# Patient Record
Sex: FEMALE | Race: WHITE | NOT HISPANIC OR LATINO | Employment: OTHER | URBAN - METROPOLITAN AREA
[De-identification: names, ages, dates, MRNs, and addresses within clinical notes are randomized per-mention and may not be internally consistent; named-entity substitution may affect disease eponyms.]

---

## 2018-06-18 ENCOUNTER — OFFICE VISIT (OUTPATIENT)
Dept: SPINE | Facility: CLINIC | Age: 78
End: 2018-06-18
Payer: MEDICARE

## 2018-06-18 VITALS
WEIGHT: 254.31 LBS | HEIGHT: 69 IN | SYSTOLIC BLOOD PRESSURE: 120 MMHG | DIASTOLIC BLOOD PRESSURE: 66 MMHG | HEART RATE: 68 BPM | BODY MASS INDEX: 37.67 KG/M2

## 2018-06-18 DIAGNOSIS — Z98.890 H/O CERVICAL SPINE SURGERY: Primary | ICD-10-CM

## 2018-06-18 DIAGNOSIS — M54.12 CERVICAL RADICULOPATHY: ICD-10-CM

## 2018-06-18 PROCEDURE — 99999 PR PBB SHADOW E&M-NEW PATIENT-LVL IV: CPT | Mod: PBBFAC,,, | Performed by: PHYSICIAN ASSISTANT

## 2018-06-18 PROCEDURE — 99203 OFFICE O/P NEW LOW 30 MIN: CPT | Mod: S$PBB,,, | Performed by: PHYSICIAN ASSISTANT

## 2018-06-18 PROCEDURE — 99204 OFFICE O/P NEW MOD 45 MIN: CPT | Mod: PBBFAC,PN | Performed by: PHYSICIAN ASSISTANT

## 2018-06-18 RX ORDER — TIZANIDINE 4 MG/1
4 TABLET ORAL EVERY 8 HOURS PRN
Qty: 40 TABLET | Refills: 0 | Status: SHIPPED | OUTPATIENT
Start: 2018-06-18 | End: 2018-07-16 | Stop reason: SDUPTHER

## 2018-06-18 RX ORDER — METHYLPREDNISOLONE 4 MG/1
TABLET ORAL
Qty: 1 PACKAGE | Refills: 0 | Status: SHIPPED | OUTPATIENT
Start: 2018-06-18 | End: 2018-07-09

## 2018-06-18 NOTE — LETTER
June 19, 2018      Adriana Gunter MD  [address] [facility]  [facility] [address]           [place] [facility]  [address] [facility] [address]  [facility] [address]  Phone: [phone]  Fax: [phone]          Patient: Selena Moulton   MR Number: [id number]   YOB: 1940   Date of Visit: 6/18/2018       Dear Dr. Adriana Gunter:    Thank you for referring Selena Moulton to me for evaluation. Attached you will find relevant portions of my assessment and plan of care.    If you have questions, please do not hesitate to call me. I look forward to following Selena Moulton along with you.    Sincerely,    Lynnette Chavarria PA-C    Enclosure  CC:  No Recipients    If you would like to receive this communication electronically, please contact [email or web address] or [phone] to request more information on [employer] Link access.    For providers and/or their staff who would like to refer a patient to [facility], please contact us through our one-stop-shop provider referral line, [facility], at [phone].    If you feel you have received this communication in error or would no longer like to receive these types of communications, please e-mail [email or web address]

## 2018-06-19 NOTE — PROGRESS NOTES
Neurosurgery History & Physical    Patient ID: Selena Moulton is a 77 y.o. female.    Chief Complaint   Patient presents with    Neck Pain     Has had 3 surgeries on her neck. Has had pain for one month. Pain radiates down both arms into hands and hands are numb. Pain is worse in the left hand. Nothing makes pain better. Pain comes and goes.       Review of Systems   Constitutional: Negative for activity change, appetite change, chills, fever and unexpected weight change.   HENT: Negative for tinnitus, trouble swallowing and voice change.    Respiratory: Negative for apnea, cough, chest tightness and shortness of breath.    Cardiovascular: Negative for chest pain and palpitations.   Gastrointestinal: Negative for constipation, diarrhea, nausea and vomiting.   Genitourinary: Negative for difficulty urinating, dysuria, frequency and urgency.   Musculoskeletal: Positive for myalgias. Negative for back pain, gait problem, neck pain and neck stiffness.   Skin: Negative for wound.   Neurological: Positive for numbness. Negative for dizziness, tremors, seizures, facial asymmetry, speech difficulty, weakness, light-headedness and headaches.   Psychiatric/Behavioral: Negative for confusion and decreased concentration.       Past Medical History:   Diagnosis Date    Gout     Hypertension      Social History     Social History    Marital status:      Spouse name: N/A    Number of children: N/A    Years of education: N/A     Occupational History    Not on file.     Social History Main Topics    Smoking status: Never Smoker    Smokeless tobacco: Not on file    Alcohol use Not on file    Drug use: Unknown    Sexual activity: Not on file     Other Topics Concern    Not on file     Social History Narrative    No narrative on file     No family history on file.  Review of patient's allergies indicates:   Allergen Reactions    Influenza virus vaccines     Latex, natural rubber     Morpholine analogues   "   Penicillins     Tetanus vaccines and toxoid        Current Outpatient Prescriptions:     allopurinol (ZYLOPRIM) 100 MG tablet, Take 300 mg by mouth once daily. , Disp: , Rfl:     amLODIPine (NORVASC) 5 MG tablet, Take 5 mg by mouth once daily., Disp: , Rfl:     aspirin (ECOTRIN) 81 MG EC tablet, Take 81 mg by mouth once daily., Disp: , Rfl:     atenolol (TENORMIN) 100 MG tablet, Take 100 mg by mouth once daily., Disp: , Rfl:     atorvastatin (LIPITOR) 80 MG tablet, Take 80 mg by mouth once daily., Disp: , Rfl:     fish oil-omega-3 fatty acids 300-1,000 mg capsule, Take by mouth once daily., Disp: , Rfl:     meloxicam (MOBIC) 15 MG tablet, Take 15 mg by mouth once daily., Disp: , Rfl:     methylPREDNISolone (MEDROL, ZAHIRA,) 4 mg tablet, use as directed, Disp: 1 Package, Rfl: 0    multivitamin capsule, Take 1 capsule by mouth once daily., Disp: , Rfl:     omeprazole (PRILOSEC) 20 MG capsule, Take 20 mg by mouth 2 (two) times daily., Disp: , Rfl:     tiZANidine (ZANAFLEX) 4 MG tablet, Take 1 tablet (4 mg total) by mouth every 8 (eight) hours as needed (muscle spasms)., Disp: 40 tablet, Rfl: 0    triamterene-hydrochlorothiazide 37.5-25 mg (DYAZIDE) 37.5-25 mg per capsule, Take 1 capsule by mouth every morning., Disp: , Rfl:     Vitals:    06/18/18 1308   BP: 120/66   Pulse: 68   Weight: 115.4 kg (254 lb 4.8 oz)   Height: 5' 9" (1.753 m)       Physical Exam   Constitutional: She is oriented to person, place, and time. She appears well-developed and well-nourished.   HENT:   Head: Normocephalic and atraumatic.   Eyes: Pupils are equal, round, and reactive to light.   Neck: Normal range of motion. Neck supple.   Cardiovascular: Normal rate.    Pulmonary/Chest: Effort normal.   Musculoskeletal: Normal range of motion. She exhibits no edema.   Neurological: She is alert and oriented to person, place, and time. She has a normal Finger-Nose-Finger Test, a normal Heel to Shin Test, a normal Romberg Test and a " normal Tandem Gait Test. Gait normal.   Reflex Scores:       Tricep reflexes are 2+ on the right side and 2+ on the left side.       Bicep reflexes are 2+ on the right side and 2+ on the left side.       Brachioradialis reflexes are 2+ on the right side and 2+ on the left side.       Patellar reflexes are 2+ on the right side and 2+ on the left side.       Achilles reflexes are 2+ on the right side and 2+ on the left side.  Skin: Skin is warm, dry and intact.   Psychiatric: She has a normal mood and affect. Her speech is normal and behavior is normal. Judgment and thought content normal.   Nursing note and vitals reviewed.      Neurologic Exam     Mental Status   Oriented to person, place, and time.   Oriented to person.   Oriented to place.   Oriented to time.   Follows 3 step commands.   Attention: normal. Concentration: normal.   Speech: speech is normal   Level of consciousness: alert  Knowledge: consistent with education.   Able to name object. Able to read. Able to repeat. Able to write. Normal comprehension.     Cranial Nerves     CN II   Visual acuity: normal  Right visual field deficit: none  Left visual field deficit: none     CN III, IV, VI   Pupils are equal, round, and reactive to light.  Right pupil: Size: 3 mm. Shape: regular. Reactivity: brisk. Consensual response: intact.   Left pupil: Size: 3 mm. Shape: regular. Reactivity: brisk. Consensual response: intact.   CN III: no CN III palsy  CN VI: no CN VI palsy  Nystagmus: none   Diplopia: none  Ophthalmoparesis: none  Conjugate gaze: present    CN V   Right facial sensation deficit: none  Left facial sensation deficit: none    CN VII   Right facial weakness: none  Left facial weakness: none    CN VIII   Hearing: intact    CN IX, X   CN IX normal.   CN X normal.     CN XI   Right sternocleidomastoid strength: normal  Left sternocleidomastoid strength: normal  Right trapezius strength: normal  Left trapezius strength: normal    CN XII   Fasciculations:  absent  Tongue deviation: none    Motor Exam   Muscle bulk: normal  Overall muscle tone: normal  Right arm pronator drift: absent  Left arm pronator drift: absent    Strength   Right neck flexion: 5/5  Left neck flexion: 5/5  Right neck extension: 5/5  Left neck extension: 5/5  Right deltoid: 5/5  Left deltoid: 5/5  Right biceps: 5/5  Left biceps: 5/5  Right triceps: 5/5  Left triceps: 5/5  Right wrist flexion: 5/5  Left wrist flexion: 5/5  Right wrist extension: 5/5  Left wrist extension: 5/5  Right interossei: 5/5  Left interossei: 5/5  Right abdominals: 5/5  Left abdominals: 5/  Right iliopsoas:   Left iliopsoas:   Right quadriceps:   Left quadriceps:   Right hamstrin/5  Left hamstrin/5  Right glutei:   Left glutei:   Right anterior tibial:   Left anterior tibial:   Right posterior tibial:   Left posterior tibial:   Right peroneal: 5  Left peroneal:   Right gastroc: 5/  Left gastroc:     Sensory Exam   Right arm light touch: normal  Left arm light touch: normal  Right leg light touch: normal  Left leg light touch: normal  Right arm vibration: normal  Left arm vibration: normal  Right arm pinprick: normal  Left arm pinprick: normal    Gait, Coordination, and Reflexes     Gait  Gait: normal    Coordination   Romberg: negative  Finger to nose coordination: normal  Heel to shin coordination: normal  Tandem walking coordination: normal    Tremor   Resting tremor: absent  Intention tremor: absent  Action tremor: absent    Reflexes   Right brachioradialis: 2+  Left brachioradialis: 2+  Right biceps: 2+  Left biceps: 2+  Right triceps: 2+  Left triceps: 2+  Right patellar: 2+  Left patellar: 2+  Right achilles: 2+  Left achilles: 2+  Right Cordon: absent  Left Cordon: absent  Right ankle clonus: absent  Left ankle clonus: absent      Provider dictation:  77 year old female with history of 2 prior cervical spine surgeries is referred through the [facility] ER for left arm/ hand  pain.  She recalls surgery in 1983 and 1984 of C5/7 anterior fusion and C5/6 posterior wiring fusion.  She has done well since surgery without significant complications.  Her current symptoms started 1 month ago without trauma.  She describes pain in the from the left shoulder to the left hand and numbness in the left hand digits 1,2,3 greater than 4,5.  It was initally intermittent, but is now constant.  She also has intermittent numbness in the right hand.  She has tried mobic and flexeril with some benefit.  She has not had PT or CLYDE.  Oswestry score: 40%.  PHQ:  4.    She is neurologically intact on exam without focal deficts noted.  Phalens and tinels are negative at the wrist and elbow bilaterally.    I have reviewed xrays of the cervical spine from 6-7-18 demonstrating post operative changes of anterior fusion forom C5-C7 and posterior wiring at C5/6.  Degenerative changes are seen throughout.  There is no acute abnormality.    Ms. Moulton has history of C5-C7 fusion surgeries with acute onset left arm pain in a C6, C7 pattern greater than the right side.  Post op changes and cervical spondylosis are seen on xrays.  At this time, I recommend further imaging with MRI cervical spine to determine if there is any significant neural compression.  To help with pain, I will prescribe methylprednisolone pack and zanaflex.  Follow up after imaging is complete.    Visit Diagnosis:  H/O cervical spine surgery  -     MRI Cervical Spine Without Contrast; Future; Expected date: 06/18/2018  -     tiZANidine (ZANAFLEX) 4 MG tablet; Take 1 tablet (4 mg total) by mouth every 8 (eight) hours as needed (muscle spasms).  Dispense: 40 tablet; Refill: 0  -     methylPREDNISolone (MEDROL, ZAHIRA,) 4 mg tablet; use as directed  Dispense: 1 Package; Refill: 0    Cervical radiculopathy  -     MRI Cervical Spine Without Contrast; Future; Expected date: 06/18/2018  -     tiZANidine (ZANAFLEX) 4 MG tablet; Take 1 tablet (4 mg total) by mouth  every 8 (eight) hours as needed (muscle spasms).  Dispense: 40 tablet; Refill: 0  -     methylPREDNISolone (MEDROL, ZAHIRA,) 4 mg tablet; use as directed  Dispense: 1 Package; Refill: 0        Total time spent counseling greater than fifty percent of total visit time.  Counseling included discussion regarding imaging findings, diagnosis possibilities, treatment options, risks and benefits.   The patient had many questions regarding the options and long-term effects.

## 2018-06-21 ENCOUNTER — HOSPITAL ENCOUNTER (OUTPATIENT)
Dept: RADIOLOGY | Facility: HOSPITAL | Age: 78
Discharge: HOME | End: 2018-06-21
Attending: PHYSICIAN ASSISTANT
Payer: MEDICARE

## 2018-06-21 DIAGNOSIS — Z98.890 H/O CERVICAL SPINE SURGERY: ICD-10-CM

## 2018-06-21 DIAGNOSIS — M54.12 CERVICAL RADICULOPATHY: ICD-10-CM

## 2018-06-21 PROCEDURE — 72141 MRI NECK SPINE W/O DYE: CPT | Mod: TC,PO

## 2018-06-21 PROCEDURE — 72141 MRI NECK SPINE W/O DYE: CPT | Mod: 26,,, | Performed by: RADIOLOGY

## 2018-06-28 ENCOUNTER — OFFICE VISIT (OUTPATIENT)
Dept: SPINE | Facility: CLINIC | Age: 78
End: 2018-06-28
Payer: MEDICARE

## 2018-06-28 VITALS
BODY MASS INDEX: 37.68 KG/M2 | SYSTOLIC BLOOD PRESSURE: 99 MMHG | WEIGHT: 254.44 LBS | DIASTOLIC BLOOD PRESSURE: 62 MMHG | HEART RATE: 60 BPM | HEIGHT: 69 IN

## 2018-06-28 DIAGNOSIS — M47.812 SPONDYLOSIS OF CERVICAL REGION WITHOUT MYELOPATHY OR RADICULOPATHY: ICD-10-CM

## 2018-06-28 DIAGNOSIS — Z98.890 H/O CERVICAL SPINE SURGERY: Primary | ICD-10-CM

## 2018-06-28 PROCEDURE — 99999 PR PBB SHADOW E&M-EST. PATIENT-LVL III: CPT | Mod: PBBFAC,,, | Performed by: PHYSICIAN ASSISTANT

## 2018-06-28 PROCEDURE — 99214 OFFICE O/P EST MOD 30 MIN: CPT | Mod: S$PBB,,, | Performed by: PHYSICIAN ASSISTANT

## 2018-06-28 PROCEDURE — 99213 OFFICE O/P EST LOW 20 MIN: CPT | Mod: PBBFAC,PN | Performed by: PHYSICIAN ASSISTANT

## 2018-06-28 NOTE — PROGRESS NOTES
Neurosurgery History & Physical    Patient ID: Selena Moulton is a 77 y.o. female.    Chief Complaint   Patient presents with    Follow-up     MRI       Review of Systems   Constitutional: Negative for activity change, appetite change, chills, fever and unexpected weight change.   HENT: Negative for tinnitus, trouble swallowing and voice change.    Respiratory: Negative for apnea, cough, chest tightness and shortness of breath.    Cardiovascular: Negative for chest pain and palpitations.   Gastrointestinal: Negative for constipation, diarrhea, nausea and vomiting.   Genitourinary: Negative for difficulty urinating, dysuria, frequency and urgency.   Musculoskeletal: Positive for myalgias. Negative for back pain, gait problem, neck pain and neck stiffness.   Skin: Negative for wound.   Neurological: Positive for numbness. Negative for dizziness, tremors, seizures, facial asymmetry, speech difficulty, weakness, light-headedness and headaches.   Psychiatric/Behavioral: Negative for confusion and decreased concentration.       Past Medical History:   Diagnosis Date    Gout     Hypertension      Social History     Social History    Marital status:      Spouse name: N/A    Number of children: N/A    Years of education: N/A     Occupational History    Not on file.     Social History Main Topics    Smoking status: Never Smoker    Smokeless tobacco: Not on file    Alcohol use Not on file    Drug use: Unknown    Sexual activity: Not on file     Other Topics Concern    Not on file     Social History Narrative    No narrative on file     No family history on file.  Review of patient's allergies indicates:   Allergen Reactions    Influenza virus vaccines     Latex, natural rubber     Morpholine analogues     Penicillins     Tetanus vaccines and toxoid        Current Outpatient Prescriptions:     allopurinol (ZYLOPRIM) 100 MG tablet, Take 300 mg by mouth once daily. , Disp: , Rfl:     amLODIPine  "(NORVASC) 5 MG tablet, Take 5 mg by mouth once daily., Disp: , Rfl:     aspirin (ECOTRIN) 81 MG EC tablet, Take 81 mg by mouth once daily., Disp: , Rfl:     atenolol (TENORMIN) 100 MG tablet, Take 100 mg by mouth once daily., Disp: , Rfl:     atorvastatin (LIPITOR) 80 MG tablet, Take 80 mg by mouth once daily., Disp: , Rfl:     fish oil-omega-3 fatty acids 300-1,000 mg capsule, Take by mouth once daily., Disp: , Rfl:     meloxicam (MOBIC) 15 MG tablet, Take 15 mg by mouth once daily., Disp: , Rfl:     methylPREDNISolone (MEDROL, ZAHIRA,) 4 mg tablet, use as directed, Disp: 1 Package, Rfl: 0    multivitamin capsule, Take 1 capsule by mouth once daily., Disp: , Rfl:     omeprazole (PRILOSEC) 20 MG capsule, Take 20 mg by mouth 2 (two) times daily., Disp: , Rfl:     tiZANidine (ZANAFLEX) 4 MG tablet, Take 1 tablet (4 mg total) by mouth every 8 (eight) hours as needed (muscle spasms)., Disp: 40 tablet, Rfl: 0    triamterene-hydrochlorothiazide 37.5-25 mg (DYAZIDE) 37.5-25 mg per capsule, Take 1 capsule by mouth every morning., Disp: , Rfl:     Vitals:    06/28/18 1018   BP: 99/62   BP Location: Right arm   Patient Position: Sitting   BP Method: Large (Automatic)   Pulse: 60   Weight: 115.4 kg (254 lb 6.6 oz)   Height: 5' 9" (1.753 m)       Physical Exam   Constitutional: She is oriented to person, place, and time. She appears well-developed and well-nourished.   HENT:   Head: Normocephalic and atraumatic.   Eyes: Pupils are equal, round, and reactive to light.   Neck: Normal range of motion. Neck supple.   Cardiovascular: Normal rate.    Pulmonary/Chest: Effort normal.   Musculoskeletal: Normal range of motion. She exhibits no edema.   Neurological: She is alert and oriented to person, place, and time. She has a normal Finger-Nose-Finger Test, a normal Heel to Shin Test, a normal Romberg Test and a normal Tandem Gait Test. Gait normal.   Reflex Scores:       Tricep reflexes are 2+ on the right side and 2+ on the " left side.       Bicep reflexes are 2+ on the right side and 2+ on the left side.       Brachioradialis reflexes are 2+ on the right side and 2+ on the left side.       Patellar reflexes are 2+ on the right side and 2+ on the left side.       Achilles reflexes are 2+ on the right side and 2+ on the left side.  Skin: Skin is warm, dry and intact.   Psychiatric: She has a normal mood and affect. Her speech is normal and behavior is normal. Judgment and thought content normal.   Nursing note and vitals reviewed.      Neurologic Exam     Mental Status   Oriented to person, place, and time.   Oriented to person.   Oriented to place.   Oriented to time.   Follows 3 step commands.   Attention: normal. Concentration: normal.   Speech: speech is normal   Level of consciousness: alert  Knowledge: consistent with education.   Able to name object. Able to read. Able to repeat. Able to write. Normal comprehension.     Cranial Nerves     CN II   Visual acuity: normal  Right visual field deficit: none  Left visual field deficit: none     CN III, IV, VI   Pupils are equal, round, and reactive to light.  Right pupil: Size: 3 mm. Shape: regular. Reactivity: brisk. Consensual response: intact.   Left pupil: Size: 3 mm. Shape: regular. Reactivity: brisk. Consensual response: intact.   CN III: no CN III palsy  CN VI: no CN VI palsy  Nystagmus: none   Diplopia: none  Ophthalmoparesis: none  Conjugate gaze: present    CN V   Right facial sensation deficit: none  Left facial sensation deficit: none    CN VII   Right facial weakness: none  Left facial weakness: none    CN VIII   Hearing: intact    CN IX, X   CN IX normal.   CN X normal.     CN XI   Right sternocleidomastoid strength: normal  Left sternocleidomastoid strength: normal  Right trapezius strength: normal  Left trapezius strength: normal    CN XII   Fasciculations: absent  Tongue deviation: none    Motor Exam   Muscle bulk: normal  Overall muscle tone: normal  Right arm pronator  drift: absent  Left arm pronator drift: absent    Strength   Right neck flexion: 5/5  Left neck flexion: 5/5  Right neck extension: 5/5  Left neck extension: 5/5  Right deltoid: 5/5  Left deltoid: 5/5  Right biceps: 5/5  Left biceps: 5/5  Right triceps: 5/5  Left triceps: 5/5  Right wrist flexion: 5/5  Left wrist flexion: 5/5  Right wrist extension: 5/5  Left wrist extension: 5/5  Right interossei: 5/5  Left interossei: 5/5  Right abdominals: 5/5  Left abdominals: 5/5  Right iliopsoas: 5/5  Left iliopsoas: 5/5  Right quadriceps: 5/5  Left quadriceps: 5/5  Right hamstrin/5  Left hamstrin/5  Right glutei: 5/5  Left glutei: 5/5  Right anterior tibial: 5/5  Left anterior tibial: 5/5  Right posterior tibial: 5/5  Left posterior tibial: 5/5  Right peroneal: 5/5  Left peroneal: 5/5  Right gastroc: 5/5  Left gastroc: 5/5    Sensory Exam   Right arm light touch: normal  Left arm light touch: normal  Right leg light touch: normal  Left leg light touch: normal  Right arm vibration: normal  Left arm vibration: normal  Right arm pinprick: normal  Left arm pinprick: normal    Gait, Coordination, and Reflexes     Gait  Gait: normal    Coordination   Romberg: negative  Finger to nose coordination: normal  Heel to shin coordination: normal  Tandem walking coordination: normal    Tremor   Resting tremor: absent  Intention tremor: absent  Action tremor: absent    Reflexes   Right brachioradialis: 2+  Left brachioradialis: 2+  Right biceps: 2+  Left biceps: 2+  Right triceps: 2+  Left triceps: 2+  Right patellar: 2+  Left patellar: 2+  Right achilles: 2+  Left achilles: 2+  Right Cordon: absent  Left Cordon: absent  Right ankle clonus: absent  Left ankle clonus: absent      Provider dictation:  77 year old female with history of 2 prior cervical spine surgeries presents for follow up of acute onset left arm pain/ numbness after undergoing an MRI of the cervical spine to discuss results.  She recalls surgery in  and   of C5/7 anterior fusion and C5/6 posterior cerclage wire fusion.  She has done well since surgery without significant complications.  At the time of her last visit, she had 1 month onset pain from the left shoulder to the left hand and numbness in the left hand digits 1,2,3 greater than 4,5.  She has completed a steroid taper and using muscle relaxants intermittently.  Pain has resolved with no significant pain today.  Right hand numbness has resolved.  She does continue to have left hand intermittent numbness that is manageable.  She has tried mobic and flexeril with some benefit.  She has not had PT or CLYDE.  Oswestry score: 40%.  PHQ:  4.    She is neurologically intact on exam without focal deficts noted.  Phalens and tinels are negative at the wrist and elbow bilaterally.    I have reviewed xrays of the cervical spine from 6-7-18 demonstrating post operative changes of anterior fusion forom C5-C7 and posterior wiring at C5/6.  Degenerative changes are seen throughout.  There is no acute abnormality.     I have reviewed an MRI of the cervical spine from 6-21-18 demonstrating post op changes at C5/6, C6/7 anterior fusion with cerclage posterior wires at C5/6.  At C2/3 there is a right disk hernia and facet hypertrophy with right foraminal narrowing.  AT C3/4 there is a left disk hernia with left foraminal narrowing.  At C5/6 there is a right osteophyte with with right anterior thecal sac impingement.  No cord signal change is seen.    Ms. Moulton has history of C5-C7 fusion surgeries with acute onset left arm pain in a C6, C7 pattern greater than the right side.  Pain has resolved with oral steroids and muscle relaxants.  Numbness has improved and nyla felt int eh elft hand at this time. She has multilevel cervical spondylosis.  The greatest abnormality to the left is C3/4 spondylosis/ disk hernia with left foraminal narrowing.  She may have had left C4 radiculopathy associated with this, but it would only explain  left neck and upper arm pain; it would not explain pain past the elbow or numbness in the hands.  Symptoms have significantly improved with medications and no further treatment is needed at this time.      Visit Diagnosis:  H/O cervical spine surgery    Spondylosis of cervical region without myelopathy or radiculopathy        Total time spent counseling greater than fifty percent of total visit time.  Counseling included discussion regarding imaging findings, diagnosis possibilities, treatment options, risks and benefits.   The patient had many questions regarding the options and long-term effects.

## 2018-07-12 ENCOUNTER — TELEPHONE (OUTPATIENT)
Dept: ADMINISTRATIVE | Facility: OTHER | Age: 78
End: 2018-07-12

## 2018-07-12 DIAGNOSIS — M54.12 CERVICAL RADICULOPATHY: ICD-10-CM

## 2018-07-12 DIAGNOSIS — Z98.890 H/O CERVICAL SPINE SURGERY: ICD-10-CM

## 2018-07-12 NOTE — TELEPHONE ENCOUNTER
----- Message from Sherrell James sent at 7/12/2018  4:08 PM CDT -----  Contact: self  Patient need to schedule appointment but want to speak with nurse first     Please call to advice  [phone] (home)      CM sent orders for pt HH to [facility]. Previous CM updated system and pt was accepted. CM updated AVS to reflect [facility] provider. There were no additional discharge needs. Care Management Interventions  PCP Verified by CM: Yes (Dr. Eligio Kimball)  Mode of Transport at Discharge:  Other (see comment) (wife or daughter can transport by car)  Transition of Care Consult (CM Consult): Home Health ([facility] )  [facility] - INPATIENT: No  Reason Outside [place]: Patient already serviced by other home care/hospice agency ([facility] )  Discharge [address]: No (has a walker and walking stick )  Health Maintenance Reviewed: Yes  Physical Therapy Consult: Yes  Occupational Therapy Consult: Yes  Speech Therapy Consult: Yes  Current Support Network: Lives with Spouse, Own Home (lives with his wife in a 1 story home with a few steps to the entrance)  Confirm Follow Up Transport: Family  Discharge Location  Discharge Placement: Home with home health     MICHAELLE Ackerman, [address] [place] [address]   [phone]

## 2018-07-13 NOTE — TELEPHONE ENCOUNTER
Spoke with pt and she asked for a refill on her Zanaflex and was also requesting pain medication. I informed her Lynnette was off until Monday and I wasn't sure she would be able to check messages while out, but I would send her one just in case. The pt asked to be scheduled on Monday to speak with Ms. Chavarria and discuss her situation. I scheduled her for Monday at 9:45.

## 2018-07-13 NOTE — TELEPHONE ENCOUNTER
Spoke with pt and informed there were some appts scheduled incorrectly on Lynnette's schedule for Monday so I put her in the next available which was 1 week later. I also informed the pt I did put a msg in to Lynnette regarding the zanaflex refill and pain medication of some sort.

## 2018-07-16 ENCOUNTER — TELEPHONE (OUTPATIENT)
Dept: SPINE | Facility: CLINIC | Age: 78
End: 2018-07-16

## 2018-07-16 RX ORDER — TIZANIDINE 4 MG/1
4 TABLET ORAL EVERY 8 HOURS PRN
Qty: 40 TABLET | Refills: 0 | Status: SHIPPED | OUTPATIENT
Start: 2018-07-16 | End: 2018-09-13

## 2018-07-16 NOTE — TELEPHONE ENCOUNTER
Spoke with patient and let her know that Lynnette has refilled her Zanaflex and gave her the other recommendations as per Lynnette, she indicated understanding.

## 2018-07-16 NOTE — TELEPHONE ENCOUNTER
I have sent in a refill of zanaflex to her pharmacy per her request.    Any other pain medications or additional refills of zanaflex need to come from her pcp or a pain management physician.    She was doing well at the time of her last visit and if she is having continued lpain,, shee needs to keep her appt on 7/23.

## 2018-07-23 ENCOUNTER — OFFICE VISIT (OUTPATIENT)
Dept: SPINE | Facility: CLINIC | Age: 78
End: 2018-07-23
Payer: MEDICARE

## 2018-07-23 ENCOUNTER — TELEPHONE (OUTPATIENT)
Dept: PAIN MEDICINE | Facility: CLINIC | Age: 78
End: 2018-07-23

## 2018-07-23 VITALS
HEART RATE: 60 BPM | SYSTOLIC BLOOD PRESSURE: 98 MMHG | HEIGHT: 69 IN | DIASTOLIC BLOOD PRESSURE: 49 MMHG | WEIGHT: 254.44 LBS | BODY MASS INDEX: 37.68 KG/M2

## 2018-07-23 DIAGNOSIS — M54.12 CERVICAL RADICULOPATHY: ICD-10-CM

## 2018-07-23 DIAGNOSIS — Z98.890 H/O CERVICAL SPINE SURGERY: ICD-10-CM

## 2018-07-23 DIAGNOSIS — M54.12 CERVICAL RADICULOPATHY: Primary | ICD-10-CM

## 2018-07-23 DIAGNOSIS — M47.812 SPONDYLOSIS OF CERVICAL REGION WITHOUT MYELOPATHY OR RADICULOPATHY: Primary | ICD-10-CM

## 2018-07-23 PROCEDURE — 99214 OFFICE O/P EST MOD 30 MIN: CPT | Mod: PBBFAC,PN | Performed by: PHYSICIAN ASSISTANT

## 2018-07-23 PROCEDURE — 99999 PR PBB SHADOW E&M-EST. PATIENT-LVL IV: CPT | Mod: PBBFAC,,, | Performed by: PHYSICIAN ASSISTANT

## 2018-07-23 PROCEDURE — 99213 OFFICE O/P EST LOW 20 MIN: CPT | Mod: S$PBB,,, | Performed by: PHYSICIAN ASSISTANT

## 2018-07-23 NOTE — PROGRESS NOTES
Neurosurgery History & Physical    Patient ID: Selena Moulton is a 77 y.o. female.    Chief Complaint   Patient presents with    Arm Pain     Has severe pain in left hand radiating up arm into left shoulder. The steroid pack helped and then the pain came back worse. She can no longer tolerate the pain.       Review of Systems   Constitutional: Negative for activity change, appetite change, chills, fever and unexpected weight change.   HENT: Negative for tinnitus, trouble swallowing and voice change.    Respiratory: Negative for apnea, cough, chest tightness and shortness of breath.    Cardiovascular: Negative for chest pain and palpitations.   Gastrointestinal: Negative for constipation, diarrhea, nausea and vomiting.   Genitourinary: Negative for difficulty urinating, dysuria, frequency and urgency.   Musculoskeletal: Positive for myalgias. Negative for back pain, gait problem, neck pain and neck stiffness.   Skin: Negative for wound.   Neurological: Positive for numbness. Negative for dizziness, tremors, seizures, facial asymmetry, speech difficulty, weakness, light-headedness and headaches.   Psychiatric/Behavioral: Negative for confusion and decreased concentration.       Past Medical History:   Diagnosis Date    Gout     Hypertension      Social History     Social History    Marital status:      Spouse name: N/A    Number of children: N/A    Years of education: N/A     Occupational History    Not on file.     Social History Main Topics    Smoking status: Never Smoker    Smokeless tobacco: Not on file    Alcohol use Not on file    Drug use: Unknown    Sexual activity: Not on file     Other Topics Concern    Not on file     Social History Narrative    No narrative on file     No family history on file.  Review of patient's allergies indicates:   Allergen Reactions    Influenza virus vaccines     Latex, natural rubber     Morpholine analogues     Penicillins     Tetanus vaccines and  "toxoid        Current Outpatient Prescriptions:     allopurinol (ZYLOPRIM) 100 MG tablet, Take 300 mg by mouth once daily. , Disp: , Rfl:     amLODIPine (NORVASC) 5 MG tablet, Take 5 mg by mouth once daily., Disp: , Rfl:     aspirin (ECOTRIN) 81 MG EC tablet, Take 81 mg by mouth once daily., Disp: , Rfl:     atenolol (TENORMIN) 100 MG tablet, Take 100 mg by mouth once daily., Disp: , Rfl:     atorvastatin (LIPITOR) 80 MG tablet, Take 80 mg by mouth once daily., Disp: , Rfl:     fish oil-omega-3 fatty acids 300-1,000 mg capsule, Take by mouth once daily., Disp: , Rfl:     meloxicam (MOBIC) 15 MG tablet, Take 15 mg by mouth once daily., Disp: , Rfl:     multivitamin capsule, Take 1 capsule by mouth once daily., Disp: , Rfl:     omeprazole (PRILOSEC) 20 MG capsule, Take 20 mg by mouth 2 (two) times daily., Disp: , Rfl:     tiZANidine (ZANAFLEX) 4 MG tablet, Take 1 tablet (4 mg total) by mouth every 8 (eight) hours as needed (muscle spasms)., Disp: 40 tablet, Rfl: 0    triamterene-hydrochlorothiazide 37.5-25 mg (DYAZIDE) 37.5-25 mg per capsule, Take 1 capsule by mouth every morning., Disp: , Rfl:     Vitals:    07/23/18 1030   BP: (!) 98/49   BP Location: Right arm   Patient Position: Sitting   BP Method: Large (Automatic)   Pulse: 60   Weight: 115.4 kg (254 lb 6.6 oz)   Height: 5' 9" (1.753 m)       Physical Exam   Constitutional: She is oriented to person, place, and time. She appears well-developed and well-nourished.   HENT:   Head: Normocephalic and atraumatic.   Eyes: Pupils are equal, round, and reactive to light.   Neck: Normal range of motion. Neck supple.   Cardiovascular: Normal rate.    Pulmonary/Chest: Effort normal.   Musculoskeletal: Normal range of motion. She exhibits no edema.   Neurological: She is alert and oriented to person, place, and time. She has a normal Finger-Nose-Finger Test, a normal Heel to Shin Test, a normal Romberg Test and a normal Tandem Gait Test. Gait normal.   Reflex " Scores:       Tricep reflexes are 2+ on the right side and 2+ on the left side.       Bicep reflexes are 2+ on the right side and 2+ on the left side.       Brachioradialis reflexes are 2+ on the right side and 2+ on the left side.       Patellar reflexes are 2+ on the right side and 2+ on the left side.       Achilles reflexes are 2+ on the right side and 2+ on the left side.  Skin: Skin is warm, dry and intact.   Psychiatric: She has a normal mood and affect. Her speech is normal and behavior is normal. Judgment and thought content normal.   Nursing note and vitals reviewed.      Neurologic Exam     Mental Status   Oriented to person, place, and time.   Oriented to person.   Oriented to place.   Oriented to time.   Follows 3 step commands.   Attention: normal. Concentration: normal.   Speech: speech is normal   Level of consciousness: alert  Knowledge: consistent with education.   Able to name object. Able to read. Able to repeat. Able to write. Normal comprehension.     Cranial Nerves     CN II   Visual acuity: normal  Right visual field deficit: none  Left visual field deficit: none     CN III, IV, VI   Pupils are equal, round, and reactive to light.  Right pupil: Size: 3 mm. Shape: regular. Reactivity: brisk. Consensual response: intact.   Left pupil: Size: 3 mm. Shape: regular. Reactivity: brisk. Consensual response: intact.   CN III: no CN III palsy  CN VI: no CN VI palsy  Nystagmus: none   Diplopia: none  Ophthalmoparesis: none  Conjugate gaze: present    CN V   Right facial sensation deficit: none  Left facial sensation deficit: none    CN VII   Right facial weakness: none  Left facial weakness: none    CN VIII   Hearing: intact    CN IX, X   CN IX normal.   CN X normal.     CN XI   Right sternocleidomastoid strength: normal  Left sternocleidomastoid strength: normal  Right trapezius strength: normal  Left trapezius strength: normal    CN XII   Fasciculations: absent  Tongue deviation: none    Motor Exam    Muscle bulk: normal  Overall muscle tone: normal  Right arm pronator drift: absent  Left arm pronator drift: absent    Strength   Right neck flexion: 5/5  Left neck flexion: 5/5  Right neck extension: 5/5  Left neck extension: 5/5  Right deltoid: 5/5  Left deltoid: 5/5  Right biceps: 5/5  Left biceps: 5/5  Right triceps: 5/5  Left triceps: 5/5  Right wrist flexion: 5/5  Left wrist flexion: 5/5  Right wrist extension: 5/5  Left wrist extension: 5/5  Right interossei: 5/5  Left interossei: 5/5  Right abdominals: 5/5  Left abdominals: 5/5  Right iliopsoas: 5/5  Left iliopsoas: 5/5  Right quadriceps: 5/5  Left quadriceps: 5/5  Right hamstrin/5  Left hamstrin/5  Right glutei: 5/5  Left glutei: 5/  Right anterior tibial: 5/5  Left anterior tibial: 5/5  Right posterior tibial: 5/5  Left posterior tibial: 5/5  Right peroneal: 5/5  Left peroneal: 5/5  Right gastroc: 5/5  Left gastroc: 5/    Sensory Exam   Right arm light touch: normal  Left arm light touch: normal  Right leg light touch: normal  Left leg light touch: normal  Right arm vibration: normal  Left arm vibration: normal  Right arm pinprick: normal  Left arm pinprick: normal    Gait, Coordination, and Reflexes     Gait  Gait: normal    Coordination   Romberg: negative  Finger to nose coordination: normal  Heel to shin coordination: normal  Tandem walking coordination: normal    Tremor   Resting tremor: absent  Intention tremor: absent  Action tremor: absent    Reflexes   Right brachioradialis: 2+  Left brachioradialis: 2+  Right biceps: 2+  Left biceps: 2+  Right triceps: 2+  Left triceps: 2+  Right patellar: 2+  Left patellar: 2+  Right achilles: 2+  Left achilles: 2+  Right Cordon: absent  Left Cordon: absent  Right ankle clonus: absent  Left ankle clonus: absent      Provider dictation:  77 year old female presents for follow up of left arm radiculopathy.  She has a history of 2 prior cervical spine surgeries in  and  of C5/7 anterior fusion  and C5/6 posterior cerclage wire fusion.  She did well after surgery without significant complications.  She has had recent onset pain from the left shoulder to the left hand and numbness in the left hand digits 1,2,3 greater than 4,5.  She has completed a steroid taper and used muscle relaxants with complete resolution of pain at the time of her last visit.  But since then pain has recurred in the same area and is worse.  It is also associated with numbness in the left hand.  She has not had PT or CLYDE.  Oswestry score: 40%.  PHQ:  4.    She is neurologically intact on exam without focal deficts noted.  Phalens and tinels are negative at the wrist and elbow bilaterally.    I have reviewed xrays of the cervical spine from 6-7-18 demonstrating post operative changes of anterior fusion forom C5-C7 and posterior wiring at C5/6.  Degenerative changes are seen throughout.  There is no acute abnormality.     I have re-reviewed an MRI of the cervical spine from 6-21-18 demonstrating post op changes at C5/6, C6/7 anterior fusion with cerclage posterior wires at C5/6.  At C2/3 there is a right disk hernia and facet hypertrophy with right foraminal narrowing.  AT C3/4 there is a left disk hernia with left foraminal narrowing.  At C5/6 there is a right osteophyte with with right anterior thecal sac impingement.  No cord signal change is seen.  There is no significant left foraminal narrowing at C5/6 or C6/7.    Ms. Moulton has history of C5-C7 fusion surgeries with acute onset left arm pain in a C6, C7 pattern greater than the right side.  She has normal post op changes and no focal left C5/6 or C6/7 foraminal narrowing to explain her left upper extremitiy symptoms.  I recommend EMG/ NCV at this time to further evaluate for the source of pain as MRI does not explain it. I am also referring her to pain management for consideration of CLYDE since oral steroids helped her significantly.  Follow up with me after nerve  testing.    Visit Diagnosis:  Spondylosis of cervical region without myelopathy or radiculopathy  -     EMG W/ ULTRASOUND AND NERVE CONDUCTION TEST 1 Extremity; Future  -     Ambulatory referral to Pain Clinic    Cervical radiculopathy  -     EMG W/ ULTRASOUND AND NERVE CONDUCTION TEST 1 Extremity; Future  -     Ambulatory referral to Pain Clinic    H/O cervical spine surgery  -     EMG W/ ULTRASOUND AND NERVE CONDUCTION TEST 1 Extremity; Future  -     Ambulatory referral to Pain Clinic        Total time spent counseling greater than fifty percent of total visit time.  Counseling included discussion regarding imaging findings, diagnosis possibilities, treatment options, risks and benefits.   The patient had many questions regarding the options and long-term effects.

## 2018-07-26 ENCOUNTER — OFFICE VISIT (OUTPATIENT)
Dept: SPINE | Facility: CLINIC | Age: 78
End: 2018-07-26
Payer: MEDICARE

## 2018-07-26 VITALS
SYSTOLIC BLOOD PRESSURE: 91 MMHG | WEIGHT: 254.44 LBS | BODY MASS INDEX: 37.68 KG/M2 | HEART RATE: 61 BPM | HEIGHT: 69 IN | DIASTOLIC BLOOD PRESSURE: 46 MMHG

## 2018-07-26 DIAGNOSIS — Z98.890 H/O CERVICAL SPINE SURGERY: ICD-10-CM

## 2018-07-26 DIAGNOSIS — M47.812 SPONDYLOSIS OF CERVICAL REGION WITHOUT MYELOPATHY OR RADICULOPATHY: ICD-10-CM

## 2018-07-26 DIAGNOSIS — G56.02 CARPAL TUNNEL SYNDROME OF LEFT WRIST: Primary | ICD-10-CM

## 2018-07-26 PROCEDURE — 99213 OFFICE O/P EST LOW 20 MIN: CPT | Mod: S$PBB,,, | Performed by: PHYSICIAN ASSISTANT

## 2018-07-26 PROCEDURE — 99214 OFFICE O/P EST MOD 30 MIN: CPT | Mod: PBBFAC,PN | Performed by: PHYSICIAN ASSISTANT

## 2018-07-26 PROCEDURE — 99999 PR PBB SHADOW E&M-EST. PATIENT-LVL IV: CPT | Mod: PBBFAC,,, | Performed by: PHYSICIAN ASSISTANT

## 2018-07-31 NOTE — PROGRESS NOTES
Neurosurgery History & Physical    Patient ID: Selena Moulton is a 77 y.o. female.    Chief Complaint   Patient presents with    Follow-up     EMG results       Review of Systems   Constitutional: Negative for activity change, appetite change, chills, fever and unexpected weight change.   HENT: Negative for tinnitus, trouble swallowing and voice change.    Respiratory: Negative for apnea, cough, chest tightness and shortness of breath.    Cardiovascular: Negative for chest pain and palpitations.   Gastrointestinal: Negative for constipation, diarrhea, nausea and vomiting.   Genitourinary: Negative for difficulty urinating, dysuria, frequency and urgency.   Musculoskeletal: Positive for myalgias. Negative for back pain, gait problem, neck pain and neck stiffness.   Skin: Negative for wound.   Neurological: Positive for numbness. Negative for dizziness, tremors, seizures, facial asymmetry, speech difficulty, weakness, light-headedness and headaches.   Psychiatric/Behavioral: Negative for confusion and decreased concentration.       Past Medical History:   Diagnosis Date    Gout     Hypertension      Social History     Social History    Marital status:      Spouse name: N/A    Number of children: N/A    Years of education: N/A     Occupational History    Not on file.     Social History Main Topics    Smoking status: Never Smoker    Smokeless tobacco: Not on file    Alcohol use Not on file    Drug use: Unknown    Sexual activity: Not on file     Other Topics Concern    Not on file     Social History Narrative    No narrative on file     No family history on file.  Review of patient's allergies indicates:   Allergen Reactions    Influenza virus vaccines     Latex, natural rubber     Morpholine analogues     Penicillins     Tetanus vaccines and toxoid        Current Outpatient Prescriptions:     allopurinol (ZYLOPRIM) 100 MG tablet, Take 300 mg by mouth once daily. , Disp: , Rfl:      "amLODIPine (NORVASC) 5 MG tablet, Take 5 mg by mouth once daily., Disp: , Rfl:     aspirin (ECOTRIN) 81 MG EC tablet, Take 81 mg by mouth once daily., Disp: , Rfl:     atenolol (TENORMIN) 100 MG tablet, Take 100 mg by mouth once daily., Disp: , Rfl:     atorvastatin (LIPITOR) 80 MG tablet, Take 80 mg by mouth once daily., Disp: , Rfl:     fish oil-omega-3 fatty acids 300-1,000 mg capsule, Take by mouth once daily., Disp: , Rfl:     meloxicam (MOBIC) 15 MG tablet, Take 15 mg by mouth once daily., Disp: , Rfl:     multivitamin capsule, Take 1 capsule by mouth once daily., Disp: , Rfl:     omeprazole (PRILOSEC) 20 MG capsule, Take 20 mg by mouth 2 (two) times daily., Disp: , Rfl:     tiZANidine (ZANAFLEX) 4 MG tablet, Take 1 tablet (4 mg total) by mouth every 8 (eight) hours as needed (muscle spasms)., Disp: 40 tablet, Rfl: 0    triamterene-hydrochlorothiazide 37.5-25 mg (DYAZIDE) 37.5-25 mg per capsule, Take 1 capsule by mouth every morning., Disp: , Rfl:     Vitals:    07/26/18 1057   BP: (!) 91/46   BP Location: Right arm   Patient Position: Sitting   BP Method: Medium (Automatic)   Pulse: 61   Weight: 115.4 kg (254 lb 6.6 oz)   Height: 5' 9" (1.753 m)       Physical Exam   Constitutional: She is oriented to person, place, and time. She appears well-developed and well-nourished.   HENT:   Head: Normocephalic and atraumatic.   Eyes: Pupils are equal, round, and reactive to light.   Neck: Normal range of motion. Neck supple.   Cardiovascular: Normal rate.    Pulmonary/Chest: Effort normal.   Musculoskeletal: Normal range of motion. She exhibits no edema.   Neurological: She is alert and oriented to person, place, and time. She has a normal Finger-Nose-Finger Test, a normal Heel to Shin Test, a normal Romberg Test and a normal Tandem Gait Test. Gait normal.   Reflex Scores:       Tricep reflexes are 2+ on the right side and 2+ on the left side.       Bicep reflexes are 2+ on the right side and 2+ on the left " side.       Brachioradialis reflexes are 2+ on the right side and 2+ on the left side.       Patellar reflexes are 2+ on the right side and 2+ on the left side.       Achilles reflexes are 2+ on the right side and 2+ on the left side.  Skin: Skin is warm, dry and intact.   Psychiatric: She has a normal mood and affect. Her speech is normal and behavior is normal. Judgment and thought content normal.   Nursing note and vitals reviewed.      Neurologic Exam     Mental Status   Oriented to person, place, and time.   Oriented to person.   Oriented to place.   Oriented to time.   Follows 3 step commands.   Attention: normal. Concentration: normal.   Speech: speech is normal   Level of consciousness: alert  Knowledge: consistent with education.   Able to name object. Able to read. Able to repeat. Able to write. Normal comprehension.     Cranial Nerves     CN II   Visual acuity: normal  Right visual field deficit: none  Left visual field deficit: none     CN III, IV, VI   Pupils are equal, round, and reactive to light.  Right pupil: Size: 3 mm. Shape: regular. Reactivity: brisk. Consensual response: intact.   Left pupil: Size: 3 mm. Shape: regular. Reactivity: brisk. Consensual response: intact.   CN III: no CN III palsy  CN VI: no CN VI palsy  Nystagmus: none   Diplopia: none  Ophthalmoparesis: none  Conjugate gaze: present    CN V   Right facial sensation deficit: none  Left facial sensation deficit: none    CN VII   Right facial weakness: none  Left facial weakness: none    CN VIII   Hearing: intact    CN IX, X   CN IX normal.   CN X normal.     CN XI   Right sternocleidomastoid strength: normal  Left sternocleidomastoid strength: normal  Right trapezius strength: normal  Left trapezius strength: normal    CN XII   Fasciculations: absent  Tongue deviation: none    Motor Exam   Muscle bulk: normal  Overall muscle tone: normal  Right arm pronator drift: absent  Left arm pronator drift: absent    Strength   Right neck  flexion: 5/5  Left neck flexion: 5/5  Right neck extension: 5/5  Left neck extension: 5/5  Right deltoid: 5/5  Left deltoid: 5/5  Right biceps: 5/5  Left biceps: 5/5  Right triceps: 5/5  Left triceps: 5/5  Right wrist flexion: 5/5  Left wrist flexion: 5/5  Right wrist extension: 5/5  Left wrist extension: 5/5  Right interossei: 5/5  Left interossei: 5/5  Right abdominals: 5/5  Left abdominals: 5/5  Right iliopsoas: 5/5  Left iliopsoas: 5/5  Right quadriceps: 5/5  Left quadriceps: 5/5  Right hamstrin/5  Left hamstrin/  Right glutei: 5/5  Left glutei: 5  Right anterior tibial: 5/5  Left anterior tibial: 55  Right posterior tibial: 5  Left posterior tibial: 5  Right peroneal: 55  Left peroneal: 5/5  Right gastroc: 5/5  Left gastroc: 55    Sensory Exam   Right arm light touch: normal  Left arm light touch: normal  Right leg light touch: normal  Left leg light touch: normal  Right arm vibration: normal  Left arm vibration: normal  Right arm pinprick: normal  Left arm pinprick: normal    Gait, Coordination, and Reflexes     Gait  Gait: normal    Coordination   Romberg: negative  Finger to nose coordination: normal  Heel to shin coordination: normal  Tandem walking coordination: normal    Tremor   Resting tremor: absent  Intention tremor: absent  Action tremor: absent    Reflexes   Right brachioradialis: 2+  Left brachioradialis: 2+  Right biceps: 2+  Left biceps: 2+  Right triceps: 2+  Left triceps: 2+  Right patellar: 2+  Left patellar: 2+  Right achilles: 2+  Left achilles: 2+  Right Cordon: absent  Left Cordon: absent  Right ankle clonus: absent  Left ankle clonus: absent      Provider dictation:  77 year old female presents for follow up of left arm radiculopathy vs carpal tunnel syndrome.  She has a history of 2 prior cervical spine surgeries in  and  of C5/7 anterior fusion and C5/6 posterior cerclage wire fusion.  She did well after surgery without significant complications.  She has had  recent onset pain from the left shoulder to the left hand and numbness in the left hand digits 1,2,3 greater than 4,5.  She has completed a steroid taper and used muscle relaxants which initially helped, but then symptoms recurred.  She has not had PT or CLYDE.  Oswestry score: 40%.  PHQ:  4.    She is neurologically intact on exam without focal deficts noted.  Phalens and tinels are negative at the wrist and elbow bilaterally.    I have reviewed xrays of the cervical spine from 6-7-18 demonstrating post operative changes of anterior fusion forom C5-C7 and posterior wiring at C5/6.  Degenerative changes are seen throughout.  There is no acute abnormality.     I have re-reviewed an MRI of the cervical spine from 6-21-18 demonstrating post op changes at C5/6, C6/7 anterior fusion with cerclage posterior wires at C5/6.  At C2/3 there is a right disk hernia and facet hypertrophy with right foraminal narrowing.  AT C3/4 there is a left disk hernia with left foraminal narrowing.  At C5/6 there is a right osteophyte with with right anterior thecal sac impingement.  No cord signal change is seen.  There is no significant left foraminal narrowing at C5/6 or C6/7.    EMG/ NCV from 7-25-18 demonstrates severe left carpal tunnel syndrome and no evidence of cervical radiculopathy.    Ms. Moulton has history of C5-C7 fusion surgeries with acute onset left arm pain in a C6, C7 pattern greater than the right side.  She has normal post op changes, no focal left C5/6 or C6/7 foraminal narrowing and no evidence of cervical radiculopathy on nerve testing to explain her left upper extremitiy symptoms.  At this time, numbness in the hand is due to severe left carpal tunnel.  I have advised her to wear a left wrist splint.  We discussed injections vs left carpal tunnel release and she would like to proceed with surgical consideration.  We will arrange for her to see one of our surgeons.     Visit Diagnosis:  Carpal tunnel syndrome of left  wrist  -     HME - OTHER    H/O cervical spine surgery    Spondylosis of cervical region without myelopathy or radiculopathy        Total time spent counseling greater than fifty percent of total visit time.  Counseling included discussion regarding imaging findings, diagnosis possibilities, treatment options, risks and benefits.   The patient had many questions regarding the options and long-term effects.

## 2018-08-02 ENCOUNTER — HOSPITAL ENCOUNTER (OUTPATIENT)
Facility: AMBULARY SURGERY CENTER | Age: 78
Discharge: HOME | End: 2018-08-02
Attending: ANESTHESIOLOGY | Admitting: ANESTHESIOLOGY
Payer: MEDICARE

## 2018-08-02 ENCOUNTER — SURGERY (OUTPATIENT)
Age: 78
End: 2018-08-02

## 2018-08-02 DIAGNOSIS — M54.12 CERVICAL RADICULITIS: Primary | ICD-10-CM

## 2018-08-02 PROCEDURE — 64480 NJX AA&/STRD TFRM EPI C/T EA: CPT | Performed by: ANESTHESIOLOGY

## 2018-08-02 PROCEDURE — 64479 NJX AA&/STRD TFRM EPI C/T 1: CPT | Mod: LT,,, | Performed by: ANESTHESIOLOGY

## 2018-08-02 PROCEDURE — 99152 MOD SED SAME PHYS/QHP 5/>YRS: CPT | Mod: ,,, | Performed by: ANESTHESIOLOGY

## 2018-08-02 PROCEDURE — 64479 NJX AA&/STRD TFRM EPI C/T 1: CPT | Performed by: ANESTHESIOLOGY

## 2018-08-02 PROCEDURE — 64480 NJX AA&/STRD TFRM EPI C/T EA: CPT | Mod: LT,,, | Performed by: ANESTHESIOLOGY

## 2018-08-02 PROCEDURE — G8907 PT DOC NO EVENTS ON DISCHARG: HCPCS | Performed by: ANESTHESIOLOGY

## 2018-08-02 RX ORDER — DEXAMETHASONE SODIUM PHOSPHATE 10 MG/ML
INJECTION INTRAMUSCULAR; INTRAVENOUS
Status: DISCONTINUED
Start: 2018-08-02 | End: 2018-08-02 | Stop reason: HOSPADM

## 2018-08-02 RX ORDER — FENTANYL CITRATE 50 UG/ML
INJECTION, SOLUTION INTRAMUSCULAR; INTRAVENOUS
Status: DISCONTINUED
Start: 2018-08-02 | End: 2018-08-02 | Stop reason: HOSPADM

## 2018-08-02 RX ORDER — MIDAZOLAM HYDROCHLORIDE 1 MG/ML
INJECTION INTRAMUSCULAR; INTRAVENOUS
Status: DISCONTINUED
Start: 2018-08-02 | End: 2018-08-02 | Stop reason: HOSPADM

## 2018-08-02 RX ORDER — LIDOCAINE HYDROCHLORIDE AND EPINEPHRINE 10; 10 MG/ML; UG/ML
INJECTION, SOLUTION INFILTRATION; PERINEURAL
Status: DISCONTINUED
Start: 2018-08-02 | End: 2018-08-02 | Stop reason: HOSPADM

## 2018-08-02 RX ORDER — DEXAMETHASONE SODIUM PHOSPHATE 10 MG/ML
INJECTION INTRAMUSCULAR; INTRAVENOUS
Status: DISCONTINUED | OUTPATIENT
Start: 2018-08-02 | End: 2018-08-02 | Stop reason: HOSPADM

## 2018-08-02 RX ORDER — MIDAZOLAM HYDROCHLORIDE 1 MG/ML
INJECTION INTRAMUSCULAR; INTRAVENOUS
Status: DISCONTINUED | OUTPATIENT
Start: 2018-08-02 | End: 2018-08-02 | Stop reason: HOSPADM

## 2018-08-02 RX ORDER — FENTANYL CITRATE 50 UG/ML
INJECTION, SOLUTION INTRAMUSCULAR; INTRAVENOUS
Status: DISCONTINUED | OUTPATIENT
Start: 2018-08-02 | End: 2018-08-02 | Stop reason: HOSPADM

## 2018-08-02 RX ORDER — LIDOCAINE HYDROCHLORIDE 10 MG/ML
INJECTION, SOLUTION EPIDURAL; INFILTRATION; INTRACAUDAL; PERINEURAL
Status: DISCONTINUED | OUTPATIENT
Start: 2018-08-02 | End: 2018-08-02 | Stop reason: HOSPADM

## 2018-08-02 RX ORDER — LIDOCAINE HYDROCHLORIDE AND EPINEPHRINE 10; 10 MG/ML; UG/ML
INJECTION, SOLUTION INFILTRATION; PERINEURAL
Status: DISCONTINUED | OUTPATIENT
Start: 2018-08-02 | End: 2018-08-02 | Stop reason: HOSPADM

## 2018-08-02 RX ORDER — SODIUM CHLORIDE, SODIUM LACTATE, POTASSIUM CHLORIDE, CALCIUM CHLORIDE 600; 310; 30; 20 MG/100ML; MG/100ML; MG/100ML; MG/100ML
INJECTION, SOLUTION INTRAVENOUS CONTINUOUS
Status: DISCONTINUED | OUTPATIENT
Start: 2018-08-02 | End: 2018-08-02 | Stop reason: HOSPADM

## 2018-08-02 RX ADMIN — DEXAMETHASONE SODIUM PHOSPHATE 10 MG: 10 INJECTION INTRAMUSCULAR; INTRAVENOUS at 12:08

## 2018-08-02 RX ADMIN — FENTANYL CITRATE 50 MCG: 50 INJECTION, SOLUTION INTRAMUSCULAR; INTRAVENOUS at 12:08

## 2018-08-02 RX ADMIN — LIDOCAINE HYDROCHLORIDE AND EPINEPHRINE 3 ML: 10; 10 INJECTION, SOLUTION INFILTRATION; PERINEURAL at 12:08

## 2018-08-02 RX ADMIN — SODIUM CHLORIDE, SODIUM LACTATE, POTASSIUM CHLORIDE, CALCIUM CHLORIDE: 600; 310; 30; 20 INJECTION, SOLUTION INTRAVENOUS at 12:08

## 2018-08-02 RX ADMIN — MIDAZOLAM HYDROCHLORIDE 2 MG: 1 INJECTION INTRAMUSCULAR; INTRAVENOUS at 12:08

## 2018-08-02 RX ADMIN — LIDOCAINE HYDROCHLORIDE 1 ML: 10 INJECTION, SOLUTION EPIDURAL; INFILTRATION; INTRACAUDAL; PERINEURAL at 12:08

## 2018-08-02 RX ADMIN — LIDOCAINE HYDROCHLORIDE 10 ML: 10 INJECTION, SOLUTION EPIDURAL; INFILTRATION; INTRACAUDAL; PERINEURAL at 12:08

## 2018-08-02 NOTE — DISCHARGE SUMMARY
[facility]  Discharge Note  Short Stay    Admit Date: 8/2/2018    Discharge Date and Time: 8/2/2018    Attending Physician: Chaim Hernandez MD     Discharge Provider: Chaim Hernandez    Diagnoses:  Active Hospital Problems    Diagnosis  POA    *Cervical radiculitis [M54.12]  Yes      Resolved Hospital Problems    Diagnosis Date Resolved POA   No resolved problems to display.       Hospital Course: Cervical CLYDE  Discharged Condition: Good    Final Diagnoses:   Active Hospital Problems    Diagnosis  POA    *Cervical radiculitis [M54.12]  Yes      Resolved Hospital Problems    Diagnosis Date Resolved POA   No resolved problems to display.       Disposition: Home or Self Care    Follow up/Patient Instructions:    Medications:  Reconciled Home Medications:      Medication List      CONTINUE taking these medications    allopurinol 100 MG tablet  Commonly known as:  ZYLOPRIM  Take 300 mg by mouth once daily.     amLODIPine 5 MG tablet  Commonly known as:  NORVASC  Take 5 mg by mouth once daily.     aspirin 81 MG EC tablet  Commonly known as:  ECOTRIN  Take 81 mg by mouth once daily.     atenolol 100 MG tablet  Commonly known as:  TENORMIN  Take 100 mg by mouth once daily.     atorvastatin 80 MG tablet  Commonly known as:  LIPITOR  Take 80 mg by mouth once daily.     fish oil-omega-3 fatty acids 300-1,000 mg capsule  Take by mouth once daily.     meloxicam 15 MG tablet  Commonly known as:  MOBIC  Take 15 mg by mouth once daily.     multivitamin capsule  Take 1 capsule by mouth once daily.     omeprazole 20 MG capsule  Commonly known as:  PRILOSEC  Take 20 mg by mouth 2 (two) times daily.     tiZANidine 4 MG tablet  Commonly known as:  ZANAFLEX  Take 1 tablet (4 mg total) by mouth every 8 (eight) hours as needed (muscle spasms).     triamterene-hydrochlorothiazide 37.5-25 mg 37.5-25 mg per capsule  Commonly known as:  DYAZIDE  Take 1 capsule by mouth every morning.            Discharge Procedure Orders  Call MD for:   temperature >100.4     Call MD for:  persistent nausea and vomiting or diarrhea     Call MD for:  severe uncontrolled pain     Call MD for:  redness, tenderness, or signs of infection (pain, swelling, redness, odor or green/yellow discharge around incision site)     Call MD for:  difficulty breathing or increased cough     Call MD for:  severe persistent headache          Follow up with MD in 2-3 weeks    Discharge Procedure Orders (must include Diet, Follow-up, Activity):    Discharge Procedure Orders (must include Diet, Follow-up, Activity)  Call MD for:  temperature >100.4     Call MD for:  persistent nausea and vomiting or diarrhea     Call MD for:  severe uncontrolled pain     Call MD for:  redness, tenderness, or signs of infection (pain, swelling, redness, odor or green/yellow discharge around incision site)     Call MD for:  difficulty breathing or increased cough     Call MD for:  severe persistent headache

## 2018-08-02 NOTE — PLAN OF CARE
Pt states ready to go home , stable, emmanuelle po fluids, ambulatory, denies pain, Leg raises performed in bed without diff and instructed on fall risk. PT  verbalized understanding

## 2018-08-02 NOTE — OP NOTE
PROCEDURE DATE: 8/2/2018    PROCEDURE: Left C5-6 and C6-7 transforaminal epidural steroid injection under fluoroscopy     DIAGNOSIS: cervical disc displacement, cervical radiculopathy     Post op diagnosis: Same     PHYSICIAN: Chaim Hernandez MD     MEDICATIONS INJECTED: Decadron 5mg and 0.5ml 1% lidocaine at each nerve root.     LOCAL ANESTHETIC INJECTED: Lidocaine 1%. 1 ml per site.     SEDATION MEDICATIONS: RN IV sedation    ESTIMATED BLOOD LOSS: none     COMPLICATIONS: none     TECHNIQUE: A time-out was taken to identify patient and procedure side prior to starting the procedure. The patient was placed in a prone position, prepped and draped in the usual sterile fashion using ChloraPrep and sterile towels. The area to be injected was determined under fluoroscopic guidance in AP and oblique view. Local anesthetic was given by raising a wheal and going down to the hub of a 25-gauge 1.5 inch needle. In oblique view, a 2inch 25-gauge bent-tip spinal needle was introduced towards posterior inferior portion of the left C5-6 and C6-7 foramen using the oblique view. C-arm was rotated about 45 degrees off vertical and 10-20 degrees cephalad to caudal. The needle was advanced in AP until tip of needle was past the lateral margins. 0.5ml contrast dye was injected to confirm appropriate placement and that there was no vascular uptake.  Test dose of 1ml  1% lidocaine with epinephrine was given.  There was no changes in vital signs.  After negative aspiration for blood or CSF, the medication was then injected. This was performed at the left C5-6 and C6-7level(s). The patient tolerated the procedure well.     The patient was monitored after the procedure. Patient was given post procedure and discharge instructions to follow at home. The patient was discharged in a stable condition.

## 2018-08-02 NOTE — DISCHARGE INSTRUCTIONS
Recovery After Procedural Sedation (Adult)  You have been given medicine by vein to make you sleep during your surgery. This may have included both a pain medicine and sleeping medicine. Most of the effects have worn off. But you may still have some drowsiness for the next 6 to 8 hours.  Home care  Follow these guidelines when you get home:  · For the next 8 hours, you should be watched by a responsible adult. This person should make sure your condition is not getting worse.  · Don't drink any alcohol for the next 24 hours.  · Don't drive, operate dangerous machinery, or make important business or personal decisions during the next 24 hours.  Note: Your healthcare provider may tell you not to take any medicine by mouth for pain or sleep in the next 4 hours. These medicines may react with the medicines you were given in the hospital. This could cause a much stronger response than usual.  Follow-up care  Follow up with your healthcare provider if you are not alert and back to your usual level of activity within 12 hours.  When to seek medical advice  Call your healthcare provider right away if any of these occur:  · Drowsiness gets worse  · Weakness or dizziness gets worse  · Repeated vomiting  · You can't be awakened   Date Last Reviewed: 10/18/2016  © 2613-2396 [employer]. [address] [place] [address], [place] [address]. All rights reserved. This information is not intended as a substitute for professional medical care. Always follow your healthcare professional's instructions.      Pain injection instructions:     Steroids take about a 2 weeks to relieve pain.  Initially you may get pain relief from the local anesthetic but this may wear off before the steroid works.    No driving for 24 hrs.   Activity as tolerated- gradually increase activities.  Dont lift over 10 lbs for 24 hrs   No heat at injection sites x 2 days. No heating pads, hot tubs, saunas, or swimming in any body of water or pool for 2  days.  Use ice pack for mild swelling and for comfort , apply for 20 minutes, remove for 20 minute intervals. No direct contact of ice itself  to skin.  May shower today. No tub baths for two days.      Resume Aspirin, Plavix, or Coumadin the day after the procedure unless otherwise instructed.   If diabetic,monitor your glucose carefully as steroids can increase your glucose level    Seek immediate medical help for:   Severe increase in your usual pain or appearance of new pain.  Prolonged (mor than 8 hours) or increasing weakness or numbness in the legs or arms.    - Numbing medicine was injected that affects nerves that carry information from       muscles to the  brain and the brain to the muscles.  This numbness can last 6-8 hrs so be very careful and get assistance when standing or walking.    Fever above 101 ,Drainage,redness,active bleeding, or increased swelling at the injection site.  Headache, shortness of breath, chest pain, or breathing problems.

## 2018-08-06 ENCOUNTER — OFFICE VISIT (OUTPATIENT)
Dept: NEUROSURGERY | Facility: CLINIC | Age: 78
End: 2018-08-06
Payer: MEDICARE

## 2018-08-06 VITALS
HEIGHT: 69 IN | OXYGEN SATURATION: 98 % | WEIGHT: 254 LBS | TEMPERATURE: 98 F | BODY MASS INDEX: 37.62 KG/M2 | DIASTOLIC BLOOD PRESSURE: 74 MMHG | SYSTOLIC BLOOD PRESSURE: 147 MMHG | RESPIRATION RATE: 18 BRPM | HEART RATE: 71 BPM

## 2018-08-06 VITALS — HEIGHT: 69 IN | BODY MASS INDEX: 38.37 KG/M2 | WEIGHT: 259.06 LBS

## 2018-08-06 DIAGNOSIS — G56.00 CARPAL TUNNEL SYNDROME, UNSPECIFIED LATERALITY: Primary | ICD-10-CM

## 2018-08-06 DIAGNOSIS — G56.02 CARPAL TUNNEL SYNDROME OF LEFT WRIST: ICD-10-CM

## 2018-08-06 PROCEDURE — 99999 PR PBB SHADOW E&M-EST. PATIENT-LVL III: CPT | Mod: PBBFAC,,, | Performed by: NEUROLOGICAL SURGERY

## 2018-08-06 PROCEDURE — 99213 OFFICE O/P EST LOW 20 MIN: CPT | Mod: PBBFAC,PN | Performed by: NEUROLOGICAL SURGERY

## 2018-08-06 PROCEDURE — 99214 OFFICE O/P EST MOD 30 MIN: CPT | Mod: S$PBB,,, | Performed by: NEUROLOGICAL SURGERY

## 2018-08-06 RX ORDER — GABAPENTIN 300 MG/1
300 CAPSULE ORAL 3 TIMES DAILY
Qty: 90 CAPSULE | Refills: 2 | Status: SHIPPED | OUTPATIENT
Start: 2018-08-06 | End: 2018-10-29 | Stop reason: SDUPTHER

## 2018-08-06 RX ORDER — METHYLPREDNISOLONE 4 MG/1
TABLET ORAL
Qty: 1 PACKAGE | Refills: 0 | Status: SHIPPED | OUTPATIENT
Start: 2018-08-06 | End: 2018-09-04 | Stop reason: SDUPTHER

## 2018-08-06 NOTE — LETTER
August 6, 2018      Lynnette Chavarria PA-C  [address] [facility] [address]  [address]  [facility] [address]           [facility]  [address] [facility] [address] [place] [address]  Phone: [phone]  Fax: [phone]          Patient: Selena Moulton   MR Number: [id number]   YOB: 1940   Date of Visit: 8/6/2018       Dear Lynnette Chavarria:    Thank you for referring Selena Moulton to me for evaluation. Attached you will find relevant portions of my assessment and plan of care.    If you have questions, please do not hesitate to call me. I look forward to following Selena Moulton along with you.    Sincerely,    Meche Cárdenas MD    Enclosure  CC:  No Recipients    If you would like to receive this communication electronically, please contact [email or web address] or [phone] to request more information on [employer] access.    For providers and/or their staff who would like to refer a patient to [facility], please contact us through our one-stop-shop provider referral line, [facility], at [phone].    If you feel you have received this communication in error or would no longer like to receive these types of communications, please e-mail [email or web address]

## 2018-08-06 NOTE — PROGRESS NOTES
Neurosurgery History and Physical    Patient ID: Selena Moulton is a 77 y.o. female.    Chief Complaint   Patient presents with    Left carpal tunnel     pt states past couple of months pain has gotten worse, was seen in ED thinking possible heart attack; pain in tips of fingers when touching and numb when not; wearing brace has helped; shooting pain up arm; right hand has started to hurt also       Review of Systems   Constitutional: Positive for activity change.   HENT: Negative.    Eyes: Negative.    Respiratory: Negative.    Cardiovascular: Negative.    Gastrointestinal: Negative.    Endocrine: Negative.    Genitourinary: Negative.    Musculoskeletal: Positive for arthralgias, gait problem and neck pain.   Allergic/Immunologic: Negative.    Neurological: Positive for weakness and numbness.   Hematological: Negative.    Psychiatric/Behavioral: Negative.        Past Medical History:   Diagnosis Date    Gout     Hypertension      Social History     Social History    Marital status:      Spouse name: N/A    Number of children: N/A    Years of education: N/A     Occupational History    Not on file.     Social History Main Topics    Smoking status: Never Smoker    Smokeless tobacco: Not on file    Alcohol use Not on file    Drug use: Unknown    Sexual activity: Not on file     Other Topics Concern    Not on file     Social History Narrative    No narrative on file     History reviewed. No pertinent family history.  Review of patient's allergies indicates:   Allergen Reactions    Influenza virus vaccines     Latex, natural rubber     Morpholine analogues     Penicillins     Tetanus vaccines and toxoid        Current Outpatient Prescriptions:     allopurinol (ZYLOPRIM) 100 MG tablet, Take 300 mg by mouth once daily. , Disp: , Rfl:     amLODIPine (NORVASC) 5 MG tablet, Take 5 mg by mouth once daily., Disp: , Rfl:     aspirin (ECOTRIN) 81 MG EC tablet, Take 81 mg by mouth once daily.,  "Disp: , Rfl:     atenolol (TENORMIN) 100 MG tablet, Take 100 mg by mouth once daily., Disp: , Rfl:     atorvastatin (LIPITOR) 80 MG tablet, Take 80 mg by mouth once daily., Disp: , Rfl:     fish oil-omega-3 fatty acids 300-1,000 mg capsule, Take by mouth once daily., Disp: , Rfl:     meloxicam (MOBIC) 15 MG tablet, Take 15 mg by mouth once daily., Disp: , Rfl:     multivitamin capsule, Take 1 capsule by mouth once daily., Disp: , Rfl:     omeprazole (PRILOSEC) 20 MG capsule, Take 20 mg by mouth 2 (two) times daily., Disp: , Rfl:     triamterene-hydrochlorothiazide 37.5-25 mg (DYAZIDE) 37.5-25 mg per capsule, Take 1 capsule by mouth every morning., Disp: , Rfl:     gabapentin (NEURONTIN) 300 MG capsule, Take 1 capsule (300 mg total) by mouth 3 (three) times daily. Start 1 cap qHS x 2d then 1 cap BID x2 d then 1 cap TID, Disp: 90 capsule, Rfl: 2    methylPREDNISolone (MEDROL DOSEPACK) 4 mg tablet, use as directed, Disp: 1 Package, Rfl: 0    tiZANidine (ZANAFLEX) 4 MG tablet, Take 1 tablet (4 mg total) by mouth every 8 (eight) hours as needed (muscle spasms)., Disp: 40 tablet, Rfl: 0  Height 5' 9" (1.753 m), weight 117.5 kg (259 lb 0.7 oz).      Neurologic Exam     Mental Status   Oriented to person, place, and time.   Attention: normal. Concentration: normal.   Speech: speech is normal   Level of consciousness: alert  Knowledge: good.     Cranial Nerves     CN II   Visual acuity: normal    CN III, IV, VI   Pupils are equal, round, and reactive to light.  Extraocular motions are normal.     CN V   Facial sensation intact.     CN VII   Facial expression full, symmetric.     CN VIII   Hearing: intact    CN IX, X   Palate: symmetric    CN XI   CN XI normal.     CN XII   CN XII normal.     Motor Exam   Muscle bulk: normal  Overall muscle tone: normal  Right arm pronator drift: absent  Left arm pronator drift: absent    Strength   Right deltoid: 5/5  Left deltoid: 5/5  Right biceps: 5/5  Left biceps: 5/5  Right " triceps: 5/5  Left triceps: 5/5  Right wrist flexion: 5/5  Left wrist flexion: 5/5  Right wrist extension: 5/5  Left wrist extension: 5/5  Right interossei: 5/5  Left interossei: 5/5  Right iliopsoas: 5/5  Left iliopsoas: 5/5  Right quadriceps: 5/5  Left quadriceps: 5/5  Right hamstrin/5  Left hamstrin/5  Right anterior tibial: 5/5  Left anterior tibial: 5/5  Right posterior tibial: 5/5  Left posterior tibial: 5/5  Right peroneal: 5/5  Left peroneal: 5/5  Right gastroc: 5/5  Left gastroc: 5/5    Sensory Exam   Right arm light touch: normal  Right leg light touch: normal  Left leg light touch: normal  Sensory deficit distribution on left: median    Gait, Coordination, and Reflexes     Gait  Gait: (uses walker 2/2 knees)    Coordination   Romberg: negative  Finger to nose coordination: normal    Tremor   Resting tremor: absent    Reflexes   Right brachioradialis: 0  Left brachioradialis: 0  Right biceps: 1+  Left biceps: 1+  Right triceps: 1+  Left triceps: 0  Right patellar: 2+  Left patellar: 2+  Right achilles: 0  Left achilles: 0  Right plantar: normal  Left plantar: normal  Right Cordon: absent  Left Cordon: absent  Right ankle clonus: absent  Left ankle clonus: absent      Physical Exam   Constitutional: She is oriented to person, place, and time. She appears well-developed and well-nourished.   HENT:   Head: Normocephalic and atraumatic.   Eyes: EOM are normal. Pupils are equal, round, and reactive to light.   Neck: Neck supple.   Cardiovascular: Normal rate and regular rhythm.    Pulmonary/Chest: Effort normal.   Abdominal: Soft.   Musculoskeletal: Normal range of motion.   Neurological: She is alert and oriented to person, place, and time. She has a normal Finger-Nose-Finger Test and a normal Romberg Test.   Reflex Scores:       Tricep reflexes are 1+ on the right side and 0 on the left side.       Bicep reflexes are 1+ on the right side and 1+ on the left side.       Brachioradialis reflexes are 0  "on the right side and 0 on the left side.       Patellar reflexes are 2+ on the right side and 2+ on the left side.       Achilles reflexes are 0 on the right side and 0 on the left side.  Skin: Skin is warm and dry.   Psychiatric: She has a normal mood and affect. Her speech is normal and behavior is normal. Judgment and thought content normal.   Nursing note and vitals reviewed.      Vital Signs  Pain Score:   3  Pain Loc: Hand  Height and Weight  Height: 5' 9" (175.3 cm)  Weight: 117.5 kg (259 lb 0.7 oz)  BSA (Calculated - sq m): 2.39 sq meters  BMI (Calculated): 38.3  Weight in (lb) to have BMI = 25: 168.9]    Provider dictation:  I reviewed the imaging. She has a C spine MRI showing surgical changes with no current surgical indications. She had a recent NCT showing severe left carpal tunnel syndrome.     She describes starting experiencing numbness in the left first 3 digits about  2 months ago and soon thereafter, she began experiencing pain in those finger tips. She was initially though to have cervical radiculopathy and referred for a cervical CLYDE but this did no improve her hand symptoms. EMG/NCT then confirmed that her symptoms are secondary to carpal tunnel syndrome. She denies any recent trauma or inciting event. She started wearing a wrist brace about a week ago and since then she has noticed mild improvement in her finger tip pain.     On exam, she has numbness in a left distribution consistent with the median nerve although I was not able to elicit splitting. She has no thenar atrophy. There was perhaps a little weakness in her opponens pollicis.    She has not yet had maximal conservative treatment. I will order a Medrol Dose pack and Neurontin and give a few more weeks of wrist splinting. F/U in 4-5 weeks. If she fails, I will offer her surgical decompression at that time.     Visit Diagnosis:  Carpal tunnel syndrome, unspecified laterality  -     methylPREDNISolone (MEDROL DOSEPACK) 4 mg tablet; " use as directed  Dispense: 1 Package; Refill: 0  -     gabapentin (NEURONTIN) 300 MG capsule; Take 1 capsule (300 mg total) by mouth 3 (three) times daily. Start 1 cap qHS x 2d then 1 cap BID x2 d then 1 cap TID  Dispense: 90 capsule; Refill: 2    Carpal tunnel syndrome of left wrist

## 2018-09-04 ENCOUNTER — OFFICE VISIT (OUTPATIENT)
Dept: NEUROSURGERY | Facility: CLINIC | Age: 78
End: 2018-09-04
Payer: MEDICARE

## 2018-09-04 VITALS — WEIGHT: 259.06 LBS | RESPIRATION RATE: 20 BRPM | HEIGHT: 69 IN | BODY MASS INDEX: 38.37 KG/M2

## 2018-09-04 DIAGNOSIS — G56.00 CARPAL TUNNEL SYNDROME, UNSPECIFIED LATERALITY: Primary | ICD-10-CM

## 2018-09-04 PROCEDURE — 99999 PR PBB SHADOW E&M-EST. PATIENT-LVL III: CPT | Mod: PBBFAC,,, | Performed by: NEUROLOGICAL SURGERY

## 2018-09-04 PROCEDURE — 99214 OFFICE O/P EST MOD 30 MIN: CPT | Mod: S$PBB,,, | Performed by: NEUROLOGICAL SURGERY

## 2018-09-04 PROCEDURE — 99213 OFFICE O/P EST LOW 20 MIN: CPT | Mod: PBBFAC,PN | Performed by: NEUROLOGICAL SURGERY

## 2018-09-04 RX ORDER — METHYLPREDNISOLONE 4 MG/1
TABLET ORAL
Qty: 1 PACKAGE | Refills: 0 | Status: SHIPPED | OUTPATIENT
Start: 2018-09-04 | End: 2018-09-12

## 2018-09-04 NOTE — PROGRESS NOTES
Neurosurgery History and Physical    Patient ID: Selena Moulton is a 78 y.o. female.    Chief Complaint   Patient presents with    Follow-up     left carpal tunnel syndrome; pt states since CLYDE pain has gotten worse; severe pain comes more often; is still wearing brace       Review of Systems   Constitutional: Positive for activity change.   HENT: Negative.    Eyes: Negative.    Respiratory: Negative.    Cardiovascular: Negative.    Gastrointestinal: Negative.    Endocrine: Negative.    Genitourinary: Negative.    Musculoskeletal: Positive for arthralgias, gait problem and neck pain.   Allergic/Immunologic: Negative.    Neurological: Positive for weakness and numbness.   Hematological: Negative.    Psychiatric/Behavioral: Negative.        Past Medical History:   Diagnosis Date    Gout     Hypertension      Social History     Socioeconomic History    Marital status:      Spouse name: Not on file    Number of children: Not on file    Years of education: Not on file    Highest education level: Not on file   Social Needs    Financial resource strain: Not on file    Food insecurity - worry: Not on file    Food insecurity - inability: Not on file    Transportation needs - medical: Not on file    Transportation needs - non-medical: Not on file   Occupational History    Not on file   Tobacco Use    Smoking status: Never Smoker   Substance and Sexual Activity    Alcohol use: Not on file    Drug use: Not on file    Sexual activity: Not on file   Other Topics Concern    Not on file   Social History Narrative    Not on file     History reviewed. No pertinent family history.  Review of patient's allergies indicates:   Allergen Reactions    Influenza virus vaccines     Latex, natural rubber     Morpholine analogues     Penicillins     Tetanus vaccines and toxoid        Current Outpatient Medications:     allopurinol (ZYLOPRIM) 100 MG tablet, Take 300 mg by mouth once daily. , Disp: , Rfl:     " amLODIPine (NORVASC) 5 MG tablet, Take 5 mg by mouth once daily., Disp: , Rfl:     aspirin (ECOTRIN) 81 MG EC tablet, Take 81 mg by mouth once daily., Disp: , Rfl:     atenolol (TENORMIN) 100 MG tablet, Take 100 mg by mouth once daily., Disp: , Rfl:     atorvastatin (LIPITOR) 80 MG tablet, Take 80 mg by mouth once daily., Disp: , Rfl:     fish oil-omega-3 fatty acids 300-1,000 mg capsule, Take by mouth once daily., Disp: , Rfl:     gabapentin (NEURONTIN) 300 MG capsule, Take 1 capsule (300 mg total) by mouth 3 (three) times daily. Start 1 cap qHS x 2d then 1 cap BID x2 d then 1 cap TID, Disp: 90 capsule, Rfl: 2    meloxicam (MOBIC) 15 MG tablet, Take 15 mg by mouth once daily., Disp: , Rfl:     methylPREDNISolone (MEDROL DOSEPACK) 4 mg tablet, use as directed, Disp: 1 Package, Rfl: 0    multivitamin capsule, Take 1 capsule by mouth once daily., Disp: , Rfl:     omeprazole (PRILOSEC) 20 MG capsule, Take 20 mg by mouth 2 (two) times daily., Disp: , Rfl:     tiZANidine (ZANAFLEX) 4 MG tablet, Take 1 tablet (4 mg total) by mouth every 8 (eight) hours as needed (muscle spasms)., Disp: 40 tablet, Rfl: 0    triamterene-hydrochlorothiazide 37.5-25 mg (DYAZIDE) 37.5-25 mg per capsule, Take 1 capsule by mouth every morning., Disp: , Rfl:   Resp. rate 20, height 5' 9" (1.753 m), weight 117.5 kg (259 lb 0.7 oz).      Neurologic Exam     Mental Status   Oriented to person, place, and time.   Attention: normal. Concentration: normal.   Speech: speech is normal   Level of consciousness: alert  Knowledge: good.     Cranial Nerves     CN II   Visual acuity: normal    CN III, IV, VI   Pupils are equal, round, and reactive to light.  Extraocular motions are normal.     CN V   Facial sensation intact.     CN VII   Facial expression full, symmetric.     CN VIII   Hearing: intact    CN IX, X   Palate: symmetric    CN XI   CN XI normal.     CN XII   CN XII normal.     Motor Exam   Muscle bulk: normal  Overall muscle tone: " normal  Right arm pronator drift: absent  Left arm pronator drift: absent    Strength   Right deltoid: 5/5  Left deltoid: 5/5  Right biceps: 5/5  Left biceps: 5/5  Right triceps: 5/5  Left triceps: 5/5  Right wrist flexion: 5/5  Left wrist flexion: 5/5  Right wrist extension: 5/5  Left wrist extension: 5/5  Right interossei: 5/5  Left interossei: 5/5  Right iliopsoas: 5/5  Left iliopsoas: 5/5  Right quadriceps: 5/5  Left quadriceps: 5/5  Right hamstrin/5  Left hamstrin/5  Right anterior tibial: 5/5  Left anterior tibial: 5/5  Right posterior tibial: 5/5  Left posterior tibial: 5/5  Right peroneal: 5/5  Left peroneal: 5/5  Right gastroc: 5/5  Left gastroc: 5/5    Sensory Exam   Right arm light touch: normal  Right leg light touch: normal  Left leg light touch: normal  Sensory deficit distribution on left: median    Gait, Coordination, and Reflexes     Gait  Gait: (uses walker 2/2 knees)    Coordination   Romberg: negative  Finger to nose coordination: normal    Tremor   Resting tremor: absent    Reflexes   Right brachioradialis: 0  Left brachioradialis: 0  Right biceps: 1+  Left biceps: 1+  Right triceps: 1+  Left triceps: 0  Right patellar: 2+  Left patellar: 2+  Right achilles: 0  Left achilles: 0  Right plantar: normal  Left plantar: normal  Right Cordon: absent  Left Cordon: absent  Right ankle clonus: absent  Left ankle clonus: absent      Physical Exam   Constitutional: She is oriented to person, place, and time. She appears well-developed and well-nourished.   HENT:   Head: Normocephalic and atraumatic.   Eyes: EOM are normal. Pupils are equal, round, and reactive to light.   Neck: Neck supple.   Cardiovascular: Normal rate and regular rhythm.   Pulmonary/Chest: Effort normal.   Abdominal: Soft.   Musculoskeletal: Normal range of motion.   Neurological: She is alert and oriented to person, place, and time. She has a normal Finger-Nose-Finger Test and a normal Romberg Test.   Reflex Scores:        "Tricep reflexes are 1+ on the right side and 0 on the left side.       Bicep reflexes are 1+ on the right side and 1+ on the left side.       Brachioradialis reflexes are 0 on the right side and 0 on the left side.       Patellar reflexes are 2+ on the right side and 2+ on the left side.       Achilles reflexes are 0 on the right side and 0 on the left side.  Skin: Skin is warm and dry.   Psychiatric: She has a normal mood and affect. Her speech is normal and behavior is normal. Judgment and thought content normal.   Nursing note and vitals reviewed.      Vital Signs  Resp: 20  Pain Score:   8  Pain Loc: Hand  Height and Weight  Height: 5' 9" (175.3 cm)  Weight: 117.5 kg (259 lb 0.7 oz)  BSA (Calculated - sq m): 2.39 sq meters  BMI (Calculated): 38.3  Weight in (lb) to have BMI = 25: 168.9]    Provider dictation:  Since her last visit, her right hand pain has worsened. She has temporary relief from from the Medrol dose pack but the pain returned as soon as she finished it.     She has now developed splitting of the ring finger on exam.    She has failed multimodality conservative treatment and at this point, surgical release is indicated. I have offered her a left carpal tunnel release. I have discussed the risks, benefits and alternatives to the surgery in detail. Risks include bleeding, hematoma, nerve injury, no improvement, recurrence and need for more surgery. She wishes to proceed.     I will order another Medrol pack to temporize her symptoms before surgery. She will need PCP clearance for surgery and for a rash that she recently developed on her left foot.       Visit Diagnosis:  Carpal tunnel syndrome, unspecified laterality  -     methylPREDNISolone (MEDROL DOSEPACK) 4 mg tablet; use as directed  Dispense: 1 Package; Refill: 0      "

## 2018-09-05 ENCOUNTER — TELEPHONE (OUTPATIENT)
Dept: NEUROSURGERY | Facility: CLINIC | Age: 78
End: 2018-09-05

## 2018-09-05 DIAGNOSIS — R79.1 ABNORMAL COAGULATION PROFILE: ICD-10-CM

## 2018-09-05 DIAGNOSIS — G56.00 CARPAL TUNNEL SYNDROME, UNSPECIFIED LATERALITY: Primary | ICD-10-CM

## 2018-09-05 RX ORDER — SODIUM CHLORIDE, SODIUM LACTATE, POTASSIUM CHLORIDE, CALCIUM CHLORIDE 600; 310; 30; 20 MG/100ML; MG/100ML; MG/100ML; MG/100ML
INJECTION, SOLUTION INTRAVENOUS CONTINUOUS
Status: CANCELLED | OUTPATIENT
Start: 2018-09-05

## 2018-09-05 RX ORDER — LIDOCAINE HYDROCHLORIDE 10 MG/ML
1 INJECTION, SOLUTION EPIDURAL; INFILTRATION; INTRACAUDAL; PERINEURAL ONCE
Status: CANCELLED | OUTPATIENT
Start: 2018-09-05 | End: 2018-09-05

## 2018-09-05 NOTE — TELEPHONE ENCOUNTER
Attempted to call pt. Unable to leave a message, called emergency number and left a message. Attempting to review information regarding surgery.

## 2018-09-12 ENCOUNTER — HOSPITAL ENCOUNTER (OUTPATIENT)
Dept: RADIOLOGY | Facility: HOSPITAL | Age: 78
Discharge: HOME | End: 2018-09-12
Attending: PHYSICIAN ASSISTANT
Payer: MEDICARE

## 2018-09-12 ENCOUNTER — OFFICE VISIT (OUTPATIENT)
Dept: FAMILY MEDICINE | Facility: CLINIC | Age: 78
End: 2018-09-12
Payer: MEDICARE

## 2018-09-12 VITALS
BODY MASS INDEX: 38.95 KG/M2 | DIASTOLIC BLOOD PRESSURE: 82 MMHG | HEIGHT: 69 IN | HEART RATE: 60 BPM | SYSTOLIC BLOOD PRESSURE: 110 MMHG | WEIGHT: 263 LBS

## 2018-09-12 DIAGNOSIS — R79.1 ABNORMAL COAGULATION PROFILE: ICD-10-CM

## 2018-09-12 DIAGNOSIS — Z01.818 PRE-OPERATIVE CLEARANCE: ICD-10-CM

## 2018-09-12 DIAGNOSIS — Z01.818 PRE-OPERATIVE CLEARANCE: Primary | ICD-10-CM

## 2018-09-12 DIAGNOSIS — R01.1 HEART MURMUR: ICD-10-CM

## 2018-09-12 PROCEDURE — 71046 X-RAY EXAM CHEST 2 VIEWS: CPT | Mod: 26,,, | Performed by: RADIOLOGY

## 2018-09-12 PROCEDURE — 71046 X-RAY EXAM CHEST 2 VIEWS: CPT | Mod: TC,FY,PO

## 2018-09-12 PROCEDURE — 99214 OFFICE O/P EST MOD 30 MIN: CPT | Mod: S$PBB,,, | Performed by: PHYSICIAN ASSISTANT

## 2018-09-12 PROCEDURE — 99999 PR PBB SHADOW E&M-EST. PATIENT-LVL IV: CPT | Mod: PBBFAC,,, | Performed by: PHYSICIAN ASSISTANT

## 2018-09-12 PROCEDURE — 99214 OFFICE O/P EST MOD 30 MIN: CPT | Mod: PBBFAC,25,PO | Performed by: PHYSICIAN ASSISTANT

## 2018-09-12 NOTE — PROGRESS NOTES
The patient presents today for pre op for her left carpal tunnel release on 9/26/18 with Dr Cárdenas.  She has a long history of a heart murmur and Her cardiologist is with [facility] in [facility].       Past Medical History:  Past Medical History:   Diagnosis Date    Gout     Hypertension      Past Surgical History:   Procedure Laterality Date    BACK SURGERY      CERVICAL FUSION      HYSTERECTOMY      Injection,steroid,epidural,transforaminal approach Left 8/2/2018    Performed by [facility]aim Hernandez [facility] at [facility] OR    TONSILLECTOMY       Review of patient's allergies indicates:   Allergen Reactions    Influenza virus vaccines     Latex, natural rubber     Morpholine analogues     Penicillins     Tetanus vaccines and toxoid      Current Outpatient Medications on File Prior to Visit   Medication Sig Dispense Refill    allopurinol (ZYLOPRIM) 100 MG tablet Take 300 mg by mouth once daily.       amLODIPine (NORVASC) 5 MG tablet Take 5 mg by mouth once daily.      aspirin (ECOTRIN) 81 MG EC tablet Take 81 mg by mouth once daily.      atenolol (TENORMIN) 100 MG tablet Take 100 mg by mouth once daily.      atorvastatin (LIPITOR) 80 MG tablet Take 80 mg by mouth once daily.      fish oil-omega-3 fatty acids 300-1,000 mg capsule Take by mouth once daily.      gabapentin (NEURONTIN) 300 MG capsule Take 1 capsule (300 mg total) by mouth 3 (three) times daily. Start 1 cap qHS x 2d then 1 cap BID x2 d then 1 cap TID 90 capsule 2    meloxicam (MOBIC) 15 MG tablet Take 15 mg by mouth once daily.      multivitamin capsule Take 1 capsule by mouth once daily.      omeprazole (PRILOSEC) 20 MG capsule Take 20 mg by mouth 2 (two) times daily.      tiZANidine (ZANAFLEX) 4 MG tablet Take 1 tablet (4 mg total) by mouth every 8 (eight) hours as needed (muscle spasms). 40 tablet 0    [DISCONTINUED] methylPREDNISolone (MEDROL DOSEPACK) 4 mg tablet use as directed 1 Package 0    [DISCONTINUED]  triamterene-hydrochlorothiazide 37.5-25 mg (DYAZIDE) 37.5-25 mg per capsule Take 1 capsule by mouth every morning.       No current facility-administered medications on file prior to visit.      Social History     Socioeconomic History    Marital status:      Spouse name: Not on file    Number of children: Not on file    Years of education: Not on file    Highest education level: Not on file   Social Needs    Financial resource strain: Not on file    Food insecurity - worry: Not on file    Food insecurity - inability: Not on file    Transportation needs - medical: Not on file    Transportation needs - non-medical: Not on file   Occupational History    Not on file   Tobacco Use    Smoking status: Never Smoker   Substance and Sexual Activity    Alcohol use: Not on file    Drug use: Not on file    Sexual activity: Not on file   Other Topics Concern    Not on file   Social History Narrative    Not on file     History reviewed. No pertinent family history.      ROS:GENERAL: No fever, chills, fatigability or weight loss.  SKIN: No rashes, itching or changes in color or texture of skin.  HEAD: No headaches or recent head trauma.EYES: Visual acuity fine. No photophobia, ocular pain or diplopia.EARS: Denies ear pain, discharge or vertigo.NOSE: No loss of smell, no epistaxis or postnasal drip.MOUTH & THROAT: No hoarseness or change in voice. No excessive gum bleeding.NODES: Denies swollen glands.  CHEST: Denies SCHMIDT, cyanosis, wheezing, cough and sputum production.  CARDIOVASCULAR: Denies chest pain, PND, orthopnea or reduced exercise tolerance.  ABDOMEN: Appetite fine. No weight loss. Denies diarrhea, abdominal pain, hematemesis or blood in stool.  URINARY: No flank pain, dysuria or hematuria.  PERIPHERAL VASCULAR: No claudication or cyanosis.  MUSCULOSKELETAL: See above.  NEUROLOGIC: No history of seizures, paralysis, alteration of gait or coordination.    PE: obese female  HEAD: Normocephalic,  atraumatic.EYES: PERRL. EOMI.   EARS: TM's intact. Light reflex normal. No retraction or perforation.   NOSE: Mucosa pink. Airway clear.MOUTH & THROAT: No tonsillar enlargement. No pharyngeal erythema or exudate. No stridor.  NODES: No cervical, axillary or inguinal lymph node enlargement.  CHEST: Lungs clear to auscultation.  CARDIOVASCULAR: Loud systolic murmur heard.  ABDOMEN: Bowel sounds normal. Not distended. Soft. No tenderness or masses.  MUSCULOSKELETAL: No palpable abnormality  NEUROLOGIC: Cranial Nerves: II-XII grossly intact.  Motor: 5/5 strength major flexors/extensors.  DTR's: Knees, Ankles 2+ and equal bilaterally; downgoing toes.  Sensory: Intact to light touch distally.  Gait & Posture: Normal gait and fine motion. No cerebellar signs.     Impression:pre- op evaluation  Plan:Lab eval, labs placed by Dr Cárdenas are on hold, placed again by myself today, referred for cardiology for evaluation of murmur.      9/14/2018: I have reviewed her recent labs and chest X-ray.  Labs show herpercalcemia and we plan to recheck BMP in 1 week.  She has been cleared by cardiology for surgery.      9/24/18: Hypercalcemia has resolved, she is cleared for surgery from primary care perspective.

## 2018-09-13 ENCOUNTER — TELEPHONE (OUTPATIENT)
Dept: CARDIOLOGY | Facility: CLINIC | Age: 78
End: 2018-09-13

## 2018-09-13 ENCOUNTER — OFFICE VISIT (OUTPATIENT)
Dept: CARDIOLOGY | Facility: CLINIC | Age: 78
End: 2018-09-13
Payer: MEDICARE

## 2018-09-13 VITALS
WEIGHT: 265.44 LBS | BODY MASS INDEX: 39.31 KG/M2 | DIASTOLIC BLOOD PRESSURE: 72 MMHG | HEIGHT: 69 IN | HEART RATE: 62 BPM | SYSTOLIC BLOOD PRESSURE: 138 MMHG

## 2018-09-13 DIAGNOSIS — I10 ESSENTIAL HYPERTENSION: ICD-10-CM

## 2018-09-13 DIAGNOSIS — Z01.810 ENCOUNTER FOR PRE-OPERATIVE CARDIOVASCULAR CLEARANCE: ICD-10-CM

## 2018-09-13 DIAGNOSIS — E78.00 HYPERCHOLESTEROLEMIA: ICD-10-CM

## 2018-09-13 DIAGNOSIS — R01.1 UNDIAGNOSED CARDIAC MURMURS: Primary | ICD-10-CM

## 2018-09-13 PROCEDURE — 99213 OFFICE O/P EST LOW 20 MIN: CPT | Mod: PBBFAC,PO | Performed by: INTERNAL MEDICINE

## 2018-09-13 PROCEDURE — 99202 OFFICE O/P NEW SF 15 MIN: CPT | Mod: S$PBB,,, | Performed by: INTERNAL MEDICINE

## 2018-09-13 PROCEDURE — 99999 PR PBB SHADOW E&M-EST. PATIENT-LVL III: CPT | Mod: PBBFAC,,, | Performed by: INTERNAL MEDICINE

## 2018-09-13 NOTE — PROGRESS NOTES
Subjective:    Patient ID:  Selena Moulton is a 78 y.o. female who presents for Hypertension (Clearance fot [facility]) and Hyperlipidemia        HPI    CLEARANCE FOR L CARPAL TUNNEL SX, H/O MURMUR, HAD ECHOS IN THE PAST,NO CP, NO SOB,  USUALLY ACTIVE, SEE ROS  Past Medical History:   Diagnosis Date    Arthritis     Gout     Hypertension      Past Surgical History:   Procedure Laterality Date    BACK SURGERY      bone graft neck    CERVICAL FUSION  1984    x2     HERNIA REPAIR      umbilical    HYSTERECTOMY      Injection,steroid,epidural,transforaminal approach Left 8/2/2018    Performed by [facility]aim Hernandez MD at [facility] OR    TONSILLECTOMY       Family History   Problem Relation Age of Onset    Cancer Mother     Stroke Mother     Hypertension Mother     No Known Problems Father      Social History     Socioeconomic History    Marital status:      Spouse name: Not on file    Number of children: Not on file    Years of education: Not on file    Highest education level: Not on file   Social Needs    Financial resource strain: Not on file    Food insecurity - worry: Not on file    Food insecurity - inability: Not on file    Transportation needs - medical: Not on file    Transportation needs - non-medical: Not on file   Occupational History    Not on file   Tobacco Use    Smoking status: Never Smoker    Smokeless tobacco: Never Used   Substance and Sexual Activity    Alcohol use: No     Frequency: Never    Drug use: No    Sexual activity: Not on file   Other Topics Concern    Not on file   Social History Narrative    Not on file       Review of patient's allergies indicates:   Allergen Reactions    Influenza virus vaccines Nausea And Vomiting    Morpholine analogues Nausea And Vomiting    Penicillins Other (See Comments)     High fever as a child, also was allergy tested 1980    Latex, natural rubber Rash    Tetanus vaccines and toxoid Rash       Current Outpatient Medications:      allopurinol (ZYLOPRIM) 300 MG tablet, Take 300 mg by mouth once daily. , Disp: , Rfl:     amLODIPine (NORVASC) 5 MG tablet, Take 5 mg by mouth once daily., Disp: , Rfl:     aspirin (ECOTRIN) 81 MG EC tablet, Take 81 mg by mouth once daily., Disp: , Rfl:     atenolol (TENORMIN) 100 MG tablet, Take 50 mg by mouth once daily. , Disp: , Rfl:     atorvastatin (LIPITOR) 80 MG tablet, Take 80 mg by mouth once daily., Disp: , Rfl:     fish oil-omega-3 fatty acids 300-1,000 mg capsule, Take by mouth once daily., Disp: , Rfl:     gabapentin (NEURONTIN) 300 MG capsule, Take 1 capsule (300 mg total) by mouth 3 (three) times daily. Start 1 cap qHS x 2d then 1 cap BID x2 d then 1 cap TID, Disp: 90 capsule, Rfl: 2    meloxicam (MOBIC) 15 MG tablet, Take 15 mg by mouth once daily., Disp: , Rfl:     multivitamin capsule, Take 1 capsule by mouth once daily., Disp: , Rfl:     omeprazole (PRILOSEC) 20 MG capsule, Take 20 mg by mouth 2 (two) times daily., Disp: , Rfl:     triamterene-hydrochlorothiazide 37.5-25 mg (DYAZIDE) 37.5-25 mg per capsule, Take 1 capsule by mouth every morning., Disp: , Rfl:     Review of Systems   Constitution: Negative for chills, diaphoresis, weakness, malaise/fatigue and night sweats.   HENT: Negative for congestion, hearing loss and nosebleeds.    Eyes: Negative for blurred vision, discharge, double vision and visual disturbance.   Cardiovascular: Negative for chest pain, claudication, cyanosis, dyspnea on exertion, irregular heartbeat, leg swelling, near-syncope, orthopnea, palpitations, paroxysmal nocturnal dyspnea and syncope.   Respiratory: Negative for cough, hemoptysis, shortness of breath and wheezing.    Endocrine: Negative for cold intolerance, heat intolerance and polyuria.   Hematologic/Lymphatic: Negative for adenopathy and bleeding problem. Does not bruise/bleed easily.   Skin: Negative for color change, itching, nail changes and rash.   Musculoskeletal: Positive for joint pain.  "Negative for back pain and falls.   Gastrointestinal: Negative for bloating, abdominal pain, change in bowel habit, dysphagia, heartburn, hematemesis, jaundice, melena and vomiting.   Genitourinary: Negative for dysuria, flank pain and frequency.   Neurological: Negative for brief paralysis, dizziness, focal weakness, light-headedness, loss of balance, numbness (L HAND), paresthesias and tremors.   Psychiatric/Behavioral: Negative for altered mental status, depression, memory loss and substance abuse. The patient is not nervous/anxious.    Allergic/Immunologic: Negative for hives and persistent infections.        Objective:      Vitals:    09/13/18 1057   BP: 138/72   Pulse: 62   Weight: 120.4 kg (265 lb 6.9 oz)   Height: 5' 9" (1.753 m)   PainSc:   4     Body mass index is 39.2 kg/m².    Physical Exam   Constitutional: She is oriented to person, place, and time. She appears well-developed and well-nourished. She is active.   OVERWEIGHT   HENT:   Head: Normocephalic and atraumatic.   Mouth/Throat: Oropharynx is clear and moist and mucous membranes are normal.   Eyes: Conjunctivae and EOM are normal. Pupils are equal, round, and reactive to light.   Neck: Trachea normal and normal range of motion. Neck supple. Normal carotid pulses, no hepatojugular reflux and no JVD present. Carotid bruit is not present. No tracheal deviation, no edema and no erythema present. No thyromegaly present.   Cardiovascular: Normal rate and regular rhythm.  No extrasystoles are present. PMI is not displaced. Exam reveals no gallop and no friction rub.   Murmur heard.   Harsh midsystolic murmur is present with a grade of 2/6 at the upper right sternal border radiating to the neck.  Pulses:       Carotid pulses are 2+ on the right side, and 2+ on the left side.       Radial pulses are 2+ on the right side, and 2+ on the left side.        Dorsalis pedis pulses are 2+ on the right side, and 2+ on the left side.        Posterior tibial pulses " are 2+ on the right side, and 2+ on the left side.   Pulmonary/Chest: Effort normal and breath sounds normal. No tachypnea and no bradypnea. She has no wheezes. She has no rales. She exhibits no tenderness.   Abdominal: Soft. Bowel sounds are normal. She exhibits no distension and no mass. There is no hepatosplenomegaly. There is no tenderness. There is no guarding and no CVA tenderness.   Musculoskeletal: Normal range of motion. She exhibits no edema or tenderness.   USES WALKER, L HAND BRACE   Lymphadenopathy:     She has no cervical adenopathy.   Neurological: She is alert and oriented to person, place, and time. She has normal strength. She displays no tremor. No cranial nerve deficit. She exhibits normal muscle tone. Coordination normal.   Skin: Skin is warm and dry. No rash noted. No cyanosis or erythema. No pallor.   Psychiatric: She has a normal mood and affect. Her speech is normal and behavior is normal. Judgment and thought content normal.               ..    Chemistry        Component Value Date/Time     09/12/2018 1147    K 5.1 09/12/2018 1147     09/12/2018 1147    CO2 31 (H) 09/12/2018 1147    BUN 30 (H) 09/12/2018 1147    CREATININE 1.1 09/12/2018 1147    GLU 90 09/12/2018 1147        Component Value Date/Time    CALCIUM 11.2 (H) 09/12/2018 1147    ALKPHOS 132 06/07/2018 2225    AST 28 06/07/2018 2225    ALT 37 06/07/2018 2225    BILITOT 0.3 06/07/2018 2225    ESTGFRAFRICA 54.8 (A) 09/12/2018 1147    EGFRNONAA 47.5 (A) 09/12/2018 1147            ..No results found for: CHOL  No results found for: HDL  No results found for: LDLCALC  No results found for: TRIG  No results found for: CHOLHDL  ..  Lab Results   Component Value Date    WBC 9.22 09/12/2018    HGB 13.6 09/12/2018    HCT 45.1 09/12/2018    MCV 93 09/12/2018     09/12/2018       Test(s) Reviewed  I have reviewed the following in detail:  [] Stress test   [] Angiography   [] Echocardiogram   [] Labs   [x] Other:        Assessment:         ICD-10-CM ICD-9-CM   1. Undiagnosed cardiac murmurs R01.1 785.2   2. Encounter for pre-operative cardiovascular clearance Z01.810 V72.81   3. Essential hypertension I10 401.9   4. Hypercholesterolemia E78.00 272.0     Problem List Items Addressed This Visit        Cardiac/Vascular    Undiagnosed cardiac murmurs - Primary    Encounter for pre-operative cardiovascular clearance    Essential hypertension    Hypercholesterolemia           Plan:     EKG SR, WNL, CHECK ECHO, ACCEPTABLE RISK FOR SURGERY, DIET, WEIGHT LOSS,ALL CV CLINICALLY STABLE, NO ANGINA, NO HF, NO TIA, NO CLINICAL ARRHYTHMIA,CONTINUE CURRENT MEDS, EDUCATION, DIET, EXERCISE      Undiagnosed cardiac murmurs    Encounter for pre-operative cardiovascular clearance    Essential hypertension    Hypercholesterolemia    RTC Low level/low impact aerobic exercise 5x's/wk. Heart healthy diet and risk factor modification.    See labs and med orders.    Aerobic exercise 5x's/wk. Heart healthy diet and risk factor modification.    See labs and med orders.

## 2018-09-13 NOTE — LETTER
September 14, 2018      Maame Montesinos PA-C  [address] [facility] [address]  [facility] [address]           [place] [facility]  [address] [facility] [address]  [facility] [address]  Phone: [phone]          Patient: Selena Moulton   MR Number: [id number]   YOB: 1940   Date of Visit: 9/13/2018       Dear Maame Montesinos:    Thank you for referring Selena Moulton to me for evaluation. Attached you will find relevant portions of my assessment and plan of care.    If you have questions, please do not hesitate to call me. I look forward to following Selena Moulton along with you.    Sincerely,    Terrie Alberto MD    Enclosure  CC:  No Recipients    If you would like to receive this communication electronically, please contact [email or web address] or [phone] to request more information on [employer] Link access.    For providers and/or their staff who would like to refer a patient to [facility], please contact us through our one-stop-shop provider referral line, [facility], at [phone].    If you feel you have received this communication in error or would no longer like to receive these types of communications, please e-mail [email or web address]

## 2018-09-14 DIAGNOSIS — N39.0 URINARY TRACT INFECTION WITHOUT HEMATURIA, SITE UNSPECIFIED: ICD-10-CM

## 2018-09-14 DIAGNOSIS — E83.52 HYPERCALCEMIA: Primary | ICD-10-CM

## 2018-09-18 DIAGNOSIS — R79.1 ABNORMAL COAGULATION PROFILE: ICD-10-CM

## 2018-09-18 DIAGNOSIS — R82.90 ABNORMAL FINDING IN URINE: ICD-10-CM

## 2018-09-18 DIAGNOSIS — Z01.818 PRE-OP EVALUATION: Primary | ICD-10-CM

## 2018-09-21 ENCOUNTER — LAB VISIT (OUTPATIENT)
Dept: LAB | Facility: HOSPITAL | Age: 78
End: 2018-09-21
Attending: NEUROLOGICAL SURGERY
Payer: MEDICARE

## 2018-09-21 DIAGNOSIS — E83.52 HYPERCALCEMIA: ICD-10-CM

## 2018-09-21 LAB
ANION GAP SERPL CALC-SCNC: 11 MMOL/L
BUN SERPL-MCNC: 38 MG/DL
CALCIUM SERPL-MCNC: 10 MG/DL
CHLORIDE SERPL-SCNC: 109 MMOL/L
CO2 SERPL-SCNC: 24 MMOL/L
CREAT SERPL-MCNC: 1.3 MG/DL
EST. GFR  (AFRICAN AMERICAN): 44.8 ML/MIN/1.73 M^2
EST. GFR  (NON AFRICAN AMERICAN): 38.8 ML/MIN/1.73 M^2
GLUCOSE SERPL-MCNC: 108 MG/DL
POTASSIUM SERPL-SCNC: 4.1 MMOL/L
SODIUM SERPL-SCNC: 144 MMOL/L

## 2018-09-21 PROCEDURE — 36415 COLL VENOUS BLD VENIPUNCTURE: CPT | Mod: PO

## 2018-09-21 PROCEDURE — 80048 BASIC METABOLIC PNL TOTAL CA: CPT

## 2018-09-24 ENCOUNTER — TELEPHONE (OUTPATIENT)
Dept: NEUROSURGERY | Facility: CLINIC | Age: 78
End: 2018-09-24

## 2018-09-24 ENCOUNTER — CLINICAL SUPPORT (OUTPATIENT)
Dept: CARDIOLOGY | Facility: CLINIC | Age: 78
End: 2018-09-24
Attending: INTERNAL MEDICINE
Payer: MEDICARE

## 2018-09-24 VITALS
SYSTOLIC BLOOD PRESSURE: 120 MMHG | BODY MASS INDEX: 39.31 KG/M2 | DIASTOLIC BLOOD PRESSURE: 70 MMHG | WEIGHT: 265.44 LBS | HEIGHT: 69 IN | HEART RATE: 64 BPM

## 2018-09-24 DIAGNOSIS — R01.1 UNDIAGNOSED CARDIAC MURMURS: ICD-10-CM

## 2018-09-24 DIAGNOSIS — N39.0 URINARY TRACT INFECTION WITHOUT HEMATURIA, SITE UNSPECIFIED: Primary | ICD-10-CM

## 2018-09-24 LAB
ASCENDING AORTA: 0.93 CM
AV MEAN GRADIENT: 34.25 MMHG
AV PEAK GRADIENT: 51.09 MMHG
AV VALVE AREA: 0.92 CM2
BSA FOR ECHO PROCEDURE: 2.42 M2
CV ECHO LV RWT: 0.51 CM
DOP CALC AO PEAK VEL: 3.57 M/S
DOP CALC AO VTI: 100.96 CM
DOP CALC LVOT AREA: 3.43 CM2
DOP CALC LVOT DIAMETER: 2.09 CM
DOP CALC LVOT STROKE VOLUME: 93.03 CM3
DOP CALCLVOT PEAK VEL VTI: 27.13 CM
E WAVE DECELERATION TIME: 188.96 MSEC
E/A RATIO: 1.95
E/E' RATIO: 12.74
ECHO LV POSTERIOR WALL: 1.16 CM (ref 0.6–1.1)
FRACTIONAL SHORTENING: 35 % (ref 28–44)
INTERVENTRICULAR SEPTUM: 1.27 CM (ref 0.6–1.1)
IVRT: 0.06 MSEC
LA MAJOR: 5.79 CM
LA MINOR: 5.07 CM
LA WIDTH: 4.13 CM
LEFT ATRIUM SIZE: 4.03 CM
LEFT ATRIUM VOLUME INDEX: 31.6 ML/M2
LEFT ATRIUM VOLUME: 76.48 CM3
LEFT INTERNAL DIMENSION IN SYSTOLE: 3.09 CM (ref 2.1–4)
LEFT VENTRICLE MASS INDEX: 91.6 G/M2
LEFT VENTRICULAR INTERNAL DIMENSION IN DIASTOLE: 4.78 CM (ref 3.5–6)
LEFT VENTRICULAR MASS: 221.57 G
LV LATERAL E/E' RATIO: 11
LV SEPTAL E/E' RATIO: 15.13
MV PEAK A VEL: 0.62 M/S
MV PEAK E VEL: 1.21 M/S
MV STENOSIS PRESSURE HALF TIME: 54.8 MS
MV VALVE AREA P 1/2 METHOD: 4.01 CM2
PISA TR MAX VEL: 2.98 M/S
PULM VEIN S/D RATIO: 0.81
PV PEAK D VEL: 0.62 M/S
PV PEAK S VEL: 0.5 M/S
RA MAJOR: 6.4 CM
RA PRESSURE: 3 MMHG
RA WIDTH: 3.81 CM
RIGHT VENTRICULAR END-DIASTOLIC DIMENSION: 3.62 CM
SINUS: 2.98 CM
TDI LATERAL: 0.11
TDI SEPTAL: 0.08
TDI: 0.1
TR MAX PG: 35.52 MMHG
TRICUSPID ANNULAR PLANE SYSTOLIC EXCURSION: 0.03 CM
TV REST PULMONARY ARTERY PRESSURE: 38.52 MMHG

## 2018-09-24 PROCEDURE — 99999 PR PBB SHADOW E&M-EST. PATIENT-LVL II: CPT | Mod: PBBFAC,,,

## 2018-09-24 PROCEDURE — 99212 OFFICE O/P EST SF 10 MIN: CPT | Mod: PBBFAC,PO

## 2018-09-24 PROCEDURE — 93306 TTE W/DOPPLER COMPLETE: CPT | Mod: PBBFAC,PO | Performed by: INTERNAL MEDICINE

## 2018-09-24 RX ORDER — NITROFURANTOIN 25; 75 MG/1; MG/1
100 CAPSULE ORAL 2 TIMES DAILY
Qty: 14 CAPSULE | Refills: 0 | Status: SHIPPED | OUTPATIENT
Start: 2018-09-24 | End: 2018-10-01

## 2018-09-24 NOTE — TELEPHONE ENCOUNTER
Spoke with pt and informed that ABT was called into pharmacy, surgery would be postponed until the following week. Pt is to repeat UA on Monday. Surgery to be rescheduled to 10/3/18 at [facility].

## 2018-09-24 NOTE — TELEPHONE ENCOUNTER
Macrobid sent to patient's pharmacy for UTI. She should start taking antibiotic today. Repeat UA and culture on Monday, 10/1. Reschedule surgery to next week.

## 2018-09-27 ENCOUNTER — TELEPHONE (OUTPATIENT)
Dept: CARDIOLOGY | Facility: CLINIC | Age: 78
End: 2018-09-27

## 2018-10-01 ENCOUNTER — LAB VISIT (OUTPATIENT)
Dept: LAB | Facility: HOSPITAL | Age: 78
End: 2018-10-01
Attending: FAMILY MEDICINE
Payer: MEDICARE

## 2018-10-01 DIAGNOSIS — N39.0 URINARY TRACT INFECTION WITHOUT HEMATURIA, SITE UNSPECIFIED: ICD-10-CM

## 2018-10-01 LAB
BILIRUB UR QL STRIP: NEGATIVE
CLARITY UR: CLEAR
COLOR UR: YELLOW
GLUCOSE UR QL STRIP: NEGATIVE
HGB UR QL STRIP: NEGATIVE
KETONES UR QL STRIP: NEGATIVE
LEUKOCYTE ESTERASE UR QL STRIP: NEGATIVE
NITRITE UR QL STRIP: NEGATIVE
PH UR STRIP: 7 [PH] (ref 5–8)
PROT UR QL STRIP: NEGATIVE
SP GR UR STRIP: 1.01 (ref 1–1.03)
URN SPEC COLLECT METH UR: NORMAL

## 2018-10-01 PROCEDURE — 81003 URINALYSIS AUTO W/O SCOPE: CPT | Mod: PO

## 2018-10-03 ENCOUNTER — TELEPHONE (OUTPATIENT)
Dept: NEUROSURGERY | Facility: CLINIC | Age: 78
End: 2018-10-03

## 2018-10-03 PROBLEM — G56.00 CARPAL TUNNEL SYNDROME: Status: ACTIVE | Noted: 2018-10-03

## 2018-10-03 NOTE — TELEPHONE ENCOUNTER
----- Message from Sarah Gar PA-C sent at 10/3/2018  9:18 AM CDT -----  Please reschedule patient's post-op appointment to appropriate time frame.    Thanks!    Tiff

## 2018-10-15 ENCOUNTER — CLINICAL SUPPORT (OUTPATIENT)
Dept: NEUROSURGERY | Facility: CLINIC | Age: 78
End: 2018-10-15
Payer: MEDICARE

## 2018-10-15 VITALS — SYSTOLIC BLOOD PRESSURE: 128 MMHG | TEMPERATURE: 98 F | DIASTOLIC BLOOD PRESSURE: 69 MMHG | HEART RATE: 67 BPM

## 2018-10-15 PROCEDURE — 99212 OFFICE O/P EST SF 10 MIN: CPT | Mod: PBBFAC,PN

## 2018-10-15 PROCEDURE — 99999 PR PBB SHADOW E&M-EST. PATIENT-LVL II: CPT | Mod: PBBFAC,,,

## 2018-10-29 DIAGNOSIS — G56.00 CARPAL TUNNEL SYNDROME, UNSPECIFIED LATERALITY: ICD-10-CM

## 2018-10-29 RX ORDER — GABAPENTIN 300 MG/1
300 CAPSULE ORAL 3 TIMES DAILY
Qty: 90 CAPSULE | Refills: 2 | Status: SHIPPED | OUTPATIENT
Start: 2018-10-29 | End: 2019-07-08 | Stop reason: ALTCHOICE

## 2018-10-30 ENCOUNTER — OFFICE VISIT (OUTPATIENT)
Dept: NEUROSURGERY | Facility: CLINIC | Age: 78
End: 2018-10-30
Payer: MEDICARE

## 2018-10-30 VITALS
SYSTOLIC BLOOD PRESSURE: 119 MMHG | WEIGHT: 262.44 LBS | RESPIRATION RATE: 16 BRPM | HEART RATE: 63 BPM | BODY MASS INDEX: 38.87 KG/M2 | TEMPERATURE: 98 F | DIASTOLIC BLOOD PRESSURE: 72 MMHG | HEIGHT: 69 IN

## 2018-10-30 DIAGNOSIS — G56.02 CARPAL TUNNEL SYNDROME OF LEFT WRIST: Primary | ICD-10-CM

## 2018-10-30 DIAGNOSIS — G56.01 CARPAL TUNNEL SYNDROME ON RIGHT: ICD-10-CM

## 2018-10-30 PROCEDURE — 99024 POSTOP FOLLOW-UP VISIT: CPT | Mod: POP,,, | Performed by: PHYSICIAN ASSISTANT

## 2018-10-30 PROCEDURE — 99999 PR PBB SHADOW E&M-EST. PATIENT-LVL III: CPT | Mod: PBBFAC,,, | Performed by: PHYSICIAN ASSISTANT

## 2018-10-30 PROCEDURE — 99213 OFFICE O/P EST LOW 20 MIN: CPT | Mod: PBBFAC,PN | Performed by: PHYSICIAN ASSISTANT

## 2018-10-30 RX ORDER — METHYLPREDNISOLONE 4 MG/1
TABLET ORAL
Qty: 1 PACKAGE | Refills: 0 | Status: SHIPPED | OUTPATIENT
Start: 2018-10-30 | End: 2018-11-06

## 2018-10-30 NOTE — PROGRESS NOTES
Neurosurgery History & Physical    Patient ID: Selena Moulton is a 78 y.o. female.    Chief Complaint   Patient presents with    Post-op Evaluation     CTR, bilateral numbess in thumb to index finger. No pain       Review of Systems   Constitutional: Negative for chills, diaphoresis, fatigue and fever.   HENT: Negative for congestion, ear pain, rhinorrhea, sneezing, sore throat and tinnitus.    Eyes: Negative for photophobia, pain, redness and visual disturbance.   Respiratory: Negative for cough, chest tightness, shortness of breath and wheezing.    Cardiovascular: Negative for chest pain, palpitations and leg swelling.   Gastrointestinal: Negative for abdominal distention, abdominal pain, constipation, diarrhea, nausea and vomiting.   Genitourinary: Negative for difficulty urinating, dysuria, frequency and urgency.   Musculoskeletal: Negative for back pain, gait problem, myalgias and neck pain.   Skin: Negative for pallor and rash.   Neurological: Positive for weakness and numbness. Negative for dizziness, seizures, speech difficulty and headaches.   Psychiatric/Behavioral: Negative for confusion and hallucinations.       Past Medical History:   Diagnosis Date    Arthritis     Gout     Hypertension      Social History     Socioeconomic History    Marital status:      Spouse name: Not on file    Number of children: Not on file    Years of education: Not on file    Highest education level: Not on file   Social Needs    Financial resource strain: Not on file    Food insecurity - worry: Not on file    Food insecurity - inability: Not on file    Transportation needs - medical: Not on file    Transportation needs - non-medical: Not on file   Occupational History    Not on file   Tobacco Use    Smoking status: Never Smoker    Smokeless tobacco: Never Used   Substance and Sexual Activity    Alcohol use: No     Frequency: Never    Drug use: No    Sexual activity: Not on file   Other Topics  "Concern    Not on file   Social History Narrative    Not on file     Family History   Problem Relation Age of Onset    Cancer Mother     Stroke Mother     Hypertension Mother     No Known Problems Father      Review of patient's allergies indicates:   Allergen Reactions    Influenza virus vaccines Nausea And Vomiting    Morpholine analogues Nausea And Vomiting    Penicillins Other (See Comments)     High fever as a child, also was allergy tested 1980    Latex, natural rubber Rash    Tetanus vaccines and toxoid Rash       Current Outpatient Medications:     allopurinol (ZYLOPRIM) 300 MG tablet, Take 300 mg by mouth once daily. , Disp: , Rfl:     amLODIPine (NORVASC) 5 MG tablet, Take 5 mg by mouth once daily., Disp: , Rfl:     aspirin (ECOTRIN) 81 MG EC tablet, Take 81 mg by mouth once daily., Disp: , Rfl:     atenolol (TENORMIN) 100 MG tablet, Take 50 mg by mouth once daily. , Disp: , Rfl:     atorvastatin (LIPITOR) 80 MG tablet, Take 80 mg by mouth once daily., Disp: , Rfl:     fish oil-omega-3 fatty acids 300-1,000 mg capsule, Take by mouth once daily., Disp: , Rfl:     gabapentin (NEURONTIN) 300 MG capsule, Take 1 capsule (300 mg total) by mouth 3 (three) times daily., Disp: 90 capsule, Rfl: 2    multivitamin capsule, Take 1 capsule by mouth once daily., Disp: , Rfl:     omeprazole (PRILOSEC) 20 MG capsule, Take 20 mg by mouth 2 (two) times daily., Disp: , Rfl:     oxyCODONE-acetaminophen (PERCOCET) 7.5-325 mg per tablet, Take 1 tablet by mouth every 6 (six) hours as needed., Disp: 28 tablet, Rfl: 0    triamterene-hydrochlorothiazide 37.5-25 mg (DYAZIDE) 37.5-25 mg per capsule, Take 1 capsule by mouth every morning., Disp: , Rfl:     methylPREDNISolone (MEDROL DOSEPACK) 4 mg tablet, use as directed, Disp: 1 Package, Rfl: 0  Blood pressure 119/72, pulse 63, temperature 98.2 °F (36.8 °C), resp. rate 16, height 5' 9" (1.753 m), weight 119 kg (262 lb 7.3 oz).      Neurologic Exam  Mental Status "   Oriented to person, place, and time.   Attention: normal. Concentration: normal.   Speech: speech is normal   Level of consciousness: alert  Knowledge: good.      Cranial Nerves      CN II   Visual acuity: normal     CN III, IV, VI   Pupils are equal, round, and reactive to light.  Extraocular motions are normal.      CN V   Facial sensation intact.      CN VII   Facial expression full, symmetric.      CN VIII   Hearing: intact     CN IX, X   Palate: symmetric     CN XI   CN XI normal.      CN XII   CN XII normal.      Motor Exam   Muscle bulk: normal  Overall muscle tone: normal  Right arm pronator drift: absent  Left arm pronator drift: absent     Strength   Right deltoid: 5/5  Left deltoid: 5/5  Right biceps: 5/5  Left biceps: 5/5  Right triceps: 5/5  Left triceps: 5/5  Right wrist flexion: 5/5  Left wrist flexion: 5/5  Right wrist extension: 5/5  Left wrist extension: 5/5  Right interossei: 5/5  Left interossei: 5/5  Right iliopsoas: 5/5  Left iliopsoas: 5/5  Right quadriceps: 5/5  Left quadriceps: 5/5  Right hamstrin/5  Left hamstrin/5  Right anterior tibial: 5/5  Left anterior tibial: 5/5  Right posterior tibial: 5/5  Left posterior tibial: 5/5  Right peroneal: 5/5  Left peroneal: 5/5  Right gastroc: 5/5  Left gastroc: 5/5     Sensory Exam   Right arm light touch: decreased in median nerve distribution  Right leg light touch: normal  Left arm light touch: decreased in median nerve distribution but improved  Left leg light touch: normal     Gait, Coordination, and Reflexes      Gait  Gait: (uses walker 2/2 knees)     Coordination   Romberg: negative  Finger to nose coordination: normal     Tremor   Resting tremor: absent     Reflexes   Right brachioradialis: 0  Left brachioradialis: 0  Right biceps: 1+  Left biceps: 1+  Right triceps: 1+  Left triceps: 0  Right patellar: 2+  Left patellar: 2+  Right achilles: 0  Left achilles: 0  Right plantar: normal  Left plantar: normal  Right Cordon: absent  Left  Cordon: absent  Right ankle clonus: absent  Left ankle clonus: absent     Physical Exam  Constitutional: She is oriented to person, place, and time. She appears well-developed and well-nourished.   HENT:   Head: Normocephalic and atraumatic.   Eyes: EOM are normal. Pupils are equal, round, and reactive to light.   Neck: Neck supple.   Cardiovascular: Normal rate and regular rhythm.   Pulmonary/Chest: Effort normal.   Abdominal: Soft.   Musculoskeletal: Normal range of motion.   Neurological: She is alert and oriented to person, place, and time. She has a normal Finger-Nose-Finger Test and a normal Romberg Test.   Reflex Scores:       Tricep reflexes are 1+ on the right side and 0 on the left side.       Bicep reflexes are 1+ on the right side and 1+ on the left side.       Brachioradialis reflexes are 0 on the right side and 0 on the left side.       Patellar reflexes are 2+ on the right side and 2+ on the left side.       Achilles reflexes are 0 on the right side and 0 on the left side.  Skin: Skin is warm and dry.   Psychiatric: She has a normal mood and affect. Her speech is normal and behavior is normal. Judgment and thought content normal.   Nursing note and vitals reviewed.      Provider dictation:    Ms. Selena Moulton is an 78 year old female who presents for 4 week post-op appointment s/p left carpal tunnel release. She reports resolution of pain in the median nerve distribution on the left, but she continues with numbness. The numbness has improved since surgery. She now complains of severe pain and numbness in the median nerve distribution on the right side that started about 2 weeks ago. The pain is worse at night and she is having difficulty sleeping due to the pain. She denies any weakness in the right hand. She continues to take neurontin 300 mg TID with mild relief.     On exam patient has a + tinel's and phalen's test on the right side. Decreased sensation right > left in the median nerve  distribution. Left carpal tunnel release incision well healed.     Ms. Moulton now has signs of right sided carpal tunnel syndrome. She has recovered well from the left sided carpal tunnel release and is pleased with outcome following surgery. I am ordering an EMG/NCS of the right upper extremity and sending a medrol dose pack to her pharmacy. She should continue to take the neurontin 300 mg TID. I will also order a right carpal tunnel wrist brace. She will follow-up once the above is complete with Dr. Cárdenas for surgical evaluation. Patient was informed to call the clinic with any further questions or concerns.     1. Carpal tunnel syndrome of left wrist     2. Carpal tunnel syndrome on right

## 2018-11-06 ENCOUNTER — OFFICE VISIT (OUTPATIENT)
Dept: FAMILY MEDICINE | Facility: CLINIC | Age: 78
End: 2018-11-06
Payer: MEDICARE

## 2018-11-06 VITALS
HEART RATE: 72 BPM | WEIGHT: 256.38 LBS | BODY MASS INDEX: 37.97 KG/M2 | DIASTOLIC BLOOD PRESSURE: 84 MMHG | SYSTOLIC BLOOD PRESSURE: 122 MMHG | HEIGHT: 69 IN

## 2018-11-06 DIAGNOSIS — R20.0 NUMBNESS: ICD-10-CM

## 2018-11-06 DIAGNOSIS — R60.0 LOCALIZED EDEMA: ICD-10-CM

## 2018-11-06 DIAGNOSIS — E78.49 OTHER HYPERLIPIDEMIA: ICD-10-CM

## 2018-11-06 DIAGNOSIS — E66.01 SEVERE OBESITY (BMI 35.0-39.9) WITH COMORBIDITY: ICD-10-CM

## 2018-11-06 DIAGNOSIS — L30.4 INTERTRIGO: ICD-10-CM

## 2018-11-06 DIAGNOSIS — I10 ESSENTIAL HYPERTENSION: Primary | ICD-10-CM

## 2018-11-06 DIAGNOSIS — R06.02 SHORTNESS OF BREATH: ICD-10-CM

## 2018-11-06 DIAGNOSIS — K21.00 GASTROESOPHAGEAL REFLUX DISEASE WITH ESOPHAGITIS: ICD-10-CM

## 2018-11-06 DIAGNOSIS — M1A.0720 IDIOPATHIC CHRONIC GOUT OF LEFT FOOT WITHOUT TOPHUS: ICD-10-CM

## 2018-11-06 DIAGNOSIS — M15.3 OTHER SECONDARY OSTEOARTHRITIS OF MULTIPLE SITES: ICD-10-CM

## 2018-11-06 PROCEDURE — 99214 OFFICE O/P EST MOD 30 MIN: CPT | Mod: S$PBB,,, | Performed by: FAMILY MEDICINE

## 2018-11-06 PROCEDURE — 99999 PR PBB SHADOW E&M-EST. PATIENT-LVL III: CPT | Mod: PBBFAC,,, | Performed by: FAMILY MEDICINE

## 2018-11-06 PROCEDURE — 99213 OFFICE O/P EST LOW 20 MIN: CPT | Mod: PBBFAC,PO | Performed by: FAMILY MEDICINE

## 2018-11-06 RX ORDER — COLCHICINE 0.6 MG/1
0.6 TABLET ORAL DAILY
COMMUNITY
End: 2018-11-06 | Stop reason: DRUGHIGH

## 2018-11-06 RX ORDER — NYSTATIN 100000 U/G
CREAM TOPICAL 2 TIMES DAILY
Qty: 30 G | Refills: 1 | Status: SHIPPED | OUTPATIENT
Start: 2018-11-06 | End: 2019-02-06 | Stop reason: SDUPTHER

## 2018-11-06 RX ORDER — COLCHICINE 0.6 MG/1
0.6 TABLET ORAL DAILY PRN
COMMUNITY
End: 2019-08-06

## 2018-11-06 RX ORDER — MELOXICAM 7.5 MG/1
7.5 TABLET ORAL DAILY
Qty: 90 TABLET | Refills: 1 | Status: SHIPPED | OUTPATIENT
Start: 2018-11-06 | End: 2019-08-06 | Stop reason: ALTCHOICE

## 2018-11-06 RX ORDER — OMEPRAZOLE 20 MG/1
20 CAPSULE, DELAYED RELEASE ORAL DAILY PRN
COMMUNITY
End: 2019-05-21 | Stop reason: SDUPTHER

## 2018-11-06 RX ORDER — ATORVASTATIN CALCIUM 40 MG/1
40 TABLET, FILM COATED ORAL NIGHTLY
COMMUNITY
End: 2019-08-20 | Stop reason: SDUPTHER

## 2018-11-06 RX ORDER — ALLOPURINOL 300 MG/1
300 TABLET ORAL DAILY
Qty: 90 TABLET | Refills: 3 | Status: SHIPPED | OUTPATIENT
Start: 2018-11-06 | End: 2019-02-06 | Stop reason: SDUPTHER

## 2018-11-06 RX ORDER — MELOXICAM 15 MG/1
15 TABLET ORAL DAILY
COMMUNITY
End: 2018-11-06 | Stop reason: DRUGHIGH

## 2018-11-06 NOTE — PATIENT INSTRUCTIONS
Treating Gout Attacks     Raising the joint above the level of your heart can help reduce gout symptoms.     Gout is a disease that affects the joints. It is caused by excess uric acid in your blood stream that may lead to crystals forming in your joints. Left untreated, it can lead to painful foot and joint deformities and even kidney problems. But, by treating gout early, you can relieve pain and help prevent future problems. Gout can usually be treated with medication and proper diet. In severe cases, surgery may be needed.  Gout attacks are painful and often happen more than once. Taking medications may reduce pain and prevent attacks in the future. There are also some things you can do at home to relieve symptoms.  Medications for gout  Your healthcare provider may prescribe a daily medication to reduce levels of uric acid. Reducing your uric acid levels may help prevent gout attacks. Allopurinol is one commonly used medication taken daily to reduce uric acid levels. Other medications can help relieve pain and swelling during an acute attack. Medicines such as NSAIDs (nonsteroidal anti-inflammatory medicines), steroids, and colchicine may be prescribed for intermittent use to relieve an acute gout attack. Be sure to take your medication as directed.  What you can do  Below are some things you can do at home to relieve gout symptoms. Your healthcare provider may have other tips.  · Rest the painful joint as much as you can.  · Raise the painful joint so it is at a level higher than your heart.  · Use ice for 10 minutes every 1-2 hours as possible.  How can I prevent gout?  With a little effort, you may be able to prevent gout attacks in the future. Here are some things you can do:  · Avoid foods high in purines  ¨ Certain meats (red meat, processed meat, turkey)  ¨ Organ meats (kidney, liver, sweetbread)  ¨ Shellfish (lobster, crab, shrimp, scallop, mussel)  ¨ Certain fish (anchovy, sardine, herring,  mackerel)  · Take any medications prescribed by your healthcare provider.  · Lose weight if you need to.  · Reduce high fructose corn syrup in meals and drinks.  · Reduce or eliminate consumption of alcohol, particularly beer, but also red wine and spirits.  · Control blood pressure, diabetes, and cholesterol.  · Drink plenty of water to help flush uric acid from your body.  Date Last Reviewed: 2/1/2016  © 7383-4879 [employer]. [address] [place] [address], [place] [address]. All rights reserved. This information is not intended as a substitute for professional medical care. Always follow your healthcare professional's instructions.        Eating Heart-Healthy Food: Using the DASH Plan    Eating for your heart doesnt have to be hard or boring. You just need to know how to make healthier choices. The DASH eating plan has been developed to help you do just that. DASH stands for Dietary Approaches to Stop Hypertension. It is a plan that has been proven to be healthier for your heart and to lower your risk for high blood pressure. It can also help lower your risk for cancer, heart disease, osteoporosis, and diabetes.  Choosing from each food group  Choose foods from each of the food groups below each day. Try to get the recommended number of servings for each food group. The serving numbers are based on a diet of 2,000 calories a day. Talk to your doctor if youre unsure about your calorie needs. Along with getting the correct servings, the DASH plan also recommends a sodium intake less than 2,300 mg per day.        Grains  Servings: 6 to 8 a day  A serving is:  · 1 slice bread  · 1 ounce dry cereal  · Half a cup cooked rice, pasta or cereal  Best choices: Whole grains and any grains high in fiber. Vegetables  Servings: 4 to 5 a day  A serving is:  · 1 cup raw leafy vegetable  · Half a cup cut-up raw or cooked vegetable  · Half a cup vegetable juice  Best choices: Fresh or frozen vegetables prepared without  added salt or fat.   Fruits  Servings: 4 to 5 a day  A serving is:  · 1 medium fruit  · One-quarter cup dried fruit  · Half a cup fresh, frozen, or canned fruit  · Half a cup of 100% fruit juices  Best choices: A variety of fresh fruits of different colors. Whole fruits are a better choice than fruit juices. Low-fat or fat-free dairy  Servings: 2 to 3 a day  A serving is:  · 1 cup milk  · 1 cup yogurt  · One and a half ounces cheese  Best choices: Skim or 1% milk, low-fat or fat-free yogurt or buttermilk, and low-fat cheeses.         Lean meats, poultry, fish  Servings: 6 or fewer a day  A serving is:  · 1 ounce cooked meats, poultry, or fish  · 1 egg  Best choices: Lean poultry and fish. Trim away visible fat. Broil, grill, roast, or boil instead of frying. Remove skin from poultry before eating. Limit how much red meat you eat.  Nuts, seeds, beans  Servings: 4 to 5 a week  A serving is:  · One-third cup nuts (one and a half ounces)  · 2 tablespoons nut butter or seeds  · Half a cup cooked dry beans or legumes  Best choices: Dry roasted nuts with no salt added, lentils, kidney beans, garbanzo beans, and whole denson beans.   Fats and oils  Servings: 2 to 3 a day  A serving is:  · 1 teaspoon vegetable oil  · 1 teaspoon soft margarine  · 1 tablespoon mayonnaise  · 2 tablespoons salad dressing  Best choices: Nut and vegetable oils (nontropical vegetable oils), such as olive and canola oil. Sweets  Servings: 5 a week or fewer  A serving is:  · 1 tablespoon sugar, maple syrup, or honey  · 1 tablespoon jam or jelly  · 1 half-ounce jelly beans (about 15)  · 1 cup lemonade  Best choices: Dried fruit can be a satisfying sweet. Choose low-fat sweets. And watch your serving sizes!      For more on the DASH eating plan, visit:  www.nhlbi.nih.gov/health/health-topics/topics/dash   Date Last Reviewed: 6/1/2016  © 4321-0674 [employer]. [address] [place] [address], [place] [address]. All rights reserved. This information  is not intended as a substitute for professional medical care. Always follow your healthcare professional's instructions.

## 2018-11-06 NOTE — PROGRESS NOTES
Subjective:       Patient ID: Selena Moulton is a 78 y.o. female.    Chief Complaint: Establish Care    The patient also is coming here today for a follow-up on gout, usually the left great toe is affected, she is taking allopurinol daily and colchicine as needed.  She also has history of osteoarthritis in different places which she takes meloxicam for daily.      Shortness of Breath   This is a chronic problem. The current episode started more than 1 year ago. The problem occurs intermittently. The problem has been waxing and waning. Associated symptoms include leg swelling. Pertinent negatives include no abdominal pain, chest pain, coryza, ear pain, neck pain, orthopnea, rash, sore throat, sputum production, swollen glands or syncope. The symptoms are aggravated by exercise. She has tried nothing for the symptoms. The treatment provided no relief. There is no history of allergies, aspirin allergies, CAD, chronic lung disease, a heart failure or PE.   Hypertension   This is a chronic problem. The current episode started more than 1 year ago. The problem is unchanged. The problem is controlled. Associated symptoms include peripheral edema and shortness of breath. Pertinent negatives include no anxiety, chest pain, malaise/fatigue, neck pain, orthopnea or palpitations. There are no associated agents to hypertension. Risk factors for coronary artery disease include dyslipidemia, obesity, sedentary lifestyle and post-menopausal state. Past treatments include beta blockers and calcium channel blockers. The current treatment provides moderate improvement. Compliance problems include diet and exercise.  Hypertensive end-organ damage includes kidney disease. There is no history of angina, CAD/MI, CVA or heart failure. Identifiable causes of hypertension include chronic renal disease. There is no history of a hypertension causing med.   Rash   This is a chronic problem. The current episode started more than 1 month  ago. The problem has been gradually worsening since onset. Location: Underneath the breast area. The rash is characterized by burning, pain and itchiness. She was exposed to nothing. Associated symptoms include shortness of breath. Pertinent negatives include no congestion, cough, diarrhea, fatigue or sore throat. Past treatments include antibiotic cream. The treatment provided mild relief. There is no history of allergies.   Hyperlipidemia   This is a chronic problem. Exacerbating diseases include chronic renal disease and obesity. She has no history of hypothyroidism. Factors aggravating her hyperlipidemia include fatty foods and beta blockers. Associated symptoms include shortness of breath. Pertinent negatives include no chest pain. Current antihyperlipidemic treatment includes statins. The current treatment provides significant improvement of lipids. Compliance problems include adherence to diet and adherence to exercise.  Risk factors for coronary artery disease include hypertension, dyslipidemia, obesity, a sedentary lifestyle and post-menopausal.            Past medical history, past social history was reviewed and discussed with the patient.    Review of Systems   Constitutional: Negative for activity change, appetite change, fatigue and malaise/fatigue.   HENT: Negative for congestion, ear discharge, ear pain and sore throat.    Eyes: Negative for discharge and itching.   Respiratory: Positive for shortness of breath. Negative for cough, sputum production, choking and chest tightness.    Cardiovascular: Positive for leg swelling. Negative for chest pain, palpitations, orthopnea and syncope.   Gastrointestinal: Negative for abdominal distention, abdominal pain and diarrhea.   Endocrine: Negative for cold intolerance and heat intolerance.   Genitourinary: Negative for dysuria and flank pain.   Musculoskeletal: Positive for arthralgias. Negative for back pain and neck pain.   Skin: Negative for pallor and  rash.   Allergic/Immunologic: Negative for environmental allergies and food allergies.   Neurological: Positive for numbness (Bilateral hands). Negative for dizziness and facial asymmetry.   Hematological: Negative for adenopathy. Does not bruise/bleed easily.   Psychiatric/Behavioral: Negative for agitation and sleep disturbance.       Objective:      Physical Exam   Constitutional: She appears well-developed and well-nourished. No distress.   HENT:   Head: Normocephalic and atraumatic.   Right Ear: External ear normal.   Left Ear: External ear normal.   Nose: Nose normal.   Mouth/Throat: Oropharynx is clear and moist. No oropharyngeal exudate.   Eyes: Conjunctivae are normal. Pupils are equal, round, and reactive to light. Right eye exhibits no discharge. Left eye exhibits no discharge.   Neck: Neck supple. No thyromegaly present.   Cardiovascular: Normal rate, regular rhythm, normal heart sounds and intact distal pulses.   Pulmonary/Chest: Effort normal and breath sounds normal. No respiratory distress. She has no wheezes.   Abdominal: She exhibits no distension. There is no tenderness.   Musculoskeletal: She exhibits edema (Trace of bilateral lower extremities and presence of varicosity veins bilaterally). She exhibits no tenderness or deformity.   Neurological: No cranial nerve deficit. Coordination normal.   Skin: She is not diaphoretic. No erythema. No pallor.   Psychiatric: She has a normal mood and affect. Her behavior is normal. Judgment and thought content normal.   Nursing note and vitals reviewed.      Assessment:       1. Essential hypertension    2. Other hyperlipidemia    3. Severe obesity (BMI 35.0-39.9) with comorbidity    4. Idiopathic chronic gout of left foot without tophus    5. Numbness    6. Shortness of breath    7. Localized edema    8. Intertrigo        Plan:       Essential hypertension:  Stable  -     Lipid panel; Future; Expected date: 11/06/2018  -     Microalbumin/creatinine urine  ratio; Future; Expected date: 11/06/2018    Other hyperlipidemia:  Uncontrolled  -     Lipid panel; Future; Expected date: 11/06/2018    Severe obesity (BMI 35.0-39.9) with comorbidity:  Improved    Idiopathic chronic gout of left foot without tophus:  Worsening  -     allopurinol (ZYLOPRIM) 300 MG tablet; Take 1 tablet (300 mg total) by mouth once daily.  Dispense: 90 tablet; Refill: 3  -     Uric acid; Future; Expected date: 11/06/2018    Numbness:  Improved  -     Vitamin B12; Future; Expected date: 11/06/2018  -     Folate; Future; Expected date: 11/06/2018    Shortness of breath:  New problem, workup needed  -     CBC auto differential; Future; Expected date: 11/06/2018  -     Comprehensive metabolic panel; Future; Expected date: 11/06/2018    Localized edema:  Stable    Intertrigo:  Worsening  -     nystatin (MYCOSTATIN) cream; Apply topically 2 (two) times daily.  Dispense: 30 g; Refill: 1    Gastroesophageal reflux disease with esophagitis:  Stable    Other secondary osteoarthritis of multiple sites:  Worsening  -     meloxicam (MOBIC) 7.5 MG tablet; Take 1 tablet (7.5 mg total) by mouth once daily. With meals  Dispense: 90 tablet; Refill: 1    Will decrease the dose to meloxicam to 7.5 mg because of the abnormal kidney function, will start the patient on nystatin cream to apply twice daily for 4 weeks, will call the patient after we have the results of the test, will decrease the dose of the cholesterol medication to 40 mg q.h.s.The patient's BMI has been recorded in the chart. The patient has been provided educational materials regarding the benefits of attaining and maintaining a normal weight. We will continue to address and follow this issue during follow up visits.Patient agreed with assessment and plan. Patient verbalized understanding.

## 2018-11-07 ENCOUNTER — LAB VISIT (OUTPATIENT)
Dept: LAB | Facility: HOSPITAL | Age: 78
End: 2018-11-07
Attending: FAMILY MEDICINE
Payer: MEDICARE

## 2018-11-07 DIAGNOSIS — I10 ESSENTIAL HYPERTENSION: ICD-10-CM

## 2018-11-07 LAB
ALBUMIN/CREAT UR: 7.5 UG/MG
CREAT UR-MCNC: 120 MG/DL
MICROALBUMIN UR DL<=1MG/L-MCNC: 9 UG/ML

## 2018-11-07 PROCEDURE — 82043 UR ALBUMIN QUANTITATIVE: CPT

## 2018-11-08 ENCOUNTER — TELEPHONE (OUTPATIENT)
Dept: NEUROSURGERY | Facility: CLINIC | Age: 78
End: 2018-11-08

## 2018-11-08 DIAGNOSIS — N28.9 ABNORMAL KIDNEY FUNCTION: Primary | ICD-10-CM

## 2018-11-08 DIAGNOSIS — G56.01 CARPAL TUNNEL SYNDROME OF RIGHT WRIST: Primary | ICD-10-CM

## 2018-11-08 NOTE — TELEPHONE ENCOUNTER
This pt has an order for a Right carpal tunnel wrist brace. Will you please call the pt to schedule a time ti be fitted? Thank you.

## 2018-11-08 NOTE — TELEPHONE ENCOUNTER
Spoke with the pt, scheduled EMG and follow up with Dr Cárdenas. Directions given to pt, reports she has completed her Medrol Dose tj as ordered and the pain to her RUE has improved.

## 2018-11-09 ENCOUNTER — HOSPITAL ENCOUNTER (OUTPATIENT)
Dept: RADIOLOGY | Facility: HOSPITAL | Age: 78
Discharge: HOME | End: 2018-11-09
Attending: FAMILY MEDICINE
Payer: MEDICARE

## 2018-11-09 DIAGNOSIS — N28.9 ABNORMAL KIDNEY FUNCTION: ICD-10-CM

## 2018-11-09 PROCEDURE — 76770 US EXAM ABDO BACK WALL COMP: CPT | Mod: TC,PO

## 2018-11-09 PROCEDURE — 76770 US EXAM ABDO BACK WALL COMP: CPT | Mod: 26,,, | Performed by: RADIOLOGY

## 2018-12-03 RX ORDER — ATENOLOL 100 MG/1
50 TABLET ORAL DAILY
Qty: 45 TABLET | Refills: 3 | Status: SHIPPED | OUTPATIENT
Start: 2018-12-03 | End: 2019-02-06 | Stop reason: SDUPTHER

## 2018-12-03 NOTE — TELEPHONE ENCOUNTER
----- Message from Jermain Wei sent at 12/3/2018  2:53 PM CST -----  Contact: same  Type:  RX Refill Request    Who Called:  patient  Refill or New Rx:  New Rx  RX Name and Strength:  atenolol (TENORMIN) 100 MG tablet  How is the patient currently taking it? (ex. 1XDay):  Take 50 mg by mouth once daily  Is this a 30 day or 90 day RX:  Historic med last filled by [facility]  Preferred Pharmacy with phone number:    [facility] - [place], [place] - [address]  [address] [address]  Phone: [phone] Fax: [phone]  Ordering Provider:  New Rx for Dr. Angella Rico Call Back Number:  [phone]  Additional Information:  Patient stated she gets the 100 mg & cuts it in half.  Patient stated she always gets a 90 day supply with 3 refills from mail order

## 2019-01-03 ENCOUNTER — OFFICE VISIT (OUTPATIENT)
Dept: PHYSICAL MEDICINE AND REHAB | Facility: CLINIC | Age: 79
End: 2019-01-03
Payer: MEDICARE

## 2019-01-03 DIAGNOSIS — G56.01 CARPAL TUNNEL SYNDROME OF RIGHT WRIST: ICD-10-CM

## 2019-01-03 PROCEDURE — 95886 MUSC TEST DONE W/N TEST COMP: CPT | Mod: 26,S$GLB,, | Performed by: PHYSICAL MEDICINE & REHABILITATION

## 2019-01-03 PROCEDURE — 99999 PR PBB SHADOW E&M-EST. PATIENT-LVL II: ICD-10-PCS | Mod: PBBFAC,,, | Performed by: PHYSICAL MEDICINE & REHABILITATION

## 2019-01-03 PROCEDURE — 99499 NO LOS: ICD-10-PCS | Mod: S$GLB,,, | Performed by: PHYSICAL MEDICINE & REHABILITATION

## 2019-01-03 PROCEDURE — 95910 PR NERVE CONDUCTION STUDY; 7-8 STUDIES: ICD-10-PCS | Mod: S$GLB,,, | Performed by: PHYSICAL MEDICINE & REHABILITATION

## 2019-01-03 PROCEDURE — 95886 PR EMG COMPLETE, W/ NERVE CONDUCTION STUDIES, 5+ MUSCLES: ICD-10-PCS | Mod: 26,S$GLB,, | Performed by: PHYSICAL MEDICINE & REHABILITATION

## 2019-01-03 PROCEDURE — 99999 PR PBB SHADOW E&M-EST. PATIENT-LVL II: CPT | Mod: PBBFAC,,, | Performed by: PHYSICAL MEDICINE & REHABILITATION

## 2019-01-03 PROCEDURE — 99212 OFFICE O/P EST SF 10 MIN: CPT | Mod: PBBFAC,PN,25 | Performed by: PHYSICAL MEDICINE & REHABILITATION

## 2019-01-03 PROCEDURE — 95886 MUSC TEST DONE W/N TEST COMP: CPT | Mod: PBBFAC,PN | Performed by: PHYSICAL MEDICINE & REHABILITATION

## 2019-01-03 PROCEDURE — 99499 UNLISTED E&M SERVICE: CPT | Mod: S$GLB,,, | Performed by: PHYSICAL MEDICINE & REHABILITATION

## 2019-01-03 PROCEDURE — 95910 NRV CNDJ TEST 7-8 STUDIES: CPT | Mod: S$GLB,,, | Performed by: PHYSICAL MEDICINE & REHABILITATION

## 2019-01-03 PROCEDURE — 95910 NRV CNDJ TEST 7-8 STUDIES: CPT | Mod: PBBFAC,PN | Performed by: PHYSICAL MEDICINE & REHABILITATION

## 2019-01-03 NOTE — LETTER
January 3, 2019      Sarah Gar PA-C  [address] [facility] [address] [place] [address]           [facility]  [address] [facility] [place] [address]  Phone: [phone]  Fax: [phone]          Patient: Selena Moulton   MR Number: [id number]   YOB: 1940   Date of Visit: 1/3/2019       Dear Sarah Gar:    Thank you for referring Selena Moulton to me for evaluation. Attached you will find relevant portions of my assessment and plan of care.    If you have questions, please do not hesitate to call me. I look forward to following Selena Moulton along with you.    Sincerely,    Yann Hall MD    Enclosure  CC:  No Recipients    If you would like to receive this communication electronically, please contact [email or web address] or [phone] to request more information on [employer] access.    For providers and/or their staff who would like to refer a patient to [facility], please contact us through our one-stop-shop provider referral line, [facility], at [phone].    If you feel you have received this communication in error or would no longer like to receive these types of communications, please e-mail [email or web address]

## 2019-01-03 NOTE — PROGRESS NOTES
[facility]  [address]  [place] [address]             Full Name: VAISHALI MATIAS Gender: Female  Patient ID: [id number] YOB: 1940  History & Physical Exam: Numbness in digits 1-3 in the right hand for 3 months. +  weakness. No neck pain. Had left CTR in 10/2018, No DM or ETOH abuse.       Visit Date: 1/3/2019 11:24  Age: 78 Years 4 Months Old  Examining Physician: ALFA  Referring Physician: LINDA      Sensory NCS      Nerve / Sites Rec. Site Onset Lat Peak Lat NP Amp PP Amp Segments Distance Velocity     ms ms µV µV  cm m/s   L Median - Digit III (Antidromic)      Wrist Dig III NR NR NR NR Wrist - Dig III 14 NR      Mid palm Dig III NR NR          NR NR Mid palm - Dig III 7 NR   R Median - Digit III (Antidromic)      Wrist Dig III NR NR NR NR Wrist - Dig III 14 NR      Mid palm Dig III 3.65 4.43 4.5 4.6 Mid palm - Dig III 7 19   L Ulnar - Digit V (Antidromic)      Wrist Dig V 2.60 3.28 18.4 36.6 Wrist - Dig V 14 54   R Ulnar - Digit V (Antidromic)      Wrist Dig V 2.66 3.33 14.7 65.6 Wrist - Dig V 14 53       Motor NCS      Nerve / Sites Muscle Latency Amplitude Amp % Duration Segments Distance Lat Diff Velocity     ms mV % ms  cm ms m/s   R Median - APB      Wrist APB 7.71 1.8 100 6.77 Wrist - APB 8        Elbow APB 13.59 1.5 82  Elbow - Wrist 29 5.89 49   L Median - APB      Wrist APB 6.04 1.6 100 11.20 Wrist - APB 8        Elbow APB 11.72 1.9 115  Elbow - Wrist 26 5.68 46   R Ulnar - ADM      Wrist ADM 3.13 9.4 100 5.68 Wrist - ADM 8        B.Elbow ADM 6.98 8.2 87.7 5.89 B.Elbow - Wrist 21 3.85 54      A.Elbow ADM 8.70 8.6 91.3 6.25 A.Elbow - B.Elbow 10 1.72 58       EMG         EMG Summary Table     Spontaneous MUAP Recruitment   Muscle IA Fib PSW Fasc H.F. Amp Dur. PPP Pattern   L. Abductor pollicis brevis 2+ None 2+ None None N 1+ N N   R. Deltoid N None None None None N N N N   R. Biceps brachii N None None None None N N N N   R. Triceps brachii N None None None None N  N N N   R. Pronator teres N None None None None N N N N   R. First dorsal interosseous N None None None None N N N N   R. Abductor pollicis brevis 3+ 1+ 3+ None None N N N Reduced       Summary    The motor conduction test was performed on 3 nerve(s). The results were normal in 1 nerve(s): R Ulnar - ADM. Results outside the specified normal range were found in 2 nerve(s), as follows:   In the R Median - APB study  o the take off latency result was increased for Wrist stimulation  o the peak amplitude result was reduced for Wrist stimulation   In the L Median - APB study  o the take off latency result was increased for Wrist stimulation  o the peak amplitude result was reduced for Wrist stimulation  o the take off velocity result was reduced for Elbow - Wrist segment    The sensory conduction test was performed on 4 nerve(s). There were results within the specified normal range in 2 nerves: R Ulnar - Digit V (Antidromic), L Ulnar - Digit V (Antidromic). Findings were non-responsive in 2 nerve(s): L Median - Digit III (Antidromic), R Median - Digit III (Antidromic).     The needle EMG examination was performed in 7 muscles. It was normal in 5 muscle(s): R. Deltoid, R. Biceps brachii, R. Triceps brachii, R. Pronator teres, R. First dorsal interosseous. The study was abnormal in 2 muscle(s), with the following distribution:   Abnormal spontaneous/insertional activity was found in L. Abductor pollicis brevis, R. Abductor pollicis brevis.   The MUP waveform abnormality was found in L. Abductor pollicis brevis.   Abnormal interference pattern was found in R. Abductor pollicis brevis.      Electrodiagnostic Impression:  1. 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  Needle EMG exam of the median nerve innervated abductor pollicis brevis muscle showed signs of significant active axonal denervation with reduced motor unit recruitment.  2. 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  It is noted that the patient recently underwent left carpal tunnel surgical release on 10/03/2018.  Persistent nerve conduction study abnormalities can be expected despite successful carpal tunnel release, however needle EMG exam the median nerve innervated abductor pollicis brevis muscle did show findings of active axonal denervation.  3. There is insufficient electrodiagnostic evidence for diagnoses of peripheral polyneuropathy, myopathy, or cervical radiculopathy/brachial plexopathy of the right upper extremity.    Summary:  1. Severe right carpal tunnel syndrome with active axonal denervation.  2. History of left carpal tunnel release, with persistent axonal denervation.    Plan:  Today's test results will be sent to her referring Neurosurgery team for further review.  As noted above, today's test results did suggest active axonal denervation of the median nerve innervated abductor pollicis brevis muscle on the left, despite recent carpal tunnel release.  Such findings can persist at the 3 month [place] status post release, however if clinical concern remains for active left median nerve compression at the wrist over the next several weeks, repeat electrodiagnostic studies can be considered.     Thank you very much for the referral. Please call if you have any questions regarding this study or the report.       -------------------------------  Yann Hall M.D.

## 2019-01-07 ENCOUNTER — OFFICE VISIT (OUTPATIENT)
Dept: NEUROSURGERY | Facility: CLINIC | Age: 79
End: 2019-01-07
Payer: MEDICARE

## 2019-01-07 VITALS
HEIGHT: 69 IN | HEART RATE: 60 BPM | WEIGHT: 259.5 LBS | BODY MASS INDEX: 38.44 KG/M2 | DIASTOLIC BLOOD PRESSURE: 78 MMHG | SYSTOLIC BLOOD PRESSURE: 142 MMHG

## 2019-01-07 DIAGNOSIS — G56.01 CARPAL TUNNEL SYNDROME OF RIGHT WRIST: Primary | ICD-10-CM

## 2019-01-07 DIAGNOSIS — R79.1 ABNORMAL COAGULATION PROFILE: ICD-10-CM

## 2019-01-07 PROCEDURE — 99213 OFFICE O/P EST LOW 20 MIN: CPT | Mod: PBBFAC,PN | Performed by: NEUROLOGICAL SURGERY

## 2019-01-07 PROCEDURE — 99214 PR OFFICE/OUTPT VISIT, EST, LEVL IV, 30-39 MIN: ICD-10-PCS | Mod: S$GLB,,, | Performed by: NEUROLOGICAL SURGERY

## 2019-01-07 PROCEDURE — 99999 PR PBB SHADOW E&M-EST. PATIENT-LVL III: ICD-10-PCS | Mod: PBBFAC,,, | Performed by: NEUROLOGICAL SURGERY

## 2019-01-07 PROCEDURE — 99214 OFFICE O/P EST MOD 30 MIN: CPT | Mod: S$GLB,,, | Performed by: NEUROLOGICAL SURGERY

## 2019-01-07 PROCEDURE — 99999 PR PBB SHADOW E&M-EST. PATIENT-LVL III: CPT | Mod: PBBFAC,,, | Performed by: NEUROLOGICAL SURGERY

## 2019-01-07 NOTE — PROGRESS NOTES
Neurosurgery History and Physical    Patient ID: Selena Moulton is a 78 y.o. female.    Chief Complaint   Patient presents with    Follow-up     f/u from EMG; right hand/wrist is still bothering pt; pain will wake her up at night       Review of Systems   Constitutional: Positive for activity change.   HENT: Negative.    Eyes: Negative.    Respiratory: Negative.    Cardiovascular: Negative.    Gastrointestinal: Negative.    Endocrine: Negative.    Genitourinary: Negative.    Musculoskeletal: Positive for arthralgias, gait problem and neck pain.   Allergic/Immunologic: Negative.    Neurological: Positive for weakness and numbness.   Hematological: Negative.    Psychiatric/Behavioral: Negative.        Past Medical History:   Diagnosis Date    Arthritis     Gout     Hypertension      Social History     Socioeconomic History    Marital status:      Spouse name: Not on file    Number of children: Not on file    Years of education: Not on file    Highest education level: Not on file   Social Needs    Financial resource strain: Not on file    Food insecurity - worry: Not on file    Food insecurity - inability: Not on file    Transportation needs - medical: Not on file    Transportation needs - non-medical: Not on file   Occupational History    Not on file   Tobacco Use    Smoking status: Never Smoker    Smokeless tobacco: Never Used   Substance and Sexual Activity    Alcohol use: No     Frequency: Never    Drug use: No    Sexual activity: Not on file   Other Topics Concern    Not on file   Social History Narrative    Not on file     Family History   Problem Relation Age of Onset    Cancer Mother     Stroke Mother     Hypertension Mother     No Known Problems Father      Review of patient's allergies indicates:   Allergen Reactions    Influenza virus vaccines     Latex, natural rubber     Morpholine analogues     Penicillins     Tetanus vaccines and toxoid        Current Outpatient  "Medications:     allopurinol (ZYLOPRIM) 300 MG tablet, Take 1 tablet (300 mg total) by mouth once daily., Disp: 90 tablet, Rfl: 3    amLODIPine (NORVASC) 5 MG tablet, Take 5 mg by mouth once daily., Disp: , Rfl:     aspirin (ECOTRIN) 81 MG EC tablet, Take 81 mg by mouth once daily., Disp: , Rfl:     atenolol (TENORMIN) 100 MG tablet, Take 0.5 tablets (50 mg total) by mouth once daily., Disp: 45 tablet, Rfl: 3    atorvastatin (LIPITOR) 40 MG tablet, Take 40 mg by mouth every evening., Disp: , Rfl:     colchicine (COLCRYS) 0.6 mg tablet, Take 0.6 mg by mouth daily as needed., Disp: , Rfl:     fish oil-omega-3 fatty acids 300-1,000 mg capsule, Take by mouth once daily., Disp: , Rfl:     gabapentin (NEURONTIN) 300 MG capsule, Take 1 capsule (300 mg total) by mouth 3 (three) times daily., Disp: 90 capsule, Rfl: 2    meloxicam (MOBIC) 7.5 MG tablet, Take 1 tablet (7.5 mg total) by mouth once daily. With meals, Disp: 90 tablet, Rfl: 1    multivitamin capsule, Take 1 capsule by mouth once daily., Disp: , Rfl:     nystatin (MYCOSTATIN) cream, Apply topically 2 (two) times daily., Disp: 30 g, Rfl: 1    omeprazole (PRILOSEC) 20 MG capsule, Take 20 mg by mouth once daily., Disp: , Rfl:     triamterene-hydrochlorothiazide 37.5-25 mg (DYAZIDE) 37.5-25 mg per capsule, Take 1 capsule by mouth every morning., Disp: , Rfl:   Blood pressure (!) 142/78, pulse 60, height 5' 9" (1.753 m), weight 117.7 kg (259 lb 7.7 oz).      Neurologic Exam     Mental Status   Oriented to person, place, and time.   Attention: normal. Concentration: normal.   Speech: speech is normal   Level of consciousness: alert  Knowledge: good.     Cranial Nerves     CN II   Visual acuity: normal    CN III, IV, VI   Pupils are equal, round, and reactive to light.  Extraocular motions are normal.     CN V   Facial sensation intact.     CN VII   Facial expression full, symmetric.     CN VIII   Hearing: intact    CN IX, X   Palate: symmetric    CN XI   CN " XI normal.     CN XII   CN XII normal.     Motor Exam   Muscle bulk: normal  Overall muscle tone: normal  Right arm pronator drift: absent  Left arm pronator drift: absent    Strength   Right deltoid: 5/5  Left deltoid: 5/5  Right biceps: 5/5  Left biceps: 5/5  Right triceps: 5/5  Left triceps: 5/5  Right wrist flexion: 5/5  Left wrist flexion: 5/5  Right wrist extension: 5/5  Left wrist extension: 5/5  Right interossei: 5/5  Left interossei: 5/5  Right iliopsoas: 5/5  Left iliopsoas: 5/5  Right quadriceps: 5/5  Left quadriceps: 5/5  Right hamstrin/5  Left hamstrin/5  Right anterior tibial: 5/5  Left anterior tibial: 5/5  Right posterior tibial: 5/5  Left posterior tibial: 5/5  Right peroneal: 5/5  Left peroneal: 5/5  Right gastroc: 5/5  Left gastroc: 5/5    Sensory Exam   Right arm light touch: normal  Sensory deficit distribution on right: median  Sensory deficit distribution on left: median    Gait, Coordination, and Reflexes     Gait  Gait: (uses walker 2/2 knees)    Coordination   Romberg: negative  Finger to nose coordination: normal    Tremor   Resting tremor: absent    Reflexes   Right brachioradialis: 0  Left brachioradialis: 0  Right biceps: 1+  Left biceps: 1+  Right triceps: 1+  Left triceps: 0  Right patellar: 2+  Left patellar: 2+  Right achilles: 0  Left achilles: 0  Right plantar: normal  Left plantar: normal  Right Cordon: absent  Left Cordon: absent  Right ankle clonus: absent  Left ankle clonus: absent      Physical Exam   Constitutional: She is oriented to person, place, and time. She appears well-developed and well-nourished.   HENT:   Head: Normocephalic and atraumatic.   Eyes: EOM are normal. Pupils are equal, round, and reactive to light.   Neck: Neck supple.   Cardiovascular: Normal rate and regular rhythm.   Pulmonary/Chest: Effort normal.   Abdominal: Soft.   Musculoskeletal: Normal range of motion.   Neurological: She is alert and oriented to person, place, and time. She has a  "normal Finger-Nose-Finger Test and a normal Romberg Test.   Reflex Scores:       Tricep reflexes are 1+ on the right side and 0 on the left side.       Bicep reflexes are 1+ on the right side and 1+ on the left side.       Brachioradialis reflexes are 0 on the right side and 0 on the left side.       Patellar reflexes are 2+ on the right side and 2+ on the left side.       Achilles reflexes are 0 on the right side and 0 on the left side.  Skin: Skin is warm and dry.   Psychiatric: She has a normal mood and affect. Her speech is normal and behavior is normal. Judgment and thought content normal.   Nursing note and vitals reviewed.      Vital Signs  Pulse: 60  BP: (!) 142/78  Pain Score:   8  Pain Loc: Hand  Height and Weight  Height: 5' 9" (175.3 cm)  Weight: 117.7 kg (259 lb 7.7 oz)  BSA (Calculated - sq m): 2.39 sq meters  BMI (Calculated): 38.4  Weight in (lb) to have BMI = 25: 168.9]    Provider dictation:  Doing very well S/P left carpal tunnel release with resolution of her hand pain. She has ongoing numbness in the first three digits. Her right carpal tunnel syndrome has now worsened and she has severe right hand pain and numbness in the first three digits. On exam, she has weakness in the right thenar muscles. Her left hand strength is normal. She had an updated NCT, showing severe right carpal tunnel syndrome with axonal denervation.     In light of her progressive symptoms, right median-innervated muscle weakness and electrodiagnostic findings, I have offered her a right-sided carpal tunnel release. Risks, benefits, alternatives explained. She wishes to proceed. She will need PCP and Cardiology clearance.     Visit Diagnosis:  Carpal tunnel syndrome of right wrist      "

## 2019-01-08 ENCOUNTER — TELEPHONE (OUTPATIENT)
Dept: NEUROSURGERY | Facility: CLINIC | Age: 79
End: 2019-01-08

## 2019-01-08 RX ORDER — LIDOCAINE HYDROCHLORIDE 10 MG/ML
1 INJECTION, SOLUTION EPIDURAL; INFILTRATION; INTRACAUDAL; PERINEURAL ONCE
Status: CANCELLED | OUTPATIENT
Start: 2019-01-08 | End: 2019-01-08

## 2019-01-08 RX ORDER — SODIUM CHLORIDE, SODIUM LACTATE, POTASSIUM CHLORIDE, CALCIUM CHLORIDE 600; 310; 30; 20 MG/100ML; MG/100ML; MG/100ML; MG/100ML
INJECTION, SOLUTION INTRAVENOUS CONTINUOUS
Status: CANCELLED | OUTPATIENT
Start: 2019-01-08

## 2019-01-08 NOTE — TELEPHONE ENCOUNTER
To Whom It May Concern,    Selena Moulton will be having surgery on 1/17/19 with Dr. Cárdenas, Neurosurgeon, at [facility]. We are reaching out to obtain a surgical clearance from Dr. Perales to ensure the safety of our patient. The surgery is a right carpal tunnel release and will be under MAC anesthesia. This procedure typically lasts approximately 0.5 hours. We request that the patient hold all anticoagulant/antiinflammatory medications for 5-7 days prior to surgery. Please let us know if our office can be of further assistance.     Thank you,     AGGIE Perez LPN  [phone]  F: [phone]

## 2019-01-08 NOTE — TELEPHONE ENCOUNTER
To Whom It May Concern,    Selena Moulton will be having surgery on 1/17/19 with Dr. Cárdenas, Neurosurgeon, at [facility]. We are reaching out to obtain a surgical clearance from Dr. Alberto to ensure the safety of our patient. The surgery is a right carpal tunnel release and will be under MAC anesthesia. This procedure typically lasts approximately 0.5 hours. We request that the patient hold all anticoagulant/antiinflammatory medications for 5-7 days prior to surgery. Please let us know if our office can be of further assistance.     Thank you,     AGGIE Perez LPN  [phone]  F: [phone]

## 2019-01-14 ENCOUNTER — OFFICE VISIT (OUTPATIENT)
Dept: CARDIOLOGY | Facility: CLINIC | Age: 79
End: 2019-01-14
Payer: MEDICARE

## 2019-01-14 VITALS
WEIGHT: 261.69 LBS | HEART RATE: 65 BPM | BODY MASS INDEX: 38.76 KG/M2 | HEIGHT: 69 IN | SYSTOLIC BLOOD PRESSURE: 128 MMHG | DIASTOLIC BLOOD PRESSURE: 70 MMHG

## 2019-01-14 DIAGNOSIS — I35.0 NONRHEUMATIC AORTIC VALVE STENOSIS: ICD-10-CM

## 2019-01-14 DIAGNOSIS — E78.00 HYPERCHOLESTEROLEMIA: ICD-10-CM

## 2019-01-14 DIAGNOSIS — E66.01 SEVERE OBESITY (BMI 35.0-39.9) WITH COMORBIDITY: ICD-10-CM

## 2019-01-14 DIAGNOSIS — I10 ESSENTIAL HYPERTENSION: Primary | ICD-10-CM

## 2019-01-14 DIAGNOSIS — Z01.810 ENCOUNTER FOR PRE-OPERATIVE CARDIOVASCULAR CLEARANCE: ICD-10-CM

## 2019-01-14 PROCEDURE — 3078F DIAST BP <80 MM HG: CPT | Mod: CPTII,S$GLB,, | Performed by: INTERNAL MEDICINE

## 2019-01-14 PROCEDURE — 99213 PR OFFICE/OUTPT VISIT, EST, LEVL III, 20-29 MIN: ICD-10-PCS | Mod: S$GLB,,, | Performed by: INTERNAL MEDICINE

## 2019-01-14 PROCEDURE — 99999 PR PBB SHADOW E&M-EST. PATIENT-LVL III: ICD-10-PCS | Mod: PBBFAC,,, | Performed by: INTERNAL MEDICINE

## 2019-01-14 PROCEDURE — 3074F PR MOST RECENT SYSTOLIC BLOOD PRESSURE < 130 MM HG: ICD-10-PCS | Mod: CPTII,S$GLB,, | Performed by: INTERNAL MEDICINE

## 2019-01-14 PROCEDURE — 3078F PR MOST RECENT DIASTOLIC BLOOD PRESSURE < 80 MM HG: ICD-10-PCS | Mod: CPTII,S$GLB,, | Performed by: INTERNAL MEDICINE

## 2019-01-14 PROCEDURE — 99499 RISK ADDL DX/OHS AUDIT: ICD-10-PCS | Mod: S$GLB,,, | Performed by: INTERNAL MEDICINE

## 2019-01-14 PROCEDURE — 99499 UNLISTED E&M SERVICE: CPT | Mod: S$GLB,,, | Performed by: INTERNAL MEDICINE

## 2019-01-14 PROCEDURE — 99999 PR PBB SHADOW E&M-EST. PATIENT-LVL III: CPT | Mod: PBBFAC,,, | Performed by: INTERNAL MEDICINE

## 2019-01-14 PROCEDURE — 3074F SYST BP LT 130 MM HG: CPT | Mod: CPTII,S$GLB,, | Performed by: INTERNAL MEDICINE

## 2019-01-14 PROCEDURE — 1101F PR PT FALLS ASSESS DOC 0-1 FALLS W/OUT INJ PAST YR: ICD-10-PCS | Mod: CPTII,S$GLB,, | Performed by: INTERNAL MEDICINE

## 2019-01-14 PROCEDURE — 1101F PT FALLS ASSESS-DOCD LE1/YR: CPT | Mod: CPTII,S$GLB,, | Performed by: INTERNAL MEDICINE

## 2019-01-14 PROCEDURE — 99213 OFFICE O/P EST LOW 20 MIN: CPT | Mod: S$GLB,,, | Performed by: INTERNAL MEDICINE

## 2019-01-14 NOTE — PROGRESS NOTES
Subjective:    Patient ID:  Selena Moulton is a 78 y.o. female who presents for Hyperlipidemia (Clearance for CTS Right wrist) and Hypertension        HPI  LAST ECHO, MUKUND 0.92, MILD SCHMIDT, DENIES SIGNIFICANT SOB, NO CP, HAD L CARPAL TUNNEL, NOW NEED R DONE, LAST LDL IN November 76, CMP OK LAST WEEK, SEE ROS    Past Medical History:   Diagnosis Date    Arthritis     Gout     Hypertension      Past Surgical History:   Procedure Laterality Date    BACK SURGERY      bone graft neck    CERVICAL FUSION  1984    x2     HERNIA REPAIR      umbilical    HYSTERECTOMY      Injection,steroid,epidural,transforaminal approach Left 8/2/2018    Performed by Chaim Hernandez MD at [facility]    RELEASE, CARPAL TUNNEL  LEFT Left 10/3/2018    Performed by Meche C[facility]denas MD at [facility]    TONSILLECTOMY       Family History   Problem Relation Age of Onset    Cancer Mother     Stroke Mother     Hypertension Mother     No Known Problems Father      Social History     Socioeconomic History    Marital status:      Spouse name: Not on file    Number of children: Not on file    Years of education: Not on file    Highest education level: Not on file   Social Needs    Financial resource strain: Not on file    Food insecurity - worry: Not on file    Food insecurity - inability: Not on file    Transportation needs - medical: Not on file    Transportation needs - non-medical: Not on file   Occupational History    Not on file   Tobacco Use    Smoking status: Never Smoker    Smokeless tobacco: Never Used   Substance and Sexual Activity    Alcohol use: No     Frequency: Never    Drug use: No    Sexual activity: Not on file   Other Topics Concern    Not on file   Social History Narrative    Not on file       Review of patient's allergies indicates:   Allergen Reactions    Influenza virus vaccines Nausea And Vomiting    Morpholine analogues Nausea And Vomiting    Penicillins Other (See Comments)     High fever  as a child, also was allergy tested 1980    Latex, natural rubber Rash    Tetanus vaccines and toxoid Rash       Current Outpatient Medications:     allopurinol (ZYLOPRIM) 300 MG tablet, Take 1 tablet (300 mg total) by mouth once daily. (Patient taking differently: Take 300 mg by mouth once daily. Use AM of surgery with a sip of water.), Disp: 90 tablet, Rfl: 3    amLODIPine (NORVASC) 5 MG tablet, Take 5 mg by mouth once daily. Use AM of surgery with a sip of water, Disp: , Rfl:     aspirin (ECOTRIN) 81 MG EC tablet, Take 81 mg by mouth once daily. Hold for 7 days before surgery, Disp: , Rfl:     atenolol (TENORMIN) 100 MG tablet, Take 0.5 tablets (50 mg total) by mouth once daily. (Patient taking differently: Take 50 mg by mouth once daily. Use AM of surgery with a sip of water), Disp: 45 tablet, Rfl: 3    atorvastatin (LIPITOR) 40 MG tablet, Take 40 mg by mouth every evening. Use PM before surgery and a dose AM of surgery with a sip of water, Disp: , Rfl:     colchicine (COLCRYS) 0.6 mg tablet, Take 0.6 mg by mouth daily as needed. Use AM of surgery with a sip of water if needed, Disp: , Rfl:     fish oil-omega-3 fatty acids 300-1,000 mg capsule, Take 1 capsule by mouth once daily. Hold for 7 days before surgery, Disp: , Rfl:     gabapentin (NEURONTIN) 300 MG capsule, Take 1 capsule (300 mg total) by mouth 3 (three) times daily. (Patient taking differently: Take 300 mg by mouth 3 (three) times daily. Use with a sip of water AM of srugery), Disp: 90 capsule, Rfl: 2    meloxicam (MOBIC) 7.5 MG tablet, Take 1 tablet (7.5 mg total) by mouth once daily. With meals (Patient taking differently: Take 7.5 mg by mouth once daily. With meals. Hold for 7 days before surgery.), Disp: 90 tablet, Rfl: 1    multivitamin capsule, Take 1 capsule by mouth once daily. Hold AM of surgery, Disp: , Rfl:     nystatin (MYCOSTATIN) cream, Apply topically 2 (two) times daily. (Patient taking differently: Apply topically 2 (two)  "times daily as needed. Hold AM of surgery), Disp: 30 g, Rfl: 1    omeprazole (PRILOSEC) 20 MG capsule, Take 20 mg by mouth daily as needed. Use AM of surgery with a sip of water, Disp: , Rfl:     triamterene-hydrochlorothiazide 37.5-25 mg (DYAZIDE) 37.5-25 mg per capsule, Take 1 capsule by mouth every morning. Hold AM of surgery., Disp: , Rfl:     Review of Systems   Constitution: Negative for chills, diaphoresis, weakness, malaise/fatigue and night sweats.   HENT: Negative for congestion, hearing loss and nosebleeds.    Eyes: Negative for blurred vision, discharge, double vision and visual disturbance.   Cardiovascular: Negative for chest pain, claudication, cyanosis, dyspnea on exertion, irregular heartbeat, leg swelling, near-syncope, orthopnea, palpitations, paroxysmal nocturnal dyspnea and syncope.   Respiratory: Negative for cough, hemoptysis, shortness of breath and wheezing.    Endocrine: Negative for cold intolerance, heat intolerance and polyuria.   Hematologic/Lymphatic: Negative for adenopathy and bleeding problem. Does not bruise/bleed easily.   Skin: Negative for color change, itching, nail changes and rash.   Musculoskeletal: Positive for joint pain (R HAND). Negative for back pain and falls.   Gastrointestinal: Negative for bloating, abdominal pain, change in bowel habit, dysphagia, heartburn, hematemesis, jaundice, melena and vomiting.   Genitourinary: Negative for dysuria, flank pain and frequency.   Neurological: Negative for brief paralysis, dizziness, focal weakness, light-headedness, loss of balance, numbness (L HAND) and tremors.   Psychiatric/Behavioral: Negative for altered mental status, depression, memory loss and substance abuse. The patient is not nervous/anxious.    Allergic/Immunologic: Negative for hives and persistent infections.        Objective:      Vitals:    01/14/19 1433   BP: 128/70   Pulse: 65   Weight: 118.7 kg (261 lb 11 oz)   Height: 5' 9" (1.753 m)   PainSc:   7 "   PainLoc: Arm     Body mass index is 38.64 kg/m².    Physical Exam   Constitutional: She is oriented to person, place, and time. She appears well-developed and well-nourished. She is active.   OVERWEIGHT   HENT:   Head: Normocephalic and atraumatic.   Mouth/Throat: Oropharynx is clear and moist and mucous membranes are normal.   Eyes: Conjunctivae and EOM are normal. Pupils are equal, round, and reactive to light.   Neck: Trachea normal and normal range of motion. Neck supple. Normal carotid pulses, no hepatojugular reflux and no JVD present. Carotid bruit is not present. No tracheal deviation, no edema and no erythema present. No thyromegaly present.   Cardiovascular: Normal rate and regular rhythm.  No extrasystoles are present. PMI is not displaced. Exam reveals no gallop and no friction rub.   Murmur heard.   Harsh midsystolic murmur is present with a grade of 2/6 at the upper right sternal border radiating to the neck.  Pulses:       Carotid pulses are 2+ on the right side, and 2+ on the left side.       Radial pulses are 2+ on the right side, and 2+ on the left side.        Dorsalis pedis pulses are 2+ on the right side, and 2+ on the left side.        Posterior tibial pulses are 2+ on the right side, and 2+ on the left side.   Pulmonary/Chest: Effort normal and breath sounds normal. No tachypnea and no bradypnea. She has no wheezes. She has no rales. She exhibits no tenderness.   Abdominal: Soft. Bowel sounds are normal. She exhibits no distension and no mass. There is no hepatosplenomegaly. There is no tenderness. There is no guarding and no CVA tenderness.   Musculoskeletal: Normal range of motion. She exhibits no edema or tenderness.   USES WALKER, L HAND BRACE   Lymphadenopathy:     She has no cervical adenopathy.   Neurological: She is alert and oriented to person, place, and time. She has normal strength. She displays no tremor. No cranial nerve deficit. She exhibits normal muscle tone. Coordination  normal.   Skin: Skin is warm and dry. No rash noted. No cyanosis or erythema. No pallor.   Psychiatric: She has a normal mood and affect. Her speech is normal and behavior is normal. Judgment and thought content normal.               ..    Chemistry        Component Value Date/Time     01/09/2019 1229    K 4.3 01/09/2019 1229     01/09/2019 1229    CO2 31 01/09/2019 1229    BUN 23 (H) 01/09/2019 1229    CREATININE 0.90 01/09/2019 1229    GLU 98 01/09/2019 1229        Component Value Date/Time    CALCIUM 9.9 01/09/2019 1229    ALKPHOS 103 01/09/2019 1229    AST 22 01/09/2019 1229    ALT 27 01/09/2019 1229    BILITOT 0.5 01/09/2019 1229    ESTGFRAFRICA >60 01/09/2019 1229    EGFRNONAA >60 01/09/2019 1229            ..  Lab Results   Component Value Date    CHOL 153 11/07/2018     Lab Results   Component Value Date    HDL 46 11/07/2018     Lab Results   Component Value Date    LDLCALC 76.6 11/07/2018     Lab Results   Component Value Date    TRIG 152 (H) 11/07/2018     Lab Results   Component Value Date    CHOLHDL 30.1 11/07/2018     ..  Lab Results   Component Value Date    WBC 7.32 01/09/2019    HGB 12.9 01/09/2019    HCT 42.2 01/09/2019    MCV 90 01/09/2019     01/09/2019       Test(s) Reviewed  I have reviewed the following in detail:  [] Stress test   [] Angiography   [x] Echocardiogram   [x] Labs   [] Other:       Assessment:         ICD-10-CM ICD-9-CM   1. Essential hypertension I10 401.9   2. Nonrheumatic aortic valve stenosis I35.0 424.1   3. Encounter for pre-operative cardiovascular clearance Z01.810 V72.81   4. Severe obesity (BMI 35.0-39.9) with comorbidity E66.01 278.01   5. Hypercholesterolemia E78.00 272.0     Problem List Items Addressed This Visit        Cardiac/Vascular    Encounter for pre-operative cardiovascular clearance    Essential hypertension - Primary    Hypercholesterolemia    Nonrheumatic aortic valve stenosis       Endocrine    Severe obesity (BMI 35.0-39.9) with  comorbidity           Plan:     ALL CV CLINICALLY STABLE, NO ANGINA, NO HF, NO TIA, NO CLINICAL ARRHYTHMIA,CONTINUE CURRENT MEDS, EDUCATION, DIET, EXERCISE,WEIGHT LOSS,  ACCEPTABLE RISK FOR CARPAL TUNNEL SURGERY, AVOID HYPOTENSION, RTC AS SCHEDULED      Essential hypertension    Nonrheumatic aortic valve stenosis    Encounter for pre-operative cardiovascular clearance    Severe obesity (BMI 35.0-39.9) with comorbidity    Hypercholesterolemia    RTC Low level/low impact aerobic exercise 5x's/wk. Heart healthy diet and risk factor modification.    See labs and med orders.    Aerobic exercise 5x's/wk. Heart healthy diet and risk factor modification.    See labs and med orders.

## 2019-01-15 ENCOUNTER — TELEPHONE (OUTPATIENT)
Dept: NEUROSURGERY | Facility: CLINIC | Age: 79
End: 2019-01-15

## 2019-01-15 NOTE — TELEPHONE ENCOUNTER
Pt has been cleared by cardiology. We are still awaiting clearance from Dr. Perales for surgery on 1.17.19. Please advise.

## 2019-01-28 ENCOUNTER — CLINICAL SUPPORT (OUTPATIENT)
Dept: NEUROSURGERY | Facility: CLINIC | Age: 79
End: 2019-01-28
Payer: MEDICARE

## 2019-01-28 PROCEDURE — 99999 PR PBB SHADOW E&M-EST. PATIENT-LVL I: CPT | Mod: PBBFAC,,,

## 2019-01-28 PROCEDURE — 99999 PR PBB SHADOW E&M-EST. PATIENT-LVL I: ICD-10-PCS | Mod: PBBFAC,,,

## 2019-01-28 RX ORDER — CLINDAMYCIN HYDROCHLORIDE 300 MG/1
300 CAPSULE ORAL 3 TIMES DAILY
Qty: 21 CAPSULE | Refills: 0 | Status: SHIPPED | OUTPATIENT
Start: 2019-01-28 | End: 2019-02-04

## 2019-01-28 NOTE — PROGRESS NOTES
Pt is 12 days s/p CTR right with Dr. Cárdenas. Pt had redness, tenderness and some discolored area around suture, TARIQ Pedraza assessed wound and ordered ABT. Dr. Cárdenas was consulted and agreed to place pt on ABT therapy and follow up in 1 week for re-check. Incision cleaned with chloraprep and 8 sutures removed with no issue. Incision is warm, dry, intact. Pt reports post-operative pain level < pre-operative state. Pt is not requesting medication refill today. Pt re-educated on narcotics policy. Educated patient on weight lifting status, bending/lifting/twisting, and to call with any changes or questions. Pt aware of imaging and f/u appt with provider. No further questions.

## 2019-02-04 ENCOUNTER — CLINICAL SUPPORT (OUTPATIENT)
Dept: NEUROSURGERY | Facility: CLINIC | Age: 79
End: 2019-02-04
Payer: MEDICARE

## 2019-02-04 PROCEDURE — 99999 PR PBB SHADOW E&M-EST. PATIENT-LVL I: ICD-10-PCS | Mod: PBBFAC,,,

## 2019-02-04 PROCEDURE — 99999 PR PBB SHADOW E&M-EST. PATIENT-LVL I: CPT | Mod: PBBFAC,,,

## 2019-02-04 NOTE — PROGRESS NOTES
Pt is 19 days s/p CTR with Dr. Cárdenas. No s/s of infection. Incision cleaned with chloraprep. Incision is warm, dry, intact. Pt reports post-operative pain level < pre-operative state. Pt is not requesting medication refill today. Pt re-educated on narcotics policy. Educated patient on weight lifting status, bending/lifting/twisting, and to call with any changes or questions. Pt aware of imaging and f/u appt with provider. No further questions.

## 2019-02-06 ENCOUNTER — OFFICE VISIT (OUTPATIENT)
Dept: FAMILY MEDICINE | Facility: CLINIC | Age: 79
End: 2019-02-06
Payer: MEDICARE

## 2019-02-06 VITALS
DIASTOLIC BLOOD PRESSURE: 68 MMHG | OXYGEN SATURATION: 95 % | BODY MASS INDEX: 39.65 KG/M2 | WEIGHT: 252.63 LBS | HEIGHT: 67 IN | SYSTOLIC BLOOD PRESSURE: 128 MMHG | HEART RATE: 73 BPM

## 2019-02-06 DIAGNOSIS — M1A.0720 IDIOPATHIC CHRONIC GOUT OF LEFT FOOT WITHOUT TOPHUS: ICD-10-CM

## 2019-02-06 DIAGNOSIS — E78.49 OTHER HYPERLIPIDEMIA: ICD-10-CM

## 2019-02-06 DIAGNOSIS — L30.4 INTERTRIGO: ICD-10-CM

## 2019-02-06 DIAGNOSIS — I35.0 NONRHEUMATIC AORTIC VALVE STENOSIS: ICD-10-CM

## 2019-02-06 DIAGNOSIS — J98.4 SCARRING OF LUNG: ICD-10-CM

## 2019-02-06 DIAGNOSIS — I77.1 TORTUOUS AORTA: ICD-10-CM

## 2019-02-06 DIAGNOSIS — Z78.0 POST-MENOPAUSAL: ICD-10-CM

## 2019-02-06 DIAGNOSIS — E66.01 CLASS 2 SEVERE OBESITY DUE TO EXCESS CALORIES WITH SERIOUS COMORBIDITY AND BODY MASS INDEX (BMI) OF 39.0 TO 39.9 IN ADULT: ICD-10-CM

## 2019-02-06 DIAGNOSIS — I10 ESSENTIAL HYPERTENSION: Primary | ICD-10-CM

## 2019-02-06 PROBLEM — J84.10 FIBROSIS LUNG: Status: ACTIVE | Noted: 2019-02-06

## 2019-02-06 PROCEDURE — 99214 PR OFFICE/OUTPT VISIT, EST, LEVL IV, 30-39 MIN: ICD-10-PCS | Mod: S$GLB,,, | Performed by: FAMILY MEDICINE

## 2019-02-06 PROCEDURE — 3078F PR MOST RECENT DIASTOLIC BLOOD PRESSURE < 80 MM HG: ICD-10-PCS | Mod: CPTII,S$GLB,, | Performed by: FAMILY MEDICINE

## 2019-02-06 PROCEDURE — 1101F PR PT FALLS ASSESS DOC 0-1 FALLS W/OUT INJ PAST YR: ICD-10-PCS | Mod: CPTII,S$GLB,, | Performed by: FAMILY MEDICINE

## 2019-02-06 PROCEDURE — 99499 RISK ADDL DX/OHS AUDIT: ICD-10-PCS | Mod: S$GLB,,, | Performed by: FAMILY MEDICINE

## 2019-02-06 PROCEDURE — 3074F SYST BP LT 130 MM HG: CPT | Mod: CPTII,S$GLB,, | Performed by: FAMILY MEDICINE

## 2019-02-06 PROCEDURE — 3078F DIAST BP <80 MM HG: CPT | Mod: CPTII,S$GLB,, | Performed by: FAMILY MEDICINE

## 2019-02-06 PROCEDURE — 99214 OFFICE O/P EST MOD 30 MIN: CPT | Mod: S$GLB,,, | Performed by: FAMILY MEDICINE

## 2019-02-06 PROCEDURE — 99999 PR PBB SHADOW E&M-EST. PATIENT-LVL IV: CPT | Mod: PBBFAC,,, | Performed by: FAMILY MEDICINE

## 2019-02-06 PROCEDURE — 99999 PR PBB SHADOW E&M-EST. PATIENT-LVL IV: ICD-10-PCS | Mod: PBBFAC,,, | Performed by: FAMILY MEDICINE

## 2019-02-06 PROCEDURE — 99499 UNLISTED E&M SERVICE: CPT | Mod: S$GLB,,, | Performed by: FAMILY MEDICINE

## 2019-02-06 PROCEDURE — 3074F PR MOST RECENT SYSTOLIC BLOOD PRESSURE < 130 MM HG: ICD-10-PCS | Mod: CPTII,S$GLB,, | Performed by: FAMILY MEDICINE

## 2019-02-06 PROCEDURE — 1101F PT FALLS ASSESS-DOCD LE1/YR: CPT | Mod: CPTII,S$GLB,, | Performed by: FAMILY MEDICINE

## 2019-02-06 RX ORDER — ATENOLOL 100 MG/1
50 TABLET ORAL DAILY
Qty: 45 TABLET | Refills: 3 | Status: SHIPPED | OUTPATIENT
Start: 2019-02-06 | End: 2019-02-26 | Stop reason: SDUPTHER

## 2019-02-06 RX ORDER — ALLOPURINOL 300 MG/1
300 TABLET ORAL DAILY
Qty: 90 TABLET | Refills: 3 | Status: SHIPPED | OUTPATIENT
Start: 2019-02-06 | End: 2019-02-22 | Stop reason: SDUPTHER

## 2019-02-06 RX ORDER — NYSTATIN 100000 U/G
CREAM TOPICAL 2 TIMES DAILY
Qty: 30 G | Refills: 1 | Status: ON HOLD | OUTPATIENT
Start: 2019-02-06 | End: 2019-10-08

## 2019-02-06 NOTE — MEDICAL/APP STUDENT
"Subjective:       Patient ID: Selena Moulton is a 78 y.o. female with HTN, aortic stenosis, mitral regurgitation, gout, and arthritis.    Chief Complaint: Follow-up with HTN    HPI   Patient is compliant with her medications. She reports her insurance changed from [employer] to [employer] and wants the prescriptions sent there. She is on Lipitor and needs the 40 mg dose.     She had a carpal tunnel surgery on 1/17 and is recovering well.     She does not take the flu shot. She is due for her DEXA scan.      Review of Systems    Constitutional: No fevers. No appetite change. No weight change.  Head: No headache. No blurry vision.  Resp: SOB on exertion. No cough.  Cardio: No chest pain. No palpitations.  GI: No nausea, vomiting, or diarrhea.   : No changes in urination.  Skin: No rash.  Extremities: No swelling.    Objective:       Vitals:    02/06/19 1307   BP: 128/68   Pulse: 73   SpO2: 95%   Weight: 114.6 kg (252 lb 10.4 oz)   Height: 5' 7" (1.702 m)     Physical Exam    Constitutional: Patient is not in distress.  Resp: Effort normal. Reduced breath sounds in lower left lung. Fine crackles in lower left lung. No wheeze.   Cardio: Regular rate and rhythm. S1, S2 normal. Systolic murmur present. Carotid bruits present.  Abdomen: Soft, non-tender.  Skin: No rash.  Extremities: No swelling.     Assessment:       79 yo F with HTN, aortic stenosis, mitral regurgitation, gout, and arthritis.  Plan:       1. Post-Menopause:    -Bone DEXA scan  2. HTN: Well- controlled   -Continue Atenolol, Amlodipine  3. Gout: Well-controlled   -Continue Allopurinol    "

## 2019-02-07 ENCOUNTER — HOSPITAL ENCOUNTER (OUTPATIENT)
Dept: RADIOLOGY | Facility: HOSPITAL | Age: 79
Discharge: HOME | End: 2019-02-07
Attending: FAMILY MEDICINE
Payer: MEDICARE

## 2019-02-07 DIAGNOSIS — Z78.0 POST-MENOPAUSAL: ICD-10-CM

## 2019-02-07 PROCEDURE — 77080 DXA BONE DENSITY AXIAL: CPT | Mod: 26,,, | Performed by: RADIOLOGY

## 2019-02-07 PROCEDURE — 77080 DEXA BONE DENSITY SPINE HIP: ICD-10-PCS | Mod: 26,,, | Performed by: RADIOLOGY

## 2019-02-07 PROCEDURE — 77080 DXA BONE DENSITY AXIAL: CPT | Mod: TC,PO

## 2019-02-08 PROBLEM — M1A.0720 IDIOPATHIC CHRONIC GOUT OF LEFT FOOT WITHOUT TOPHUS: Status: ACTIVE | Noted: 2019-02-08

## 2019-02-08 PROBLEM — Z78.0 POST-MENOPAUSAL: Status: ACTIVE | Noted: 2019-02-08

## 2019-02-08 PROBLEM — L30.4 INTERTRIGO: Status: ACTIVE | Noted: 2019-02-08

## 2019-02-08 PROBLEM — E66.812 CLASS 2 SEVERE OBESITY DUE TO EXCESS CALORIES WITH SERIOUS COMORBIDITY AND BODY MASS INDEX (BMI) OF 39.0 TO 39.9 IN ADULT: Status: ACTIVE | Noted: 2018-09-13

## 2019-02-08 NOTE — PROGRESS NOTES
Subjective:       Patient ID: Selena Moulton is a 78 y.o. female.    Chief Complaint: Follow-up    HPI     The patient is coming here today for a follow-up visit on hypertension, she is currently stable current medications, denies any side effects of the medication, she needs a new prescription.  She has been not bring a log of blood pressure home, she does not check her blood pressure home.  She denies any symptoms of chest pain, shortness of breath, nausea, vomiting, abdominal pain, diarrhea and constipation.    She is also here today for a follow-up on hyperlipidemia, she is currently stable on current medication, she take atorvastatin 40 mg a needs a new prescription.    Patient is compliant with her medications. She reports her insurance changed from [employer] to [employer] and wants the prescriptions sent there.    She had a carpal tunnel surgery on 1/17 and is recovering well.      She does not take the flu shot. She is due for her DEXA scan.     The patient stated that the rash underneath the breast area is improved considerably and needs a new refill sent to the pharmacy.  Upon review of the medical records, x-rays showed presence of tortuous aorta.  The patient currently on aspirin and Lipitor.  She has fine crackles on the left lung, x-ray showed presence of scarring on the left.      Past medical history, past social history was reviewed and discussed with the patient.    Review of Systems   Constitutional: Negative for activity change and appetite change.   HENT: Negative for congestion and ear discharge.    Eyes: Negative for discharge and itching.   Respiratory: Negative for choking and chest tightness.    Cardiovascular: Negative for chest pain and leg swelling.   Gastrointestinal: Negative for abdominal distention and abdominal pain.   Endocrine: Negative for cold intolerance and heat intolerance.   Genitourinary: Negative for dysuria and flank pain.   Musculoskeletal: Negative for  arthralgias and back pain.   Skin: Negative for pallor and rash.   Allergic/Immunologic: Negative for environmental allergies and food allergies.   Neurological: Negative for dizziness and facial asymmetry.   Hematological: Negative for adenopathy. Does not bruise/bleed easily.   Psychiatric/Behavioral: Negative for agitation, confusion and sleep disturbance.       Objective:      Physical Exam   Constitutional: She appears well-developed and well-nourished. No distress.   HENT:   Head: Normocephalic and atraumatic.   Right Ear: External ear normal.   Left Ear: External ear normal.   Nose: Nose normal.   Mouth/Throat: Oropharynx is clear and moist. No oropharyngeal exudate.   Eyes: Conjunctivae are normal. Pupils are equal, round, and reactive to light. Right eye exhibits no discharge. Left eye exhibits no discharge.   Neck: Neck supple. No thyromegaly present.   Cardiovascular: Normal rate and regular rhythm.   Murmur (Systolic) heard.  Pulmonary/Chest: Effort normal and breath sounds normal.   Reduced breath sounds in lower left lung. Fine crackles in lower left    Musculoskeletal: She exhibits no tenderness or deformity.   Neurological: No cranial nerve deficit. Coordination normal.   Skin: No rash noted. She is not diaphoretic. No erythema. No pallor.   Psychiatric: She has a normal mood and affect. Her behavior is normal. Judgment and thought content normal.   Nursing note and vitals reviewed.      Assessment:       1. Post-menopausal    2. Other hyperlipidemia    3. Nonrheumatic aortic valve stenosis    4. Class 2 severe obesity due to excess calories with serious comorbidity and body mass index (BMI) of 39.0 to 39.9 in adult    5. Essential hypertension    6. Idiopathic chronic gout of left foot without tophus    7. Intertrigo    8. Tortuous aorta    9. Scarring of lung        Plan:       Essential hypertension:  Stable  -     atenolol (TENORMIN) 100 MG tablet; Take 0.5 tablets (50 mg total) by mouth once  daily.  Dispense: 45 tablet; Refill: 3    Nonrheumatic aortic valve stenosis:  Stable.  The patient is currently seen the cardiologist    Post-menopausal  -     DXA Bone Density Spine And Hip; Future; Expected date: 02/06/2019    Other hyperlipidemia:  Uncontrolled.    Class 2 severe obesity due to excess calories with serious comorbidity and body mass index (BMI) of 39.0 to 39.9 in adult:  Worsening    Idiopathic chronic gout of left foot without tophus:  Stable  -     allopurinol (ZYLOPRIM) 300 MG tablet; Take 1 tablet (300 mg total) by mouth once daily.  Dispense: 90 tablet; Refill: 3    Intertrigo:  Stable  -     nystatin (MYCOSTATIN) cream; Apply topically 2 (two) times daily.  Dispense: 30 g; Refill: 1    Tortuous aorta:  Stable    Scarring of lung:  Stable    Call the patient after we have the results of the bone density test, continue with current medications, healthy eating habits, avoid fried foods, red meat and processed starches, 3 meals a day, 3 snacks and small portions, decrease carbohydrate intake, drink plenty water. The patient's BMI has been recorded in the chart. The patient has been provided educational materials regarding the benefits of attaining and maintaining a normal weight. We will continue to address and follow this issue during follow up visits.Patient agreed with assessment and plan. Will continue to monitor the patient, if the patient develops any symptoms of cough, will order another x-rays or chest CT.Patient verbalized understanding.  Follow up with the cardiologist as directed.

## 2019-02-11 ENCOUNTER — OFFICE VISIT (OUTPATIENT)
Dept: NEUROSURGERY | Facility: CLINIC | Age: 79
End: 2019-02-11
Payer: MEDICARE

## 2019-02-11 VITALS
DIASTOLIC BLOOD PRESSURE: 68 MMHG | HEART RATE: 64 BPM | TEMPERATURE: 98 F | BODY MASS INDEX: 39.78 KG/M2 | WEIGHT: 253.44 LBS | SYSTOLIC BLOOD PRESSURE: 113 MMHG | RESPIRATION RATE: 16 BRPM | HEIGHT: 67 IN

## 2019-02-11 DIAGNOSIS — G56.01 CARPAL TUNNEL SYNDROME OF RIGHT WRIST: Primary | ICD-10-CM

## 2019-02-11 PROCEDURE — 99024 PR POST-OP FOLLOW-UP VISIT: ICD-10-PCS | Mod: S$GLB,,, | Performed by: PHYSICIAN ASSISTANT

## 2019-02-11 PROCEDURE — 99024 POSTOP FOLLOW-UP VISIT: CPT | Mod: S$GLB,,, | Performed by: PHYSICIAN ASSISTANT

## 2019-02-11 PROCEDURE — 99999 PR PBB SHADOW E&M-EST. PATIENT-LVL IV: CPT | Mod: PBBFAC,,, | Performed by: PHYSICIAN ASSISTANT

## 2019-02-11 PROCEDURE — 99999 PR PBB SHADOW E&M-EST. PATIENT-LVL IV: ICD-10-PCS | Mod: PBBFAC,,, | Performed by: PHYSICIAN ASSISTANT

## 2019-02-11 NOTE — PROGRESS NOTES
Neurosurgery History & Physical    Patient ID: Selena Moulton is a 78 y.o. female.    Chief Complaint   Patient presents with    Follow-up     CTR       Review of Systems   Constitutional: Negative for chills, diaphoresis, fatigue and fever.   HENT: Negative for congestion, ear pain, rhinorrhea, sneezing, sore throat and tinnitus.    Eyes: Negative for photophobia, pain, redness and visual disturbance.   Respiratory: Negative for cough, chest tightness, shortness of breath and wheezing.    Cardiovascular: Negative for chest pain, palpitations and leg swelling.   Gastrointestinal: Negative for abdominal distention, abdominal pain, constipation, diarrhea, nausea and vomiting.   Genitourinary: Negative for difficulty urinating, dysuria, frequency and urgency.   Musculoskeletal: Negative for back pain, gait problem, myalgias and neck pain.   Skin: Negative for pallor and rash.   Neurological: Positive for weakness and numbness. Negative for dizziness, seizures, speech difficulty and headaches.   Psychiatric/Behavioral: Negative for confusion and hallucinations.       Past Medical History:   Diagnosis Date    Arthritis     Gout     Hypertension      Social History     Socioeconomic History    Marital status:      Spouse name: Not on file    Number of children: Not on file    Years of education: Not on file    Highest education level: Not on file   Social Needs    Financial resource strain: Not on file    Food insecurity - worry: Not on file    Food insecurity - inability: Not on file    Transportation needs - medical: Not on file    Transportation needs - non-medical: Not on file   Occupational History    Not on file   Tobacco Use    Smoking status: Never Smoker    Smokeless tobacco: Never Used   Substance and Sexual Activity    Alcohol use: No     Frequency: Never    Drug use: No    Sexual activity: Not on file   Other Topics Concern    Not on file   Social History Narrative    Not on  file     Family History   Problem Relation Age of Onset    Cancer Mother     Stroke Mother     Hypertension Mother     No Known Problems Father      Review of patient's allergies indicates:   Allergen Reactions    Influenza virus vaccines Nausea And Vomiting    Morpholine analogues Nausea And Vomiting    Penicillins Other (See Comments)     High fever as a child, also was allergy tested 1980    Latex, natural rubber Rash    Tetanus vaccines and toxoid Rash       Current Outpatient Medications:     allopurinol (ZYLOPRIM) 300 MG tablet, Take 1 tablet (300 mg total) by mouth once daily., Disp: 90 tablet, Rfl: 3    amLODIPine (NORVASC) 5 MG tablet, Take 5 mg by mouth once daily. Use AM of surgery with a sip of water, Disp: , Rfl:     aspirin (ECOTRIN) 81 MG EC tablet, Take 81 mg by mouth once daily. Hold for 7 days before surgery, Disp: , Rfl:     atenolol (TENORMIN) 100 MG tablet, Take 0.5 tablets (50 mg total) by mouth once daily., Disp: 45 tablet, Rfl: 3    atorvastatin (LIPITOR) 40 MG tablet, Take 40 mg by mouth every evening. Use PM before surgery and a dose AM of surgery with a sip of water, Disp: , Rfl:     colchicine (COLCRYS) 0.6 mg tablet, Take 0.6 mg by mouth daily as needed. Use AM of surgery with a sip of water if needed, Disp: , Rfl:     fish oil-omega-3 fatty acids 300-1,000 mg capsule, Take 1 capsule by mouth once daily. Hold for 7 days before surgery, Disp: , Rfl:     gabapentin (NEURONTIN) 300 MG capsule, Take 1 capsule (300 mg total) by mouth 3 (three) times daily. (Patient taking differently: Take 300 mg by mouth 3 (three) times daily. Use with a sip of water AM of srugery), Disp: 90 capsule, Rfl: 2    meloxicam (MOBIC) 7.5 MG tablet, Take 1 tablet (7.5 mg total) by mouth once daily. With meals (Patient taking differently: Take 7.5 mg by mouth once daily. With meals. Hold for 7 days before surgery.), Disp: 90 tablet, Rfl: 1    multivitamin capsule, Take 1 capsule by mouth once  "daily. Hold AM of surgery, Disp: , Rfl:     nystatin (MYCOSTATIN) cream, Apply topically 2 (two) times daily., Disp: 30 g, Rfl: 1    omeprazole (PRILOSEC) 20 MG capsule, Take 20 mg by mouth daily as needed. Use AM of surgery with a sip of water, Disp: , Rfl:     oxyCODONE-acetaminophen (PERCOCET) 7.5-325 mg per tablet, Take 1 tablet by mouth every 6 (six) hours as needed., Disp: 28 tablet, Rfl: 0    triamterene-hydrochlorothiazide 37.5-25 mg (DYAZIDE) 37.5-25 mg per capsule, Take 1 capsule by mouth every morning. Hold AM of surgery., Disp: , Rfl:   Blood pressure 113/68, pulse 64, temperature 97.9 °F (36.6 °C), resp. rate 16, height 5' 7" (1.702 m), weight 114.9 kg (253 lb 6.7 oz).      Neurologic Exam  Mental Status   Oriented to person, place, and time.   Attention: normal. Concentration: normal.   Speech: speech is normal   Level of consciousness: alert  Knowledge: good.      Cranial Nerves      CN II   Visual acuity: normal     CN III, IV, VI   Pupils are equal, round, and reactive to light.  Extraocular motions are normal.      CN V   Facial sensation intact.      CN VII   Facial expression full, symmetric.      CN VIII   Hearing: intact     CN IX, X   Palate: symmetric     CN XI   CN XI normal.      CN XII   CN XII normal.      Motor Exam   Muscle bulk: normal  Overall muscle tone: normal  Right arm pronator drift: absent  Left arm pronator drift: absent     Strength   Right deltoid: 5/5  Left deltoid: 5/5  Right biceps: 5/5  Left biceps: 5/5  Right triceps: 5/5  Left triceps: 5/5  Right wrist flexion: 5/5  Left wrist flexion: 5/5  Right wrist extension: 5/5  Left wrist extension: 5/5  Right interossei: 5/5  Left interossei: 5/5  Right iliopsoas: 5/5  Left iliopsoas: 5/5  Right quadriceps: 5/5  Left quadriceps: 5/5  Right hamstrin/5  Left hamstrin/5  Right anterior tibial: 5/5  Left anterior tibial: 5/5  Right posterior tibial: 5/5  Left posterior tibial: 5/5  Right peroneal: 5/5  Left peroneal: " 5/5  Right gastroc: 5/5  Left gastroc: 5/5     Sensory Exam   Sensory deficit distribution on right: median  Sensory deficit distribution on left: none     Gait, Coordination, and Reflexes      Gait  Gait: normal     Coordination   Romberg: negative  Finger to nose coordination: normal     Tremor   Resting tremor: absent     Reflexes   Right brachioradialis: 0  Left brachioradialis: 0  Right biceps: 1+  Left biceps: 1+  Right triceps: 1+  Left triceps: 0  Right patellar: 2+  Left patellar: 2+  Right achilles: 0  Left achilles: 0  Right plantar: normal  Left plantar: normal  Right Cordon: absent  Left Cordon: absent  Right ankle clonus: absent  Left ankle clonus: absent    Physical Exam  Constitutional: She is oriented to person, place, and time. She appears well-developed and well-nourished.   HENT:   Head: Normocephalic and atraumatic.   Eyes: EOM are normal. Pupils are equal, round, and reactive to light.   Neck: Neck supple.   Cardiovascular: Normal rate and regular rhythm.   Pulmonary/Chest: Effort normal.   Abdominal: Soft.   Musculoskeletal: Normal range of motion.   Neurological: She is alert and oriented to person, place, and time. She has a normal Finger-Nose-Finger Test and a normal Romberg Test.   Reflex Scores:       Tricep reflexes are 1+ on the right side and 0 on the left side.       Bicep reflexes are 1+ on the right side and 1+ on the left side.       Brachioradialis reflexes are 0 on the right side and 0 on the left side.       Patellar reflexes are 2+ on the right side and 2+ on the left side.       Achilles reflexes are 0 on the right side and 0 on the left side.  Skin: Skin is warm and dry.   Psychiatric: She has a normal mood and affect. Her speech is normal and behavior is normal. Judgment and thought content normal.   Nursing note and vitals reviewed.       Provider dictation:  Ms. Selena Moulton presents to clinic today for 4 week post-op appointment s/p right carpal tunnel release. She  reports resolution of right hand pain following surgery. She reports continued numbness in the right median nerve distribution and weakness.     On exam, she has weakness in the right thenar muscles. Her left hand strength is normal. Decreased sensation in the right median nerve distribution. Incision is well healed.     Overall, Ms. Moulton is doing very well with resolution of hand pain following carpal tunnel release. She was informed to allow more time and hopefully her right hand weakness and numbness will improve. She reports significant improvement in the left hand weakness and numbness since prior left carpal tunnel release. She will follow-up as scheduled for 3 month post-op appointment with Dr. Cárdenas. If she continues to do well at that time we will release her from our care. She was informed to call the clinic with any further questions or concerns.     1. Carpal tunnel syndrome of right wrist

## 2019-02-22 DIAGNOSIS — M1A.0720 IDIOPATHIC CHRONIC GOUT OF LEFT FOOT WITHOUT TOPHUS: ICD-10-CM

## 2019-02-22 RX ORDER — ALLOPURINOL 300 MG/1
300 TABLET ORAL DAILY
Qty: 90 TABLET | Refills: 3 | Status: SHIPPED | OUTPATIENT
Start: 2019-02-22 | End: 2020-03-02

## 2019-02-22 NOTE — TELEPHONE ENCOUNTER
----- Message from Ana Esposito sent at 2/22/2019  8:40 AM CST -----  Contact: self  Type:  RX Refill Request    Who Called:  self  Refill or New Rx:  refill  RX Name and Strength:  allopurinol (ZYLOPRIM) 300 MG tablet; atenolol (TENORMIN) 100 MG tablet  How is the patient currently taking it? (ex. 1XDay):    Is this a 30 day or 90 day RX:  90  Preferred Pharmacy with phone number:  [facility]  Local or Mail Order:  local  Ordering Provider:  Dr Angella Rico Call Back Number:  [phone]  Additional Information:  Patient called the [facility] and they told her they do not mail prescriptions. It is strictly a  pharmacy in [facility].  She needs the prescriptions sent to [facility]. Please call patient when sent.Thanks!   [facility] [address] [place] [address] [id number] [place] [address] AT [place] [address] [address] [place] [address]  [id number] [place] [id number]  Phone: [phone] Fax: [phone]

## 2019-02-26 DIAGNOSIS — I10 ESSENTIAL HYPERTENSION: ICD-10-CM

## 2019-02-26 RX ORDER — ATENOLOL 100 MG/1
50 TABLET ORAL DAILY
Qty: 45 TABLET | Refills: 3 | Status: SHIPPED | OUTPATIENT
Start: 2019-02-26 | End: 2019-04-15 | Stop reason: SDUPTHER

## 2019-02-26 NOTE — TELEPHONE ENCOUNTER
----- Message from Jasmina Blackburn sent at 2/26/2019 10:34 AM CST -----  Contact: Patient  Type:  RX Refill Request    Who Called: Patient  Refill or New Rx:  Refill  RX Name and Strength:  atenolol (TENORMIN) 100 MG tablet  How is the patient currently taking it? (ex. 1XDay): Take 0.5 tablets (50 mg total) by mouth once daily. - Oral   Is this a 30 day or 90 day RX:  45 tablet  Preferred Pharmacy with phone number:    [facility] [facility] [id number] [place] [address] [address] [address]  Phone: [phone] Fax: [phone]  Local or Mail Order:  Local  Ordering Provider:  Dr Aby Rico Call Back Number:  [phone]  Additional Information:  Calling to request a refill. She will be out of medication today. Please advise.

## 2019-04-15 ENCOUNTER — OFFICE VISIT (OUTPATIENT)
Dept: NEUROSURGERY | Facility: CLINIC | Age: 79
End: 2019-04-15
Payer: MEDICARE

## 2019-04-15 ENCOUNTER — OFFICE VISIT (OUTPATIENT)
Dept: CARDIOLOGY | Facility: CLINIC | Age: 79
End: 2019-04-15
Payer: MEDICARE

## 2019-04-15 VITALS
HEART RATE: 63 BPM | HEIGHT: 67 IN | SYSTOLIC BLOOD PRESSURE: 118 MMHG | BODY MASS INDEX: 40.89 KG/M2 | DIASTOLIC BLOOD PRESSURE: 68 MMHG | WEIGHT: 260.5 LBS

## 2019-04-15 VITALS
DIASTOLIC BLOOD PRESSURE: 61 MMHG | SYSTOLIC BLOOD PRESSURE: 109 MMHG | BODY MASS INDEX: 41.03 KG/M2 | HEIGHT: 67 IN | HEART RATE: 67 BPM | WEIGHT: 261.44 LBS

## 2019-04-15 DIAGNOSIS — I10 ESSENTIAL HYPERTENSION: ICD-10-CM

## 2019-04-15 DIAGNOSIS — I35.0 NONRHEUMATIC AORTIC VALVE STENOSIS: Primary | ICD-10-CM

## 2019-04-15 DIAGNOSIS — G56.01 CARPAL TUNNEL SYNDROME OF RIGHT WRIST: Primary | ICD-10-CM

## 2019-04-15 DIAGNOSIS — E66.01 CLASS 2 SEVERE OBESITY DUE TO EXCESS CALORIES WITH SERIOUS COMORBIDITY AND BODY MASS INDEX (BMI) OF 39.0 TO 39.9 IN ADULT: ICD-10-CM

## 2019-04-15 DIAGNOSIS — R06.02 SOB (SHORTNESS OF BREATH): ICD-10-CM

## 2019-04-15 PROBLEM — R01.1 UNDIAGNOSED CARDIAC MURMURS: Status: RESOLVED | Noted: 2018-09-13 | Resolved: 2019-04-15

## 2019-04-15 PROCEDURE — 99999 PR PBB SHADOW E&M-EST. PATIENT-LVL III: ICD-10-PCS | Mod: PBBFAC,,, | Performed by: NEUROLOGICAL SURGERY

## 2019-04-15 PROCEDURE — 3074F SYST BP LT 130 MM HG: CPT | Mod: CPTII,S$GLB,, | Performed by: INTERNAL MEDICINE

## 2019-04-15 PROCEDURE — 99499 RISK ADDL DX/OHS AUDIT: ICD-10-PCS | Mod: S$GLB,,, | Performed by: INTERNAL MEDICINE

## 2019-04-15 PROCEDURE — 1101F PT FALLS ASSESS-DOCD LE1/YR: CPT | Mod: CPTII,S$GLB,, | Performed by: INTERNAL MEDICINE

## 2019-04-15 PROCEDURE — 99214 OFFICE O/P EST MOD 30 MIN: CPT | Mod: S$GLB,,, | Performed by: INTERNAL MEDICINE

## 2019-04-15 PROCEDURE — 99999 PR PBB SHADOW E&M-EST. PATIENT-LVL III: CPT | Mod: PBBFAC,,, | Performed by: NEUROLOGICAL SURGERY

## 2019-04-15 PROCEDURE — 99999 PR PBB SHADOW E&M-EST. PATIENT-LVL III: ICD-10-PCS | Mod: PBBFAC,,, | Performed by: INTERNAL MEDICINE

## 2019-04-15 PROCEDURE — 99214 PR OFFICE/OUTPT VISIT, EST, LEVL IV, 30-39 MIN: ICD-10-PCS | Mod: S$GLB,,, | Performed by: INTERNAL MEDICINE

## 2019-04-15 PROCEDURE — 99999 PR PBB SHADOW E&M-EST. PATIENT-LVL III: CPT | Mod: PBBFAC,,, | Performed by: INTERNAL MEDICINE

## 2019-04-15 PROCEDURE — 3078F DIAST BP <80 MM HG: CPT | Mod: CPTII,S$GLB,, | Performed by: INTERNAL MEDICINE

## 2019-04-15 PROCEDURE — 99024 PR POST-OP FOLLOW-UP VISIT: ICD-10-PCS | Mod: S$GLB,,, | Performed by: NEUROLOGICAL SURGERY

## 2019-04-15 PROCEDURE — 99024 POSTOP FOLLOW-UP VISIT: CPT | Mod: S$GLB,,, | Performed by: NEUROLOGICAL SURGERY

## 2019-04-15 PROCEDURE — 3078F PR MOST RECENT DIASTOLIC BLOOD PRESSURE < 80 MM HG: ICD-10-PCS | Mod: CPTII,S$GLB,, | Performed by: INTERNAL MEDICINE

## 2019-04-15 PROCEDURE — 99499 UNLISTED E&M SERVICE: CPT | Mod: S$GLB,,, | Performed by: INTERNAL MEDICINE

## 2019-04-15 PROCEDURE — 1101F PR PT FALLS ASSESS DOC 0-1 FALLS W/OUT INJ PAST YR: ICD-10-PCS | Mod: CPTII,S$GLB,, | Performed by: INTERNAL MEDICINE

## 2019-04-15 PROCEDURE — 3074F PR MOST RECENT SYSTOLIC BLOOD PRESSURE < 130 MM HG: ICD-10-PCS | Mod: CPTII,S$GLB,, | Performed by: INTERNAL MEDICINE

## 2019-04-15 RX ORDER — ATENOLOL 100 MG/1
50 TABLET ORAL DAILY
Qty: 45 TABLET | Refills: 1 | Status: SHIPPED | OUTPATIENT
Start: 2019-04-15 | End: 2020-02-17 | Stop reason: SDUPTHER

## 2019-04-15 NOTE — PROGRESS NOTES
Neurosurgery History and Physical    Patient ID: Selena Moulton is a 78 y.o. female.    Chief Complaint   Patient presents with    Follow-up     patient reports left hand numbness has resolved; right hand is improving but still has numbness; pain resolved in bilateral hands; 0/10       Review of Systems   Constitutional: Negative.    HENT: Negative.    Eyes: Negative.    Respiratory: Negative.    Cardiovascular: Negative.    Gastrointestinal: Negative.    Endocrine: Negative.    Genitourinary: Negative.    Allergic/Immunologic: Negative.    Neurological: Positive for weakness and numbness.   Hematological: Negative.    Psychiatric/Behavioral: Negative.        Past Medical History:   Diagnosis Date    Arthritis     Gout     Hypertension      Social History     Socioeconomic History    Marital status:      Spouse name: Not on file    Number of children: Not on file    Years of education: Not on file    Highest education level: Not on file   Occupational History    Not on file   Social Needs    Financial resource strain: Not on file    Food insecurity:     Worry: Not on file     Inability: Not on file    Transportation needs:     Medical: Not on file     Non-medical: Not on file   Tobacco Use    Smoking status: Never Smoker    Smokeless tobacco: Never Used   Substance and Sexual Activity    Alcohol use: No     Frequency: Never    Drug use: No    Sexual activity: Not on file   Lifestyle    Physical activity:     Days per week: Not on file     Minutes per session: Not on file    Stress: Not on file   Relationships    Social connections:     Talks on phone: Not on file     Gets together: Not on file     Attends Jewish service: Not on file     Active member of club or organization: Not on file     Attends meetings of clubs or organizations: Not on file     Relationship status: Not on file   Other Topics Concern    Not on file   Social History Narrative    Not on file     Family History    Problem Relation Age of Onset    Cancer Mother     Stroke Mother     Hypertension Mother     No Known Problems Father      Review of patient's allergies indicates:   Allergen Reactions    Influenza virus vaccines     Latex, natural rubber     Morpholine analogues     Penicillins     Tetanus vaccines and toxoid        Current Outpatient Medications:     allopurinol (ZYLOPRIM) 300 MG tablet, Take 1 tablet (300 mg total) by mouth once daily., Disp: 90 tablet, Rfl: 3    amLODIPine (NORVASC) 5 MG tablet, Take 5 mg by mouth once daily. Use AM of surgery with a sip of water, Disp: , Rfl:     aspirin (ECOTRIN) 81 MG EC tablet, Take 81 mg by mouth once daily. Hold for 7 days before surgery, Disp: , Rfl:     atenolol (TENORMIN) 100 MG tablet, Take 0.5 tablets (50 mg total) by mouth once daily., Disp: 45 tablet, Rfl: 3    atorvastatin (LIPITOR) 40 MG tablet, Take 40 mg by mouth every evening. Use PM before surgery and a dose AM of surgery with a sip of water, Disp: , Rfl:     colchicine (COLCRYS) 0.6 mg tablet, Take 0.6 mg by mouth daily as needed. Use AM of surgery with a sip of water if needed, Disp: , Rfl:     fish oil-omega-3 fatty acids 300-1,000 mg capsule, Take 1 capsule by mouth once daily. Hold for 7 days before surgery, Disp: , Rfl:     gabapentin (NEURONTIN) 300 MG capsule, Take 1 capsule (300 mg total) by mouth 3 (three) times daily. (Patient taking differently: Take 300 mg by mouth 3 (three) times daily. Use with a sip of water AM of ugery), Disp: 90 capsule, Rfl: 2    meloxicam (MOBIC) 7.5 MG tablet, Take 1 tablet (7.5 mg total) by mouth once daily. With meals (Patient taking differently: Take 7.5 mg by mouth once daily. With meals. Hold for 7 days before surgery.), Disp: 90 tablet, Rfl: 1    multivitamin capsule, Take 1 capsule by mouth once daily. Hold AM of surgery, Disp: , Rfl:     nystatin (MYCOSTATIN) cream, Apply topically 2 (two) times daily., Disp: 30 g, Rfl: 1    omeprazole  "(PRILOSEC) 20 MG capsule, Take 20 mg by mouth daily as needed. Use AM of surgery with a sip of water, Disp: , Rfl:     oxyCODONE-acetaminophen (PERCOCET) 7.5-325 mg per tablet, Take 1 tablet by mouth every 6 (six) hours as needed., Disp: 28 tablet, Rfl: 0    triamterene-hydrochlorothiazide 37.5-25 mg (DYAZIDE) 37.5-25 mg per capsule, Take 1 capsule by mouth every morning. Hold AM of surgery., Disp: , Rfl:   Blood pressure 118/68, pulse 63, height 5' 7" (1.702 m), weight 118.2 kg (260 lb 7.6 oz).      Neurologic Exam     Mental Status   Oriented to person, place, and time.   Attention: normal. Concentration: normal.   Speech: speech is normal   Level of consciousness: alert  Knowledge: good.     Cranial Nerves     CN II   Visual acuity: normal    CN III, IV, VI   Pupils are equal, round, and reactive to light.  Extraocular motions are normal.     CN V   Facial sensation intact.     CN VII   Facial expression full, symmetric.     CN VIII   Hearing: intact    CN IX, X   Palate: symmetric    CN XI   CN XI normal.     CN XII   CN XII normal.     Motor Exam   Muscle bulk: normal  Overall muscle tone: normal  Right arm pronator drift: absent  Left arm pronator drift: absent    Strength   Right deltoid: 5/5  Left deltoid: 5/5  Right biceps: 5/5  Left biceps: 5/5  Right triceps: 5/5  Left triceps: 5/5  Right wrist flexion: 5/5  Left wrist flexion: 5/5  Right wrist extension: 5/5  Left wrist extension: 5/5  Right interossei: 5/5  Left interossei: 5/5  Right iliopsoas: 5/5  Left iliopsoas: 5/5  Right quadriceps: 5/5  Left quadriceps: 5/5  Right hamstrin/5  Left hamstrin/5  Right anterior tibial: 5/5  Left anterior tibial: 5/5  Right posterior tibial: 5/5  Left posterior tibial: 5/5  Right peroneal: 5/5  Left peroneal: 5/5  Right gastroc: 5/5  Left gastroc: 5/5    Sensory Exam   Right arm light touch: normal  Left arm light touch: normal  Sensory deficit distribution on right: median    Gait, Coordination, and " "Reflexes     Gait  Gait: (uses walker 2/2 knees)    Coordination   Romberg: negative  Finger to nose coordination: normal    Tremor   Resting tremor: absent    Reflexes   Right brachioradialis: 0  Left brachioradialis: 0  Right biceps: 1+  Left biceps: 1+  Right triceps: 1+  Left triceps: 0  Right patellar: 2+  Left patellar: 2+  Right achilles: 0  Left achilles: 0  Right plantar: normal  Left plantar: normal  Right Cordon: absent  Left Cordon: absent  Right ankle clonus: absent  Left ankle clonus: absent      Physical Exam   Constitutional: She is oriented to person, place, and time. She appears well-developed and well-nourished.   HENT:   Head: Normocephalic and atraumatic.   Eyes: Pupils are equal, round, and reactive to light. EOM are normal.   Neck: Neck supple.   Cardiovascular: Normal rate and regular rhythm.   Pulmonary/Chest: Effort normal.   Abdominal: Soft.   Musculoskeletal: Normal range of motion.   Neurological: She is alert and oriented to person, place, and time. She has a normal Finger-Nose-Finger Test and a normal Romberg Test.   Reflex Scores:       Tricep reflexes are 1+ on the right side and 0 on the left side.       Bicep reflexes are 1+ on the right side and 1+ on the left side.       Brachioradialis reflexes are 0 on the right side and 0 on the left side.       Patellar reflexes are 2+ on the right side and 2+ on the left side.       Achilles reflexes are 0 on the right side and 0 on the left side.  Skin: Skin is warm and dry.   Psychiatric: She has a normal mood and affect. Her speech is normal and behavior is normal. Judgment and thought content normal.   Nursing note and vitals reviewed.      Vital Signs  Pulse: 63  BP: 118/68  Pain Score: 0-No pain  Height and Weight  Height: 5' 7" (170.2 cm)  Weight: 118.2 kg (260 lb 7.6 oz)  BSA (Calculated - sq m): 2.36 sq meters  BMI (Calculated): 40.9  Weight in (lb) to have BMI = 25: 159.3]    Provider dictation:  Doing very well S/P left carpal " tunnel release and subsequent right carpal tunnel release. Resolution of her bilateral hand pain. Left numbness has now fully resolved. The right hand is stronger and sensation is improved although she still has a little numbness.     On exam, the left median muscles are full strength The right median muscles are still slightly weak but stronger than per-op. Incisions are well-healed.     Very pleased with outcome of both operations.     At this point, she may F/U PRN.    Visit Diagnosis:  Carpal tunnel syndrome of right wrist

## 2019-04-15 NOTE — PROGRESS NOTES
Subjective:    Patient ID:  Selena Moulton is a 78 y.o. female who presents for Valvular Heart Disease (Murmur); Hypertension; and Shortness of Breath        HPI    RECENT CMP OK, HAD CARPAL TUNNEL SURGERY, DENIES CP, SOME  SOB / MILD, SEE ROS  Past Medical History:   Diagnosis Date    Arthritis     Gout     Hypertension      Past Surgical History:   Procedure Laterality Date    BACK SURGERY      bone graft neck    CERVICAL FUSION  1984    x2     HERNIA REPAIR      umbilical    HYSTERECTOMY      Injection,steroid,epidural,transforaminal approach Left 8/2/2018    Performed by [facility]aim Hernandez [facility] at [facility]    RELEASE, CARPAL TUNNEL  LEFT Left 10/3/2018    Performed by Meche C[facility]denas [facility] at [facility]    RELEASE, CARPAL TUNNEL RIGHT Right 1/17/2019    Performed by [facility]as [facility] at [facility]    TONSILLECTOMY       Family History   Problem Relation Age of Onset    Cancer Mother     Stroke Mother     Hypertension Mother     No Known Problems Father      Social History     Socioeconomic History    Marital status:      Spouse name: Not on file    Number of children: Not on file    Years of education: Not on file    Highest education level: Not on file   Occupational History    Not on file   Social Needs    Financial resource strain: Not on file    Food insecurity:     Worry: Not on file     Inability: Not on file    Transportation needs:     Medical: Not on file     Non-medical: Not on file   Tobacco Use    Smoking status: Never Smoker    Smokeless tobacco: Never Used   Substance and Sexual Activity    Alcohol use: No     Frequency: Never    Drug use: No    Sexual activity: Not on file   Lifestyle    Physical activity:     Days per week: Not on file     Minutes per session: Not on file    Stress: Not on file   Relationships    Social connections:     Talks on phone: Not on file     Gets together: Not on file     Attends Mandaen service: Not on file     Active member of  club or organization: Not on file     Attends meetings of clubs or organizations: Not on file     Relationship status: Not on file   Other Topics Concern    Not on file   Social History Narrative    Not on file       Review of patient's allergies indicates:   Allergen Reactions    Influenza virus vaccines Nausea And Vomiting    Morpholine analogues Nausea And Vomiting    Penicillins Other (See Comments)     High fever as a child, also was allergy tested 1980    Latex, natural rubber Rash    Tetanus vaccines and toxoid Rash       Current Outpatient Medications:     allopurinol (ZYLOPRIM) 300 MG tablet, Take 1 tablet (300 mg total) by mouth once daily., Disp: 90 tablet, Rfl: 3    amLODIPine (NORVASC) 5 MG tablet, Take 5 mg by mouth once daily. Use AM of surgery with a sip of water, Disp: , Rfl:     aspirin (ECOTRIN) 81 MG EC tablet, Take 81 mg by mouth once daily. Hold for 7 days before surgery, Disp: , Rfl:     atenolol (TENORMIN) 100 MG tablet, Take 0.5 tablets (50 mg total) by mouth once daily., Disp: 45 tablet, Rfl: 1    atorvastatin (LIPITOR) 40 MG tablet, Take 40 mg by mouth every evening. Use PM before surgery and a dose AM of surgery with a sip of water, Disp: , Rfl:     fish oil-omega-3 fatty acids 300-1,000 mg capsule, Take 1 capsule by mouth once daily. Hold for 7 days before surgery, Disp: , Rfl:     meloxicam (MOBIC) 7.5 MG tablet, Take 1 tablet (7.5 mg total) by mouth once daily. With meals (Patient taking differently: Take 7.5 mg by mouth once daily. With meals. Hold for 7 days before surgery.), Disp: 90 tablet, Rfl: 1    multivitamin capsule, Take 1 capsule by mouth once daily. Hold AM of surgery, Disp: , Rfl:     omeprazole (PRILOSEC) 20 MG capsule, Take 20 mg by mouth daily as needed. Use AM of surgery with a sip of water, Disp: , Rfl:     triamterene-hydrochlorothiazide 37.5-25 mg (DYAZIDE) 37.5-25 mg per capsule, Take 1 capsule by mouth every morning. Hold AM of surgery., Disp: ,  Rfl:     colchicine (COLCRYS) 0.6 mg tablet, Take 0.6 mg by mouth daily as needed. Use AM of surgery with a sip of water if needed, Disp: , Rfl:     gabapentin (NEURONTIN) 300 MG capsule, Take 1 capsule (300 mg total) by mouth 3 (three) times daily. (Patient taking differently: Take 300 mg by mouth 3 (three) times daily. Use with a sip of water AM of srugery), Disp: 90 capsule, Rfl: 2    nystatin (MYCOSTATIN) cream, Apply topically 2 (two) times daily., Disp: 30 g, Rfl: 1    Review of Systems   Constitution: Negative for chills, diaphoresis, malaise/fatigue and night sweats.   HENT: Negative for congestion, hearing loss and nosebleeds.    Eyes: Negative for blurred vision, discharge, double vision and visual disturbance.   Cardiovascular: Positive for dyspnea on exertion. Negative for chest pain, claudication, cyanosis, irregular heartbeat, leg swelling, near-syncope, orthopnea, palpitations, paroxysmal nocturnal dyspnea and syncope.   Respiratory: Negative for cough, hemoptysis, shortness of breath (SOME) and wheezing.    Endocrine: Negative for cold intolerance, heat intolerance and polyuria.   Hematologic/Lymphatic: Negative for adenopathy and bleeding problem. Does not bruise/bleed easily.   Skin: Negative for color change, itching, nail changes and rash.   Musculoskeletal: Negative for back pain, falls and joint pain (R HAND).   Gastrointestinal: Negative for bloating, abdominal pain, change in bowel habit, dysphagia, heartburn, hematemesis, jaundice, melena and vomiting.   Genitourinary: Negative for dysuria, flank pain and frequency.   Neurological: Negative for brief paralysis, dizziness, focal weakness, light-headedness, loss of balance, numbness (L HAND), tremors and weakness.   Psychiatric/Behavioral: Negative for altered mental status, depression, memory loss and substance abuse. The patient is not nervous/anxious.    Allergic/Immunologic: Negative for hives and persistent infections.       "  Objective:      Vitals:    04/15/19 1304   BP: 109/61   Pulse: 67   Weight: 118.6 kg (261 lb 7.5 oz)   Height: 5' 7" (1.702 m)   PainSc: 0-No pain     Body mass index is 40.95 kg/m².    Physical Exam   Constitutional: She is oriented to person, place, and time. She appears well-developed and well-nourished. She is active.   OVERWEIGHT   HENT:   Head: Normocephalic and atraumatic.   Mouth/Throat: Oropharynx is clear and moist and mucous membranes are normal.   Eyes: Pupils are equal, round, and reactive to light. Conjunctivae and EOM are normal.   Neck: Trachea normal and normal range of motion. Neck supple. Normal carotid pulses, no hepatojugular reflux and no JVD present. Carotid bruit is not present. No edema and no erythema present. No thyromegaly present.   Cardiovascular: Normal rate and regular rhythm.  No extrasystoles are present. PMI is not displaced. Exam reveals no gallop and no friction rub.   Murmur heard.   Harsh midsystolic murmur is present with a grade of 2/6 at the upper right sternal border radiating to the neck.  Pulses:       Carotid pulses are 2+ on the right side, and 2+ on the left side.       Radial pulses are 2+ on the right side, and 2+ on the left side.        Dorsalis pedis pulses are 2+ on the right side, and 2+ on the left side.        Posterior tibial pulses are 2+ on the right side, and 2+ on the left side.   Pulmonary/Chest: Effort normal and breath sounds normal. No tachypnea and no bradypnea. She has no wheezes. She has no rales. She exhibits no tenderness.   Abdominal: Soft. Bowel sounds are normal. She exhibits no distension and no mass. There is no hepatosplenomegaly. There is no guarding and no CVA tenderness.   Musculoskeletal: Normal range of motion. She exhibits no edema or tenderness.   SLOW GAIT   Lymphadenopathy:     She has no cervical adenopathy.   Neurological: She is alert and oriented to person, place, and time. She has normal strength. She displays no tremor. No " cranial nerve deficit. Coordination normal.   Skin: Skin is warm and dry. No rash noted. No cyanosis or erythema. No pallor.   Psychiatric: She has a normal mood and affect. Her speech is normal and behavior is normal.               ..    Chemistry        Component Value Date/Time     01/09/2019 1229    K 4.3 01/09/2019 1229     01/09/2019 1229    CO2 31 01/09/2019 1229    BUN 23 (H) 01/09/2019 1229    CREATININE 0.90 01/09/2019 1229    GLU 98 01/09/2019 1229        Component Value Date/Time    CALCIUM 9.9 01/09/2019 1229    ALKPHOS 103 01/09/2019 1229    AST 22 01/09/2019 1229    ALT 27 01/09/2019 1229    BILITOT 0.5 01/09/2019 1229    ESTGFRAFRICA >60 01/09/2019 1229    EGFRNONAA >60 01/09/2019 1229            ..  Lab Results   Component Value Date    CHOL 153 11/07/2018     Lab Results   Component Value Date    HDL 46 11/07/2018     Lab Results   Component Value Date    LDLCALC 76.6 11/07/2018     Lab Results   Component Value Date    TRIG 152 (H) 11/07/2018     Lab Results   Component Value Date    CHOLHDL 30.1 11/07/2018     ..  Lab Results   Component Value Date    WBC 7.32 01/09/2019    HGB 12.9 01/09/2019    HCT 42.2 01/09/2019    MCV 90 01/09/2019     01/09/2019       Test(s) Reviewed  I have reviewed the following in detail:  [] Stress test   [] Angiography   [] Echocardiogram   [x] Labs   [x] Other:       Assessment:         ICD-10-CM ICD-9-CM   1. Nonrheumatic aortic valve stenosis I35.0 424.1   2. SOB (shortness of breath) R06.02 786.05   3. Essential hypertension I10 401.9   4. Class 2 severe obesity due to excess calories with serious comorbidity and body mass index (BMI) of 39.0 to 39.9 in adult E66.01 278.01    Z68.39 V85.39     Problem List Items Addressed This Visit        Cardiac/Vascular    Essential hypertension    Relevant Medications    atenolol (TENORMIN) 100 MG tablet    Nonrheumatic aortic valve stenosis - Primary    Relevant Orders    Transthoracic echo (TTE) 2D with  Color Flow       Other    Class 2 severe obesity due to excess calories with serious comorbidity and body mass index (BMI) of 39.0 to 39.9 in adult    SOB (shortness of breath)    Relevant Orders    Transthoracic echo (TTE) 2D with Color Flow    Stress test with myocardial perfusion (Completed)           Plan:         WILL CHECK NUCLEAR STRESS TEST TO ASSESS FOR ISCHEMIA THEN DECIDE REGARDING VALVE I AORTIC STENOSIS PATIENT'S SYMPTOMS ARE BORDERLINE.ALL OTHER CV CLINICALLY STABLE,  NO HF, NO TIA, NO CLINICAL ARRHYTHMIA,CONTINUE CURRENT MEDS, EDUCATION, DIET, EXERCISE, WEIGHT LOSS, WILL CALL WITH RESULTS OF STRESS TEST, WATCH BLOOD PRESSURE, 10 CLINIC IN FEW MONTHS WITH REASSESSMENT OF AORTIC VALVE IF NUCLEAR STRESS TEST IS NEGATIVE  Nonrheumatic aortic valve stenosis  -     Transthoracic echo (TTE) 2D with Color Flow; Future    SOB (shortness of breath)  -     Transthoracic echo (TTE) 2D with Color Flow; Future  -     Stress test with myocardial perfusion; Future    Essential hypertension  -     atenolol (TENORMIN) 100 MG tablet; Take 0.5 tablets (50 mg total) by mouth once daily.  Dispense: 45 tablet; Refill: 1    Class 2 severe obesity due to excess calories with serious comorbidity and body mass index (BMI) of 39.0 to 39.9 in adult    RTC Low level/low impact aerobic exercise 5x's/wk. Heart healthy diet and risk factor modification.    See labs and med orders.    Aerobic exercise 5x's/wk. Heart healthy diet and risk factor modification.    See labs and med orders.

## 2019-04-17 ENCOUNTER — HOSPITAL ENCOUNTER (OUTPATIENT)
Dept: RADIOLOGY | Facility: HOSPITAL | Age: 79
Discharge: HOME | End: 2019-04-17
Attending: INTERNAL MEDICINE
Payer: MEDICARE

## 2019-04-17 ENCOUNTER — CLINICAL SUPPORT (OUTPATIENT)
Dept: CARDIOLOGY | Facility: CLINIC | Age: 79
End: 2019-04-17
Attending: INTERNAL MEDICINE
Payer: MEDICARE

## 2019-04-17 VITALS — WEIGHT: 261 LBS | HEIGHT: 67 IN | BODY MASS INDEX: 40.97 KG/M2

## 2019-04-17 DIAGNOSIS — R06.02 SOB (SHORTNESS OF BREATH): ICD-10-CM

## 2019-04-17 LAB
CV STRESS BASE HR: 60 BPM
DIASTOLIC BLOOD PRESSURE: 67 MMHG
NUC REST EJECTION FRACTION: 71
OHS CV CPX 1 MINUTE RECOVERY HEART RATE: 72 BPM
OHS CV CPX 85 PERCENT MAX PREDICTED HEART RATE MALE: 115
OHS CV CPX MAX PREDICTED HEART RATE: 136
OHS CV CPX PATIENT IS FEMALE: 1
OHS CV CPX PATIENT IS MALE: 0
OHS CV CPX PEAK DIASTOLIC BLOOD PRESSURE: 67 MMHG
OHS CV CPX PEAK HEAR RATE: 74 BPM
OHS CV CPX PEAK RATE PRESSURE PRODUCT: NORMAL
OHS CV CPX PEAK SYSTOLIC BLOOD PRESSURE: 145 MMHG
OHS CV CPX PERCENT MAX PREDICTED HEART RATE ACHIEVED: 55
OHS CV CPX RATE PRESSURE PRODUCT PRESENTING: 8700
SYSTOLIC BLOOD PRESSURE: 145 MMHG

## 2019-04-17 PROCEDURE — 78452 STRESS TEST WITH MYOCARDIAL PERFUSION (CUPID ONLY): ICD-10-PCS | Mod: 26,,, | Performed by: INTERNAL MEDICINE

## 2019-04-17 PROCEDURE — 99999 PR PBB SHADOW E&M-EST. PATIENT-LVL I: CPT | Mod: PBBFAC,,,

## 2019-04-17 PROCEDURE — 93018 PR CARDIAC STRESS TST,INTERP/REPT ONLY: ICD-10-PCS | Mod: ,,, | Performed by: INTERNAL MEDICINE

## 2019-04-17 PROCEDURE — 93016 CV STRESS TEST SUPVJ ONLY: CPT | Mod: ,,, | Performed by: INTERNAL MEDICINE

## 2019-04-17 PROCEDURE — 99999 PR PBB SHADOW E&M-EST. PATIENT-LVL I: ICD-10-PCS | Mod: PBBFAC,,,

## 2019-04-17 PROCEDURE — 93016 STRESS TEST WITH MYOCARDIAL PERFUSION (CUPID ONLY): ICD-10-PCS | Mod: ,,, | Performed by: INTERNAL MEDICINE

## 2019-04-17 PROCEDURE — 93018 CV STRESS TEST I&R ONLY: CPT | Mod: ,,, | Performed by: INTERNAL MEDICINE

## 2019-04-17 PROCEDURE — 78452 HT MUSCLE IMAGE SPECT MULT: CPT | Mod: 26,,, | Performed by: INTERNAL MEDICINE

## 2019-04-18 ENCOUNTER — TELEPHONE (OUTPATIENT)
Dept: CARDIOLOGY | Facility: CLINIC | Age: 79
End: 2019-04-18

## 2019-05-21 DIAGNOSIS — K21.00 GASTROESOPHAGEAL REFLUX DISEASE WITH ESOPHAGITIS: ICD-10-CM

## 2019-05-21 RX ORDER — OMEPRAZOLE 20 MG/1
20 CAPSULE, DELAYED RELEASE ORAL DAILY PRN
Qty: 90 CAPSULE | Refills: 1 | Status: SHIPPED | OUTPATIENT
Start: 2019-05-21 | End: 2019-11-25

## 2019-05-21 RX ORDER — AMLODIPINE BESYLATE 5 MG/1
5 TABLET ORAL DAILY
Qty: 90 TABLET | Refills: 1 | Status: SHIPPED | OUTPATIENT
Start: 2019-05-21 | End: 2019-07-08 | Stop reason: DRUGHIGH

## 2019-05-21 RX ORDER — TRIAMTERENE AND HYDROCHLOROTHIAZIDE 37.5; 25 MG/1; MG/1
1 CAPSULE ORAL EVERY MORNING
Qty: 90 CAPSULE | Refills: 1 | Status: SHIPPED | OUTPATIENT
Start: 2019-05-21 | End: 2019-08-07 | Stop reason: ALTCHOICE

## 2019-07-01 ENCOUNTER — CLINICAL SUPPORT (OUTPATIENT)
Dept: CARDIOLOGY | Facility: CLINIC | Age: 79
End: 2019-07-01
Attending: INTERNAL MEDICINE
Payer: MEDICARE

## 2019-07-01 VITALS
DIASTOLIC BLOOD PRESSURE: 86 MMHG | BODY MASS INDEX: 40.97 KG/M2 | SYSTOLIC BLOOD PRESSURE: 138 MMHG | WEIGHT: 261 LBS | HEART RATE: 58 BPM | HEIGHT: 67 IN

## 2019-07-01 DIAGNOSIS — I35.0 NONRHEUMATIC AORTIC VALVE STENOSIS: ICD-10-CM

## 2019-07-01 DIAGNOSIS — R06.02 SOB (SHORTNESS OF BREATH): ICD-10-CM

## 2019-07-01 LAB
ASCENDING AORTA: 2.22 CM
AV INDEX (PROSTH): 0.25
AV MEAN GRADIENT: 42 MMHG
AV PEAK GRADIENT: 68 MMHG
AV VALVE AREA: 0.77 CM2
AV VELOCITY RATIO: 0.25
BSA FOR ECHO PROCEDURE: 2.37 M2
CV ECHO LV RWT: 0.5 CM
DOP CALC AO PEAK VEL: 4.12 M/S
DOP CALC AO VTI: 109.09 CM
DOP CALC LVOT AREA: 3 CM2
DOP CALC LVOT DIAMETER: 1.97 CM
DOP CALC LVOT PEAK VEL: 1.04 M/S
DOP CALC LVOT STROKE VOLUME: 84.02 CM3
DOP CALCLVOT PEAK VEL VTI: 27.58 CM
E WAVE DECELERATION TIME: 259.23 MSEC
E/A RATIO: 1.07
E/E' RATIO: 11.13 M/S
ECHO LV POSTERIOR WALL: 1.02 CM (ref 0.6–1.1)
FRACTIONAL SHORTENING: 38 % (ref 28–44)
INTERVENTRICULAR SEPTUM: 1.11 CM (ref 0.6–1.1)
IVRT: 0.13 MSEC
LA MAJOR: 5.23 CM
LA MINOR: 5.59 CM
LA WIDTH: 3.35 CM
LEFT ATRIUM SIZE: 3.16 CM
LEFT ATRIUM VOLUME INDEX: 21.5 ML/M2
LEFT ATRIUM VOLUME: 48.63 CM3
LEFT INTERNAL DIMENSION IN SYSTOLE: 2.52 CM (ref 2.1–4)
LEFT VENTRICLE DIASTOLIC VOLUME INDEX: 32.63 ML/M2
LEFT VENTRICLE DIASTOLIC VOLUME: 73.89 ML
LEFT VENTRICLE MASS INDEX: 64 G/M2
LEFT VENTRICLE SYSTOLIC VOLUME INDEX: 10.1 ML/M2
LEFT VENTRICLE SYSTOLIC VOLUME: 22.85 ML
LEFT VENTRICULAR INTERNAL DIMENSION IN DIASTOLE: 4.09 CM (ref 3.5–6)
LEFT VENTRICULAR MASS: 143.89 G
LV LATERAL E/E' RATIO: 9.89 M/S
LV SEPTAL E/E' RATIO: 12.71 M/S
MV PEAK A VEL: 0.83 M/S
MV PEAK E VEL: 0.89 M/S
PISA TR MAX VEL: 2.89 M/S
PULM VEIN S/D RATIO: 1.1
PV PEAK D VEL: 0.41 M/S
PV PEAK S VEL: 0.45 M/S
RA MAJOR: 5.42 CM
RA PRESSURE: 3 MMHG
RA WIDTH: 3.19 CM
RIGHT VENTRICULAR END-DIASTOLIC DIMENSION: 4.15 CM
RV TISSUE DOPPLER FREE WALL SYSTOLIC VELOCITY 1 (APICAL 4 CHAMBER VIEW): 10.17 CM/S
SINUS: 2.79 CM
STJ: 1.89 CM
TDI LATERAL: 0.09 M/S
TDI SEPTAL: 0.07 M/S
TDI: 0.08 M/S
TR MAX PG: 33 MMHG
TRICUSPID ANNULAR PLANE SYSTOLIC EXCURSION: 2.84 CM
TV REST PULMONARY ARTERY PRESSURE: 36 MMHG

## 2019-07-01 PROCEDURE — 93306 TTE W/DOPPLER COMPLETE: CPT | Mod: S$GLB,,, | Performed by: INTERNAL MEDICINE

## 2019-07-01 PROCEDURE — 99999 PR PBB SHADOW E&M-EST. PATIENT-LVL II: CPT | Mod: PBBFAC,,,

## 2019-07-01 PROCEDURE — 99999 PR PBB SHADOW E&M-EST. PATIENT-LVL II: ICD-10-PCS | Mod: PBBFAC,,,

## 2019-07-01 PROCEDURE — 93306 TRANSTHORACIC ECHO (TTE) COMPLETE (CUPID ONLY): ICD-10-PCS | Mod: S$GLB,,, | Performed by: INTERNAL MEDICINE

## 2019-07-08 ENCOUNTER — OFFICE VISIT (OUTPATIENT)
Dept: CARDIOLOGY | Facility: CLINIC | Age: 79
End: 2019-07-08
Payer: MEDICARE

## 2019-07-08 VITALS
HEART RATE: 66 BPM | BODY MASS INDEX: 39.72 KG/M2 | DIASTOLIC BLOOD PRESSURE: 68 MMHG | SYSTOLIC BLOOD PRESSURE: 99 MMHG | WEIGHT: 253.06 LBS | HEIGHT: 67 IN

## 2019-07-08 DIAGNOSIS — R06.02 SOB (SHORTNESS OF BREATH): ICD-10-CM

## 2019-07-08 DIAGNOSIS — E78.49 OTHER HYPERLIPIDEMIA: ICD-10-CM

## 2019-07-08 DIAGNOSIS — R42 DIZZINESS: ICD-10-CM

## 2019-07-08 DIAGNOSIS — E66.01 CLASS 2 SEVERE OBESITY DUE TO EXCESS CALORIES WITH SERIOUS COMORBIDITY AND BODY MASS INDEX (BMI) OF 39.0 TO 39.9 IN ADULT: ICD-10-CM

## 2019-07-08 DIAGNOSIS — I35.0 SEVERE AORTIC STENOSIS: Primary | ICD-10-CM

## 2019-07-08 DIAGNOSIS — I10 ESSENTIAL HYPERTENSION: ICD-10-CM

## 2019-07-08 PROCEDURE — 99999 PR PBB SHADOW E&M-EST. PATIENT-LVL IV: ICD-10-PCS | Mod: PBBFAC,,, | Performed by: INTERNAL MEDICINE

## 2019-07-08 PROCEDURE — 99499 UNLISTED E&M SERVICE: CPT | Mod: S$GLB,,, | Performed by: INTERNAL MEDICINE

## 2019-07-08 PROCEDURE — 99214 PR OFFICE/OUTPT VISIT, EST, LEVL IV, 30-39 MIN: ICD-10-PCS | Mod: S$GLB,,, | Performed by: INTERNAL MEDICINE

## 2019-07-08 PROCEDURE — 3078F PR MOST RECENT DIASTOLIC BLOOD PRESSURE < 80 MM HG: ICD-10-PCS | Mod: CPTII,S$GLB,, | Performed by: INTERNAL MEDICINE

## 2019-07-08 PROCEDURE — 3074F PR MOST RECENT SYSTOLIC BLOOD PRESSURE < 130 MM HG: ICD-10-PCS | Mod: CPTII,S$GLB,, | Performed by: INTERNAL MEDICINE

## 2019-07-08 PROCEDURE — 1101F PT FALLS ASSESS-DOCD LE1/YR: CPT | Mod: CPTII,S$GLB,, | Performed by: INTERNAL MEDICINE

## 2019-07-08 PROCEDURE — 99999 PR PBB SHADOW E&M-EST. PATIENT-LVL IV: CPT | Mod: PBBFAC,,, | Performed by: INTERNAL MEDICINE

## 2019-07-08 PROCEDURE — 99214 OFFICE O/P EST MOD 30 MIN: CPT | Mod: S$GLB,,, | Performed by: INTERNAL MEDICINE

## 2019-07-08 PROCEDURE — 3078F DIAST BP <80 MM HG: CPT | Mod: CPTII,S$GLB,, | Performed by: INTERNAL MEDICINE

## 2019-07-08 PROCEDURE — 3074F SYST BP LT 130 MM HG: CPT | Mod: CPTII,S$GLB,, | Performed by: INTERNAL MEDICINE

## 2019-07-08 PROCEDURE — 99499 RISK ADDL DX/OHS AUDIT: ICD-10-PCS | Mod: S$GLB,,, | Performed by: INTERNAL MEDICINE

## 2019-07-08 PROCEDURE — 1101F PR PT FALLS ASSESS DOC 0-1 FALLS W/OUT INJ PAST YR: ICD-10-PCS | Mod: CPTII,S$GLB,, | Performed by: INTERNAL MEDICINE

## 2019-07-08 RX ORDER — SODIUM CHLORIDE 0.9 % (FLUSH) 0.9 %
10 SYRINGE (ML) INJECTION
Status: DISCONTINUED | OUTPATIENT
Start: 2019-07-08 | End: 2019-07-23 | Stop reason: CLARIF

## 2019-07-08 RX ORDER — SODIUM CHLORIDE 9 MG/ML
INJECTION, SOLUTION INTRAVENOUS ONCE
Status: CANCELLED | OUTPATIENT
Start: 2019-07-08 | End: 2019-07-08

## 2019-07-08 RX ORDER — AMLODIPINE BESYLATE 2.5 MG/1
2.5 TABLET ORAL DAILY
Qty: 30 TABLET | Refills: 3 | Status: SHIPPED | OUTPATIENT
Start: 2019-07-08 | End: 2019-10-25 | Stop reason: SDUPTHER

## 2019-07-08 NOTE — PATIENT INSTRUCTIONS
Angiogram with Dr. Alberto    Arrive for procedure at: [facility] on Tuesday July 30th 2019. Your procedure will start at 8 a.m.     You will receive a phone call from [facility] Pre-Op Department with arrival time and further instructions prior to your scheduled procedure.    Notify the nurse if you are ALLERGIC TO IODINE.    FASTING: You MAY NOT have anything to eat or drink AFTER MIDNIGHT the day before your procedure.     MEDICATIONS: You may take your regular morning medications with water. If there are any medications that you should not take, you will be instructed to hold them for that morning.    ? CARDIOLOGY PRE-PROCEDURE MEDICATION ORDERS:  ** Please hold any medications that are checked below:    HOLD   # OF DAYS TO HOLD  ? Coumadin   Consult with Coumadin Clinic   ? Xarelto    _DAY BEFORE & DAY OF_  ? Pradaxa  _ DAY BEFORE & DAY OF _  ? Eliquis   _ DAY BEFORE & DAY OF _  ? Metformin    Day before procedure & morning of procedure  ? Short acting insulin   Morning of procedure    CONTINUE the Following Medications   ? Plavix      ? Effient     ? Aspirin    WHAT TO EXPECT:    How long will the procedure take?  The procedure will take an average of 1 - 2 hours to perform.  After the procedure, you will need to lay flat for around 4 - 6 hours to minimize bleeding from the puncture site. If the wrist is accessed you will need to keep your arm still as instructed by the nurse.    When can I go home?  You may be able to be discharged home that same afternoon if there were no complications.  If you have one of the following: balloon; stent; pacemaker or defibrillator procedures, you may spend one night for observation.  Your doctor will determine your discharge based upon your progress.  The results of your procedure will be discussed with you before you are discharged.  Any further testing or procedures will be scheduled for you either before you leave or you will be instructed to call for a  future appointment.      TRANSPORTATION:  PLEASE ARRANGE TO HAVE SOMEONE DRIVE YOU HOME FOLLOWING YOUR PROCEDURE, YOU WILL NOT BE ALLOWED TO DRIVE.

## 2019-07-08 NOTE — PROGRESS NOTES
Subjective:    Patient ID:  Selena Moulton is a 78 y.o. female who presents for Hypertension (Echo) and Valvular Heart Disease        HPI  DISCUSSED ECHO, SEVERE AS, MUKUND 0.8CM2,MILD AI,  HAS BEEN MORE SOB, SOME LIGHT-HEADEDNESS ESPECIALLY UPON STANDING, NO CHEST PAIN PER SE NO DEFINITE ORTHOPNEA, NO PALPITATION,, SEE ROS    Past Medical History:   Diagnosis Date    Arthritis     Gout     Hypertension      Past Surgical History:   Procedure Laterality Date    BACK SURGERY      bone graft neck    CERVICAL FUSION  1984    x2     HERNIA REPAIR      umbilical    HYSTERECTOMY      Injection,steroid,epidural,transforaminal approach Left 8/2/2018    Performed by Chaim Hernandez MD at [facility] OR    RELEASE, CARPAL TUNNEL  LEFT Left 10/3/2018    Performed by [facility]he C[facility]denas MD at [facility]    RELEASE, CARPAL TUNNEL RIGHT Right 1/17/2019    Performed by [facility] C[facility]as [facility] at [facility]    TONSILLECTOMY       Family History   Problem Relation Age of Onset    Cancer Mother     Stroke Mother     Hypertension Mother     No Known Problems Father      Social History     Socioeconomic History    Marital status:      Spouse name: Not on file    Number of children: Not on file    Years of education: Not on file    Highest education level: Not on file   Occupational History    Not on file   Social Needs    Financial resource strain: Not on file    Food insecurity:     Worry: Not on file     Inability: Not on file    Transportation needs:     Medical: Not on file     Non-medical: Not on file   Tobacco Use    Smoking status: Never Smoker    Smokeless tobacco: Never Used   Substance and Sexual Activity    Alcohol use: No     Frequency: Never    Drug use: No    Sexual activity: Not on file   Lifestyle    Physical activity:     Days per week: Not on file     Minutes per session: Not on file    Stress: Not on file   Relationships    Social connections:     Talks on phone: Not on file     Gets  together: Not on file     Attends Confucianist service: Not on file     Active member of club or organization: Not on file     Attends meetings of clubs or organizations: Not on file     Relationship status: Not on file   Other Topics Concern    Not on file   Social History Narrative    Not on file       Review of patient's allergies indicates:   Allergen Reactions    Influenza virus vaccines Nausea And Vomiting    Morpholine analogues Nausea And Vomiting    Penicillins Other (See Comments)     High fever as a child, also was allergy tested 1980    Latex, natural rubber Rash    Tetanus vaccines and toxoid Rash       Current Outpatient Medications:     allopurinol (ZYLOPRIM) 300 MG tablet, Take 1 tablet (300 mg total) by mouth once daily., Disp: 90 tablet, Rfl: 3    aspirin (ECOTRIN) 81 MG EC tablet, Take 81 mg by mouth once daily. Hold for 7 days before surgery, Disp: , Rfl:     atenolol (TENORMIN) 100 MG tablet, Take 0.5 tablets (50 mg total) by mouth once daily., Disp: 45 tablet, Rfl: 1    atorvastatin (LIPITOR) 40 MG tablet, Take 40 mg by mouth every evening. Use PM before surgery and a dose AM of surgery with a sip of water, Disp: , Rfl:     colchicine (COLCRYS) 0.6 mg tablet, Take 0.6 mg by mouth daily as needed. Use AM of surgery with a sip of water if needed, Disp: , Rfl:     fish oil-omega-3 fatty acids 300-1,000 mg capsule, Take 1 capsule by mouth once daily. Hold for 7 days before surgery, Disp: , Rfl:     meloxicam (MOBIC) 7.5 MG tablet, Take 1 tablet (7.5 mg total) by mouth once daily. With meals (Patient taking differently: Take 7.5 mg by mouth once daily. With meals. Hold for 7 days before surgery.), Disp: 90 tablet, Rfl: 1    multivitamin capsule, Take 1 capsule by mouth once daily. Hold AM of surgery, Disp: , Rfl:     nystatin (MYCOSTATIN) cream, Apply topically 2 (two) times daily., Disp: 30 g, Rfl: 1    omeprazole (PRILOSEC) 20 MG capsule, Take 1 capsule (20 mg total) by mouth daily  as needed. Use AM of surgery with a sip of water, Disp: 90 capsule, Rfl: 1    triamterene-hydrochlorothiazide 37.5-25 mg (DYAZIDE) 37.5-25 mg per capsule, Take 1 capsule by mouth every morning. Hold AM of surgery., Disp: 90 capsule, Rfl: 1    amLODIPine (NORVASC) 2.5 MG tablet, Take 1 tablet (2.5 mg total) by mouth once daily., Disp: 30 tablet, Rfl: 3    Current Facility-Administered Medications:     sodium chloride 0.9% flush 10 mL, 10 mL, Intravenous, PRN, Terrie Alberto MD    Review of Systems   Constitution: Negative for chills, diaphoresis, malaise/fatigue and night sweats.   HENT: Negative for congestion, hearing loss and nosebleeds.    Eyes: Negative for blurred vision, discharge, double vision and visual disturbance.   Cardiovascular: Positive for dyspnea on exertion. Negative for chest pain, claudication, cyanosis, irregular heartbeat, leg swelling, near-syncope, orthopnea, palpitations, paroxysmal nocturnal dyspnea and syncope.   Respiratory: Positive for shortness of breath (SOME). Negative for cough, hemoptysis and wheezing.    Endocrine: Negative for cold intolerance, heat intolerance and polyuria.   Hematologic/Lymphatic: Negative for adenopathy and bleeding problem. Does not bruise/bleed easily.   Skin: Negative for color change, itching, nail changes and rash.   Musculoskeletal: Negative for back pain, falls and joint pain (R HAND).   Gastrointestinal: Negative for bloating, abdominal pain, change in bowel habit, dysphagia, heartburn, hematemesis, jaundice, melena and vomiting.   Genitourinary: Negative for dysuria, flank pain and frequency.   Neurological: Positive for dizziness. Negative for brief paralysis, focal weakness, light-headedness, loss of balance, numbness (L HAND), tremors and weakness.   Psychiatric/Behavioral: Negative for altered mental status, depression, memory loss and substance abuse. The patient is not nervous/anxious.    Allergic/Immunologic: Negative for hives and  "persistent infections.        Objective:      Vitals:    07/08/19 1415   BP: 99/68   Pulse: 66   Weight: 114.8 kg (253 lb 1.4 oz)   Height: 5' 7" (1.702 m)   PainSc: 0-No pain     Body mass index is 39.64 kg/m².    Physical Exam   Constitutional: She is oriented to person, place, and time. She appears well-developed and well-nourished. She is active.   OVERWEIGHT   HENT:   Head: Normocephalic and atraumatic.   Mouth/Throat: Oropharynx is clear and moist and mucous membranes are normal.   Eyes: Pupils are equal, round, and reactive to light. Conjunctivae and EOM are normal.   Neck: Trachea normal and normal range of motion. Neck supple. Normal carotid pulses, no hepatojugular reflux and no JVD present. Carotid bruit is not present. No edema and no erythema present. No thyromegaly present.   Cardiovascular: Normal rate and regular rhythm.  No extrasystoles are present. PMI is not displaced. Exam reveals no gallop and no friction rub.   Murmur heard.   Harsh midsystolic murmur is present with a grade of 2/6 at the upper right sternal border radiating to the neck.  Pulses:       Carotid pulses are 2+ on the right side, and 2+ on the left side.       Radial pulses are 2+ on the right side, and 2+ on the left side.        Dorsalis pedis pulses are 2+ on the right side, and 2+ on the left side.        Posterior tibial pulses are 2+ on the right side, and 2+ on the left side.   Pulmonary/Chest: Effort normal and breath sounds normal. No tachypnea and no bradypnea. She has no wheezes. She has no rales. She exhibits no tenderness.   Abdominal: Soft. Bowel sounds are normal. She exhibits no distension and no mass. There is no hepatosplenomegaly. There is no CVA tenderness.   Musculoskeletal: Normal range of motion. She exhibits no edema or tenderness.   SLOW GAIT   Lymphadenopathy:     She has no cervical adenopathy.   Neurological: She is alert and oriented to person, place, and time. She has normal strength. She displays no " tremor. No cranial nerve deficit.   Skin: Skin is warm and dry. No cyanosis or erythema. No pallor.   Psychiatric: She has a normal mood and affect. Her speech is normal and behavior is normal.               ..    Chemistry        Component Value Date/Time     01/09/2019 1229    K 4.3 01/09/2019 1229     01/09/2019 1229    CO2 31 01/09/2019 1229    BUN 23 (H) 01/09/2019 1229    CREATININE 0.90 01/09/2019 1229    GLU 98 01/09/2019 1229        Component Value Date/Time    CALCIUM 9.9 01/09/2019 1229    ALKPHOS 103 01/09/2019 1229    AST 22 01/09/2019 1229    ALT 27 01/09/2019 1229    BILITOT 0.5 01/09/2019 1229    ESTGFRAFRICA >60 01/09/2019 1229    EGFRNONAA >60 01/09/2019 1229            ..  Lab Results   Component Value Date    CHOL 153 11/07/2018     Lab Results   Component Value Date    HDL 46 11/07/2018     Lab Results   Component Value Date    LDLCALC 76.6 11/07/2018     Lab Results   Component Value Date    TRIG 152 (H) 11/07/2018     Lab Results   Component Value Date    CHOLHDL 30.1 11/07/2018     ..  Lab Results   Component Value Date    WBC 7.32 01/09/2019    HGB 12.9 01/09/2019    HCT 42.2 01/09/2019    MCV 90 01/09/2019     01/09/2019       Test(s) Reviewed  I have reviewed the following in detail:  [] Stress test   [] Angiography   [x] Echocardiogram   [x] Labs   [] Other:       Assessment:         ICD-10-CM ICD-9-CM   1. Severe aortic stenosis I35.0 424.1   2. Essential hypertension I10 401.9   3. Dizziness R42 780.4   4. Other hyperlipidemia E78.49 272.4   5. Class 2 severe obesity due to excess calories with serious comorbidity and body mass index (BMI) of 39.0 to 39.9 in adult E66.01 278.01    Z68.39 V85.39   6. SOB (shortness of breath) R06.02 786.05     Problem List Items Addressed This Visit        Cardiac/Vascular    Essential hypertension    Other hyperlipidemia    Severe aortic stenosis - Primary    Relevant Orders    Case Request-Cath Lab: CATHETERIZATION, HEART, BOTH LEFT  AND RIGHT (Completed)    Full code       Other    Class 2 severe obesity due to excess calories with serious comorbidity and body mass index (BMI) of 39.0 to 39.9 in adult    SOB (shortness of breath)    Relevant Orders    Full code    Dizziness    Relevant Orders    Full code           Plan:         DECREASE AMLODIPINE TO 2.5 MG IN VIEW OF MILD ORTHOSTASIS, WILL NEED, R/LHC IN FEW OF SEVERE AORTIC STENOSIS AND PROGRESSIVE SYMPTOMS, LIKELY BECAUSE A CANDIDATE FOR TAVR,ALL OTHER CV CLINICALLY STABLE, CLASS 1 ANGINA, NO OVERT HF, NO TIA, NO CLINICAL ARRHYTHMIA,CONTINUE CURRENT MEDS, EDUCATION, DIET, EXERCISE, WEIGHT LOSS, CONSENT  Severe aortic stenosis  -     Case Request-Cath Lab: CATHETERIZATION, HEART, BOTH LEFT AND RIGHT; Standing  -     Full code; Standing    Essential hypertension    Dizziness  Comments:  MILD ORTHOSTASIS  Orders:  -     Full code; Standing    Other hyperlipidemia    Class 2 severe obesity due to excess calories with serious comorbidity and body mass index (BMI) of 39.0 to 39.9 in adult    SOB (shortness of breath)  -     Full code; Standing    Other orders  -     amLODIPine (NORVASC) 2.5 MG tablet; Take 1 tablet (2.5 mg total) by mouth once daily.  Dispense: 30 tablet; Refill: 3  -     sodium chloride 0.9% flush 10 mL    RTC Low level/low impact aerobic exercise 5x's/wk. Heart healthy diet and risk factor modification.    See labs and med orders.    Aerobic exercise 5x's/wk. Heart healthy diet and risk factor modification.    See labs and med orders.

## 2019-07-23 ENCOUNTER — PATIENT OUTREACH (OUTPATIENT)
Dept: ADMINISTRATIVE | Facility: HOSPITAL | Age: 79
End: 2019-07-23

## 2019-07-31 DIAGNOSIS — I35.0 NODULAR CALCIFIC AORTIC VALVE STENOSIS: Primary | ICD-10-CM

## 2019-07-31 DIAGNOSIS — N18.9 CHRONIC KIDNEY DISEASE, UNSPECIFIED CKD STAGE: ICD-10-CM

## 2019-08-02 ENCOUNTER — TELEPHONE (OUTPATIENT)
Dept: CARDIOLOGY | Facility: CLINIC | Age: 79
End: 2019-08-02

## 2019-08-06 ENCOUNTER — LAB VISIT (OUTPATIENT)
Dept: LAB | Facility: HOSPITAL | Age: 79
End: 2019-08-06
Attending: FAMILY MEDICINE
Payer: MEDICARE

## 2019-08-06 ENCOUNTER — OFFICE VISIT (OUTPATIENT)
Dept: FAMILY MEDICINE | Facility: CLINIC | Age: 79
End: 2019-08-06
Payer: MEDICARE

## 2019-08-06 ENCOUNTER — IMMUNIZATION (OUTPATIENT)
Dept: PHARMACY | Facility: CLINIC | Age: 79
End: 2019-08-06
Payer: MEDICARE

## 2019-08-06 VITALS
DIASTOLIC BLOOD PRESSURE: 68 MMHG | WEIGHT: 248.25 LBS | TEMPERATURE: 98 F | BODY MASS INDEX: 38.96 KG/M2 | OXYGEN SATURATION: 95 % | HEART RATE: 61 BPM | SYSTOLIC BLOOD PRESSURE: 110 MMHG | HEIGHT: 67 IN

## 2019-08-06 DIAGNOSIS — E66.01 CLASS 2 SEVERE OBESITY DUE TO EXCESS CALORIES WITH SERIOUS COMORBIDITY AND BODY MASS INDEX (BMI) OF 38.0 TO 38.9 IN ADULT: ICD-10-CM

## 2019-08-06 DIAGNOSIS — R53.83 OTHER FATIGUE: ICD-10-CM

## 2019-08-06 DIAGNOSIS — M54.2 NECK PAIN: Primary | ICD-10-CM

## 2019-08-06 DIAGNOSIS — N28.9 ABNORMAL KIDNEY FUNCTION: ICD-10-CM

## 2019-08-06 DIAGNOSIS — I10 ESSENTIAL HYPERTENSION: ICD-10-CM

## 2019-08-06 PROBLEM — E66.812 CLASS 2 SEVERE OBESITY DUE TO EXCESS CALORIES WITH SERIOUS COMORBIDITY AND BODY MASS INDEX (BMI) OF 39.0 TO 39.9 IN ADULT: Status: RESOLVED | Noted: 2018-09-13 | Resolved: 2019-08-06

## 2019-08-06 LAB
ANION GAP SERPL CALC-SCNC: 10 MMOL/L (ref 8–16)
BUN SERPL-MCNC: 35 MG/DL (ref 8–23)
CALCIUM SERPL-MCNC: 10.6 MG/DL (ref 8.7–10.5)
CHLORIDE SERPL-SCNC: 105 MMOL/L (ref 95–110)
CO2 SERPL-SCNC: 27 MMOL/L (ref 23–29)
CREAT SERPL-MCNC: 1.5 MG/DL (ref 0.5–1.4)
EST. GFR  (AFRICAN AMERICAN): 37.7 ML/MIN/1.73 M^2
EST. GFR  (NON AFRICAN AMERICAN): 32.7 ML/MIN/1.73 M^2
GLUCOSE SERPL-MCNC: 105 MG/DL (ref 70–110)
POTASSIUM SERPL-SCNC: 4.9 MMOL/L (ref 3.5–5.1)
SODIUM SERPL-SCNC: 142 MMOL/L (ref 136–145)

## 2019-08-06 PROCEDURE — 99214 OFFICE O/P EST MOD 30 MIN: CPT | Mod: S$GLB,,, | Performed by: FAMILY MEDICINE

## 2019-08-06 PROCEDURE — 1101F PT FALLS ASSESS-DOCD LE1/YR: CPT | Mod: CPTII,S$GLB,, | Performed by: FAMILY MEDICINE

## 2019-08-06 PROCEDURE — 3078F PR MOST RECENT DIASTOLIC BLOOD PRESSURE < 80 MM HG: ICD-10-PCS | Mod: CPTII,S$GLB,, | Performed by: FAMILY MEDICINE

## 2019-08-06 PROCEDURE — 3074F PR MOST RECENT SYSTOLIC BLOOD PRESSURE < 130 MM HG: ICD-10-PCS | Mod: CPTII,S$GLB,, | Performed by: FAMILY MEDICINE

## 2019-08-06 PROCEDURE — 99999 PR PBB SHADOW E&M-EST. PATIENT-LVL III: ICD-10-PCS | Mod: PBBFAC,,, | Performed by: FAMILY MEDICINE

## 2019-08-06 PROCEDURE — 80048 BASIC METABOLIC PNL TOTAL CA: CPT

## 2019-08-06 PROCEDURE — 99214 PR OFFICE/OUTPT VISIT, EST, LEVL IV, 30-39 MIN: ICD-10-PCS | Mod: S$GLB,,, | Performed by: FAMILY MEDICINE

## 2019-08-06 PROCEDURE — 36415 COLL VENOUS BLD VENIPUNCTURE: CPT | Mod: PO

## 2019-08-06 PROCEDURE — 3074F SYST BP LT 130 MM HG: CPT | Mod: CPTII,S$GLB,, | Performed by: FAMILY MEDICINE

## 2019-08-06 PROCEDURE — 1101F PR PT FALLS ASSESS DOC 0-1 FALLS W/OUT INJ PAST YR: ICD-10-PCS | Mod: CPTII,S$GLB,, | Performed by: FAMILY MEDICINE

## 2019-08-06 PROCEDURE — 99999 PR PBB SHADOW E&M-EST. PATIENT-LVL III: CPT | Mod: PBBFAC,,, | Performed by: FAMILY MEDICINE

## 2019-08-06 PROCEDURE — 3078F DIAST BP <80 MM HG: CPT | Mod: CPTII,S$GLB,, | Performed by: FAMILY MEDICINE

## 2019-08-06 RX ORDER — HYDROCODONE BITARTRATE AND ACETAMINOPHEN 5; 325 MG/1; MG/1
1 TABLET ORAL EVERY 4 HOURS PRN
COMMUNITY
Start: 2017-11-27 | End: 2019-08-06 | Stop reason: SDUPTHER

## 2019-08-06 RX ORDER — ACETAMINOPHEN 500 MG
500 TABLET ORAL EVERY 8 HOURS PRN
COMMUNITY

## 2019-08-06 RX ORDER — HYDROCODONE BITARTRATE AND ACETAMINOPHEN 5; 325 MG/1; MG/1
1 TABLET ORAL EVERY 6 HOURS PRN
Qty: 28 TABLET | Refills: 0 | Status: SHIPPED | OUTPATIENT
Start: 2019-08-06 | End: 2019-08-13

## 2019-08-06 NOTE — PROGRESS NOTES
Subjective:       Patient ID: Selena Moulton is a 78 y.o. female.    Chief Complaint: Follow-up (6mth)    The patient is here today for a follow-up visit.  Upon review of the last blood work, the patient kidney function is abnormal.  The patient is taking meloxicam daily for arthritis pain. Also takes omeprazole for acid reflux daily.    Neck Pain    This is a chronic problem. The current episode started more than 1 month ago. The problem occurs constantly. The problem has been gradually worsening. The pain is associated with nothing. The pain is present in the midline. The quality of the pain is described as aching. The pain is severe. The symptoms are aggravated by position. Stiffness is present in the morning, all day and at night. Associated symptoms include weakness. Pertinent negatives include no chest pain, fever, headaches, leg pain, numbness, paresis, photophobia, syncope, tingling or visual change. She has tried acetaminophen, oral narcotics and NSAIDs for the symptoms. The treatment provided moderate (The patient states that he has been taking meloxicam 7.5, Tylenol sometimes and still the pain persists, she was prescribed the past oxycodone and sometimes hydrocodone with good relief of the symptoms when the pain gets severe,) relief.   Hypertension   This is a chronic problem. The current episode started more than 1 year ago. The problem is unchanged. The problem is controlled. Associated symptoms include malaise/fatigue, neck pain and shortness of breath. Pertinent negatives include no anxiety, chest pain, headaches, orthopnea, palpitations or peripheral edema. (The patient stated that was told that the cardiologist that she needed surgery, she still waiting for the recommendations in [place] for possible surgery) There are no associated agents to hypertension. Risk factors for coronary artery disease include obesity, post-menopausal state, sedentary lifestyle, family history and dyslipidemia.  Past treatments include calcium channel blockers and diuretics. The current treatment provides significant improvement. Compliance problems include diet and exercise.  Hypertensive end-organ damage includes kidney disease. There is no history of angina or retinopathy. The last blood work showed presence of abnormal kidney function,. Identifiable causes of hypertension include chronic renal disease.   Fatigue   This is a chronic problem. The current episode started more than 1 month ago. The problem occurs constantly. The problem has been gradually worsening. Associated symptoms include arthralgias, fatigue, neck pain and weakness. Pertinent negatives include no abdominal pain, chest pain, chills, congestion, coughing, fever, headaches, joint swelling, myalgias, numbness, rash, swollen glands, urinary symptoms or visual change. The symptoms are aggravated by exertion. She has tried nothing for the symptoms. The treatment provided no relief.        Past medical history, past social history was reviewed and discussed with the patient.    Review of Systems   Constitutional: Positive for fatigue and malaise/fatigue. Negative for activity change, appetite change, chills and fever.   HENT: Negative for congestion and ear discharge.    Eyes: Negative for photophobia, discharge and itching.   Respiratory: Positive for shortness of breath. Negative for cough, choking and chest tightness.    Cardiovascular: Negative for chest pain, palpitations, orthopnea, leg swelling and syncope.   Gastrointestinal: Negative for abdominal distention and abdominal pain.   Endocrine: Negative for cold intolerance and heat intolerance.   Genitourinary: Negative for dysuria and flank pain.   Musculoskeletal: Positive for arthralgias and neck pain. Negative for back pain, joint swelling and myalgias.   Skin: Negative for pallor and rash.   Allergic/Immunologic: Negative for environmental allergies and food allergies.   Neurological: Positive for  weakness. Negative for dizziness, tingling, facial asymmetry, numbness and headaches.   Hematological: Negative for adenopathy. Does not bruise/bleed easily.   Psychiatric/Behavioral: Negative for agitation and confusion.       Objective:      Physical Exam   Constitutional: She appears well-developed and well-nourished. No distress.   Limited ambulation, the patient is using a walker for mobilization   HENT:   Head: Normocephalic and atraumatic.   Right Ear: External ear normal.   Left Ear: External ear normal.   Nose: Nose normal.   Mouth/Throat: Oropharynx is clear and moist. No oropharyngeal exudate.   Eyes: Pupils are equal, round, and reactive to light. Conjunctivae are normal. Right eye exhibits no discharge. Left eye exhibits no discharge.   Neck: Neck supple. No tracheal deviation present. No thyromegaly present.   Cardiovascular: Normal rate, regular rhythm and normal heart sounds.   No murmur heard.  Pulmonary/Chest: Effort normal and breath sounds normal. No respiratory distress. She has no wheezes.   Abdominal: She exhibits no distension. There is no tenderness.   Musculoskeletal: She exhibits tenderness (Cervical spine with decreased range of motion). She exhibits no deformity.   Neurological: No cranial nerve deficit. Coordination normal.   Skin: No rash noted. She is not diaphoretic. No erythema. No pallor.   Psychiatric: She has a normal mood and affect. Her behavior is normal. Judgment and thought content normal.   Nursing note and vitals reviewed.      Assessment:       1. Neck pain    2. Class 2 severe obesity due to excess calories with serious comorbidity and body mass index (BMI) of 38.0 to 38.9 in adult    3. Essential hypertension    4. Other fatigue    5. Abnormal kidney function        Plan:       Neck pain:  Worsening  -     HYDROcodone-acetaminophen (NORCO) 5-325 mg per tablet; Take 1 tablet by mouth every 6 (six) hours as needed.  Dispense: 28 tablet; Refill: 0    Class 2 severe obesity  due to excess calories with serious comorbidity and body mass index (BMI) of 38.0 to 38.9 in adult:  Worsening    Essential hypertension:  Well controlled    Other fatigue:  Worsening  -     Basic metabolic panel; Future; Expected date: 08/06/2019    Abnormal kidney function:  New problem, workup needed  -     Basic metabolic panel; Future; Expected date: 08/06/2019    Will call the patient after we have the results of the test, [place] prescription monitoring program was checked and okay how, hydrocodone was prescribed to take as needed for 7 days.  Healthy habits, decrease salt intake, weight loss, increase physical activity as tolerated.  Monitor blood pressure home.  Follow with the cardiologist as directed.  The patient was advised to discontinue meloxicam due to side effects.The patient's BMI has been recorded in the chart. The patient has been provided educational materials regarding the benefits of attaining and maintaining a normal weight. We will continue to address and follow this issue during follow up visits.Patient agreed with assessment and plan. Patient verbalized understanding.

## 2019-08-07 DIAGNOSIS — N18.30 CHRONIC KIDNEY DISEASE, STAGE 3: Primary | ICD-10-CM

## 2019-08-07 RX ORDER — OLMESARTAN MEDOXOMIL AND HYDROCHLOROTHIAZIDE 20/12.5 20; 12.5 MG/1; MG/1
1 TABLET ORAL DAILY
Qty: 90 TABLET | Refills: 3 | Status: SHIPPED | OUTPATIENT
Start: 2019-08-07 | End: 2019-09-04 | Stop reason: ALTCHOICE

## 2019-08-09 ENCOUNTER — TELEPHONE (OUTPATIENT)
Dept: CARDIOLOGY | Facility: CLINIC | Age: 79
End: 2019-08-09

## 2019-08-09 NOTE — TELEPHONE ENCOUNTER
----- Message from Tamara Loomis sent at 8/9/2019 12:42 PM CDT -----  Contact: patient   Patient need to speak with a nurse, she states Dr Alberto told her to call office if she didn't hear anything for the [place] office to schedule her procedure. Please call back at [phone] (home)

## 2019-08-12 DIAGNOSIS — I35.0 NODULAR CALCIFIC AORTIC VALVE STENOSIS: Primary | ICD-10-CM

## 2019-08-20 ENCOUNTER — CLINICAL SUPPORT (OUTPATIENT)
Dept: FAMILY MEDICINE | Facility: CLINIC | Age: 79
End: 2019-08-20
Payer: MEDICARE

## 2019-08-20 ENCOUNTER — LAB VISIT (OUTPATIENT)
Dept: LAB | Facility: HOSPITAL | Age: 79
End: 2019-08-20
Payer: MEDICARE

## 2019-08-20 VITALS — DIASTOLIC BLOOD PRESSURE: 60 MMHG | SYSTOLIC BLOOD PRESSURE: 114 MMHG | OXYGEN SATURATION: 96 % | HEART RATE: 60 BPM

## 2019-08-20 DIAGNOSIS — E78.49 OTHER HYPERLIPIDEMIA: ICD-10-CM

## 2019-08-20 DIAGNOSIS — N18.30 CHRONIC KIDNEY DISEASE, STAGE 3: ICD-10-CM

## 2019-08-20 DIAGNOSIS — I10 ESSENTIAL HYPERTENSION: Primary | ICD-10-CM

## 2019-08-20 LAB
ANION GAP SERPL CALC-SCNC: 11 MMOL/L (ref 8–16)
BUN SERPL-MCNC: 23 MG/DL (ref 8–23)
CALCIUM SERPL-MCNC: 10.2 MG/DL (ref 8.7–10.5)
CHLORIDE SERPL-SCNC: 104 MMOL/L (ref 95–110)
CO2 SERPL-SCNC: 26 MMOL/L (ref 23–29)
CREAT SERPL-MCNC: 1.1 MG/DL (ref 0.5–1.4)
EST. GFR  (AFRICAN AMERICAN): 54.4 ML/MIN/1.73 M^2
EST. GFR  (NON AFRICAN AMERICAN): 47.2 ML/MIN/1.73 M^2
GLUCOSE SERPL-MCNC: 109 MG/DL (ref 70–110)
POTASSIUM SERPL-SCNC: 4.5 MMOL/L (ref 3.5–5.1)
SODIUM SERPL-SCNC: 141 MMOL/L (ref 136–145)

## 2019-08-20 PROCEDURE — 99999 PR PBB SHADOW E&M-EST. PATIENT-LVL III: CPT | Mod: PBBFAC,,,

## 2019-08-20 PROCEDURE — 99999 PR PBB SHADOW E&M-EST. PATIENT-LVL III: ICD-10-PCS | Mod: PBBFAC,,,

## 2019-08-20 PROCEDURE — 36415 COLL VENOUS BLD VENIPUNCTURE: CPT | Mod: PO

## 2019-08-20 PROCEDURE — 80048 BASIC METABOLIC PNL TOTAL CA: CPT

## 2019-08-20 RX ORDER — ATORVASTATIN CALCIUM 40 MG/1
40 TABLET, FILM COATED ORAL NIGHTLY
Qty: 90 TABLET | Refills: 3 | Status: SHIPPED | OUTPATIENT
Start: 2019-08-20 | End: 2020-07-21 | Stop reason: SDUPTHER

## 2019-08-20 NOTE — PROGRESS NOTES
Selena Moulton 79 y.o. female is here today for Blood Pressure check.   History of HTN yes.    Review of patient's allergies indicates:   Allergen Reactions    Influenza virus vaccines Nausea And Vomiting    Morpholine analogues Nausea And Vomiting    Penicillins Other (See Comments)     High fever as a child, also was allergy tested 1980    Latex, natural rubber Rash    Tetanus vaccines and toxoid Rash     Creatinine   Date Value Ref Range Status   08/06/2019 1.5 (H) 0.5 - 1.4 mg/dL Final     Sodium   Date Value Ref Range Status   08/06/2019 142 136 - 145 mmol/L Final     Potassium   Date Value Ref Range Status   08/06/2019 4.9 3.5 - 5.1 mmol/L Final   ]  Patient denies taking blood pressure medications on a regular basis at the same time of the day.     Current Outpatient Medications:     acetaminophen (TYLENOL) 500 MG tablet, Take 500 mg by mouth every 8 (eight) hours as needed., Disp: , Rfl:     allopurinol (ZYLOPRIM) 300 MG tablet, Take 1 tablet (300 mg total) by mouth once daily., Disp: 90 tablet, Rfl: 3    amLODIPine (NORVASC) 2.5 MG tablet, Take 1 tablet (2.5 mg total) by mouth once daily., Disp: 30 tablet, Rfl: 3    aspirin (ECOTRIN) 81 MG EC tablet, Take 81 mg by mouth once daily. Hold for 7 days before surgery, Disp: , Rfl:     atenolol (TENORMIN) 100 MG tablet, Take 0.5 tablets (50 mg total) by mouth once daily., Disp: 45 tablet, Rfl: 1    atorvastatin (LIPITOR) 40 MG tablet, Take 40 mg by mouth every evening. , Disp: , Rfl:     fish oil-omega-3 fatty acids 300-1,000 mg capsule, Take 1 capsule by mouth once daily. Hold for 7 days before surgery, Disp: , Rfl:     multivitamin capsule, Take 1 capsule by mouth once daily. Hold AM of surgery, Disp: , Rfl:     nystatin (MYCOSTATIN) cream, Apply topically 2 (two) times daily., Disp: 30 g, Rfl: 1    olmesartan-hydrochlorothiazide (BENICAR HCT) 20-12.5 mg per tablet, Take 1 tablet by mouth once daily., Disp: 90 tablet, Rfl: 3    omeprazole  (PRILOSEC) 20 MG capsule, Take 1 capsule (20 mg total) by mouth daily as needed. Use AM of surgery with a sip of water, Disp: 90 capsule, Rfl: 1  Does patient have record of home blood pressure readings no. Readings have been averaging n/a.   Last dose of blood pressure medication was taken at 7:00am.  Patient is asymptomatic.   Complains of N/A.    BP: 114/60 , Pulse: 60 .    Blood pressure reading after 15 minutes was 114/60, Pulse60.  Dr. Perales notified.

## 2019-08-20 NOTE — PATIENT INSTRUCTIONS
Pt advised to cont bp medication  Regiment, maintain compliant diet, report any s/s of high BP, keep fu visits

## 2019-08-30 ENCOUNTER — HOSPITAL ENCOUNTER (OUTPATIENT)
Dept: PULMONOLOGY | Facility: CLINIC | Age: 79
Discharge: HOME | End: 2019-08-30
Payer: MEDICARE

## 2019-08-30 ENCOUNTER — HOSPITAL ENCOUNTER (OUTPATIENT)
Dept: RADIOLOGY | Facility: HOSPITAL | Age: 79
Discharge: HOME | End: 2019-08-30
Attending: INTERNAL MEDICINE
Payer: MEDICARE

## 2019-08-30 ENCOUNTER — INITIAL CONSULT (OUTPATIENT)
Dept: CARDIOLOGY | Facility: CLINIC | Age: 79
End: 2019-08-30
Payer: MEDICARE

## 2019-08-30 VITALS
HEART RATE: 67 BPM | OXYGEN SATURATION: 95 % | BODY MASS INDEX: 40.31 KG/M2 | HEIGHT: 67 IN | SYSTOLIC BLOOD PRESSURE: 142 MMHG | WEIGHT: 256.81 LBS | DIASTOLIC BLOOD PRESSURE: 74 MMHG

## 2019-08-30 VITALS — WEIGHT: 257.06 LBS | HEIGHT: 68 IN | BODY MASS INDEX: 38.96 KG/M2

## 2019-08-30 DIAGNOSIS — I77.1 TORTUOUS AORTA: ICD-10-CM

## 2019-08-30 DIAGNOSIS — E78.49 OTHER HYPERLIPIDEMIA: ICD-10-CM

## 2019-08-30 DIAGNOSIS — B37.2 SKIN CANDIDIASIS: ICD-10-CM

## 2019-08-30 DIAGNOSIS — I35.0 NODULAR CALCIFIC AORTIC VALVE STENOSIS: ICD-10-CM

## 2019-08-30 DIAGNOSIS — I10 ESSENTIAL HYPERTENSION: Primary | ICD-10-CM

## 2019-08-30 DIAGNOSIS — R06.02 SOB (SHORTNESS OF BREATH): ICD-10-CM

## 2019-08-30 DIAGNOSIS — E66.01 MORBID OBESITY: ICD-10-CM

## 2019-08-30 DIAGNOSIS — I35.0 SEVERE AORTIC STENOSIS: ICD-10-CM

## 2019-08-30 LAB
ALLENS TEST: ABNORMAL
DELSYS: ABNORMAL
DLCO ADJ PRE: 22.17 ML/(MIN*MMHG) (ref 16.49–27.96)
DLCO SINGLE BREATH LLN: 16.49
DLCO SINGLE BREATH PRE REF: 98.5 %
DLCO SINGLE BREATH REF: 22.22
DLCOC SBVA LLN: 2.71
DLCOC SBVA PRE REF: 125.8 %
DLCOC SBVA REF: 3.95
DLCOC SINGLE BREATH LLN: 16.49
DLCOC SINGLE BREATH PRE REF: 99.7 %
DLCOC SINGLE BREATH REF: 22.22
DLCOCSBVAULN: 5.18
DLCOCSINGLEBREATHULN: 27.96
DLCOSINGLEBREATHULN: 27.96
DLCOVA LLN: 2.71
DLCOVA PRE REF: 124.2 %
DLCOVA PRE: 4.91 ML/(MIN*MMHG*L) (ref 2.71–5.18)
DLCOVA REF: 3.95
DLCOVAULN: 5.18
DLVAADJ PRE: 4.97 ML/(MIN*MMHG*L) (ref 2.71–5.18)
FEF 25 75 LLN: 0.69
FEF 25 75 PRE REF: 45 %
FEF 25 75 REF: 1.66
FEV05 LLN: 0.89
FEV05 REF: 1.75
FEV1 FVC LLN: 62
FEV1 FVC PRE REF: 84.5 %
FEV1 FVC REF: 76
FEV1 LLN: 1.52
FEV1 PRE REF: 62.4 %
FEV1 REF: 2.18
FVC LLN: 2.02
FVC PRE REF: 72.7 %
FVC REF: 2.89
HCO3 UR-SCNC: 26.1 MMOL/L (ref 24–28)
IVC PRE: 2.11 L (ref 2.02–3.77)
IVC SINGLE BREATH LLN: 2.02
IVC SINGLE BREATH PRE REF: 72.8 %
IVC SINGLE BREATH REF: 2.89
IVCSINGLEBREATHULN: 3.77
MVV LLN: 75
MVV PRE REF: 54.3 %
MVV REF: 88
PCO2 BLDA: 42.6 MMHG (ref 35–45)
PEF LLN: 3.41
PEF PRE REF: 61 %
PEF REF: 5.35
PH SMN: 7.4 [PH] (ref 7.35–7.45)
PO2 BLDA: 77 MMHG (ref 80–100)
POC BE: 1 MMOL/L
POC SATURATED O2: 95 % (ref 95–100)
POC TCO2: 27 MMOL/L (ref 23–27)
PRE DLCO: 21.89 ML/(MIN*MMHG) (ref 16.49–27.96)
PRE FEF 25 75: 0.75 L/S (ref 0.69–2.64)
PRE FET 100: 7.23 SEC
PRE FEV05 REF: 55.9 %
PRE FEV1 FVC: 64.54 % (ref 61.69–91.07)
PRE FEV1: 1.36 L (ref 1.52–2.83)
PRE FEV5: 0.98 L (ref 0.89–2.6)
PRE FVC: 2.1 L (ref 2.02–3.77)
PRE MVV: 48 L/MIN (ref 75.09–101.59)
PRE PEF: 3.26 L/S (ref 3.41–7.29)
SAMPLE: ABNORMAL
SITE: ABNORMAL
VA PRE: 4.47 L (ref 5.48–5.48)
VA SINGLE BREATH LLN: 5.48
VA SINGLE BREATH PRE REF: 81.6 %
VA SINGLE BREATH REF: 5.48
VASINGLEBREATHULN: 5.48

## 2019-08-30 PROCEDURE — 71275 CT ANGIOGRAPHY CHEST: CPT | Mod: TC

## 2019-08-30 PROCEDURE — 99499 RISK ADDL DX/OHS AUDIT: ICD-10-PCS | Mod: S$GLB,,, | Performed by: INTERNAL MEDICINE

## 2019-08-30 PROCEDURE — 94010 BREATHING CAPACITY TEST: ICD-10-PCS | Mod: S$GLB,,, | Performed by: INTERNAL MEDICINE

## 2019-08-30 PROCEDURE — 94729 DIFFUSING CAPACITY: CPT | Mod: S$GLB,,, | Performed by: INTERNAL MEDICINE

## 2019-08-30 PROCEDURE — 25500020 PHARM REV CODE 255: Performed by: INTERNAL MEDICINE

## 2019-08-30 PROCEDURE — 99999 PR PBB SHADOW E&M-EST. PATIENT-LVL IV: CPT | Mod: PBBFAC,,, | Performed by: INTERNAL MEDICINE

## 2019-08-30 PROCEDURE — 36600 PR WITHDRAWAL OF ARTERIAL BLOOD: ICD-10-PCS | Mod: S$GLB,,, | Performed by: INTERNAL MEDICINE

## 2019-08-30 PROCEDURE — 82803 PR  BLOOD GASES: PH, PO2 & PCO2: ICD-10-PCS | Mod: S$GLB,,, | Performed by: INTERNAL MEDICINE

## 2019-08-30 PROCEDURE — 99999 PR PBB SHADOW E&M-EST. PATIENT-LVL IV: ICD-10-PCS | Mod: PBBFAC,,, | Performed by: INTERNAL MEDICINE

## 2019-08-30 PROCEDURE — 94010 BREATHING CAPACITY TEST: CPT | Mod: S$GLB,,, | Performed by: INTERNAL MEDICINE

## 2019-08-30 PROCEDURE — 71275 CTA CARDIAC TAVR_PARTNERS (XPD): ICD-10-PCS | Mod: 26,,, | Performed by: RADIOLOGY

## 2019-08-30 PROCEDURE — 99205 PR OFFICE/OUTPT VISIT, NEW, LEVL V, 60-74 MIN: ICD-10-PCS | Mod: S$GLB,,, | Performed by: INTERNAL MEDICINE

## 2019-08-30 PROCEDURE — 99205 OFFICE O/P NEW HI 60 MIN: CPT | Mod: S$GLB,,, | Performed by: INTERNAL MEDICINE

## 2019-08-30 PROCEDURE — 1101F PR PT FALLS ASSESS DOC 0-1 FALLS W/OUT INJ PAST YR: ICD-10-PCS | Mod: CPTII,S$GLB,, | Performed by: INTERNAL MEDICINE

## 2019-08-30 PROCEDURE — 99499 UNLISTED E&M SERVICE: CPT | Mod: S$GLB,,, | Performed by: INTERNAL MEDICINE

## 2019-08-30 PROCEDURE — 71275 CT ANGIOGRAPHY CHEST: CPT | Mod: 26,,, | Performed by: RADIOLOGY

## 2019-08-30 PROCEDURE — 3078F DIAST BP <80 MM HG: CPT | Mod: CPTII,S$GLB,, | Performed by: INTERNAL MEDICINE

## 2019-08-30 PROCEDURE — 3077F PR MOST RECENT SYSTOLIC BLOOD PRESSURE >= 140 MM HG: ICD-10-PCS | Mod: CPTII,S$GLB,, | Performed by: INTERNAL MEDICINE

## 2019-08-30 PROCEDURE — 94729 PR C02/MEMBANE DIFFUSE CAPACITY: ICD-10-PCS | Mod: S$GLB,,, | Performed by: INTERNAL MEDICINE

## 2019-08-30 PROCEDURE — 74174 CTA CARDIAC TAVR_PARTNERS (XPD): ICD-10-PCS | Mod: 26,,, | Performed by: RADIOLOGY

## 2019-08-30 PROCEDURE — 3077F SYST BP >= 140 MM HG: CPT | Mod: CPTII,S$GLB,, | Performed by: INTERNAL MEDICINE

## 2019-08-30 PROCEDURE — 36600 WITHDRAWAL OF ARTERIAL BLOOD: CPT | Mod: S$GLB,,, | Performed by: INTERNAL MEDICINE

## 2019-08-30 PROCEDURE — 94618 PULMONARY STRESS TESTING: ICD-10-PCS | Mod: S$GLB,,, | Performed by: INTERNAL MEDICINE

## 2019-08-30 PROCEDURE — 1101F PT FALLS ASSESS-DOCD LE1/YR: CPT | Mod: CPTII,S$GLB,, | Performed by: INTERNAL MEDICINE

## 2019-08-30 PROCEDURE — 94618 PULMONARY STRESS TESTING: CPT | Mod: S$GLB,,, | Performed by: INTERNAL MEDICINE

## 2019-08-30 PROCEDURE — 74174 CTA ABD&PLVS W/CONTRAST: CPT | Mod: 26,,, | Performed by: RADIOLOGY

## 2019-08-30 PROCEDURE — 3078F PR MOST RECENT DIASTOLIC BLOOD PRESSURE < 80 MM HG: ICD-10-PCS | Mod: CPTII,S$GLB,, | Performed by: INTERNAL MEDICINE

## 2019-08-30 PROCEDURE — 82803 BLOOD GASES ANY COMBINATION: CPT | Mod: S$GLB,,, | Performed by: INTERNAL MEDICINE

## 2019-08-30 RX ORDER — FLUCONAZOLE 200 MG/1
200 TABLET ORAL DAILY
Qty: 7 TABLET | Refills: 0 | Status: SHIPPED | OUTPATIENT
Start: 2019-08-30 | End: 2019-09-06

## 2019-08-30 RX ADMIN — IOHEXOL 100 ML: 350 INJECTION, SOLUTION INTRAVENOUS at 11:08

## 2019-08-30 NOTE — PROCEDURES
Selena Moulton is a 79 y.o.  female patient, who presents for a 6 minute walk test ordered by Erick Thomas MD.  The diagnosis is Aortic Valve Disorder.  The patient's BMI is 39.2 kg/m2.  Predicted distance (lower limit of normal) is 161.16 meters.      Test Results:    The test was completed without stopping.  The total time walked was 360 seconds.  During walking, the patient reported:  Dyspnea, Leg pain.  The patient used a walker for assistance during testing.     08/30/2019---------Distance: 274.32 meters (900 feet)     O2 Sat % Supplemental Oxygen Heart Rate Blood Pressure Seema Scale   Pre-exercise  (Resting) 98 % Room Air 73 bpm 140/66 mmHg 0   During Exercise 99 % Room Air 98 bpm 172/72 mmHg 2   Post-exercise  (Recovery) 98 % Room Air  70 bpm 150/69 mmHg      Recovery Time:  176 seconds    Performing nurse/tech:  Austin MEIER      PREVIOUS STUDY:   The patient has not had a previous study.      CLINICAL INTERPRETATION:  Six minute walk distance is 274.32 meters (900 feet) with light dyspnea.  During exercise, there was no desaturation while breathing room air.  Both blood pressure and heart rate increased significantly with walking.  The patient reported non-pulmonary symptoms during exercise.  Significant exercise impairment is likely due to subjective symptoms.  The patient did complete the study, walking 360 seconds of the 360 second test.  No previous study performed.  Based upon age and body mass index, exercise capacity may be normal.

## 2019-08-30 NOTE — PROGRESS NOTES
INTERVENTIONAL CARDIOLOGY CLINIC  HEART VALVE CENTER    REFERRING PHYSICIAN: Dr Alberto    CHIEF COMPLIANT:  Shortness of breath    HISTORY OF PRESENT ILLNESS  Selena Moulton is a 79 y.o. female referred by Dr Alberto for evaluation of severe AS (NYHA Class III sx).    She is a very pleasant lady with HTN, DLD who follows in the [facility]. She has noticed increased dyspnea on exertion for the past 6 months, she was found to have severe AS on echo and thus referred to us for TAVR evaluation.    The patient has undergone the following TAVR work-up:   ECHO (Date 7.1.19): MUKUND= 0.8 cm2, MG= 42mmHg, Peak Bradley= 4.12 m/s, EF= 60%.   LHC (Date 7.30.19): Non obstructive CAD   STS: 2.67%   Frailty: 1/4   Iliacs are >9.1 on R and > 8.9 on L   LVOT area by CTA is 3.69 cm2 (24 mm X 19 mm) and Avg Diameter is 21.7 per Dr Esquivel  Incidental findings on CT: Pending at the time of this note  CT Surgery risk assessment: [facility] appointment next week  Rhythm issues: None  PFTs: Pending but no Hx of COPD.  Comorbidities: Morbid Obesity, groin candidiasis, hypertension    Selena Moulton is a 23 mm  Cierra S3 or Nadya valve candidate via RTF access.        PAST MEDICAL HISTORY  Past Medical History:   Diagnosis Date    Anticoagulant long-term use     Arthritis     Class 2 severe obesity due to excess calories with serious comorbidity and body mass index (BMI) of 39.0 to 39.9 in adult 9/13/2018    Gout     Hypertension         PAST SURGICAL HISTORY  Past Surgical History:   Procedure Laterality Date    ANGIOGRAM, CORONARY ARTERY N/A 7/30/2019    Performed by Terrie Alberto [facility] at [facility] CATH    BACK SURGERY      bone graft neck    CATHETERIZATION, HEART, BOTH LEFT AND RIGHT N/A 7/30/2019    Performed by [facility]rie [facility]to MD at [facility] CATH    CERVICAL FUSION  1984    x2     HERNIA REPAIR      umbilical    HYSTERECTOMY      Injection,steroid,epidural,transforaminal approach Left 8/2/2018    [facility]    RELEASE, CARPAL TUNNEL  LEFT Left 10/3/2018    Performed by Meche C[facility]denas MD at [facility]    RELEASE, CARPAL TUNNEL RIGHT Right 1/17/2019    Performed by Meche [facility]as MD at [facility]    TONSILLECTOMY      Valve study-aortic  7/30/2019    Performed by [facility] at [facility] CATH       MEDICATIONS  Current Outpatient Medications on File Prior to Visit   Medication Sig Dispense Refill    acetaminophen (TYLENOL) 500 MG tablet Take 500 mg by mouth every 8 (eight) hours as needed.      allopurinol (ZYLOPRIM) 300 MG tablet Take 1 tablet (300 mg total) by mouth once daily. 90 tablet 3    amLODIPine (NORVASC) 2.5 MG tablet Take 1 tablet (2.5 mg total) by mouth once daily. 30 tablet 3    aspirin (ECOTRIN) 81 MG EC tablet Take 81 mg by mouth once daily. Hold for 7 days before surgery      atenolol (TENORMIN) 100 MG tablet Take 0.5 tablets (50 mg total) by mouth once daily. 45 tablet 1    atorvastatin (LIPITOR) 40 MG tablet Take 1 tablet (40 mg total) by mouth every evening. 90 tablet 3    fish oil-omega-3 fatty acids 300-1,000 mg capsule Take 1 capsule by mouth once daily. Hold for 7 days before surgery      multivitamin capsule Take 1 capsule by mouth once daily. Hold AM of surgery      nystatin (MYCOSTATIN) cream Apply topically 2 (two) times daily. 30 g 1    olmesartan-hydrochlorothiazide (BENICAR HCT) 20-12.5 mg per tablet Take 1 tablet by mouth once daily. 90 tablet 3    omeprazole (PRILOSEC) 20 MG capsule Take 1 capsule (20 mg total) by mouth daily as needed. Use AM of surgery with a sip of water 90 capsule 1     Current Facility-Administered Medications on File Prior to Visit   Medication Dose Route Frequency Provider Last Rate Last Dose    [COMPLETED] iohexol (OMNIPAQUE 350) injection 100 mL  100 mL Intravenous ONCE PRN Erick Thomas MD   100 mL at 08/30/19 1103        SOCIAL HISTORY  TOBACCO: Denies  ETOH: Denies  ILLEGAL DRUGS: Denies      Review of Systems   Constitution:  Negative for fever.   HENT: Negative for congestion and hoarse voice.    Eyes: Negative for blurred vision and double vision.   Cardiovascular: Positive for dyspnea on exertion. Negative for chest pain, claudication, cyanosis, irregular heartbeat, leg swelling, near-syncope, orthopnea, palpitations and paroxysmal nocturnal dyspnea.   Respiratory: Negative for cough, hemoptysis and shortness of breath.    Endocrine: Negative for cold intolerance and heat intolerance.   Hematologic/Lymphatic: Negative for bleeding problem. Does not bruise/bleed easily.   Skin: Negative for dry skin and nail changes.   Musculoskeletal: Negative for back pain and falls.   Gastrointestinal: Negative for abdominal pain and anorexia.   Neurological: Negative for brief paralysis, dizziness and weakness.        Objective:    Physical Exam   Constitutional: She is oriented to person, place, and time. She appears well-developed and well-nourished.   Eyes: Pupils are equal, round, and reactive to light.   Neck: No JVD present. No thyromegaly present.   Cardiovascular: Normal rate, regular rhythm and intact distal pulses.   Murmur heard.   Harsh midsystolic murmur is present with a grade of 3/6 at the upper right sternal border radiating to the neck.  Pulmonary/Chest: No respiratory distress. She has no wheezes. She exhibits no tenderness.   Abdominal: She exhibits no distension. There is no tenderness. There is no rebound.   Musculoskeletal: She exhibits no edema or tenderness.   Neurological: She is alert and oriented to person, place, and time.   Skin: Skin is warm and dry.   Psychiatric: She has a normal mood and affect.         Assessment:       1. Essential hypertension    2. Other hyperlipidemia    3. Severe aortic stenosis    4. Tortuous aorta    5. SOB (shortness of breath)    6. Morbid obesity    7. Skin candidiasis    8. Nodular calcific aortic valve stenosis         Plan:         Essential hypertension  BP controlled in current  regimen    Other hyperlipidemia  On high intensity statin therapy    Morbid obesity  Discussed at length the importance of lifestyle changes. This increases her risk of vascular complications post TAVR.   After successful TAVR she should emphasize exercise and diet.    Skin candidiasis  Will give oral fluconazole. Will plan on reviewing her groin when she comes to sign consents for TAVR, and will give her fluconazole that day.    Nodular calcific aortic valve stenosis  TAVR work-up:   ECHO (Date 7.1.19): MUKUND= 0.8 cm2, MG= 42mmHg, Peak Bradley= 4.12 m/s, EF= 60%.   LHC (Date 7.30.19): Non obstructive CAD   STS: 2.67%   Frailty: 1/4   Iliacs are >9.1 on R and > 8.9 on L   LVOT area by CTA is 3.69 cm2 (24 mm X 19 mm) and Avg Diameter is 21.7 per Dr Esquivel  Incidental findings on CT: Pending at the time of this note  CT Surgery risk assessment: [facility] appointment next week  Rhythm issues: None  PFTs: Pending but no Hx of COPD.  Comorbidities: Morbid Obesity, groin candidiasis, hypertension    Selena Moulton is a 23 mm  Cierra S3 or Nadya valve candidate via RTF access.    Transcatheter Aortic valve replacement   1. Valve: 23 mm Cierra S3 (Potential Nadya 23 case)  2. TAVR access: RTF (Needs candida resolved)  3. Valvuloplasty balloon: 20 True  4. Viabahn size if needed: 10X5  5. Antithrombotic therapy: ASA/Plavix  6. Pacemaker risk factors: None  1. The patient was explained the the procedure carries around a 12.5% risk of permanent pacemaker requirement and that risk depends on the patients underlying conduction system.  7. Patient was educated abut the pathophysiology and natural history of severe aortic stenosis and was educated about the treatment options including surgical and transcatheter valve replacement. She agrees to be full code for the forseable future and to undergo workup for treatment of severe AS.   8. The risks, benefits, and alternatives of transcatheter aortic valve replacement were discussed with  the patient. All questions were answered and informed consent was obtained. I had a detailed discussion with the patient regarding risk of stroke, MI, bleeding access site complications including limb loss, allergy, kidney failure including dialysis and death.  9. The patient understands the risks and benefits and wishes to go ahead with the procedure.  10. All patient's questions were answered          Fidel Esquivel MD [facility]  Interventional Cardiology  Structural/Valvular heart disease  [phone]

## 2019-08-30 NOTE — ASSESSMENT & PLAN NOTE
Will give oral fluconazole. Will plan on reviewing her groin when she comes to sign consents for TAVR, and will give her fluconazole that day.

## 2019-08-30 NOTE — LETTER
August 30, 2019      Terrie Alberto MD  [address] [facility] [address] [place] [address]           [facility]  [address]  [facility] [address]  Phone: [phone]          Patient: Selena Moulton   MR Number: [id number]   YOB: 1940   Date of Visit: 8/30/2019       Dear Dr. Terrie Alberto:    Thank you for referring Selena Moulton to me for evaluation. Attached you will find relevant portions of my assessment and plan of care.    If you have questions, please do not hesitate to call me. I look forward to following Selena Moulton along with you.    Sincerely,    Fidel Cardenas MD    Enclosure  CC:  No Recipients    If you would like to receive this communication electronically, please contact [email or web address] or [phone] to request more information on [facility] access.    For providers and/or their staff who would like to refer a patient to [facility], please contact us through our one-stop-shop provider referral line, [facility], at [phone].    If you feel you have received this communication in error or would no longer like to receive these types of communications, please e-mail [email or web address]

## 2019-08-30 NOTE — ASSESSMENT & PLAN NOTE
Discussed at length the importance of lifestyle changes. This increases her risk of vascular complications post TAVR.   After successful TAVR she should emphasize exercise and diet.

## 2019-08-30 NOTE — ASSESSMENT & PLAN NOTE
TAVR work-up:   ECHO (Date 7.1.19): MUKUND= 0.8 cm2, MG= 42mmHg, Peak Bradley= 4.12 m/s, EF= 60%.   LHC (Date 7.30.19): Non obstructive CAD   STS: 2.67%   Frailty: 1/4   Iliacs are >9.1 on R and > 8.9 on L   LVOT area by CTA is 3.69 cm2 (24 mm X 19 mm) and Avg Diameter is 21.7 per Dr Esquivel  Incidental findings on CT: Pending at the time of this note  CT Surgery risk assessment: [facility] appointment next week  Rhythm issues: None  PFTs: Pending but no Hx of COPD.  Comorbidities: Morbid Obesity, groin candidiasis, hypertension    Selena Moulton is a 23 mm  Cierra S3 or Nadya valve candidate via RTF access.    Transcatheter Aortic valve replacement   1. Valve: 23 mm Cierra S3 (Potential Nadya 23 case)  2. TAVR access: RTF (Needs candida resolved)  3. Valvuloplasty balloon: 20 True  4. Viabahn size if needed: 10X5  5. Antithrombotic therapy: ASA/Plavix  6. Pacemaker risk factors: None  1. The patient was explained the the procedure carries around a 12.5% risk of permanent pacemaker requirement and that risk depends on the patients underlying conduction system.  7. Patient was educated abut the pathophysiology and natural history of severe aortic stenosis and was educated about the treatment options including surgical and transcatheter valve replacement. She agrees to be full code for the forseable future and to undergo workup for treatment of severe AS.   8. The risks, benefits, and alternatives of transcatheter aortic valve replacement were discussed with the patient. All questions were answered and informed consent was obtained. I had a detailed discussion with the patient regarding risk of stroke, MI, bleeding access site complications including limb loss, allergy, kidney failure including dialysis and death.  9. The patient understands the risks and benefits and wishes to go ahead with the procedure.  10. All patient's questions were answered

## 2019-09-03 DIAGNOSIS — I35.0 SEVERE AORTIC STENOSIS: Primary | ICD-10-CM

## 2019-09-03 DIAGNOSIS — I48.0 PAROXYSMAL ATRIAL FIBRILLATION: ICD-10-CM

## 2019-09-03 RX ORDER — SODIUM CHLORIDE 9 MG/ML
INJECTION, SOLUTION INTRAVENOUS CONTINUOUS
Status: CANCELLED | OUTPATIENT
Start: 2019-09-03

## 2019-09-03 RX ORDER — DIPHENHYDRAMINE HCL 25 MG
50 CAPSULE ORAL ONCE
Status: CANCELLED | OUTPATIENT
Start: 2019-09-03 | End: 2019-09-03

## 2019-09-04 ENCOUNTER — LAB VISIT (OUTPATIENT)
Dept: LAB | Facility: HOSPITAL | Age: 79
End: 2019-09-04
Attending: FAMILY MEDICINE
Payer: MEDICARE

## 2019-09-04 ENCOUNTER — OFFICE VISIT (OUTPATIENT)
Dept: FAMILY MEDICINE | Facility: CLINIC | Age: 79
End: 2019-09-04
Payer: MEDICARE

## 2019-09-04 VITALS
OXYGEN SATURATION: 95 % | SYSTOLIC BLOOD PRESSURE: 116 MMHG | BODY MASS INDEX: 39.29 KG/M2 | WEIGHT: 258.38 LBS | DIASTOLIC BLOOD PRESSURE: 78 MMHG | TEMPERATURE: 98 F | HEART RATE: 72 BPM

## 2019-09-04 DIAGNOSIS — R06.02 SHORTNESS OF BREATH: ICD-10-CM

## 2019-09-04 DIAGNOSIS — M10.072 ACUTE IDIOPATHIC GOUT OF LEFT FOOT: ICD-10-CM

## 2019-09-04 DIAGNOSIS — I10 ESSENTIAL HYPERTENSION: ICD-10-CM

## 2019-09-04 DIAGNOSIS — M10.072 ACUTE IDIOPATHIC GOUT OF LEFT FOOT: Primary | ICD-10-CM

## 2019-09-04 DIAGNOSIS — R21 RASH AND NONSPECIFIC SKIN ERUPTION: ICD-10-CM

## 2019-09-04 LAB
BASOPHILS # BLD AUTO: 0.04 K/UL (ref 0–0.2)
BASOPHILS NFR BLD: 0.4 % (ref 0–1.9)
DIFFERENTIAL METHOD: ABNORMAL
EOSINOPHIL # BLD AUTO: 0.4 K/UL (ref 0–0.5)
EOSINOPHIL NFR BLD: 3.6 % (ref 0–8)
ERYTHROCYTE [DISTWIDTH] IN BLOOD BY AUTOMATED COUNT: 15.4 % (ref 11.5–14.5)
HCT VFR BLD AUTO: 41.9 % (ref 37–48.5)
HGB BLD-MCNC: 12.9 G/DL (ref 12–16)
IMM GRANULOCYTES # BLD AUTO: 0.04 K/UL (ref 0–0.04)
IMM GRANULOCYTES NFR BLD AUTO: 0.4 % (ref 0–0.5)
LYMPHOCYTES # BLD AUTO: 1.6 K/UL (ref 1–4.8)
LYMPHOCYTES NFR BLD: 15.5 % (ref 18–48)
MCH RBC QN AUTO: 28.2 PG (ref 27–31)
MCHC RBC AUTO-ENTMCNC: 30.8 G/DL (ref 32–36)
MCV RBC AUTO: 92 FL (ref 82–98)
MONOCYTES # BLD AUTO: 0.8 K/UL (ref 0.3–1)
MONOCYTES NFR BLD: 8.1 % (ref 4–15)
NEUTROPHILS # BLD AUTO: 7.3 K/UL (ref 1.8–7.7)
NEUTROPHILS NFR BLD: 72 % (ref 38–73)
NRBC BLD-RTO: 0 /100 WBC
PLATELET # BLD AUTO: 213 K/UL (ref 150–350)
PMV BLD AUTO: 11.5 FL (ref 9.2–12.9)
RBC # BLD AUTO: 4.57 M/UL (ref 4–5.4)
WBC # BLD AUTO: 10.08 K/UL (ref 3.9–12.7)

## 2019-09-04 PROCEDURE — 1101F PT FALLS ASSESS-DOCD LE1/YR: CPT | Mod: CPTII,S$GLB,, | Performed by: FAMILY MEDICINE

## 2019-09-04 PROCEDURE — 3078F DIAST BP <80 MM HG: CPT | Mod: CPTII,S$GLB,, | Performed by: FAMILY MEDICINE

## 2019-09-04 PROCEDURE — 3078F PR MOST RECENT DIASTOLIC BLOOD PRESSURE < 80 MM HG: ICD-10-PCS | Mod: CPTII,S$GLB,, | Performed by: FAMILY MEDICINE

## 2019-09-04 PROCEDURE — 36415 COLL VENOUS BLD VENIPUNCTURE: CPT | Mod: PO

## 2019-09-04 PROCEDURE — 3074F SYST BP LT 130 MM HG: CPT | Mod: CPTII,S$GLB,, | Performed by: FAMILY MEDICINE

## 2019-09-04 PROCEDURE — 99999 PR PBB SHADOW E&M-EST. PATIENT-LVL III: ICD-10-PCS | Mod: PBBFAC,,, | Performed by: FAMILY MEDICINE

## 2019-09-04 PROCEDURE — 84550 ASSAY OF BLOOD/URIC ACID: CPT

## 2019-09-04 PROCEDURE — 85025 COMPLETE CBC W/AUTO DIFF WBC: CPT

## 2019-09-04 PROCEDURE — 99214 PR OFFICE/OUTPT VISIT, EST, LEVL IV, 30-39 MIN: ICD-10-PCS | Mod: S$GLB,,, | Performed by: FAMILY MEDICINE

## 2019-09-04 PROCEDURE — 1101F PR PT FALLS ASSESS DOC 0-1 FALLS W/OUT INJ PAST YR: ICD-10-PCS | Mod: CPTII,S$GLB,, | Performed by: FAMILY MEDICINE

## 2019-09-04 PROCEDURE — 99214 OFFICE O/P EST MOD 30 MIN: CPT | Mod: S$GLB,,, | Performed by: FAMILY MEDICINE

## 2019-09-04 PROCEDURE — 99999 PR PBB SHADOW E&M-EST. PATIENT-LVL III: CPT | Mod: PBBFAC,,, | Performed by: FAMILY MEDICINE

## 2019-09-04 PROCEDURE — 3074F PR MOST RECENT SYSTOLIC BLOOD PRESSURE < 130 MM HG: ICD-10-PCS | Mod: CPTII,S$GLB,, | Performed by: FAMILY MEDICINE

## 2019-09-04 RX ORDER — METHYLPREDNISOLONE 4 MG/1
TABLET ORAL
Qty: 1 PACKAGE | Refills: 0 | Status: SHIPPED | OUTPATIENT
Start: 2019-09-04 | End: 2019-09-25

## 2019-09-04 RX ORDER — IRBESARTAN AND HYDROCHLOROTHIAZIDE 150; 12.5 MG/1; MG/1
1 TABLET, FILM COATED ORAL DAILY
Qty: 90 TABLET | Refills: 3 | Status: SHIPPED | OUTPATIENT
Start: 2019-09-04 | End: 2020-08-25

## 2019-09-04 NOTE — PATIENT INSTRUCTIONS
Eating Heart-Healthy Food: Using the DASH Plan    Eating for your heart doesnt have to be hard or boring. You just need to know how to make healthier choices. The DASH eating plan has been developed to help you do just that. DASH stands for Dietary Approaches to Stop Hypertension. It is a plan that has been proven to be healthier for your heart and to lower your risk for high blood pressure. It can also help lower your risk for cancer, heart disease, osteoporosis, and diabetes.  Choosing from each food group  Choose foods from each of the food groups below each day. Try to get the recommended number of servings for each food group. The serving numbers are based on a diet of 2,000 calories a day. Talk to your doctor if youre unsure about your calorie needs. Along with getting the correct servings, the DASH plan also recommends a sodium intake less than 2,300 mg per day.        Grains  Servings: 6 to 8 a day  A serving is:  · 1 slice bread  · 1 ounce dry cereal  · Half a cup cooked rice, pasta or cereal  Best choices: Whole grains and any grains high in fiber. Vegetables  Servings: 4 to 5 a day  A serving is:  · 1 cup raw leafy vegetable  · Half a cup cut-up raw or cooked vegetable  · Half a cup vegetable juice  Best choices: Fresh or frozen vegetables prepared without added salt or fat.   Fruits  Servings: 4 to 5 a day  A serving is:  · 1 medium fruit  · One-quarter cup dried fruit  · Half a cup fresh, frozen, or canned fruit  · Half a cup of 100% fruit juices  Best choices: A variety of fresh fruits of different colors. Whole fruits are a better choice than fruit juices. Low-fat or fat-free dairy  Servings: 2 to 3 a day  A serving is:  · 1 cup milk  · 1 cup yogurt  · One and a half ounces cheese  Best choices: Skim or 1% milk, low-fat or fat-free yogurt or buttermilk, and low-fat cheeses.         Lean meats, poultry, fish  Servings: 6 or fewer a day  A serving is:  · 1 ounce cooked meats, poultry, or fish  · 1  egg  Best choices: Lean poultry and fish. Trim away visible fat. Broil, grill, roast, or boil instead of frying. Remove skin from poultry before eating. Limit how much red meat you eat.  Nuts, seeds, beans  Servings: 4 to 5 a week  A serving is:  · One-third cup nuts (one and a half ounces)  · 2 tablespoons nut butter or seeds  · Half a cup cooked dry beans or legumes  Best choices: Dry roasted nuts with no salt added, lentils, kidney beans, garbanzo beans, and whole denson beans.   Fats and oils  Servings: 2 to 3 a day  A serving is:  · 1 teaspoon vegetable oil  · 1 teaspoon soft margarine  · 1 tablespoon mayonnaise  · 2 tablespoons salad dressing  Best choices: Nut and vegetable oils (nontropical vegetable oils), such as olive and canola oil. Sweets  Servings: 5 a week or fewer  A serving is:  · 1 tablespoon sugar, maple syrup, or honey  · 1 tablespoon jam or jelly  · 1 half-ounce jelly beans (about 15)  · 1 cup lemonade  Best choices: Dried fruit can be a satisfying sweet. Choose low-fat sweets. And watch your serving sizes!      For more on the DASH eating plan, visit:  www.nhlbi.nih.gov/health/health-topics/topics/dash   Date Last Reviewed: 6/1/2016  © 1890-9490 [employer]. [address] [place] [address], [place] [address]. All rights reserved. This information is not intended as a substitute for professional medical care. Always follow your healthcare professional's instructions.

## 2019-09-05 LAB — URATE SERPL-MCNC: 5.7 MG/DL (ref 2.4–5.7)

## 2019-09-09 NOTE — PROGRESS NOTES
Subjective:       Patient ID: Selena Moulton is a 79 y.o. female.    Chief Complaint: Rash    Rash   The current episode started 1 to 4 weeks ago. The problem has been gradually worsening since onset. The rash is diffuse. The rash is characterized by dryness, itchiness and redness. She was exposed to nothing. Associated symptoms include fatigue and shortness of breath. Pertinent negatives include no congestion, cough, diarrhea, eye pain, fever, joint pain, nail changes, sore throat or vomiting. Past treatments include moisturizer. The treatment provided mild relief. Her past medical history is significant for allergies.   Foot Injury    There was no injury mechanism. Pain location: Left foot. The quality of the pain is described as aching. The pain is severe. The pain has been worsening since onset. Associated symptoms include an inability to bear weight. Pertinent negatives include no loss of motion, loss of sensation, muscle weakness, numbness or tingling. She reports no foreign bodies present. The symptoms are aggravated by movement, palpation and weight bearing. She has tried elevation, acetaminophen, non-weight bearing and NSAIDs (The patient also try colchicine with moderate relief of the symptoms) for the symptoms. The treatment provided mild relief.   Shortness of Breath   This is a chronic problem. The current episode started more than 1 month ago. The problem occurs constantly. The problem has been gradually worsening. Associated symptoms include a rash. Pertinent negatives include no abdominal pain, chest pain, claudication, coryza, fever, leg swelling, sore throat or vomiting. The symptoms are aggravated by exercise. The patient has no known risk factors (The patient is currently seen the cardiologist secondary to valve defect and is scheduled to have surgery) for DVT/PE. The treatment provided no relief. Her past medical history is significant for allergies. There is no history of chronic lung  disease, COPD or PE.        Past medical history, past social history was reviewed and discussed with the patient.    Review of Systems   Constitutional: Positive for fatigue. Negative for activity change, appetite change, chills and fever.   HENT: Negative for congestion, ear discharge and sore throat.    Eyes: Negative for pain, discharge and itching.   Respiratory: Positive for shortness of breath. Negative for cough, choking and chest tightness.    Cardiovascular: Negative for chest pain, palpitations, claudication and leg swelling.   Gastrointestinal: Negative for abdominal distention, abdominal pain, diarrhea and vomiting.   Endocrine: Negative for cold intolerance and heat intolerance.   Genitourinary: Negative for dysuria and flank pain.   Musculoskeletal: Positive for arthralgias and back pain. Negative for joint pain.   Skin: Positive for rash. Negative for nail changes and pallor.   Allergic/Immunologic: Negative for environmental allergies and food allergies.   Neurological: Negative for dizziness, tingling, facial asymmetry and numbness.   Hematological: Negative for adenopathy. Does not bruise/bleed easily.   Psychiatric/Behavioral: Negative for agitation and confusion.       Objective:      Physical Exam   Constitutional: She appears well-developed and well-nourished. No distress.   HENT:   Head: Normocephalic and atraumatic.   Right Ear: External ear normal.   Left Ear: External ear normal.   Mouth/Throat: Oropharynx is clear and moist. No oropharyngeal exudate.   Eyes: Pupils are equal, round, and reactive to light. Conjunctivae are normal. Right eye exhibits no discharge. Left eye exhibits no discharge. No scleral icterus.   Neck: Neck supple. No thyromegaly present.   Cardiovascular: Normal rate, regular rhythm and normal heart sounds.   No murmur heard.  Pulmonary/Chest: Effort normal and breath sounds normal. No respiratory distress. She has no wheezes.   Abdominal: She exhibits no distension.  There is no tenderness.   Musculoskeletal: She exhibits tenderness (Left foot presence of redness and swelling). She exhibits no deformity.   Neurological: No cranial nerve deficit. Coordination normal.   Skin: No rash noted. She is not diaphoretic. There is erythema (On arms and legs). No pallor.   Psychiatric: She has a normal mood and affect. Her behavior is normal. Judgment and thought content normal.   Nursing note and vitals reviewed.      Assessment:       1. Acute idiopathic gout of left foot    2. Rash and nonspecific skin eruption    3. Essential hypertension    4. Shortness of breath        Plan:       Acute idiopathic gout of left foot:  New problem workup needed  -     Uric acid; Future; Expected date: 09/04/2019  -     CBC auto differential; Future; Expected date: 09/04/2019    Rash and nonspecific skin eruption:  New problem, workup needed  -     methylPREDNISolone (MEDROL DOSEPACK) 4 mg tablet; use as directed  Dispense: 1 Package; Refill: 0    Essential hypertension:  Well controlled  -     irbesartan-hydrochlorothiazide (AVALIDE) 150-12.5 mg per tablet; Take 1 tablet by mouth once daily.  Dispense: 90 tablet; Refill: 3    Shortness of breath:  Worsening    Will call the patient after have the results of the test, Medrol Dosepak was prescribed for the patient for a rash and also for the gout.The patient's BMI has been recorded in the chart. The patient has been provided educational materials regarding the benefits of attaining and maintaining a normal weight. We will continue to address and follow this issue during follow up visits.  The patient will follow with cardiologist as directed.Patient agreed with assessment and plan. Patient verbalized understanding.

## 2019-10-07 ENCOUNTER — ANESTHESIA EVENT (OUTPATIENT)
Dept: MEDSURG UNIT | Facility: HOSPITAL | Age: 79
DRG: 266 | End: 2019-10-07
Payer: MEDICARE

## 2019-10-07 ENCOUNTER — OFFICE VISIT (OUTPATIENT)
Dept: CARDIOLOGY | Facility: CLINIC | Age: 79
DRG: 266 | End: 2019-10-07
Payer: MEDICARE

## 2019-10-07 VITALS
WEIGHT: 255.94 LBS | HEIGHT: 68 IN | OXYGEN SATURATION: 97 % | DIASTOLIC BLOOD PRESSURE: 70 MMHG | BODY MASS INDEX: 38.79 KG/M2 | SYSTOLIC BLOOD PRESSURE: 142 MMHG | HEART RATE: 60 BPM

## 2019-10-07 DIAGNOSIS — I35.0 NODULAR CALCIFIC AORTIC VALVE STENOSIS: Primary | ICD-10-CM

## 2019-10-07 PROCEDURE — 1101F PT FALLS ASSESS-DOCD LE1/YR: CPT | Mod: CPTII,S$GLB,, | Performed by: INTERNAL MEDICINE

## 2019-10-07 PROCEDURE — 3078F PR MOST RECENT DIASTOLIC BLOOD PRESSURE < 80 MM HG: ICD-10-PCS | Mod: CPTII,S$GLB,, | Performed by: INTERNAL MEDICINE

## 2019-10-07 PROCEDURE — 99999 PR PBB SHADOW E&M-EST. PATIENT-LVL IV: ICD-10-PCS | Mod: PBBFAC,,,

## 2019-10-07 PROCEDURE — 3078F DIAST BP <80 MM HG: CPT | Mod: CPTII,S$GLB,, | Performed by: INTERNAL MEDICINE

## 2019-10-07 PROCEDURE — 99214 PR OFFICE/OUTPT VISIT, EST, LEVL IV, 30-39 MIN: ICD-10-PCS | Mod: S$GLB,,, | Performed by: INTERNAL MEDICINE

## 2019-10-07 PROCEDURE — 99214 OFFICE O/P EST MOD 30 MIN: CPT | Mod: S$GLB,,, | Performed by: INTERNAL MEDICINE

## 2019-10-07 PROCEDURE — 3074F SYST BP LT 130 MM HG: CPT | Mod: CPTII,S$GLB,, | Performed by: INTERNAL MEDICINE

## 2019-10-07 PROCEDURE — 3074F PR MOST RECENT SYSTOLIC BLOOD PRESSURE < 130 MM HG: ICD-10-PCS | Mod: CPTII,S$GLB,, | Performed by: INTERNAL MEDICINE

## 2019-10-07 PROCEDURE — 99999 PR PBB SHADOW E&M-EST. PATIENT-LVL IV: CPT | Mod: PBBFAC,,,

## 2019-10-07 PROCEDURE — 1101F PR PT FALLS ASSESS DOC 0-1 FALLS W/OUT INJ PAST YR: ICD-10-PCS | Mod: CPTII,S$GLB,, | Performed by: INTERNAL MEDICINE

## 2019-10-07 RX ORDER — FUROSEMIDE 20 MG/1
20 TABLET ORAL DAILY PRN
Qty: 30 TABLET | Refills: 3 | Status: ON HOLD | OUTPATIENT
Start: 2019-10-07 | End: 2019-10-09 | Stop reason: SDUPTHER

## 2019-10-07 NOTE — H&P (VIEW-ONLY)
Subjective:    Patient ID:  Selena Moulton is a 79 y.o. female who presents for follow-up of Aortic Stenosis    Ref:  Amkieh    HPI  Selena Moulton is a 79 y.o. female referred by Dr Alberto for evaluation of severe AS (NYHA Class III sx).     She is a very pleasant lady with HTN, DLD who follows in the [facility]. She has noticed increased dyspnea on exertion for the past 6 months, she was found to have severe AS on echo and is here to complete her TAVR evaluation. She had groin candidiasis which has completely resolved with oral fluconazole.     The patient has undergone the following TAVR work-up:   Severe, symptomatic AS, NYHA Class III, CCS 0  · ECHO (Date 7.1.19): MUKUND= 0.8 cm2, MG= 42mmHg, Peak Bradley= 4.12 m/s, EF= 60%.   · LHC (Date 7.30.19): Non obstructive CAD   · STS: 2.67%   · Frailty: 1/4   · Iliacs are >9.1 on R and > 8.9 on L   · LVOT area by CTA is 3.69 cm2 (24 mm X 19 mm) and Avg Diam 21.7, Cierra 23S3 is 7% OS.  · Incidental findings on CT: Left renal cysts.Liver right hepatic lobe hemangioma.  · CT Surgery risk assessment: Moderate Risk per Dr. Rainey  · Rhythm issues: None  · PFTs: FEV1 62.4% predicted, DLCO 98.5% predicted.  · Comorbidities: Morbid Obesity, hypertension       Selena Moulton is a 23 mm Cierra S3 which is 7% OS at nominal inflation volume.     Review of Systems   Constitution: Negative for chills, decreased appetite and diaphoresis.   HENT: Negative for congestion and ear discharge.    Eyes: Negative for blurred vision and discharge.   Cardiovascular: Positive for dyspnea on exertion. Negative for chest pain, irregular heartbeat, leg swelling and paroxysmal nocturnal dyspnea.   Respiratory: Negative for cough, hemoptysis and shortness of breath.    Gastrointestinal: Negative for abdominal pain.        Objective:    Physical Exam   Constitutional: She is oriented to person, place, and time. She appears well-developed and well-nourished. No distress.   Eyes: Pupils are  equal, round, and reactive to light. Conjunctivae are normal.   Neck: No tracheal deviation present. No thyromegaly present.   Cardiovascular: Normal rate, regular rhythm, normal heart sounds and intact distal pulses. Exam reveals no gallop and no friction rub.   No murmur heard.  Pulses:       Radial pulses are 2+ on the right side, and 2+ on the left side.        Femoral pulses are 2+ on the right side, and 2+ on the left side.  4/6 systolic ejection murmur   Pulmonary/Chest: Effort normal and breath sounds normal. No respiratory distress. She has no wheezes. She has no rales.   Abdominal: Soft. Bowel sounds are normal. She exhibits no distension. There is no tenderness.   Musculoskeletal: She exhibits no edema or deformity.   Neurological: She is alert and oriented to person, place, and time. No cranial nerve deficit. Coordination normal.   Skin: Skin is warm and dry. She is not diaphoretic.   Psychiatric: She has a normal mood and affect. Her behavior is normal.         Assessment:       1. Essential hypertension    2. Other hyperlipidemia    3. Severe aortic stenosis    4. Tortuous aorta    5. SOB (shortness of breath)    6. Morbid obesity    7. Skin candidiasis    8. Nodular calcific aortic valve stenosis          Plan:          Essential hypertension  BP controlled in current regimen     Other hyperlipidemia  On high intensity statin therapy     Morbid obesity  Discussed at length the importance of lifestyle changes. This increases her risk of vascular complications post TAVR.   After successful TAVR she should emphasize exercise and diet.     Skin candidiasis  Resolved after she received oral fluconazole, no rash or redness in groin..    Dyspnea on exertion  PRN Lasix 20 mg for dyspnea on exertion    Nodular calcific aortic valve stenosis        The patient has undergone the following TAVR work-up:   Severe, symptomatic AS, NYHA Class III, CCS 0  · ECHO (Date 7.1.19): MUKUND= 0.8 cm2, MG= 42mmHg, Peak Bradley= 4.12  m/s, EF= 60%.   · [facility] (Date 7.30.19): Non obstructive CAD   · STS: 2.67%   · Frailty: 1/4   · Iliacs are >9.1 on R and > 8.9 on L   · LVOT area by CTA is 3.69 cm2 (24 mm X 19 mm) and Avg Diam 21.7, Cierra 23S3 is 7% OS.  · Incidental findings on CT: Left renal cysts.Liver right hepatic lobe hemangioma.  · CT Surgery risk assessment: Moderate Risk per Dr. Rainey  · Rhythm issues: None  · PFTs: FEV1 62.4% predicted, DLCO 98.5% predicted.  · Comorbidities: Morbid Obesity, hypertension       Selena Moulton is a 23 mm Cierra S3 which is 7% OS at nominal inflation volume.     Transcatheter Aortic valve replacement   1. Valve: 23 mm Cierra S3 at nominal  2. TAVR access: RTF   3. Valvuloplasty balloon: 20 True  4. Viabahn size if needed: 10X5  5. Antithrombotic therapy: ASA/Plavix  6. Pacemaker risk factors: None  The patient was explained the the procedure carries around a 12.5% risk of permanent pacemaker requirement and that risk depends on the patients underlying conduction system.  7. Patient saw the video and was educated abut the pathophysiology and natural history of severe aortic stenosis and was educated about the treatment options including surgical and transcatheter valve replacement. She agrees to be full code for the forseable future and to undergo workup for treatment of severe AS.   8. The risks, benefits, and alternatives of transcatheter aortic valve replacement were discussed with the patient. All questions were answered and informed consent was obtained. I had a detailed discussion with the patient regarding risk of stroke, MI, bleeding access site complications including limb loss, allergy, kidney failure including dialysis and death.  9. The patient understands the risks and benefits and wishes to go ahead with the procedure.  10. All patient's questions were answered     Chris Ortiz MD (David)  Cardiology Fellow    Staff:  I have personally taken the history and examined this patient  and agree with the fellow's note as stated above and amended it accordingly :-)

## 2019-10-07 NOTE — PROGRESS NOTES
Subjective:    Patient ID:  Selena Moulton is a 79 y.o. female who presents for follow-up of Aortic Stenosis    Ref:  Amkieh    HPI  Selena Moulton is a 79 y.o. female referred by Dr Alberto for evaluation of severe AS (NYHA Class III sx).     She is a very pleasant lady with HTN, DLD who follows in the [facility]. She has noticed increased dyspnea on exertion for the past 6 months, she was found to have severe AS on echo and is here to complete her TAVR evaluation. She had groin candidiasis which has completely resolved with oral fluconazole.     The patient has undergone the following TAVR work-up:   Severe, symptomatic AS, NYHA Class III, CCS 0  · ECHO (Date 7.1.19): MUKUND= 0.8 cm2, MG= 42mmHg, Peak Bradley= 4.12 m/s, EF= 60%.   · LHC (Date 7.30.19): Non obstructive CAD   · STS: 2.67%   · Frailty: 1/4   · Iliacs are >9.1 on R and > 8.9 on L   · LVOT area by CTA is 3.69 cm2 (24 mm X 19 mm) and Avg Diam 21.7, Cierra 23S3 is 7% OS.  · Incidental findings on CT: Left renal cysts.Liver right hepatic lobe hemangioma.  · CT Surgery risk assessment: Moderate Risk per Dr. Rainey  · Rhythm issues: None  · PFTs: FEV1 62.4% predicted, DLCO 98.5% predicted.  · Comorbidities: Morbid Obesity, hypertension       Selena oMulton is a 23 mm Cierra S3 which is 7% OS at nominal inflation volume.     Review of Systems   Constitution: Negative for chills, decreased appetite and diaphoresis.   HENT: Negative for congestion and ear discharge.    Eyes: Negative for blurred vision and discharge.   Cardiovascular: Positive for dyspnea on exertion. Negative for chest pain, irregular heartbeat, leg swelling and paroxysmal nocturnal dyspnea.   Respiratory: Negative for cough, hemoptysis and shortness of breath.    Gastrointestinal: Negative for abdominal pain.        Objective:    Physical Exam   Constitutional: She is oriented to person, place, and time. She appears well-developed and well-nourished. No distress.   Eyes: Pupils are  equal, round, and reactive to light. Conjunctivae are normal.   Neck: No tracheal deviation present. No thyromegaly present.   Cardiovascular: Normal rate, regular rhythm, normal heart sounds and intact distal pulses. Exam reveals no gallop and no friction rub.   No murmur heard.  Pulses:       Radial pulses are 2+ on the right side, and 2+ on the left side.        Femoral pulses are 2+ on the right side, and 2+ on the left side.  4/6 systolic ejection murmur   Pulmonary/Chest: Effort normal and breath sounds normal. No respiratory distress. She has no wheezes. She has no rales.   Abdominal: Soft. Bowel sounds are normal. She exhibits no distension. There is no tenderness.   Musculoskeletal: She exhibits no edema or deformity.   Neurological: She is alert and oriented to person, place, and time. No cranial nerve deficit. Coordination normal.   Skin: Skin is warm and dry. She is not diaphoretic.   Psychiatric: She has a normal mood and affect. Her behavior is normal.         Assessment:       1. Essential hypertension    2. Other hyperlipidemia    3. Severe aortic stenosis    4. Tortuous aorta    5. SOB (shortness of breath)    6. Morbid obesity    7. Skin candidiasis    8. Nodular calcific aortic valve stenosis          Plan:          Essential hypertension  BP controlled in current regimen     Other hyperlipidemia  On high intensity statin therapy     Morbid obesity  Discussed at length the importance of lifestyle changes. This increases her risk of vascular complications post TAVR.   After successful TAVR she should emphasize exercise and diet.     Skin candidiasis  Resolved after she received oral fluconazole, no rash or redness in groin..    Dyspnea on exertion  PRN Lasix 20 mg for dyspnea on exertion    Nodular calcific aortic valve stenosis        The patient has undergone the following TAVR work-up:   Severe, symptomatic AS, NYHA Class III, CCS 0  · ECHO (Date 7.1.19): MUKUND= 0.8 cm2, MG= 42mmHg, Peak Bradley= 4.12  m/s, EF= 60%.   · [facility] (Date 7.30.19): Non obstructive CAD   · STS: 2.67%   · Frailty: 1/4   · Iliacs are >9.1 on R and > 8.9 on L   · LVOT area by CTA is 3.69 cm2 (24 mm X 19 mm) and Avg Diam 21.7, Cierra 23S3 is 7% OS.  · Incidental findings on CT: Left renal cysts.Liver right hepatic lobe hemangioma.  · CT Surgery risk assessment: Moderate Risk per Dr. Rainey  · Rhythm issues: None  · PFTs: FEV1 62.4% predicted, DLCO 98.5% predicted.  · Comorbidities: Morbid Obesity, hypertension       Selena Moulton is a 23 mm Cierra S3 which is 7% OS at nominal inflation volume.     Transcatheter Aortic valve replacement   1. Valve: 23 mm Cierra S3 at nominal  2. TAVR access: RTF   3. Valvuloplasty balloon: 20 True  4. Viabahn size if needed: 10X5  5. Antithrombotic therapy: ASA/Plavix  6. Pacemaker risk factors: None  The patient was explained the the procedure carries around a 12.5% risk of permanent pacemaker requirement and that risk depends on the patients underlying conduction system.  7. Patient saw the video and was educated abut the pathophysiology and natural history of severe aortic stenosis and was educated about the treatment options including surgical and transcatheter valve replacement. She agrees to be full code for the forseable future and to undergo workup for treatment of severe AS.   8. The risks, benefits, and alternatives of transcatheter aortic valve replacement were discussed with the patient. All questions were answered and informed consent was obtained. I had a detailed discussion with the patient regarding risk of stroke, MI, bleeding access site complications including limb loss, allergy, kidney failure including dialysis and death.  9. The patient understands the risks and benefits and wishes to go ahead with the procedure.  10. All patient's questions were answered     Chris Ortiz MD (David)  Cardiology Fellow    Staff:  I have personally taken the history and examined this patient  and agree with the fellow's note as stated above and amended it accordingly :-)

## 2019-10-08 ENCOUNTER — ANESTHESIA (OUTPATIENT)
Dept: MEDSURG UNIT | Facility: HOSPITAL | Age: 79
DRG: 266 | End: 2019-10-08
Payer: MEDICARE

## 2019-10-08 ENCOUNTER — HOSPITAL ENCOUNTER (INPATIENT)
Facility: HOSPITAL | Age: 79
LOS: 1 days | Discharge: HOME | DRG: 266 | End: 2019-10-09
Attending: INTERNAL MEDICINE | Admitting: INTERNAL MEDICINE
Payer: MEDICARE

## 2019-10-08 DIAGNOSIS — Z95.2 S/P TAVR (TRANSCATHETER AORTIC VALVE REPLACEMENT): ICD-10-CM

## 2019-10-08 DIAGNOSIS — I35.0 SEVERE AORTIC STENOSIS: Primary | ICD-10-CM

## 2019-10-08 DIAGNOSIS — I35.0 SEVERE AORTIC STENOSIS: ICD-10-CM

## 2019-10-08 LAB
ABO + RH BLD: NORMAL
ANION GAP SERPL CALC-SCNC: 11 MMOL/L (ref 8–16)
BASOPHILS # BLD AUTO: 0.04 K/UL (ref 0–0.2)
BASOPHILS NFR BLD: 0.8 % (ref 0–1.9)
BLD GP AB SCN CELLS X3 SERPL QL: NORMAL
BUN SERPL-MCNC: 37 MG/DL (ref 8–23)
CALCIUM SERPL-MCNC: 9.8 MG/DL (ref 8.7–10.5)
CHLORIDE SERPL-SCNC: 105 MMOL/L (ref 95–110)
CO2 SERPL-SCNC: 25 MMOL/L (ref 23–29)
CREAT SERPL-MCNC: 1.4 MG/DL (ref 0.5–1.4)
DIFFERENTIAL METHOD: ABNORMAL
EOSINOPHIL # BLD AUTO: 0.1 K/UL (ref 0–0.5)
EOSINOPHIL NFR BLD: 1.7 % (ref 0–8)
ERYTHROCYTE [DISTWIDTH] IN BLOOD BY AUTOMATED COUNT: 14.6 % (ref 11.5–14.5)
EST. GFR  (AFRICAN AMERICAN): 40.6 ML/MIN/1.73 M^2
EST. GFR  (NON AFRICAN AMERICAN): 35.3 ML/MIN/1.73 M^2
GLUCOSE SERPL-MCNC: 106 MG/DL (ref 70–110)
HCT VFR BLD AUTO: 36.2 % (ref 37–48.5)
HGB BLD-MCNC: 11.3 G/DL (ref 12–16)
IMM GRANULOCYTES # BLD AUTO: 0.03 K/UL (ref 0–0.04)
IMM GRANULOCYTES NFR BLD AUTO: 0.6 % (ref 0–0.5)
INR PPP: 0.9 (ref 0.8–1.2)
LYMPHOCYTES # BLD AUTO: 1.2 K/UL (ref 1–4.8)
LYMPHOCYTES NFR BLD: 24.4 % (ref 18–48)
MCH RBC QN AUTO: 28.8 PG (ref 27–31)
MCHC RBC AUTO-ENTMCNC: 31.2 G/DL (ref 32–36)
MCV RBC AUTO: 92 FL (ref 82–98)
MONOCYTES # BLD AUTO: 0.4 K/UL (ref 0.3–1)
MONOCYTES NFR BLD: 9 % (ref 4–15)
NEUTROPHILS # BLD AUTO: 3 K/UL (ref 1.8–7.7)
NEUTROPHILS NFR BLD: 63.5 % (ref 38–73)
NRBC BLD-RTO: 0 /100 WBC
PLATELET # BLD AUTO: 160 K/UL (ref 150–350)
PMV BLD AUTO: 10.8 FL (ref 9.2–12.9)
POTASSIUM SERPL-SCNC: 4.6 MMOL/L (ref 3.5–5.1)
PROTHROMBIN TIME: 9.7 SEC (ref 9–12.5)
RBC # BLD AUTO: 3.92 M/UL (ref 4–5.4)
SODIUM SERPL-SCNC: 141 MMOL/L (ref 136–145)
WBC # BLD AUTO: 4.79 K/UL (ref 3.9–12.7)

## 2019-10-08 PROCEDURE — 27000191 HC C-V MONITORING

## 2019-10-08 PROCEDURE — 63600175 PHARM REV CODE 636 W HCPCS: Performed by: ANESTHESIOLOGY

## 2019-10-08 PROCEDURE — 33361 REPLACE AORTIC VALVE PERQ: CPT | Mod: 62,Q0,, | Performed by: INTERNAL MEDICINE

## 2019-10-08 PROCEDURE — 33361 PR TAVR, PERCUTANEOUS FEMORAL: ICD-10-PCS | Mod: 62,Q0,, | Performed by: THORACIC SURGERY (CARDIOTHORACIC VASCULAR SURGERY)

## 2019-10-08 PROCEDURE — 25000003 PHARM REV CODE 250: Performed by: INTERNAL MEDICINE

## 2019-10-08 PROCEDURE — 80048 BASIC METABOLIC PNL TOTAL CA: CPT

## 2019-10-08 PROCEDURE — 25000003 PHARM REV CODE 250: Performed by: ANESTHESIOLOGY

## 2019-10-08 PROCEDURE — 37000008 HC ANESTHESIA 1ST 15 MINUTES: Performed by: INTERNAL MEDICINE

## 2019-10-08 PROCEDURE — 99222 PR INITIAL HOSPITAL CARE,LEVL II: ICD-10-PCS | Mod: ,,, | Performed by: INTERNAL MEDICINE

## 2019-10-08 PROCEDURE — 99900035 HC TECH TIME PER 15 MIN (STAT)

## 2019-10-08 PROCEDURE — 93010 EKG 12-LEAD: ICD-10-PCS | Mod: ,,, | Performed by: INTERNAL MEDICINE

## 2019-10-08 PROCEDURE — C1760 CLOSURE DEV, VASC: HCPCS | Performed by: INTERNAL MEDICINE

## 2019-10-08 PROCEDURE — 93005 ELECTROCARDIOGRAM TRACING: CPT

## 2019-10-08 PROCEDURE — 27800903 OPTIME MED/SURG SUP & DEVICES OTHER IMPLANTS: Performed by: INTERNAL MEDICINE

## 2019-10-08 PROCEDURE — 63600175 PHARM REV CODE 636 W HCPCS: Performed by: STUDENT IN AN ORGANIZED HEALTH CARE EDUCATION/TRAINING PROGRAM

## 2019-10-08 PROCEDURE — C1894 INTRO/SHEATH, NON-LASER: HCPCS | Performed by: INTERNAL MEDICINE

## 2019-10-08 PROCEDURE — 94761 N-INVAS EAR/PLS OXIMETRY MLT: CPT

## 2019-10-08 PROCEDURE — 33361 REPLACE AORTIC VALVE PERQ: CPT | Mod: Q0 | Performed by: INTERNAL MEDICINE

## 2019-10-08 PROCEDURE — 33361 REPLACE AORTIC VALVE PERQ: CPT | Mod: 62,Q0,, | Performed by: THORACIC SURGERY (CARDIOTHORACIC VASCULAR SURGERY)

## 2019-10-08 PROCEDURE — 85610 PROTHROMBIN TIME: CPT

## 2019-10-08 PROCEDURE — 33361 PR TAVR, PERCUTANEOUS FEMORAL: ICD-10-PCS | Mod: 62,Q0,, | Performed by: INTERNAL MEDICINE

## 2019-10-08 PROCEDURE — 27000239 HC STAND-BY BYPASS PUMP

## 2019-10-08 PROCEDURE — C1769 GUIDE WIRE: HCPCS | Performed by: INTERNAL MEDICINE

## 2019-10-08 PROCEDURE — 63600175 PHARM REV CODE 636 W HCPCS: Performed by: INTERNAL MEDICINE

## 2019-10-08 PROCEDURE — 20000000 HC ICU ROOM

## 2019-10-08 PROCEDURE — 36415 COLL VENOUS BLD VENIPUNCTURE: CPT

## 2019-10-08 PROCEDURE — C1725 CATH, TRANSLUMIN NON-LASER: HCPCS | Performed by: INTERNAL MEDICINE

## 2019-10-08 PROCEDURE — 93010 ELECTROCARDIOGRAM REPORT: CPT | Mod: ,,, | Performed by: INTERNAL MEDICINE

## 2019-10-08 PROCEDURE — D9220A PRA ANESTHESIA: ICD-10-PCS | Mod: ,,, | Performed by: ANESTHESIOLOGY

## 2019-10-08 PROCEDURE — 86901 BLOOD TYPING SEROLOGIC RH(D): CPT

## 2019-10-08 PROCEDURE — A4216 STERILE WATER/SALINE, 10 ML: HCPCS | Performed by: ANESTHESIOLOGY

## 2019-10-08 PROCEDURE — 85025 COMPLETE CBC W/AUTO DIFF WBC: CPT

## 2019-10-08 PROCEDURE — 99222 1ST HOSP IP/OBS MODERATE 55: CPT | Mod: ,,, | Performed by: INTERNAL MEDICINE

## 2019-10-08 PROCEDURE — 27201423 OPTIME MED/SURG SUP & DEVICES STERILE SUPPLY: Performed by: INTERNAL MEDICINE

## 2019-10-08 PROCEDURE — D9220A PRA ANESTHESIA: Mod: ,,, | Performed by: ANESTHESIOLOGY

## 2019-10-08 PROCEDURE — 37000009 HC ANESTHESIA EA ADD 15 MINS: Performed by: INTERNAL MEDICINE

## 2019-10-08 DEVICE — KIT SAPIEN 23MM DELIVERY: Type: IMPLANTABLE DEVICE | Site: HEART | Status: FUNCTIONAL

## 2019-10-08 RX ORDER — SODIUM CHLORIDE 9 MG/ML
20 INJECTION, SOLUTION INTRAVENOUS CONTINUOUS
Status: ACTIVE | OUTPATIENT
Start: 2019-10-08 | End: 2019-10-08

## 2019-10-08 RX ORDER — CLOPIDOGREL 300 MG/1
300 TABLET, FILM COATED ORAL ONCE
Status: COMPLETED | OUTPATIENT
Start: 2019-10-08 | End: 2019-10-08

## 2019-10-08 RX ORDER — SODIUM,POTASSIUM PHOSPHATES 280-250MG
2 POWDER IN PACKET (EA) ORAL
Status: DISCONTINUED | OUTPATIENT
Start: 2019-10-08 | End: 2019-10-09 | Stop reason: HOSPADM

## 2019-10-08 RX ORDER — SODIUM CHLORIDE 9 MG/ML
INJECTION, SOLUTION INTRAVENOUS CONTINUOUS
Status: DISCONTINUED | OUTPATIENT
Start: 2019-10-08 | End: 2019-10-08

## 2019-10-08 RX ORDER — FENTANYL CITRATE 50 UG/ML
INJECTION, SOLUTION INTRAMUSCULAR; INTRAVENOUS
Status: DISCONTINUED | OUTPATIENT
Start: 2019-10-08 | End: 2019-10-08

## 2019-10-08 RX ORDER — DIPHENHYDRAMINE HCL 50 MG
50 CAPSULE ORAL ONCE
Status: COMPLETED | OUTPATIENT
Start: 2019-10-08 | End: 2019-10-08

## 2019-10-08 RX ORDER — CEFAZOLIN SODIUM 1 G/3ML
INJECTION, POWDER, FOR SOLUTION INTRAMUSCULAR; INTRAVENOUS
Status: DISCONTINUED | OUTPATIENT
Start: 2019-10-08 | End: 2019-10-08

## 2019-10-08 RX ORDER — LANOLIN ALCOHOL/MO/W.PET/CERES
800 CREAM (GRAM) TOPICAL
Status: DISCONTINUED | OUTPATIENT
Start: 2019-10-08 | End: 2019-10-09 | Stop reason: HOSPADM

## 2019-10-08 RX ORDER — HEPARIN SODIUM 1000 [USP'U]/ML
INJECTION, SOLUTION INTRAVENOUS; SUBCUTANEOUS
Status: DISCONTINUED | OUTPATIENT
Start: 2019-10-08 | End: 2019-10-08

## 2019-10-08 RX ORDER — PANTOPRAZOLE SODIUM 40 MG/1
40 TABLET, DELAYED RELEASE ORAL DAILY
Status: DISCONTINUED | OUTPATIENT
Start: 2019-10-09 | End: 2019-10-09 | Stop reason: HOSPADM

## 2019-10-08 RX ORDER — LIDOCAINE HYDROCHLORIDE 20 MG/ML
INJECTION, SOLUTION INFILTRATION; PERINEURAL
Status: DISCONTINUED | OUTPATIENT
Start: 2019-10-08 | End: 2019-10-08 | Stop reason: HOSPADM

## 2019-10-08 RX ORDER — POTASSIUM CHLORIDE 20 MEQ/15ML
40 SOLUTION ORAL
Status: DISCONTINUED | OUTPATIENT
Start: 2019-10-08 | End: 2019-10-09 | Stop reason: HOSPADM

## 2019-10-08 RX ORDER — LABETALOL HCL 20 MG/4 ML
10 SYRINGE (ML) INTRAVENOUS EVERY 4 HOURS PRN
Status: DISCONTINUED | OUTPATIENT
Start: 2019-10-08 | End: 2019-10-09 | Stop reason: HOSPADM

## 2019-10-08 RX ORDER — CLOPIDOGREL BISULFATE 75 MG/1
75 TABLET ORAL DAILY
Status: DISCONTINUED | OUTPATIENT
Start: 2019-10-09 | End: 2019-10-09 | Stop reason: HOSPADM

## 2019-10-08 RX ORDER — ATORVASTATIN CALCIUM 20 MG/1
40 TABLET, FILM COATED ORAL NIGHTLY
Status: DISCONTINUED | OUTPATIENT
Start: 2019-10-08 | End: 2019-10-09 | Stop reason: HOSPADM

## 2019-10-08 RX ORDER — ALLOPURINOL 100 MG/1
300 TABLET ORAL DAILY
Status: DISCONTINUED | OUTPATIENT
Start: 2019-10-09 | End: 2019-10-09 | Stop reason: HOSPADM

## 2019-10-08 RX ORDER — HEPARIN SODIUM 200 [USP'U]/100ML
INJECTION, SOLUTION INTRAVENOUS
Status: DISCONTINUED | OUTPATIENT
Start: 2019-10-08 | End: 2019-10-09 | Stop reason: HOSPADM

## 2019-10-08 RX ORDER — FAMOTIDINE 20 MG/1
40 TABLET, FILM COATED ORAL ONCE
Status: COMPLETED | OUTPATIENT
Start: 2019-10-08 | End: 2019-10-08

## 2019-10-08 RX ORDER — NOREPINEPHRINE BITARTRATE/D5W 4MG/250ML
0.02 PLASTIC BAG, INJECTION (ML) INTRAVENOUS CONTINUOUS
Status: DISCONTINUED | OUTPATIENT
Start: 2019-10-08 | End: 2019-10-09 | Stop reason: HOSPADM

## 2019-10-08 RX ORDER — PROTAMINE SULFATE 10 MG/ML
INJECTION, SOLUTION INTRAVENOUS
Status: DISCONTINUED | OUTPATIENT
Start: 2019-10-08 | End: 2019-10-08

## 2019-10-08 RX ORDER — ASPIRIN 81 MG/1
81 TABLET ORAL DAILY
Status: DISCONTINUED | OUTPATIENT
Start: 2019-10-09 | End: 2019-10-09 | Stop reason: HOSPADM

## 2019-10-08 RX ORDER — CLOPIDOGREL BISULFATE 75 MG/1
75 TABLET ORAL DAILY
Status: DISCONTINUED | OUTPATIENT
Start: 2019-10-08 | End: 2019-10-08

## 2019-10-08 RX ORDER — ACETAMINOPHEN 325 MG/1
650 TABLET ORAL EVERY 4 HOURS PRN
Status: DISCONTINUED | OUTPATIENT
Start: 2019-10-08 | End: 2019-10-09 | Stop reason: HOSPADM

## 2019-10-08 RX ORDER — METHYLPREDNISOLONE SOD SUCC 125 MG
125 VIAL (EA) INJECTION ONCE
Status: COMPLETED | OUTPATIENT
Start: 2019-10-08 | End: 2019-10-08

## 2019-10-08 RX ORDER — PROPOFOL 10 MG/ML
VIAL (ML) INTRAVENOUS
Status: DISCONTINUED | OUTPATIENT
Start: 2019-10-08 | End: 2019-10-08

## 2019-10-08 RX ADMIN — SODIUM CHLORIDE: 0.9 INJECTION, SOLUTION INTRAVENOUS at 06:10

## 2019-10-08 RX ADMIN — PROTAMINE SULFATE 50 MG: 10 INJECTION, SOLUTION INTRAVENOUS at 08:10

## 2019-10-08 RX ADMIN — CLOPIDOGREL BISULFATE 300 MG: 300 TABLET, FILM COATED ORAL at 12:10

## 2019-10-08 RX ADMIN — METHYLPREDNISOLONE SODIUM SUCCINATE 125 MG: 125 INJECTION, POWDER, FOR SOLUTION INTRAMUSCULAR; INTRAVENOUS at 07:10

## 2019-10-08 RX ADMIN — PROPOFOL 20 MG: 10 INJECTION, EMULSION INTRAVENOUS at 07:10

## 2019-10-08 RX ADMIN — FENTANYL CITRATE 50 MCG: 50 INJECTION, SOLUTION INTRAMUSCULAR; INTRAVENOUS at 07:10

## 2019-10-08 RX ADMIN — PROPOFOL 20 MG: 10 INJECTION, EMULSION INTRAVENOUS at 08:10

## 2019-10-08 RX ADMIN — FAMOTIDINE 40 MG: 20 TABLET, FILM COATED ORAL at 07:10

## 2019-10-08 RX ADMIN — DIPHENHYDRAMINE HYDROCHLORIDE 50 MG: 50 CAPSULE ORAL at 07:10

## 2019-10-08 RX ADMIN — HEPARIN SODIUM 2000 UNITS: 1000 INJECTION INTRAVENOUS; SUBCUTANEOUS at 08:10

## 2019-10-08 RX ADMIN — CEFAZOLIN 2 G: 330 INJECTION, POWDER, FOR SOLUTION INTRAMUSCULAR; INTRAVENOUS at 08:10

## 2019-10-08 RX ADMIN — PROPOFOL 10 MG: 10 INJECTION, EMULSION INTRAVENOUS at 07:10

## 2019-10-08 RX ADMIN — HEPARIN SODIUM 12000 UNITS: 1000 INJECTION INTRAVENOUS; SUBCUTANEOUS at 08:10

## 2019-10-08 RX ADMIN — DEXMEDETOMIDINE HYDROCHLORIDE 1 MCG/KG/HR: 100 INJECTION, SOLUTION, CONCENTRATE INTRAVENOUS at 07:10

## 2019-10-08 RX ADMIN — DIPHENHYDRAMINE HYDROCHLORIDE 50 MG: 50 CAPSULE ORAL at 06:10

## 2019-10-08 RX ADMIN — SODIUM CHLORIDE 800 ML: 0.9 INJECTION, SOLUTION INTRAVENOUS at 10:10

## 2019-10-08 NOTE — ASSESSMENT & PLAN NOTE
Selena Moulton is a 79 y.o.F with HTN, DLD, severe aortic stenosis who presented for an elective TAVR. There were no complications intra-op and TVP placed. EP has been consulted to evaluate for PPM eval. As part of her TAVR workup,   - last EF on TTE 7/1/19 was 60% with LHC notable for nonobstructive CAD  - She received an Rodgers Cierra, (balloon expandable) valve.   - ECG 10/8/19 06:49   - ECG 10/8/19 11:16 HR 68 bpm  ms  ms  - TVP in place. Threshold testing performed bedside  - continue tele monitoring  - will eval ECG in AM for PPM eval

## 2019-10-08 NOTE — NURSING TRANSFER
Nursing Transfer Note      10/8/2019     Transfer To: [id number]    Transfer via stretcher    Transfer with 5L to O2, cardiac monitoring    Transported by cath lab RN's    Medicines sent: levophed gtt (not infusing)    Chart send with patient: Yes    Notified: daughter and patient of arrival    Patient reassessed at: Olga RN and CHAVA Leal, present at bedside or assessment and handoff. (10/08/2019, 1000)    Upon arrival to floor: cardiac monitor applied, patient oriented to room, call bell in reach and bed in lowest position

## 2019-10-08 NOTE — ANESTHESIA PREPROCEDURE EVALUATION
[facility]  Anesthesia Pre-Operative Evaluation         Patient Name: Selena Moulton  YOB: 1940  MRN: [id number]    SUBJECTIVE:     Pre-operative evaluation for Procedure(s) (LRB):  Replacement-valve-aortic (N/A)     10/08/2019    Selena Moulton is a 79 y.o. female w/ a significant PMHx of HTN, AS    Patient now presents for the above procedure(s).    TTE 7/1/19  · Concentric left ventricular remodeling.  · Normal left ventricular systolic function. The estimated ejection fraction is 65%  · Indeterminate left ventricular diastolic function.  · Normal right ventricular systolic function.  · Severe aortic valve stenosis.  · Aortic valve area is 0.8 cm2; peak velocity is 4.12 m/s; mean gradient is 42 mmHg.  · Mild aortic regurgitation.  · The estimated PA systolic pressure is 36 mm Hg    Stress Test 4/17/19  · The perfusion scan is free of evidence from myocardial ischemia or injury.  · There is a mild intensity defect in the anteroseptal wall of the left ventricle, secondary to breast attenuation.  · An ejection fraction of 71 % at rest, stress EF 73%.  · Resting wall motion is physiologic.  · The EKG portion of this study is negative for myocardial ischemia.  · There were no arrhythmias during stress.  · The patient reported no symptoms during the stress test.    EKG 9/12/18                                  Measurements  Intervals                              Axis            Rate:         65                       P:            48  SC:           162                      QRS:          42  QRSD:         88                       T:            49  QT:           416                                      QTc:          432                                                                 Interpretive Statements  Normal sinus rhythm  Normal ECG  Compared to ECG 06/07/2018 21:29:09  Atrial premature complex(es) no longer  present    Electronically Signed On 9- 19:14:39 CDT by Vinayak Carroll    LDA: None documented.       Prev airway: None documented.    Drips: None documented.      Patient Active Problem List   Diagnosis    Cervical radiculitis    Carpal tunnel syndrome of left wrist    Encounter for pre-operative cardiovascular clearance    Essential hypertension    Other hyperlipidemia    Morbid obesity    Carpal tunnel syndrome of right wrist    Severe aortic stenosis    Tortuous aorta    Scarring of lung    Post-menopausal    Idiopathic chronic gout of left foot without tophus    Intertrigo    SOB (shortness of breath)    Dizziness    Neck pain    Nodular calcific aortic valve stenosis    Skin candidiasis       Review of patient's allergies indicates:   Allergen Reactions    Influenza virus vaccines Nausea And Vomiting    Iodine and iodide containing products     Morpholine analogues Nausea And Vomiting    Penicillins Other (See Comments)     High fever as a child, also was allergy tested 1980    Latex, natural rubber Rash    Tetanus vaccines and toxoid Rash       Current Inpatient Medications:      No current facility-administered medications on file prior to encounter.      Current Outpatient Medications on File Prior to Encounter   Medication Sig Dispense Refill    acetaminophen (TYLENOL) 500 MG tablet Take 500 mg by mouth every 8 (eight) hours as needed.      allopurinol (ZYLOPRIM) 300 MG tablet Take 1 tablet (300 mg total) by mouth once daily. 90 tablet 3    amLODIPine (NORVASC) 2.5 MG tablet Take 1 tablet (2.5 mg total) by mouth once daily. 30 tablet 3    aspirin (ECOTRIN) 81 MG EC tablet Take 81 mg by mouth once daily. Hold for 7 days before surgery      atenolol (TENORMIN) 100 MG tablet Take 0.5 tablets (50 mg total) by mouth once daily. 45 tablet 1    atorvastatin (LIPITOR) 40 MG tablet Take 1 tablet (40 mg total) by mouth every evening. 90 tablet 3    fish oil-omega-3 fatty acids  300-1,000 mg capsule Take 1 capsule by mouth once daily. Hold for 7 days before surgery      multivitamin capsule Take 1 capsule by mouth once daily. Hold AM of surgery      nystatin (MYCOSTATIN) cream Apply topically 2 (two) times daily. (Patient not taking: Reported on 10/7/2019) 30 g 1    omeprazole (PRILOSEC) 20 MG capsule Take 1 capsule (20 mg total) by mouth daily as needed. Use AM of surgery with a sip of water 90 capsule 1       Past Surgical History:   Procedure Laterality Date    BACK SURGERY      bone graft neck    CARPAL TUNNEL RELEASE Left 10/3/2018    Procedure: RELEASE, CARPAL TUNNEL  LEFT;  Surgeon: Meche Cárdenas MD;  Location: [facility];  Service: Neurosurgery;  Laterality: Left;    CARPAL TUNNEL RELEASE Right 1/17/2019    Procedure: RELEASE, CARPAL TUNNEL RIGHT;  Surgeon: Meche Cárdenas MD;  Location: [facility];  Service: Neurosurgery;  Laterality: Right;    CATHETERIZATION OF BOTH LEFT AND RIGHT HEART N/A 7/30/2019    Procedure: CATHETERIZATION, HEART, BOTH LEFT AND RIGHT;  Surgeon: Terrie Alberto MD;  Location: [facility] CATH;  Service: Cardiology;  Laterality: N/A;    CERVICAL FUSION  1984    x2     CORONARY ANGIOGRAPHY N/A 7/30/2019    Procedure: ANGIOGRAM, CORONARY ARTERY;  Surgeon: Terrie Alberto MD;  Location: [facility] CATH;  Service: Cardiology;  Laterality: N/A;    HERNIA REPAIR      umbilical    HYSTERECTOMY      TONSILLECTOMY         Social History     Socioeconomic History    Marital status:      Spouse name: Not on file    Number of children: Not on file    Years of education: Not on file    Highest education level: Not on file   Occupational History    Not on file   Social Needs    Financial resource strain: Not on file    Food insecurity:     Worry: Not on file     Inability: Not on file    Transportation needs:     Medical: Not on file     Non-medical: Not on file   Tobacco Use    Smoking status: Never Smoker    Smokeless tobacco: Never Used    Substance and Sexual Activity    Alcohol use: No     Frequency: Never    Drug use: No    Sexual activity: Not on file   Lifestyle    Physical activity:     Days per week: Not on file     Minutes per session: Not on file    Stress: Not on file   Relationships    Social connections:     Talks on phone: Not on file     Gets together: Not on file     Attends Hindu service: Not on file     Active member of club or organization: Not on file     Attends meetings of clubs or organizations: Not on file     Relationship status: Not on file   Other Topics Concern    Not on file   Social History Narrative    Not on file       OBJECTIVE:     Vital Signs Range (Last 24H):  Pulse:  [60-61]   BP: (138-142)/(68-70)   SpO2:  [97 %]       Significant Labs:  Lab Results   Component Value Date    WBC 6.90 10/07/2019    HGB 12.5 10/07/2019    HCT 39.9 10/07/2019     10/07/2019    CHOL 153 2018    TRIG 152 (H) 2018    HDL 46 2018    ALT 27 2019    AST 22 2019     10/07/2019    K 4.1 10/07/2019     10/07/2019    CREATININE 1.4 10/07/2019    BUN 31 (H) 10/07/2019    CO2 28 10/07/2019    INR 0.9 10/07/2019    HGBA1C 5.7 (H) 2019       Diagnostic Studies: No relevant studies.    EKG:   Results for orders placed or performed during the hospital encounter of 18   EKG 12-LEAD    Collection Time: 18  2:16 PM    Narrative                             [facility]                                                                                  Test Date:    2018  Pat Name:     VAISHALI VARNER             Department:     Patient ID:   [id number]                  Room:           Gender:       Female                   Technician:   YC  :          1940               Requested By:   Order Number:                          Reading MD:   Vinayak Carroll                                   Measurements  Intervals                              Axis             Rate:         65                       P:            48  TX:           162                      QRS:          42  QRSD:         88                       T:            49  QT:           416                                      QTc:          432                                                                 Interpretive Statements  Normal sinus rhythm  Normal ECG  Compared to ECG 06/07/2018 21:29:09  Atrial premature complex(es) no longer present    Electronically Signed On 9- 19:14:39 CDT by Vinayak Carroll         2D ECHO:  TTE:  Results for orders placed or performed in visit on 07/01/19   Transthoracic echo (TTE) 2D with Color Flow   Result Value Ref Range    Ascending aorta 2.22 cm    STJ 1.89 cm    AV mean gradient 42 mmHg    Ao peak bradley 4.12 m/s    Ao .09 cm    IVRT 0.13 msec    IVS 1.11 (A) 0.6 - 1.1 cm    LA size 3.16 cm    Left Atrium Major Axis 5.23 cm    Left Atrium Minor Axis 5.59 cm    LVIDD 4.09 3.5 - 6.0 cm    LVIDS 2.52 2.1 - 4.0 cm    LVOT diameter 1.97 cm    LVOT peak VTI 27.58 cm    PW 1.02 0.6 - 1.1 cm    MV Peak A Bradley 0.83 m/s    E wave decelartion time 259.23 msec    MV Peak E Bradley 0.89 m/s    PV Peak D Bradley 0.41 m/s    PV Peak S Bradley 0.45 m/s    RA Major Axis 5.42 cm    RA Width 3.19 cm    RVDD 4.15 cm    Sinus 2.79 cm    TAPSE 2.84 cm    TR Max Bradley 2.89 m/s    TDI LATERAL 0.09 m/s    TDI SEPTAL 0.07 m/s    LA WIDTH 3.35 cm    LV Diastolic Volume 73.89 mL    LV Systolic Volume 22.85 mL    RV S' 10.17 cm/s    LVOT peak bradley 1.04 m/s    LV LATERAL E/E' RATIO 9.89 m/s    LV SEPTAL E/E' RATIO 12.71 m/s    FS 38 %    LA volume 48.63 cm3    LV mass 143.89 g    Left Ventricle Relative Wall Thickness 0.50 cm    AV valve area 0.77 cm2    AV Velocity Ratio 0.25     AV index (prosthetic) 0.25     E/A ratio 1.07     Mean e' 0.08 m/s    Pulm vein S/D ratio 1.10     LVOT area 3.0 cm2    LVOT stroke volume 84.02 cm3    AV peak gradient 68 mmHg    E/E' ratio 11.13 m/s    Triscuspid Valve  Regurgitation Peak Gradient 33 mmHg    BSA 2.37 m2    LV Systolic Volume Index 10.1 mL/m2    LV Diastolic Volume Index 32.63 mL/m2    LA Volume Index 21.5 mL/m2    LV Mass Index 64 g/m2    Right Atrial Pressure (from IVC) 3 mmHg    TV rest pulmonary artery pressure 36 mmHg    Narrative    · Concentric left ventricular remodeling.  · Normal left ventricular systolic function. The estimated ejection   fraction is 65%  · Indeterminate left ventricular diastolic function.  · Normal right ventricular systolic function.  · Severe aortic valve stenosis.  · Aortic valve area is 0.8 cm2; peak velocity is 4.12 m/s; mean gradient   is 42 mmHg.  · Mild aortic regurgitation.  · The estimated PA systolic pressure is 36 mm Hg          MURRAY:  No results found for this or any previous visit.    ASSESSMENT/PLAN:       Anesthesia Evaluation    I have reviewed the Patient Summary Reports.    I have reviewed the Nursing Notes.   I have reviewed the Medications.     Review of Systems  Anesthesia Hx:  No problems with previous Anesthesia    Social:  Non-Smoker    Cardiovascular:   Hypertension Valvular problems/Murmurs, AS hyperlipidemia SCHMIDT SOB with METS>4    9-2018 ECHO CONCLUSIONS: Left ventricle shows mild concentric hypertrophy.  · Left ventricle ejection fraction is normal at 65%  · There is severe aortic valve stenosis. MUKUND is 0.92 cm2; peak velocity is 3.57 m/s; mean gradient is 34.25 mmHg.  · Mild regurgitation is present in the aortic valve..  · Mitral valve shows mild regurgitation.  · The estimated PA systolic pressure is 38.52 mm Hg   Pulmonary:   Pulmonary hypertension   Hepatic/GI:   GERD    Musculoskeletal:   Arthritis  gout Spine Disorders: cervical Disc disease and Degenerative disease        Physical Exam  General:  Obesity    Airway/Jaw/Neck:  Airway Findings: Mouth Opening: Normal Tongue: Normal  General Airway Assessment: Adult  Mallampati: II  TM Distance: Normal, at least 6 cm       Chest/Lungs:  Chest/Lungs  Findings: Clear to auscultation, Normal Respiratory Rate     Heart/Vascular:  Heart Findings: Rate: Normal             Anesthesia Plan  Type of Anesthesia, risks & benefits discussed:  Anesthesia Type:  general  Patient's Preference:   Intra-op Monitoring Plan: standard ASA monitors, arterial line and central line  Intra-op Monitoring Plan Comments:   Post Op Pain Control Plan: multimodal analgesia, IV/PO Opioids PRN and per primary service following discharge from PACU  Post Op Pain Control Plan Comments:   Induction:   IV  Beta Blocker:  Patient is not currently on a Beta-Blocker (No further documentation required).       Informed Consent: Patient understands risks and agrees with Anesthesia plan.  Questions answered. Anesthesia consent signed with patient.  ASA Score: 4     Day of Surgery Review of History & Physical:    H&P update referred to the surgeon.         Ready For Surgery From Anesthesia Perspective.

## 2019-10-08 NOTE — OP NOTE
DATE OF PROCEDURE:  10/8/2019     PREOPERATIVE DIAGNOSIS:  Severe aortic stenosis     POSTOPERATIVE DIAGNOSIS:  Severe aortic stenosis.     PROCEDURE:  left transfemoral transcatheter aortic valve replacement with a 23   mm silveira s3 valve.     SURGEON:  Kiet Rainey M.D.     CO-SURGEON:  CAMILA ingram     ANESTHESIA:  Moderate sedation and local anesthetic.     INDICATIONS FOR PROCEDURE:  A 80 yo patient moderate risk for surgical AVR. The patient was evaluated for transcatheter valve by the valve team and found to be an acceptable transfemoral candidate. The patient understood the risks of the procedure and was able to give informed consent.     OPERATIVE FINDINGS:  Severe aortic stenosis, severely calcified valve,   well-seated prosthesis post-implant with no paravalvular leak.     BLOOD LOSS:  100 mL     PROCEDURE IN DETAIL:  The patient was taken electively to the Cath Lab, placed   supine upon the table.  Moderate sedation and local anesthetic was used. The patient was prepped and draped.  Both femoral arteries were accessed with a micropuncture technique.  We performed fluoroscopy, heparinized the patient, upsized to delivery sheaths. Catheters were positioned in the root of the aorta and we obtained our implant angle. We crossed the aortic valve with a soft wire and exchanged this for a stiff wire over the catheter.     We then performed balloon valvuloplasty under rapid pacing. The patient tolerated this well hemodynamically.     The valve was brought in the field sterilely.  We crossed the aortic arch with carotid occlusion, positioned at the aortic valve annulus and delivered it under rapid pacing. The valve was well seated with no paravalvular leak.  Cannulas were removed.  Groins were closed percutaneously.      Excellent hemostasis was achieved after administration of protamine. Dr ingram and LEONEL performed the procedure together as cosurgeons and were present for the procedure.

## 2019-10-08 NOTE — CONSULTS
[facility]  Cardiac Electrophysiology  Consult Note    Admission Date: 10/8/2019  Code Status: Prior   Attending Provider: Fidel Cardenas MD  Consulting Provider: Swathi Rodriguez MD  Principal Problem:<principal problem not specified>    Inpatient consult to Electrophysiology  Consult performed by: Swathi Rodriguez MD  Consult ordered by: Maykel Dorsey MD        Subjective:     Chief Complaint:  Shortness of breath     HPI:   Selena Moulton is a 79 y.o.F with HTN, DLD, severe aortic stenosis who presented for an elective TAVR. There were no complications intra-op and TVP placed. EP has been consulted to evaluate for PPM eval. As part of her TAVR workup, last EF on TTE 7/1/19 was 60% with LHC notable for nonobstructive CAD. She received an Rodgers Cierra, (balloon expandable) valve.     Past Medical History:   Diagnosis Date    Anticoagulant long-term use     Aortic stenosis     Arthritis     Class 2 severe obesity due to excess calories with serious comorbidity and body mass index (BMI) of 39.0 to 39.9 in adult 9/13/2018    Gout     Hypertension        Past Surgical History:   Procedure Laterality Date    BACK SURGERY      bone graft neck    CARPAL TUNNEL RELEASE Left 10/3/2018    Procedure: RELEASE, CARPAL TUNNEL  LEFT;  Surgeon: Meche Cárdenas MD;  Location: [facility];  Service: Neurosurgery;  Laterality: Left;    CARPAL TUNNEL RELEASE Right 1/17/2019    Procedure: RELEASE, CARPAL TUNNEL RIGHT;  Surgeon: Meche Cárdenas MD;  Location: [facility];  Service: Neurosurgery;  Laterality: Right;    CATHETERIZATION OF BOTH LEFT AND RIGHT HEART N/A 7/30/2019    Procedure: CATHETERIZATION, HEART, BOTH LEFT AND RIGHT;  Surgeon: Terrie Alberto MD;  Location: [facility] CATH;  Service: Cardiology;  Laterality: N/A;    CERVICAL FUSION  1984    x2     CORONARY ANGIOGRAPHY N/A 7/30/2019    Procedure: ANGIOGRAM, CORONARY ARTERY;  Surgeon: Terrie Alberto MD;  Location: [facility] CATH;   Service: Cardiology;  Laterality: N/A;    HERNIA REPAIR      umbilical    HYSTERECTOMY      TONSILLECTOMY         Review of patient's allergies indicates:   Allergen Reactions    Influenza virus vaccines Nausea And Vomiting    Iodine and iodide containing products     Morpholine analogues Nausea And Vomiting    Penicillins Other (See Comments)     High fever as a child, also was allergy tested 1980    Latex, natural rubber Rash    Tetanus vaccines and toxoid Rash       No current facility-administered medications on file prior to encounter.      Current Outpatient Medications on File Prior to Encounter   Medication Sig    acetaminophen (TYLENOL) 500 MG tablet Take 500 mg by mouth every 8 (eight) hours as needed.    allopurinol (ZYLOPRIM) 300 MG tablet Take 1 tablet (300 mg total) by mouth once daily.    amLODIPine (NORVASC) 2.5 MG tablet Take 1 tablet (2.5 mg total) by mouth once daily.    aspirin (ECOTRIN) 81 MG EC tablet Take 81 mg by mouth once daily. Hold for 7 days before surgery    atenolol (TENORMIN) 100 MG tablet Take 0.5 tablets (50 mg total) by mouth once daily.    atorvastatin (LIPITOR) 40 MG tablet Take 1 tablet (40 mg total) by mouth every evening. (Patient taking differently: Take 40 mg by mouth once daily. )    fish oil-omega-3 fatty acids 300-1,000 mg capsule Take 1 capsule by mouth once daily. Hold for 7 days before surgery    multivitamin capsule Take 1 capsule by mouth once daily. Hold AM of surgery    omeprazole (PRILOSEC) 20 MG capsule Take 1 capsule (20 mg total) by mouth daily as needed. Use AM of surgery with a sip of water    [DISCONTINUED] nystatin (MYCOSTATIN) cream Apply topically 2 (two) times daily.     Family History     Problem Relation (Age of Onset)    Cancer Mother    Hypertension Mother    No Known Problems Father    Ovarian cancer Mother    Stroke Mother        Tobacco Use    Smoking status: Never Smoker    Smokeless tobacco: Never Used   Substance and Sexual  Activity    Alcohol use: No     Frequency: Never    Drug use: No    Sexual activity: Not on file     Review of Systems   Constitution: Negative for chills and fever.   Cardiovascular: Negative for chest pain, dyspnea on exertion, irregular heartbeat, near-syncope and syncope.   Respiratory: Negative for shortness of breath.    Gastrointestinal: Negative for nausea.   Neurological: Negative for headaches and weakness.     Objective:     Vital Signs (Most Recent):  Temp: 97.3 °F (36.3 °C) (10/08/19 1101)  Pulse: 68 (10/08/19 1101)  Resp: 20 (10/08/19 1101)  BP: (!) 142/70 (10/08/19 0601)  SpO2: 99 % (10/08/19 1101) Vital Signs (24h Range):  Temp:  [96.2 °F (35.7 °C)-97.4 °F (36.3 °C)] 97.3 °F (36.3 °C)  Pulse:  [60-68] 68  Resp:  [10-20] 20  SpO2:  [95 %-99 %] 99 %  BP: (134-142)/(68-78) 142/70  Arterial Line BP: (141-154)/(64-67) 154/67       Weight: 117 kg (257 lb 15 oz)  Body mass index is 40.4 kg/m².    SpO2: 99 %  O2 Device (Oxygen Therapy): room air    Physical Exam   Constitutional: She is oriented to person, place, and time. She appears well-developed and well-nourished. No distress.   HENT:   Head: Normocephalic and atraumatic.   Neck: No JVD present.   TVP in place R IJ   Cardiovascular: Normal rate and regular rhythm.   No murmur heard.  Pulmonary/Chest: Effort normal and breath sounds normal. No respiratory distress.   Abdominal: Soft. Bowel sounds are normal. She exhibits no distension. There is no tenderness.   Musculoskeletal: She exhibits no edema.   Neurological: She is alert and oriented to person, place, and time.   Skin: Skin is warm and dry. She is not diaphoretic. No erythema.   Vitals reviewed.    Significant Labs:   CMP:   Recent Labs   Lab 10/07/19  1347 10/08/19  0610    141   K 4.1 4.6    105   CO2 28 25   * 106   BUN 31* 37*   CREATININE 1.4 1.4   CALCIUM 10.6* 9.8   ALBUMIN 3.7  --    ANIONGAP 9 11   ESTGFRAFRICA 40.6* 40.6*   EGFRNONAA 35.3* 35.3*    and CBC:    Recent Labs   Lab 10/07/19  1347 10/08/19  0813   WBC 6.90 4.79   HGB 12.5 11.3*   HCT 39.9 36.2*    160          Assessment and Plan:     Severe aortic stenosis  Selena Moulton is a 79 y.o.F with HTN, DLD, severe aortic stenosis who presented for an elective TAVR. There were no complications intra-op and TVP placed. EP has been consulted to evaluate for PPM eval.    - last EF on TTE 7/1/19 was 60% with LHC notable for nonobstructive CAD  - She received an Rodgers Cierra 23 mm (balloon expandable) valve.   - ECG 10/8/19 06:13 HR 64 bpm  ms QRS 94 ms  - ECG 10/8/19 11:16 HR 68 bpm  ms  ms  - will eval ECG in AM for PPM eval  - TVP in place. Threshold testing performed bedside  - continue tele monitoring    Thank you for your consult. I will follow-up with patient. Please contact us if you have any additional questions.    Patient seen and plan of care discussed with Dr. Page.    Swathi Rodriguez MD  Cardiac Electrophysiology  [facility]

## 2019-10-08 NOTE — SUBJECTIVE & OBJECTIVE
Past Medical History:   Diagnosis Date    Anticoagulant long-term use     Aortic stenosis     Arthritis     Class 2 severe obesity due to excess calories with serious comorbidity and body mass index (BMI) of 39.0 to 39.9 in adult 9/13/2018    Gout     Hypertension        Past Surgical History:   Procedure Laterality Date    BACK SURGERY      bone graft neck    CARPAL TUNNEL RELEASE Left 10/3/2018    Procedure: RELEASE, CARPAL TUNNEL  LEFT;  Surgeon: Meche Cárdenas MD;  Location: [facility];  Service: Neurosurgery;  Laterality: Left;    CARPAL TUNNEL RELEASE Right 1/17/2019    Procedure: RELEASE, CARPAL TUNNEL RIGHT;  Surgeon: Meche Cárdenas MD;  Location: [facility];  Service: Neurosurgery;  Laterality: Right;    CATHETERIZATION OF BOTH LEFT AND RIGHT HEART N/A 7/30/2019    Procedure: CATHETERIZATION, HEART, BOTH LEFT AND RIGHT;  Surgeon: Terrie Alberto MD;  Location: [facility] CATH;  Service: Cardiology;  Laterality: N/A;    CERVICAL FUSION  1984    x2     CORONARY ANGIOGRAPHY N/A 7/30/2019    Procedure: ANGIOGRAM, CORONARY ARTERY;  Surgeon: Terrie Alberto MD;  Location: [facility] CATH;  Service: Cardiology;  Laterality: N/A;    HERNIA REPAIR      umbilical    HYSTERECTOMY      TONSILLECTOMY         Review of patient's allergies indicates:   Allergen Reactions    Influenza virus vaccines Nausea And Vomiting    Iodine and iodide containing products     Morpholine analogues Nausea And Vomiting    Penicillins Other (See Comments)     High fever as a child, also was allergy tested 1980    Latex, natural rubber Rash    Tetanus vaccines and toxoid Rash       No current facility-administered medications on file prior to encounter.      Current Outpatient Medications on File Prior to Encounter   Medication Sig    acetaminophen (TYLENOL) 500 MG tablet Take 500 mg by mouth every 8 (eight) hours as needed.    allopurinol (ZYLOPRIM) 300 MG tablet Take 1 tablet (300 mg total) by mouth once daily.     amLODIPine (NORVASC) 2.5 MG tablet Take 1 tablet (2.5 mg total) by mouth once daily.    aspirin (ECOTRIN) 81 MG EC tablet Take 81 mg by mouth once daily. Hold for 7 days before surgery    atenolol (TENORMIN) 100 MG tablet Take 0.5 tablets (50 mg total) by mouth once daily.    atorvastatin (LIPITOR) 40 MG tablet Take 1 tablet (40 mg total) by mouth every evening. (Patient taking differently: Take 40 mg by mouth once daily. )    fish oil-omega-3 fatty acids 300-1,000 mg capsule Take 1 capsule by mouth once daily. Hold for 7 days before surgery    multivitamin capsule Take 1 capsule by mouth once daily. Hold AM of surgery    omeprazole (PRILOSEC) 20 MG capsule Take 1 capsule (20 mg total) by mouth daily as needed. Use AM of surgery with a sip of water    [DISCONTINUED] nystatin (MYCOSTATIN) cream Apply topically 2 (two) times daily.     Family History     Problem Relation (Age of Onset)    Cancer Mother    Hypertension Mother    No Known Problems Father    Ovarian cancer Mother    Stroke Mother        Tobacco Use    Smoking status: Never Smoker    Smokeless tobacco: Never Used   Substance and Sexual Activity    Alcohol use: No     Frequency: Never    Drug use: No    Sexual activity: Not on file     Review of Systems   Constitution: Negative for chills and fever.   Cardiovascular: Negative for chest pain, dyspnea on exertion, irregular heartbeat, near-syncope and syncope.   Respiratory: Negative for shortness of breath.    Gastrointestinal: Negative for nausea.   Neurological: Negative for headaches and weakness.     Objective:     Vital Signs (Most Recent):  Temp: 97.3 °F (36.3 °C) (10/08/19 1101)  Pulse: 68 (10/08/19 1101)  Resp: 20 (10/08/19 1101)  BP: (!) 142/70 (10/08/19 0601)  SpO2: 99 % (10/08/19 1101) Vital Signs (24h Range):  Temp:  [96.2 °F (35.7 °C)-97.4 °F (36.3 °C)] 97.3 °F (36.3 °C)  Pulse:  [60-68] 68  Resp:  [10-20] 20  SpO2:  [95 %-99 %] 99 %  BP: (134-142)/(68-78) 142/70  Arterial Line BP:  (141-154)/(64-67) 154/67       Weight: 117 kg (257 lb 15 oz)  Body mass index is 40.4 kg/m².    SpO2: 99 %  O2 Device (Oxygen Therapy): room air    Physical Exam   Constitutional: She is oriented to person, place, and time. She appears well-developed and well-nourished. No distress.   HENT:   Head: Normocephalic and atraumatic.   Neck: No JVD present.   TVP in place R IJ   Cardiovascular: Normal rate and regular rhythm.   No murmur heard.  Pulmonary/Chest: Effort normal and breath sounds normal. No respiratory distress.   Abdominal: Soft. Bowel sounds are normal. She exhibits no distension. There is no tenderness.   Musculoskeletal: She exhibits no edema.   Neurological: She is alert and oriented to person, place, and time.   Skin: Skin is warm and dry. She is not diaphoretic. No erythema.   Vitals reviewed.    Significant Labs:   CMP:   Recent Labs   Lab 10/07/19  1347 10/08/19  0610    141   K 4.1 4.6    105   CO2 28 25   * 106   BUN 31* 37*   CREATININE 1.4 1.4   CALCIUM 10.6* 9.8   ALBUMIN 3.7  --    ANIONGAP 9 11   ESTGFRAFRICA 40.6* 40.6*   EGFRNONAA 35.3* 35.3*    and CBC:   Recent Labs   Lab 10/07/19  1347 10/08/19  0813   WBC 6.90 4.79   HGB 12.5 11.3*   HCT 39.9 36.2*    160       Significant Imaging:

## 2019-10-08 NOTE — TRANSFER OF CARE
"Anesthesia Transfer of Care Note    Patient: Selena Moulton    Procedure(s) Performed: Procedure(s) (LRB):  Replacement-valve-aortic (N/A)    Patient location: ICU    Anesthesia Type: general    Transport from OR: Transported from OR on 6-10 L/min O2 by face mask with adequate spontaneous ventilation    Post pain: adequate analgesia    Post assessment: no apparent anesthetic complications    Post vital signs: stable    Level of consciousness: awake    Nausea/Vomiting: no nausea/vomiting    Complications: none          Last vitals:   Visit Vitals  BP (!) 142/70 (BP Location: Left arm, Patient Position: Lying)   Pulse 67   Temp 36.3 °C (97.4 °F) (Oral)   Resp 18   Ht 5' 7.5" (1.715 m)   Wt 115.7 kg (255 lb)   SpO2 95%   Breastfeeding? No   BMI 39.35 kg/m²     "

## 2019-10-08 NOTE — HPI
Selena Moulton is a 79 y.o.F with HTN, DLD, severe aortic stenosis who presented for an elective TAVR. There were no complications intra-op and TVP placed. EP has been consulted to evaluate for PPM eval. As part of her TAVR workup, last EF on TTE 7/1/19 was 60% with LHC notable for nonobstructive CAD. She received an Rodgers Cierra, (balloon expandable) valve.

## 2019-10-08 NOTE — PLAN OF CARE
CMICU DAILY GOALS       A: Awake    RASS: Goal - RASS Goal: 0-->alert and calm  Actual - RASS (Arnold Agitation-Sedation Scale): 0-->alert and calm   Restraint necessity:    B: Breath   SBT: Not intubated   C: Coordinate A & B, analgesics/sedatives   Pain: managed    SAT: Not intubated  D: Delirium   CAM-ICU: Overall CAM-ICU: Negative  E: Early Mobility   MOVE Screen: Pass   Activity: Activity Management: activity adjusted per tolerance, up in chair  FAS: Feeding/Nutrition   Diet order: Diet/Nutrition Received: 2 gram sodium, low saturated fat/low cholesterol,   Fluid restriction:    T: Thrombus   DVT prophylaxis: VTE Required Core Measure: Pharmacological prophylaxis initiated/maintained  H: HOB Elevation   Head of Bed (HOB): other (see comments)(up in chair)  U: Ulcer Prophylaxis   GI: yes  G: Glucose control   managed    S: Skin   Bundle compliance: yes   Bathing/Skin Care: bath, chlorhexidine, bath, complete, dressed/undressed, incontinence care, linen changed Date: 10/8/19 AM  B: Bowel Function   No issues   I: Indwelling Catheters   Wallace necessity:     CVC necessity: Yes   IPAD offered: Yes  D: De-escalation Antibx   Not applicable   Plan for the day   No complaints of pain post TAVR procedure. Bedrest maintained 2 hours post-op per orders. R and L groin sites WDL, pulses 2+ in bilateral upper and lower extremities. Pt currently up in chair per MD encouragement. VSS.   Family/Goals of care/Code Status   Code Status: Full Code     No acute events throughout day, VS and assessment per flow sheet, patient progressing towards goals as tolerated, plan of care reviewed with Selena Moulton and family, all concerns addressed, will continue to monitor.    Mel Kidd

## 2019-10-08 NOTE — INTERVAL H&P NOTE
The patient has been examined and the H&P has been reviewed:    I concur with the findings and no changes have occurred since H&P was written.     TAVR   Groin sites clean, Candida resolved  RTF S3 Cierra 23 mm per Dr Wells    Anesthesia/Surgery risks, benefits and alternative options discussed and understood by patient/family.          There are no hospital problems to display for this patient.

## 2019-10-08 NOTE — PROCEDURES
POST CATH NOTE  Procedure:  Severe AS s/p TAVR  Indication:  Severe AS, symptomatic   Access:  B/l TF    Findings:  Intervention:  S/p LTF TAVR 23mm Rodgers Cierra S#  Post TAVR TTE gradient, Mean gradient 1mmHg, PV 0.67m/s    Post Cath Exam:  Vitals:    10/08/19 0601   BP: (!) 142/70   Pulse:    Resp:    Temp:        Patient tolerated the procedure well, no complications  No unusual pain, hematoma or thrill at vascular access site.  Distal pulse present without signs of ischemia.  Post-procedure orders as per [facility]    Recommendation:  - Admit to CCU, Dr. Esquivel  - EP consult  - Vasopressor / Electrolytes as needed  - TVP monitoring per EP  -Routine Post cath care  -Bedrest for 2 hours  -Resume Cardiac diet  - IVFs NS @ 200cc x 4 hour  -continue dual antiplatelet therapy daily uninterrupted for 6 months      Maykel Dorsey M.D.  Page # [phone]  Cardiovascular Fellow PGY-IV  [facility]

## 2019-10-08 NOTE — PLAN OF CARE
Patient arrived to room. PIV placed, labs sent. Admit assessment completed. Team aware of difficulty drawing blood. BMP, PT sent. Team will draw T&S and CBC in procedure room. Plan of care discussed with patient. Will monitor.

## 2019-10-09 VITALS
TEMPERATURE: 98 F | OXYGEN SATURATION: 100 % | DIASTOLIC BLOOD PRESSURE: 54 MMHG | RESPIRATION RATE: 13 BRPM | HEIGHT: 67 IN | WEIGHT: 257.94 LBS | BODY MASS INDEX: 40.48 KG/M2 | HEART RATE: 76 BPM | SYSTOLIC BLOOD PRESSURE: 108 MMHG

## 2019-10-09 LAB
ANION GAP SERPL CALC-SCNC: 10 MMOL/L (ref 8–16)
BASOPHILS # BLD AUTO: 0.01 K/UL (ref 0–0.2)
BASOPHILS NFR BLD: 0.1 % (ref 0–1.9)
BUN SERPL-MCNC: 34 MG/DL (ref 8–23)
CALCIUM SERPL-MCNC: 9.2 MG/DL (ref 8.7–10.5)
CHLORIDE SERPL-SCNC: 107 MMOL/L (ref 95–110)
CO2 SERPL-SCNC: 24 MMOL/L (ref 23–29)
CREAT SERPL-MCNC: 1.1 MG/DL (ref 0.5–1.4)
DIFFERENTIAL METHOD: ABNORMAL
EOSINOPHIL # BLD AUTO: 0 K/UL (ref 0–0.5)
EOSINOPHIL NFR BLD: 0 % (ref 0–8)
ERYTHROCYTE [DISTWIDTH] IN BLOOD BY AUTOMATED COUNT: 14.5 % (ref 11.5–14.5)
EST. GFR  (AFRICAN AMERICAN): 54.4 ML/MIN/1.73 M^2
EST. GFR  (NON AFRICAN AMERICAN): 47.2 ML/MIN/1.73 M^2
GLUCOSE SERPL-MCNC: 166 MG/DL (ref 70–110)
HCT VFR BLD AUTO: 37.9 % (ref 37–48.5)
HGB BLD-MCNC: 11.7 G/DL (ref 12–16)
IMM GRANULOCYTES # BLD AUTO: 0.08 K/UL (ref 0–0.04)
IMM GRANULOCYTES NFR BLD AUTO: 0.7 % (ref 0–0.5)
LYMPHOCYTES # BLD AUTO: 1.2 K/UL (ref 1–4.8)
LYMPHOCYTES NFR BLD: 10.7 % (ref 18–48)
MAGNESIUM SERPL-MCNC: 1.5 MG/DL (ref 1.6–2.6)
MCH RBC QN AUTO: 28.7 PG (ref 27–31)
MCHC RBC AUTO-ENTMCNC: 30.9 G/DL (ref 32–36)
MCV RBC AUTO: 93 FL (ref 82–98)
MONOCYTES # BLD AUTO: 0.8 K/UL (ref 0.3–1)
MONOCYTES NFR BLD: 6.5 % (ref 4–15)
NEUTROPHILS # BLD AUTO: 9.5 K/UL (ref 1.8–7.7)
NEUTROPHILS NFR BLD: 82 % (ref 38–73)
NRBC BLD-RTO: 0 /100 WBC
PLATELET # BLD AUTO: 180 K/UL (ref 150–350)
PMV BLD AUTO: 10.7 FL (ref 9.2–12.9)
POC ACTIVATED CLOTTING TIME K: 142 SEC (ref 74–137)
POC ACTIVATED CLOTTING TIME K: 241 SEC (ref 74–137)
POTASSIUM SERPL-SCNC: 3.6 MMOL/L (ref 3.5–5.1)
RBC # BLD AUTO: 4.07 M/UL (ref 4–5.4)
SAMPLE: ABNORMAL
SAMPLE: ABNORMAL
SODIUM SERPL-SCNC: 141 MMOL/L (ref 136–145)
WBC # BLD AUTO: 11.53 K/UL (ref 3.9–12.7)

## 2019-10-09 PROCEDURE — 93010 ELECTROCARDIOGRAM REPORT: CPT | Mod: ,,, | Performed by: INTERNAL MEDICINE

## 2019-10-09 PROCEDURE — 63600175 PHARM REV CODE 636 W HCPCS: Performed by: STUDENT IN AN ORGANIZED HEALTH CARE EDUCATION/TRAINING PROGRAM

## 2019-10-09 PROCEDURE — 80048 BASIC METABOLIC PNL TOTAL CA: CPT

## 2019-10-09 PROCEDURE — 25000003 PHARM REV CODE 250: Performed by: STUDENT IN AN ORGANIZED HEALTH CARE EDUCATION/TRAINING PROGRAM

## 2019-10-09 PROCEDURE — 93010 EKG 12-LEAD: ICD-10-PCS | Mod: ,,, | Performed by: INTERNAL MEDICINE

## 2019-10-09 PROCEDURE — 93005 ELECTROCARDIOGRAM TRACING: CPT

## 2019-10-09 PROCEDURE — 25000003 PHARM REV CODE 250: Performed by: INTERNAL MEDICINE

## 2019-10-09 PROCEDURE — 33210 INSERT ELECTRD/PM CATH SNGL: CPT

## 2019-10-09 PROCEDURE — 85025 COMPLETE CBC W/AUTO DIFF WBC: CPT

## 2019-10-09 PROCEDURE — 83735 ASSAY OF MAGNESIUM: CPT

## 2019-10-09 PROCEDURE — C1894 INTRO/SHEATH, NON-LASER: HCPCS

## 2019-10-09 RX ORDER — FUROSEMIDE 20 MG/1
20 TABLET ORAL DAILY PRN
Qty: 30 TABLET | Refills: 3 | Status: SHIPPED | OUTPATIENT
Start: 2019-10-09 | End: 2022-05-06

## 2019-10-09 RX ORDER — FUROSEMIDE 10 MG/ML
10 INJECTION INTRAMUSCULAR; INTRAVENOUS ONCE
Status: COMPLETED | OUTPATIENT
Start: 2019-10-09 | End: 2019-10-09

## 2019-10-09 RX ORDER — CLOPIDOGREL BISULFATE 75 MG/1
75 TABLET ORAL DAILY
Qty: 30 TABLET | Refills: 5 | Status: SHIPPED | OUTPATIENT
Start: 2019-10-09 | End: 2020-07-10 | Stop reason: SDUPTHER

## 2019-10-09 RX ORDER — DIPHENHYDRAMINE HCL 25 MG
25 CAPSULE ORAL ONCE
Status: COMPLETED | OUTPATIENT
Start: 2019-10-09 | End: 2019-10-09

## 2019-10-09 RX ADMIN — POTASSIUM CHLORIDE 40 MEQ: 1.5 SOLUTION ORAL at 06:10

## 2019-10-09 RX ADMIN — FUROSEMIDE 10 MG: 10 INJECTION, SOLUTION INTRAMUSCULAR; INTRAVENOUS at 07:10

## 2019-10-09 RX ADMIN — MAGNESIUM OXIDE 400 MG (241.3 MG MAGNESIUM) TABLET 800 MG: TABLET at 06:10

## 2019-10-09 RX ADMIN — PANTOPRAZOLE SODIUM 40 MG: 40 TABLET, DELAYED RELEASE ORAL at 08:10

## 2019-10-09 RX ADMIN — CLOPIDOGREL BISULFATE 75 MG: 75 TABLET ORAL at 08:10

## 2019-10-09 RX ADMIN — DIPHENHYDRAMINE HYDROCHLORIDE 25 MG: 25 CAPSULE ORAL at 07:10

## 2019-10-09 RX ADMIN — ASPIRIN 81 MG: 81 TABLET, COATED ORAL at 08:10

## 2019-10-09 RX ADMIN — ALLOPURINOL 300 MG: 100 TABLET ORAL at 08:10

## 2019-10-09 NOTE — PROGRESS NOTES
[facility]  Cardiac Electrophysiology  Progress Note    Admission Date: 10/8/2019  Code Status: Full Code   Attending Physician: Fidel Cardenas MD   Expected Discharge Date:   Principal Problem:<principal problem not specified>    Subjective:     Interval History: No acute events overnight. VS wnl. No events on tele    Review of Systems   Constitution: Negative for chills and fever.   Cardiovascular: Negative for chest pain, dyspnea on exertion, irregular heartbeat, near-syncope and syncope.   Respiratory: Negative for shortness of breath.    Gastrointestinal: Negative for nausea.   Neurological: Negative for headaches and weakness.     Objective:     Vital Signs (Most Recent):  Temp: 97.7 °F (36.5 °C) (10/08/19 1915)  Pulse: 75 (10/09/19 0600)  Resp: (!) 23 (10/09/19 0600)  BP: (!) 108/54 (10/08/19 2200)  SpO2: 100 % (10/09/19 0600) Vital Signs (24h Range):  Temp:  [96.2 °F (35.7 °C)-97.7 °F (36.5 °C)] 97.7 °F (36.5 °C)  Pulse:  [64-75] 75  Resp:  [10-25] 23  SpO2:  [95 %-100 %] 100 %  BP: (108)/(54) 108/54  Arterial Line BP: (115-162)/(39-68) 157/57     Weight: 117 kg (257 lb 15 oz)  Body mass index is 40.4 kg/m².     SpO2: 100 %  O2 Device (Oxygen Therapy): room air    Physical Exam   Constitutional: She is oriented to person, place, and time. She appears well-developed and well-nourished. No distress.   HENT:   Head: Normocephalic and atraumatic.   Neck: No JVD present.   TVP in place R IJ   Cardiovascular: Normal rate and regular rhythm.   No murmur heard.  Pulmonary/Chest: Effort normal and breath sounds normal. No respiratory distress.   Abdominal: Soft. Bowel sounds are normal. She exhibits no distension. There is no tenderness.   Musculoskeletal: She exhibits no edema.   Neurological: She is alert and oriented to person, place, and time.   Skin: Skin is warm and dry. She is not diaphoretic. No erythema.   Vitals reviewed.      Significant Labs:   CMP:   Recent Labs   Lab  10/07/19  1347 10/08/19  0610 10/09/19  0343    141 141   K 4.1 4.6 3.6    105 107   CO2 28 25 24   * 106 166*   BUN 31* 37* 34*   CREATININE 1.4 1.4 1.1   CALCIUM 10.6* 9.8 9.2   ALBUMIN 3.7  --   --    ANIONGAP 9 11 10   ESTGFRAFRICA 40.6* 40.6* 54.4*   EGFRNONAA 35.3* 35.3* 47.2*    and CBC:   Recent Labs   Lab 10/07/19  1347 10/08/19  0813 10/09/19  0343   WBC 6.90 4.79 11.53   HGB 12.5 11.3* 11.7*   HCT 39.9 36.2* 37.9    160 180       Significant Imaging:      Assessment and Plan:     Severe aortic stenosis  Selena Moulton is a 79 y.o.F with HTN, DLD, severe aortic stenosis who presented for an elective TAVR. There were no complications intra-op and TVP placed. EP has been consulted to evaluate for PPM eval. As part of her TAVR workup,   - last EF on TTE 7/1/19 was 60% with LHC notable for nonobstructive CAD  - She received an Rodgers Cierra, (balloon expandable) valve.   - ECG 10/8/19 06:13 HR 64 bpm  ms QRS 94 ms  - ECG 10/8/19 11:16 HR 68 bpm  ms  ms  - ECG 10/9/19 6:00 HR 71 bpm  ms  ms  - no indications for PPM at this time.   - may remove TVP   - continue tele monitoring    Thank you for the consult. We will sign off now. Please don't hesitate to call with questions.     Patient seen and plan of care discussed with Dr. Kaylee Rodriguez MD  Cardiac Electrophysiology  [facility]

## 2019-10-09 NOTE — ANESTHESIA POSTPROCEDURE EVALUATION
Anesthesia Post Evaluation    Patient: Selena Moulton    Procedure(s) Performed: Procedure(s) (LRB):  Replacement-valve-aortic (N/A)    Final Anesthesia Type: general  Patient location during evaluation: ICU  Patient participation: Yes- Able to Participate  Level of consciousness: awake and alert  Post-procedure vital signs: reviewed and stable  Pain management: adequate  Airway patency: patent  PONV status at discharge: No PONV  Anesthetic complications: no      Cardiovascular status: hemodynamically stable  Respiratory status: unassisted  Follow-up not needed.          Vitals Value Taken Time   /54 10/8/2019 10:04 PM   Temp 36.6 °C (97.8 °F) 10/9/2019  7:15 AM   Pulse 76 10/9/2019  8:58 AM   Resp 13 10/9/2019  8:58 AM   SpO2 100 % 10/9/2019  8:58 AM   Vitals shown include unvalidated device data.      No case tracking events are documented in the log.      Pain/Amado Score: No data recorded

## 2019-10-09 NOTE — ASSESSMENT & PLAN NOTE
Selena Moulton is a 79 y.o.F with HTN, DLD, severe aortic stenosis who presented for an elective TAVR. There were no complications intra-op and TVP placed. EP has been consulted to evaluate for PPM eval. As part of her TAVR workup,   - last EF on TTE 7/1/19 was 60% with LHC notable for nonobstructive CAD  - She received an Rodgers Cierra, (balloon expandable) valve.   - ECG 10/8/19 06:13 HR 64 bpm  ms QRS 94 ms  - ECG 10/8/19 11:16 HR 68 bpm  ms  ms  - ECG 10/9/19 6:00 HR 71 bpm  ms  ms  - no indications for PPM at this time.   - may remove TVP   - continue tele monitoring

## 2019-10-09 NOTE — SUBJECTIVE & OBJECTIVE
Interval History: No acute events overnight. VS wnl. No events on tele    Review of Systems   Constitution: Negative for chills and fever.   Cardiovascular: Negative for chest pain, dyspnea on exertion, irregular heartbeat, near-syncope and syncope.   Respiratory: Negative for shortness of breath.    Gastrointestinal: Negative for nausea.   Neurological: Negative for headaches and weakness.     Objective:     Vital Signs (Most Recent):  Temp: 97.7 °F (36.5 °C) (10/08/19 1915)  Pulse: 75 (10/09/19 0600)  Resp: (!) 23 (10/09/19 0600)  BP: (!) 108/54 (10/08/19 2200)  SpO2: 100 % (10/09/19 0600) Vital Signs (24h Range):  Temp:  [96.2 °F (35.7 °C)-97.7 °F (36.5 °C)] 97.7 °F (36.5 °C)  Pulse:  [64-75] 75  Resp:  [10-25] 23  SpO2:  [95 %-100 %] 100 %  BP: (108)/(54) 108/54  Arterial Line BP: (115-162)/(39-68) 157/57     Weight: 117 kg (257 lb 15 oz)  Body mass index is 40.4 kg/m².     SpO2: 100 %  O2 Device (Oxygen Therapy): room air    Physical Exam   Constitutional: She is oriented to person, place, and time. She appears well-developed and well-nourished. No distress.   HENT:   Head: Normocephalic and atraumatic.   Neck: No JVD present.   TVP in place R IJ   Cardiovascular: Normal rate and regular rhythm.   No murmur heard.  Pulmonary/Chest: Effort normal and breath sounds normal. No respiratory distress.   Abdominal: Soft. Bowel sounds are normal. She exhibits no distension. There is no tenderness.   Musculoskeletal: She exhibits no edema.   Neurological: She is alert and oriented to person, place, and time.   Skin: Skin is warm and dry. She is not diaphoretic. No erythema.   Vitals reviewed.      Significant Labs:   CMP:   Recent Labs   Lab 10/07/19  1347 10/08/19  0610 10/09/19  0343    141 141   K 4.1 4.6 3.6    105 107   CO2 28 25 24   * 106 166*   BUN 31* 37* 34*   CREATININE 1.4 1.4 1.1   CALCIUM 10.6* 9.8 9.2   ALBUMIN 3.7  --   --    ANIONGAP 9 11 10   ESTGFRAFRICA 40.6* 40.6* 54.4*    EGFRNONAA 35.3* 35.3* 47.2*    and CBC:   Recent Labs   Lab 10/07/19  1347 10/08/19  0813 10/09/19  0343   WBC 6.90 4.79 11.53   HGB 12.5 11.3* 11.7*   HCT 39.9 36.2* 37.9    160 180       Significant Imaging:

## 2019-10-09 NOTE — PLAN OF CARE
No changes overnight, VSS. TVP in place, complete assessment and frequent vitals per flow sheet, plan of care reviewed with Mrs. Moulton and her daughter, questions/concerns addressed, will continue to monitor pt.

## 2019-10-09 NOTE — DISCHARGE SUMMARY
[facility]  Interventional Cardiology  Discharge Summary      Patient Name: Selena Moulton  MRN: [id number]  Admission Date: 10/8/2019  Hospital Length of Stay: 1 days  Discharge Date and Time:  10/09/2019 7:16 AM  Attending Physician: Fidel Cardenas MD  Discharging Provider: Chris Kumar MD  Primary Care Physician: Aby Perales MD    HPI: Selena Moulton is a 79 y.o. female referred by Dr Alberto for evaluation of severe AS (NYHA Class III sx).     She is a very pleasant lady with HTN, DLD who follows in the [facility]. She has noticed increased dyspnea on exertion for the past 6 months, she was found to have severe AS on echo and is here to complete her TAVR evaluation. She had groin candidiasis which has completely resolved with oral fluconazole.     The patient has undergone the following TAVR work-up:   Severe, symptomatic AS, NYHA Class III, CCS 0  · ECHO (Date 7.1.19): MUKUND= 0.8 cm2, MG= 42mmHg, Peak Bradley= 4.12 m/s, EF= 60%.   · LHC (Date 7.30.19): Non obstructive CAD   · STS: 2.67%   · Frailty: 1/4   · Iliacs are >9.1 on R and > 8.9 on L   · LVOT area by CTA is 3.69 cm2 (24 mm X 19 mm) and Avg Diam 21.7, Cierra 23S3 is 7% OS.  · Incidental findings on CT: Left renal cysts.Liver right hepatic lobe hemangioma.  · CT Surgery risk assessment: Moderate Risk per Dr. Rainey  · Rhythm issues: None  · PFTs: FEV1 62.4% predicted, DLCO 98.5% predicted.  · Comorbidities: Morbid Obesity, hypertension        Selena Moulton is a 23 mm Cierra S3 which is 7% OS at nominal inflation volume.    Procedure(s) (LRB):  Replacement-valve-aortic (N/A)     Indwelling Lines/Drains at time of discharge:  Lines/Drains/Airways     Central Venous Catheter Line                 Introducer 10/08/19 0800 right internal jugular less than 1 day          Drain            Female External Urinary Catheter 10/08/19 1000 less than 1 day          Arterial Line                 Arterial Line 10/08/19 0800  Right Radial less than 1 day          Line                 Pacer Wires 10/08/19 0800 less than 1 day                Hospital Course:   Pt admitted and underwent successful placement of a 23 mm Cierra S3 TAVR via LTF access under MAC sedation. TVP was left in place and EP was consulted. Pt was taken to the CCU in stable condition. Pt remained stable overnight. QRS remained stable and TVP removed. Pt  required IV diuresis for acute on chronic diastolic heart failure with IV Lasix. Diuresed well. That afternoon, pt ambulated without difficulty. Pt was eager to go home. It was felt pt was stable for discharge. Cont ASA and Plavix and f/u in one month in valve clinic    Subjective:   Patient doing well this AM, eager to go home, groin sites with no hematoma        Physical Exam:  General appearance: well developed, well nourished  Head: normocephalic, atraumatic  Eyes:  conjunctivae/corneas clear. PERRL.  Nose: Nares normal. Septum midline.  Throat: lips, mucosa, and tongue normal; teeth and gums normal and no throat erythema  Neck: supple, symmetrical, trachea midline,+ JVD   Lungs:  clear to auscultation bilaterally and normal respiratory effort  Heart: regular rate and rhythm, S1, S2 normal, no murmur, click, rub or gallop  Abdomen: soft, non-tender non-distented;BS posl; no masses,  no organomegaly  Extremities: mild BL LE edema, or clubbing  Pulses: 2+ DP/PT, BL groins soft w/o hematoma or thrill  Skin: Skin color, texture, turgor normal. No rashes or lesions  Neurologic: Normal strength and tone. No focal numbness or weakness     A/P:  Severe Aortic Stenosis  - Successful Left transfemoral S3 Cierra TAVR   - Post valve transthoracic echo showed no paravalvular leak  - Cont ASA and plavix     Acute On Chronic Diastolic Heart Failure Exacerbation  - PT with volume overload requiring IV diuretics post procedure  - Improved            Consults (From admission, onward)        Status Ordering Provider     Inpatient  consult to Electrophysiology  Once     Provider:  (Not yet assigned)    Completed GWYN GARCIA          Significant Diagnostic Studies: Labs:   BMP:   Recent Labs   Lab 10/07/19  1347 10/08/19  0610 10/09/19  0343   * 106 166*    141 141   K 4.1 4.6 3.6    105 107   CO2 28 25 24   BUN 31* 37* 34*   CREATININE 1.4 1.4 1.1   CALCIUM 10.6* 9.8 9.2   MG  --   --  1.5*       Pending Diagnostic Studies:     Procedure Component Value Units Date/Time    EKG 12-LEAD on arrival to floor [[id number]]     Order Status:  Sent Lab Status:  No result         Final Active Diagnoses:    Diagnosis Date Noted POA    Severe aortic stenosis [I35.0] 01/14/2019 Yes      Problems Resolved During this Admission:       Discharged Condition: good    Follow Up:    Patient Instructions:   No discharge procedures on file.  Medications:  Reconciled Home Medications:      Medication List      START taking these medications    clopidogrel 75 mg tablet  Commonly known as:  PLAVIX  Take 1 tablet (75 mg total) by mouth once daily.        CHANGE how you take these medications    atorvastatin 40 MG tablet  Commonly known as:  LIPITOR  Take 1 tablet (40 mg total) by mouth every evening.  What changed:  when to take this     furosemide 20 MG tablet  Commonly known as:  LASIX  Take 1 tablet (20 mg total) by mouth daily as needed (As directed for weight gain).  What changed:  reasons to take this        CONTINUE taking these medications    acetaminophen 500 MG tablet  Commonly known as:  TYLENOL  Take 500 mg by mouth every 8 (eight) hours as needed.     allopurinol 300 MG tablet  Commonly known as:  ZYLOPRIM  Take 1 tablet (300 mg total) by mouth once daily.     amLODIPine 2.5 MG tablet  Commonly known as:  NORVASC  Take 1 tablet (2.5 mg total) by mouth once daily.     aspirin 81 MG EC tablet  Commonly known as:  ECOTRIN  Take 81 mg by mouth once daily. Hold for 7 days before surgery     atenolol 100 MG tablet  Commonly known as:   TENORMIN  Take 0.5 tablets (50 mg total) by mouth once daily.     fish oil-omega-3 fatty acids 300-1,000 mg capsule  Take 1 capsule by mouth once daily. Hold for 7 days before surgery     irbesartan-hydrochlorothiazide 150-12.5 mg per tablet  Commonly known as:  AVALIDE  Take 1 tablet by mouth once daily.     multivitamin capsule  Take 1 capsule by mouth once daily. Hold AM of surgery     omeprazole 20 MG capsule  Commonly known as:  PRILOSEC  Take 1 capsule (20 mg total) by mouth daily as needed. Use AM of surgery with a sip of water        STOP taking these medications    nystatin cream  Commonly known as:  MYCOSTATIN            Time spent on the discharge of patient: 30 minutes    Chris Kumar MD  Interventional Cardiology  [facility]

## 2019-10-11 ENCOUNTER — PATIENT OUTREACH (OUTPATIENT)
Dept: ADMINISTRATIVE | Facility: CLINIC | Age: 79
End: 2019-10-11

## 2019-10-11 NOTE — PATIENT INSTRUCTIONS
Discharge Instructions for Aortic Valve Stenosis  You have been diagnosed with aortic valve stenosis. This means the aortic valve in your heart is stiff or remains in a near-closed position and has trouble opening. Because of this, your heart has to work harder to push the blood through the valve. In some cases, this extra work makes the muscle of the heart thicken. The extra work can tire the heart and cause its muscle to weaken over time. This condition often changes very slowly and doesn't need to be treated. But in some people, it may get worse faster and need to be treated. Some people can control their stenosis with medicines. In some severe cases, surgery is needed.  Home care  · Check with your doctor before taking any over-the-counter medicines, herbal products, or vitamins.  · Take your medicines exactly as directed. Dont skip doses.  · Keep all follow-up appointments. Some people with aortic stenosis dont have symptoms. Others need close follow-up and surgery.  Lifestyle changes  · Maintain a healthy weight. Get help to lose any extra pounds.  · Ask your doctor if an exercise program is right for you. Some people with aortic stenosis need to be very careful about exercise as it can result in fainting.  · Break the smoking habit. Enroll in a stop-smoking program to improve your chances of success.  Follow-up care  Make a follow-up appointment with your healthcare provider, or as directed.  When to call 911  Call 911 or go to the emergency room right away if you have any of the following:  · Chest pain or shortness of breath  · Weakness in the muscles of your face, arms, or legs  · Trouble speaking  · Rapid pulse or pounding heartbeat  · Fainting or dizziness  · Sudden vertigo   Date Last Reviewed: 6/1/2016  © 5114-4035 [employer]. [address] [place] [address], [place] [address]. All rights reserved. This information is not intended as a substitute for professional medical care. Always follow  your healthcare professional's instructions.

## 2019-10-25 RX ORDER — AMLODIPINE BESYLATE 2.5 MG/1
TABLET ORAL
Qty: 90 TABLET | Refills: 0 | Status: SHIPPED | OUTPATIENT
Start: 2019-10-25 | End: 2020-02-11 | Stop reason: SDUPTHER

## 2019-11-10 DIAGNOSIS — K21.00 GASTROESOPHAGEAL REFLUX DISEASE WITH ESOPHAGITIS: ICD-10-CM

## 2019-11-11 RX ORDER — TRIAMTERENE AND HYDROCHLOROTHIAZIDE 37.5; 25 MG/1; MG/1
CAPSULE ORAL
Qty: 90 CAPSULE | Refills: 0 | OUTPATIENT
Start: 2019-11-11

## 2019-11-11 RX ORDER — PANTOPRAZOLE SODIUM 20 MG/1
20 TABLET, DELAYED RELEASE ORAL DAILY
Qty: 90 TABLET | Refills: 3 | Status: SHIPPED | OUTPATIENT
Start: 2019-11-11 | End: 2020-11-08

## 2019-11-11 RX ORDER — OMEPRAZOLE 20 MG/1
20 CAPSULE, DELAYED RELEASE ORAL DAILY
Qty: 90 CAPSULE | Refills: 0 | OUTPATIENT
Start: 2019-11-11

## 2019-11-11 NOTE — TELEPHONE ENCOUNTER
Please see the following assessment. This refill request still requires some action on your part. Patient is on both clopidogrel and omeprazole. Omeprazole may decrease antithrombotic activity of clopidogrel. If PPI is required, consider substituting with Dexilant or Protonix for lower risk of this drug interaction. Defer to you. Thank you.

## 2019-11-11 NOTE — PROGRESS NOTES
Refill Authorization Note     is requesting a refill authorization.    Brief assessment and rationale for refill: DEFER: omeprazole -interaction with clopidogrel; QUICK DC: triamterene hctz -last commented as no longer taking  Name and strength of medication: triamterene-hydrochlorothiazide 37.5-25 mg (DYAZIDE) 37.5-25 mg per capsule // omeprazole (PRILOSEC) 20 MG capsule       Medication Therapy Plan: started clopidogrel less the 12months; Consider changing to Dexilant or Protonix if patient needs PPI; Defer to you; triamterene hctz dc'd for alternate therapy(8/19); QUICK DC    Medication reconciliation completed: No   Pharmacist Review Requested: Yes                     Comments:   Last Prescribed Info:    Disp Refills Start End    triamterene-hydrochlorothiazide 37.5-25 mg (DYAZIDE) 37.5-25 mg per capsule (Discontinued) 90 capsule 1 5/21/2019 8/7/2019    Sig - Route: Take 1 capsule by mouth every morning. Hold AM of surgery. - Oral    Sent to pharmacy as: triamterene-hydrochlorothiazide 37.5-25 mg (DYAZIDE) 37.5-25 mg per capsule    Reason for Discontinue: Alternate therapy    E-Prescribing Status: Receipt confirmed by pharmacy (5/21/2019  3:17 PM CDT)             Requested Prescriptions   Pending Prescriptions Disp Refills    omeprazole (PRILOSEC) 20 MG capsule [Pharmacy Med Name: OMEPRAZOLE 20MG CAPSULES] 90 capsule 0     Sig: TAKE ONE CAPSULE BY MOUTH DAILY AS NEEDED.( TAKE THE MORNING OF SURGERY WITH A SIP OF WATER)       Gastroenterology: Proton Pump Inhibitors Failed - 11/11/2019  2:14 PM        Failed - Plavix is not on active medication list        Failed - GERD is on problem list         Passed - Patient is at least 18 years old        Passed - Osteoporosis is not on problem list        Passed - Valid encounter within last 12 months     Recent Outpatient Visits            1 month ago Nodular calcific aortic valve stenosis    [facility]    2 months ago Acute idiopathic gout of  left foot    [facility] Aby Perales MD    3 months ago Neck pain    [facility] Aby Perales MD    4 months ago Severe aortic stenosis    [facility] Terrie Alberto MD    7 months ago Nonrheumatic aortic valve stenosis    [facility] Terrie Alberto MD          Future Appointments              In 2 weeks Aby Perales MD [facility]    In 3 weeks LAB, [facility]    [facility]    In 3 weeks Fidel Cardenas MD [facility]Inter[facility], [facility]               triamterene-hydrochlorothiazide 37.5-25 mg (DYAZIDE) 37.5-25 mg per capsule [Pharmacy Med Name: TRIAMTERENE 37.5MG/ HCTZ 25MG CAPS] 90 capsule 0     Sig: TAKE 1 CAPSULE BY MOUTH EVERY MORNING, HOLD THE MORNING OF SURGERY       Cardiovascular: Diuretic Combos Passed - 11/11/2019  2:14 PM        Passed - Patient is at least 18 years old        Passed - Last BP in normal range within 360 days     BP Readings from Last 3 Encounters:   10/08/19 (!) 108/54   10/07/19 (!) 142/70   09/04/19 116/78              Passed - Office visit in past 12 months or future 90 days     Recent Outpatient Visits            1 month ago Nodular calcific aortic valve stenosis    [facility]    2 months ago Acute idiopathic gout of left foot    [facility] Aby Perales MD    3 months ago Neck pain    [facility] Aby Perales MD    4 months ago Severe aortic stenosis    [facility] Terrie Alberto MD    7 months ago Nonrheumatic aortic valve stenosis    [facility] Terrie Alberto MD          Future Appointments              In 2 weeks Aby Perales MD [facility]    In 3 weeks LAB, SAME DAY [facility]    [facility]    In  3 weeks [facility], [facility]                Passed - K in normal range and within 180 days     Potassium   Date Value Ref Range Status   10/09/2019 3.6 3.5 - 5.1 mmol/L Final   10/08/2019 4.6 3.5 - 5.1 mmol/L Final     Comment:     *Result may be interfered by visible hemolysis   10/07/2019 4.1 3.5 - 5.1 mmol/L Final              Passed - Na in normal range and within 180 days     Sodium   Date Value Ref Range Status   10/09/2019 141 136 - 145 mmol/L Final   10/08/2019 141 136 - 145 mmol/L Final   10/07/2019 141 136 - 145 mmol/L Final              Passed - Cr is 1.4 or below and within 180 days     Creatinine   Date Value Ref Range Status   10/09/2019 1.1 0.5 - 1.4 mg/dL Final   10/08/2019 1.4 0.5 - 1.4 mg/dL Final   10/07/2019 1.4 0.5 - 1.4 mg/dL Final              Passed - Ca in normal range and within 360 days     Calcium   Date Value Ref Range Status   10/09/2019 9.2 8.7 - 10.5 mg/dL Final   10/08/2019 9.8 8.7 - 10.5 mg/dL Final   10/07/2019 10.6 (H) 8.7 - 10.5 mg/dL Final              Passed - eGFR within 180 days     eGFR if non    Date Value Ref Range Status   10/09/2019 47.2 (A) >60 mL/min/1.73 m^2 Final     Comment:     Calculation used to obtain the estimated glomerular filtration  rate (eGFR) is the CKD-EPI equation.      10/08/2019 35.3 (A) >60 mL/min/1.73 m^2 Final     Comment:     Calculation used to obtain the estimated glomerular filtration  rate (eGFR) is the CKD-EPI equation.      10/07/2019 35.3 (A) >60 mL/min/1.73 m^2 Final     Comment:     Calculation used to obtain the estimated glomerular filtration  rate (eGFR) is the CKD-EPI equation.

## 2019-11-11 NOTE — PROGRESS NOTES
Refill Authorization Note     is requesting a refill authorization.    Brief assessment and rationale for refill: DEFER: omeprazole -interaction with clopidogrel; triamterene hctz -last commented as no longer taking  Name and strength of medication: triamterene-hydrochlorothiazide 37.5-25 mg (DYAZIDE) 37.5-25 mg per capsule // omeprazole (PRILOSEC) 20 MG capsule       Medication Therapy Plan: started clopidogrel less the 12months; triamterene hctz dc'd for alternate therapy(8/19)    Medication reconciliation completed: No   Pharmacist Review Requested: Yes                     Comments:  Requested Prescriptions   Pending Prescriptions Disp Refills    omeprazole (PRILOSEC) 20 MG capsule [Pharmacy Med Name: OMEPRAZOLE 20MG CAPSULES] 90 capsule 0     Sig: TAKE ONE CAPSULE BY MOUTH DAILY AS NEEDED.( TAKE THE MORNING OF SURGERY WITH A SIP OF WATER)       Gastroenterology: Proton Pump Inhibitors Failed - 11/10/2019  3:15 AM        Failed - Plavix is not on active medication list        Failed - GERD is on problem list         Passed - Patient is at least 18 years old        Passed - Osteoporosis is not on problem list        Passed - Valid encounter within last 12 months     Recent Outpatient Visits            1 month ago Nodular calcific aortic valve stenosis    [facility]    2 months ago Acute idiopathic gout of left foot    [facility] Aby Perales MD    3 months ago Neck pain    [facility] Aby Perales MD    4 months ago Severe aortic stenosis    [facility] Terrie Alberto MD    7 months ago Nonrheumatic aortic valve stenosis    [facility] Terrie Alberto MD          Future Appointments              In 2 weeks [facility]    In 3 weeks [facility]    In 3 weeks ECHO, [facility], [facility]    In 3 weeks Fidel Esquivel  MD [facility], [facility]               triamterene-hydrochlorothiazide 37.5-25 mg (DYAZIDE) 37.5-25 mg per capsule [Pharmacy Med Name: TRIAMTERENE 37.5MG/ HCTZ 25MG CAPS] 90 capsule 0     Sig: TAKE 1 CAPSULE BY MOUTH EVERY MORNING, HOLD THE MORNING OF SURGERY       Cardiovascular: Diuretic Combos Passed - 11/10/2019  3:15 AM        Passed - Patient is at least 18 years old        Passed - Last BP in normal range within 360 days     BP Readings from Last 3 Encounters:   10/08/19 (!) 108/54   10/07/19 (!) 142/70   09/04/19 116/78              Passed - Office visit in past 12 months or future 90 days     Recent Outpatient Visits            1 month ago Nodular calcific aortic valve stenosis    [facility]    2 months ago Acute idiopathic gout of left foot    [facility] Aby Perales MD    3 months ago Neck pain    [facility] Aby Perales MD    4 months ago Severe aortic stenosis    [facility] Terrie Alberto MD    7 months ago Nonrheumatic aortic valve stenosis    [facility] Terrie Alberto MD          Future Appointments              In 2 weeks Aby Perales MD [facility]    In 3 weeks LAB, [facility] DAY [facility]    In 3 weeks ECHO, [facility], [facility]    In 3 weeks MD [facility], [facility]                Passed - K in normal range and within 180 days     Potassium   Date Value Ref Range Status   10/09/2019 3.6 3.5 - 5.1 mmol/L Final   10/08/2019 4.6 3.5 - 5.1 mmol/L Final     Comment:     *Result may be interfered by visible hemolysis   10/07/2019 4.1 3.5 - 5.1 mmol/L Final              Passed - Na in normal range and within 180 days     Sodium   Date Value Ref Range Status   10/09/2019 141 136 - 145 mmol/L Final   10/08/2019 141 136 - 145 mmol/L Final   10/07/2019 141 136 - 145 mmol/L Final               Passed - Cr is 1.4 or below and within 180 days     Creatinine   Date Value Ref Range Status   10/09/2019 1.1 0.5 - 1.4 mg/dL Final   10/08/2019 1.4 0.5 - 1.4 mg/dL Final   10/07/2019 1.4 0.5 - 1.4 mg/dL Final              Passed - Ca in normal range and within 360 days     Calcium   Date Value Ref Range Status   10/09/2019 9.2 8.7 - 10.5 mg/dL Final   10/08/2019 9.8 8.7 - 10.5 mg/dL Final   10/07/2019 10.6 (H) 8.7 - 10.5 mg/dL Final              Passed - eGFR within 180 days     eGFR if non    Date Value Ref Range Status   10/09/2019 47.2 (A) >60 mL/min/1.73 m^2 Final     Comment:     Calculation used to obtain the estimated glomerular filtration  rate (eGFR) is the CKD-EPI equation.      10/08/2019 35.3 (A) >60 mL/min/1.73 m^2 Final     Comment:     Calculation used to obtain the estimated glomerular filtration  rate (eGFR) is the CKD-EPI equation.      10/07/2019 35.3 (A) >60 mL/min/1.73 m^2 Final     Comment:     Calculation used to obtain the estimated glomerular filtration  rate (eGFR) is the CKD-EPI equation.

## 2019-11-12 NOTE — TELEPHONE ENCOUNTER
Please notify patient that the omeprazole and Plavix interact between each other and I will have to change the omeprazole to something different.  I will try pantoprazole instead, will be the same concentration. I will fax a prescription to the pharmacy.  Thank you

## 2019-11-18 RX ORDER — AMLODIPINE BESYLATE 5 MG/1
2.5 TABLET ORAL DAILY
Qty: 90 TABLET | Refills: 0 | OUTPATIENT
Start: 2019-11-18

## 2019-11-18 NOTE — TELEPHONE ENCOUNTER
I have reviewed and agree with the assessment below.  Will QDC as pt has refills available per Dr. Alberto and pt was transitioned to 2.5 mg Thanks

## 2019-11-18 NOTE — PROGRESS NOTES
Refill Authorization Note     is requesting a refill authorization.    Brief assessment and rationale for refill: APPROVE: prr   Name and strength of medication: AMLODIPINE BESYLATE 5MG TABLETS            Medication reconciliation completed: No              How patient will take medication: t1t po qam           Comments:

## 2019-11-25 ENCOUNTER — OFFICE VISIT (OUTPATIENT)
Dept: FAMILY MEDICINE | Facility: CLINIC | Age: 79
End: 2019-11-25
Payer: MEDICARE

## 2019-11-25 VITALS
DIASTOLIC BLOOD PRESSURE: 76 MMHG | WEIGHT: 252.44 LBS | HEIGHT: 67 IN | OXYGEN SATURATION: 95 % | HEART RATE: 69 BPM | BODY MASS INDEX: 39.62 KG/M2 | SYSTOLIC BLOOD PRESSURE: 124 MMHG

## 2019-11-25 DIAGNOSIS — N18.30 STAGE 3 CHRONIC KIDNEY DISEASE: ICD-10-CM

## 2019-11-25 DIAGNOSIS — B96.89 ACUTE BACTERIAL BRONCHITIS: ICD-10-CM

## 2019-11-25 DIAGNOSIS — I10 ESSENTIAL HYPERTENSION: ICD-10-CM

## 2019-11-25 DIAGNOSIS — E83.42 HYPOMAGNESEMIA: Primary | ICD-10-CM

## 2019-11-25 DIAGNOSIS — E66.01 CLASS 2 SEVERE OBESITY DUE TO EXCESS CALORIES WITH SERIOUS COMORBIDITY AND BODY MASS INDEX (BMI) OF 39.0 TO 39.9 IN ADULT: ICD-10-CM

## 2019-11-25 DIAGNOSIS — E78.49 OTHER HYPERLIPIDEMIA: ICD-10-CM

## 2019-11-25 DIAGNOSIS — J20.8 ACUTE BACTERIAL BRONCHITIS: ICD-10-CM

## 2019-11-25 PROCEDURE — 99499 UNLISTED E&M SERVICE: CPT | Mod: S$GLB,,, | Performed by: FAMILY MEDICINE

## 2019-11-25 PROCEDURE — 3074F PR MOST RECENT SYSTOLIC BLOOD PRESSURE < 130 MM HG: ICD-10-PCS | Mod: CPTII,S$GLB,, | Performed by: FAMILY MEDICINE

## 2019-11-25 PROCEDURE — 99999 PR PBB SHADOW E&M-EST. PATIENT-LVL III: ICD-10-PCS | Mod: PBBFAC,,, | Performed by: FAMILY MEDICINE

## 2019-11-25 PROCEDURE — 1126F AMNT PAIN NOTED NONE PRSNT: CPT | Mod: S$GLB,,, | Performed by: FAMILY MEDICINE

## 2019-11-25 PROCEDURE — 99214 OFFICE O/P EST MOD 30 MIN: CPT | Mod: S$GLB,,, | Performed by: FAMILY MEDICINE

## 2019-11-25 PROCEDURE — 3074F SYST BP LT 130 MM HG: CPT | Mod: CPTII,S$GLB,, | Performed by: FAMILY MEDICINE

## 2019-11-25 PROCEDURE — 99999 PR PBB SHADOW E&M-EST. PATIENT-LVL III: CPT | Mod: PBBFAC,,, | Performed by: FAMILY MEDICINE

## 2019-11-25 PROCEDURE — 1126F PR PAIN SEVERITY QUANTIFIED, NO PAIN PRESENT: ICD-10-PCS | Mod: S$GLB,,, | Performed by: FAMILY MEDICINE

## 2019-11-25 PROCEDURE — 1101F PR PT FALLS ASSESS DOC 0-1 FALLS W/OUT INJ PAST YR: ICD-10-PCS | Mod: CPTII,S$GLB,, | Performed by: FAMILY MEDICINE

## 2019-11-25 PROCEDURE — 99499 RISK ADDL DX/OHS AUDIT: ICD-10-PCS | Mod: S$GLB,,, | Performed by: FAMILY MEDICINE

## 2019-11-25 PROCEDURE — 99214 PR OFFICE/OUTPT VISIT, EST, LEVL IV, 30-39 MIN: ICD-10-PCS | Mod: S$GLB,,, | Performed by: FAMILY MEDICINE

## 2019-11-25 PROCEDURE — 1159F PR MEDICATION LIST DOCUMENTED IN MEDICAL RECORD: ICD-10-PCS | Mod: S$GLB,,, | Performed by: FAMILY MEDICINE

## 2019-11-25 PROCEDURE — 1159F MED LIST DOCD IN RCRD: CPT | Mod: S$GLB,,, | Performed by: FAMILY MEDICINE

## 2019-11-25 PROCEDURE — 1101F PT FALLS ASSESS-DOCD LE1/YR: CPT | Mod: CPTII,S$GLB,, | Performed by: FAMILY MEDICINE

## 2019-11-25 PROCEDURE — 3078F DIAST BP <80 MM HG: CPT | Mod: CPTII,S$GLB,, | Performed by: FAMILY MEDICINE

## 2019-11-25 PROCEDURE — 3078F PR MOST RECENT DIASTOLIC BLOOD PRESSURE < 80 MM HG: ICD-10-PCS | Mod: CPTII,S$GLB,, | Performed by: FAMILY MEDICINE

## 2019-11-25 RX ORDER — MONTELUKAST SODIUM 10 MG/1
10 TABLET ORAL NIGHTLY
Qty: 30 TABLET | Refills: 0 | Status: SHIPPED | OUTPATIENT
Start: 2019-11-25 | End: 2019-12-25

## 2019-11-25 RX ORDER — DOXYCYCLINE 100 MG/1
100 CAPSULE ORAL EVERY 12 HOURS
Qty: 20 CAPSULE | Refills: 0 | Status: SHIPPED | OUTPATIENT
Start: 2019-11-25 | End: 2019-12-05

## 2019-11-25 RX ORDER — LANOLIN ALCOHOL/MO/W.PET/CERES
400 CREAM (GRAM) TOPICAL DAILY
Qty: 90 TABLET | Refills: 0 | Status: SHIPPED | OUTPATIENT
Start: 2019-11-25 | End: 2020-06-17

## 2019-11-25 RX ORDER — ALBUTEROL SULFATE 90 UG/1
2 AEROSOL, METERED RESPIRATORY (INHALATION) EVERY 6 HOURS PRN
Qty: 18 G | Refills: 0 | Status: SHIPPED | OUTPATIENT
Start: 2019-11-25 | End: 2021-06-28

## 2019-11-25 NOTE — PROGRESS NOTES
Subjective:       Patient ID: Selena Moulton is a 79 y.o. female.    Chief Complaint: Follow-up (3 MONTH FOLLOW UP)    Cough   This is a new problem. The current episode started 1 to 4 weeks ago. The problem has been unchanged. The problem occurs constantly. The cough is productive of purulent sputum. Associated symptoms include nasal congestion, postnasal drip, rhinorrhea and wheezing. Pertinent negatives include no chest pain, chills, ear congestion, ear pain, fever, headaches, heartburn, hemoptysis, rash, sore throat, shortness of breath, sweats or weight loss. The symptoms are aggravated by fumes and exercise. She has tried nothing for the symptoms. The treatment provided no relief. There is no history of bronchiectasis or environmental allergies.   Hypertension   This is a chronic problem. The current episode started more than 1 year ago. The problem is unchanged. The problem is controlled. Pertinent negatives include no anxiety, chest pain, headaches, neck pain, orthopnea, palpitations, peripheral edema, shortness of breath or sweats. There are no associated agents to hypertension. Risk factors for coronary artery disease include dyslipidemia and obesity. Past treatments include diuretics, calcium channel blockers and angiotensin blockers. The current treatment provides significant improvement. Compliance problems include diet and exercise.  Hypertensive end-organ damage includes kidney disease. There is no history of angina. Identifiable causes of hypertension include chronic renal disease.   Hyperlipidemia   This is a chronic problem. The current episode started more than 1 year ago. The problem is uncontrolled. Recent lipid tests were reviewed and are high. Exacerbating diseases include chronic renal disease and obesity. She has no history of hypothyroidism or liver disease. Factors aggravating her hyperlipidemia include fatty foods. Pertinent negatives include no chest pain, focal weakness or  shortness of breath. Current antihyperlipidemic treatment includes statins. The current treatment provides moderate improvement of lipids. Compliance problems include adherence to diet and adherence to exercise.  Risk factors for coronary artery disease include hypertension, dyslipidemia, obesity and post-menopausal.     Upon review of the last blood work, the patient kidney function was improved, blood count was improved, but the magnesium levels were low.  The patient is not currently taking any magnesium tablets.    Past medical history, past social history was reviewed and discussed with the patient.    Review of Systems   Constitutional: Negative for activity change, appetite change, chills, fever and weight loss.   HENT: Positive for congestion, postnasal drip and rhinorrhea. Negative for ear discharge, ear pain and sore throat.    Eyes: Negative for discharge and itching.   Respiratory: Positive for cough and wheezing. Negative for hemoptysis, choking, chest tightness and shortness of breath.    Cardiovascular: Negative for chest pain, palpitations, orthopnea and leg swelling.   Gastrointestinal: Negative for abdominal distention, abdominal pain and heartburn.   Endocrine: Negative for cold intolerance and heat intolerance.   Genitourinary: Negative for dysuria and flank pain.   Musculoskeletal: Negative for arthralgias, back pain and neck pain.   Skin: Negative for pallor and rash.   Allergic/Immunologic: Negative for environmental allergies and food allergies.   Neurological: Negative for dizziness, focal weakness, facial asymmetry and headaches.   Hematological: Negative for adenopathy. Does not bruise/bleed easily.   Psychiatric/Behavioral: Negative for agitation, confusion, decreased concentration and sleep disturbance.       Objective:      Physical Exam   Constitutional: She appears well-developed and well-nourished. No distress.   Limited ambulation, the patient is using the walker for mobilization.    HENT:   Head: Normocephalic and atraumatic.   Right Ear: External ear normal.   Left Ear: External ear normal.   Nose: Nose normal.   Mouth/Throat: Oropharynx is clear and moist. No oropharyngeal exudate.   Eyes: Pupils are equal, round, and reactive to light. Conjunctivae are normal. Right eye exhibits no discharge. Left eye exhibits no discharge.   Neck: Neck supple. No tracheal deviation present. No thyromegaly present.   Cardiovascular: Normal rate and regular rhythm.   Murmur heard.  Pulmonary/Chest: Effort normal. No respiratory distress. She has wheezes.   Musculoskeletal: She exhibits no tenderness or deformity.   Neurological: No cranial nerve deficit. Coordination normal.   Skin: No rash noted. She is not diaphoretic. No erythema. No pallor.   Psychiatric: She has a normal mood and affect. Her behavior is normal. Judgment and thought content normal.   Nursing note and vitals reviewed.      Assessment:       1. Hypomagnesemia    2. Stage 3 chronic kidney disease    3. Essential hypertension    4. Other hyperlipidemia    5. Class 2 severe obesity due to excess calories with serious comorbidity and body mass index (BMI) of 39.0 to 39.9 in adult    6. Acute bacterial bronchitis        Plan:       Hypomagnesemia:  New problem, next visit workup  -     magnesium oxide (MAG-OX) 400 mg (241.3 mg magnesium) tablet; Take 1 tablet (400 mg total) by mouth once daily.  Dispense: 90 tablet; Refill: 0  -     Magnesium; Future; Expected date: 11/25/2019    Stage 3 chronic kidney disease:  Improved    Essential hypertension:  Controlled  -     Comprehensive metabolic panel; Future; Expected date: 11/25/2019  -     Lipid panel; Future; Expected date: 11/25/2019    Other hyperlipidemia:  Uncontrolled  -     Comprehensive metabolic panel; Future; Expected date: 11/25/2019  -     Lipid panel; Future; Expected date: 11/25/2019    Class 2 severe obesity due to excess calories with serious comorbidity and body mass index (BMI)  of 39.0 to 39.9 in adult:  Improved    Acute bacterial bronchitis:  New problem workup needed  -     doxycycline (MONODOX) 100 MG capsule; Take 1 capsule (100 mg total) by mouth every 12 (twelve) hours. for 10 days  Dispense: 20 capsule; Refill: 0  -     albuterol (VENTOLIN HFA) 90 mcg/actuation inhaler; Inhale 2 puffs into the lungs every 6 (six) hours as needed for Wheezing. Rescue  Dispense: 18 g; Refill: 0  -     montelukast (SINGULAIR) 10 mg tablet; Take 1 tablet (10 mg total) by mouth every evening.  Dispense: 30 tablet; Refill: 0    Will call the patient after we have the results of the test.  Will start patient on magnesium oxide 1 tablet p.o. q.day, for bacterial bronchitis will start patient on doxycycline Ventolin inhaler in singular.  If the symptoms get worse, the patient will notify us immediately.  Drink plenty water.The patient's BMI has been recorded in the chart. The patient has been provided educational materials regarding the benefits of attaining and maintaining a normal weight. We will continue to address and follow this issue during follow up visits.Patient agreed with assessment and plan. Patient verbalized understanding.

## 2019-11-25 NOTE — PATIENT INSTRUCTIONS
Eating Heart-Healthy Food: Using the DASH Plan    Eating for your heart doesnt have to be hard or boring. You just need to know how to make healthier choices. The DASH eating plan has been developed to help you do just that. DASH stands for Dietary Approaches to Stop Hypertension. It is a plan that has been proven to be healthier for your heart and to lower your risk for high blood pressure. It can also help lower your risk for cancer, heart disease, osteoporosis, and diabetes.  Choosing from each food group  Choose foods from each of the food groups below each day. Try to get the recommended number of servings for each food group. The serving numbers are based on a diet of 2,000 calories a day. Talk to your doctor if youre unsure about your calorie needs. Along with getting the correct servings, the DASH plan also recommends a sodium intake less than 2,300 mg per day.        Grains  Servings: 6 to 8 a day  A serving is:  · 1 slice bread  · 1 ounce dry cereal  · Half a cup cooked rice, pasta or cereal  Best choices: Whole grains and any grains high in fiber. Vegetables  Servings: 4 to 5 a day  A serving is:  · 1 cup raw leafy vegetable  · Half a cup cut-up raw or cooked vegetable  · Half a cup vegetable juice  Best choices: Fresh or frozen vegetables prepared without added salt or fat.   Fruits  Servings: 4 to 5 a day  A serving is:  · 1 medium fruit  · One-quarter cup dried fruit  · Half a cup fresh, frozen, or canned fruit  · Half a cup of 100% fruit juices  Best choices: A variety of fresh fruits of different colors. Whole fruits are a better choice than fruit juices. Low-fat or fat-free dairy  Servings: 2 to 3 a day  A serving is:  · 1 cup milk  · 1 cup yogurt  · One and a half ounces cheese  Best choices: Skim or 1% milk, low-fat or fat-free yogurt or buttermilk, and low-fat cheeses.         Lean meats, poultry, fish  Servings: 6 or fewer a day  A serving is:  · 1 ounce cooked meats, poultry, or fish  · 1  egg  Best choices: Lean poultry and fish. Trim away visible fat. Broil, grill, roast, or boil instead of frying. Remove skin from poultry before eating. Limit how much red meat you eat.  Nuts, seeds, beans  Servings: 4 to 5 a week  A serving is:  · One-third cup nuts (one and a half ounces)  · 2 tablespoons nut butter or seeds  · Half a cup cooked dry beans or legumes  Best choices: Dry roasted nuts with no salt added, lentils, kidney beans, garbanzo beans, and whole denson beans.   Fats and oils  Servings: 2 to 3 a day  A serving is:  · 1 teaspoon vegetable oil  · 1 teaspoon soft margarine  · 1 tablespoon mayonnaise  · 2 tablespoons salad dressing  Best choices: Nut and vegetable oils (nontropical vegetable oils), such as olive and canola oil. Sweets  Servings: 5 a week or fewer  A serving is:  · 1 tablespoon sugar, maple syrup, or honey  · 1 tablespoon jam or jelly  · 1 half-ounce jelly beans (about 15)  · 1 cup lemonade  Best choices: Dried fruit can be a satisfying sweet. Choose low-fat sweets. And watch your serving sizes!      For more on the DASH eating plan, visit:  www.nhlbi.nih.gov/health/health-topics/topics/dash   Date Last Reviewed: 6/1/2016  © 5552-5089 [employer]. [address] [place] [address], [place] [address]. All rights reserved. This information is not intended as a substitute for professional medical care. Always follow your healthcare professional's instructions.

## 2019-12-03 ENCOUNTER — PATIENT OUTREACH (OUTPATIENT)
Dept: ADMINISTRATIVE | Facility: OTHER | Age: 79
End: 2019-12-03

## 2019-12-06 ENCOUNTER — OFFICE VISIT (OUTPATIENT)
Dept: CARDIOLOGY | Facility: CLINIC | Age: 79
End: 2019-12-06
Attending: INTERNAL MEDICINE
Payer: MEDICARE

## 2019-12-06 ENCOUNTER — HOSPITAL ENCOUNTER (OUTPATIENT)
Dept: CARDIOLOGY | Facility: CLINIC | Age: 79
Discharge: HOME | End: 2019-12-06
Attending: INTERNAL MEDICINE
Payer: MEDICARE

## 2019-12-06 VITALS
HEIGHT: 67 IN | WEIGHT: 252.44 LBS | HEART RATE: 68 BPM | WEIGHT: 252 LBS | SYSTOLIC BLOOD PRESSURE: 131 MMHG | BODY MASS INDEX: 39.55 KG/M2 | SYSTOLIC BLOOD PRESSURE: 114 MMHG | DIASTOLIC BLOOD PRESSURE: 60 MMHG | HEART RATE: 61 BPM | HEIGHT: 67 IN | OXYGEN SATURATION: 98 % | DIASTOLIC BLOOD PRESSURE: 60 MMHG | BODY MASS INDEX: 39.62 KG/M2

## 2019-12-06 DIAGNOSIS — Z95.2 S/P TAVR (TRANSCATHETER AORTIC VALVE REPLACEMENT): Primary | ICD-10-CM

## 2019-12-06 DIAGNOSIS — I35.0 SEVERE AORTIC STENOSIS: ICD-10-CM

## 2019-12-06 DIAGNOSIS — I10 ESSENTIAL HYPERTENSION: ICD-10-CM

## 2019-12-06 DIAGNOSIS — I50.33 ACUTE ON CHRONIC HEART FAILURE WITH PRESERVED EJECTION FRACTION: ICD-10-CM

## 2019-12-06 PROBLEM — Z95.3 S/P TAVR (TRANSCATHETER AORTIC VALVE REPLACEMENT): Status: ACTIVE | Noted: 2019-12-06

## 2019-12-06 LAB
ASCENDING AORTA: 3.48 CM
AV INDEX (PROSTH): 0.47
AV MEAN GRADIENT: 18 MMHG
AV PEAK GRADIENT: 36 MMHG
AV VALVE AREA: 1.97 CM2
AV VELOCITY RATIO: 0.42
BSA FOR ECHO PROCEDURE: 2.32 M2
CV ECHO LV RWT: 0.48 CM
DOP CALC AO PEAK VEL: 2.98 M/S
DOP CALC AO VTI: 67.43 CM
DOP CALC LVOT AREA: 4.2 CM2
DOP CALC LVOT DIAMETER: 2.31 CM
DOP CALC LVOT PEAK VEL: 1.26 M/S
DOP CALC LVOT STROKE VOLUME: 133.08 CM3
DOP CALCLVOT PEAK VEL VTI: 31.77 CM
E WAVE DECELERATION TIME: 259.85 MSEC
E/A RATIO: 1.39
E/E' RATIO: 13.38 M/S
ECHO LV POSTERIOR WALL: 1.06 CM (ref 0.6–1.1)
FRACTIONAL SHORTENING: 35 % (ref 28–44)
INTERVENTRICULAR SEPTUM: 0.97 CM (ref 0.6–1.1)
LA MAJOR: 6.3 CM
LA MINOR: 6.01 CM
LA WIDTH: 4.81 CM
LEFT ATRIUM SIZE: 4.43 CM
LEFT ATRIUM VOLUME INDEX: 49.9 ML/M2
LEFT ATRIUM VOLUME: 111.42 CM3
LEFT INTERNAL DIMENSION IN SYSTOLE: 2.9 CM (ref 2.1–4)
LEFT VENTRICLE DIASTOLIC VOLUME INDEX: 39.96 ML/M2
LEFT VENTRICLE DIASTOLIC VOLUME: 89.21 ML
LEFT VENTRICLE MASS INDEX: 68 G/M2
LEFT VENTRICLE SYSTOLIC VOLUME INDEX: 14.4 ML/M2
LEFT VENTRICLE SYSTOLIC VOLUME: 32.17 ML
LEFT VENTRICULAR INTERNAL DIMENSION IN DIASTOLE: 4.43 CM (ref 3.5–6)
LEFT VENTRICULAR MASS: 152.56 G
LV LATERAL E/E' RATIO: 13.38 M/S
LV SEPTAL E/E' RATIO: 13.38 M/S
MV PEAK A VEL: 0.77 M/S
MV PEAK E VEL: 1.07 M/S
PISA TR MAX VEL: 2.87 M/S
PULM VEIN S/D RATIO: 1.02
PV PEAK D VEL: 0.54 M/S
PV PEAK S VEL: 0.55 M/S
RA MAJOR: 6.55 CM
RA PRESSURE: 3 MMHG
RA WIDTH: 5.4 CM
RIGHT VENTRICULAR END-DIASTOLIC DIMENSION: 4.08 CM
RV TISSUE DOPPLER FREE WALL SYSTOLIC VELOCITY 1 (APICAL 4 CHAMBER VIEW): 16.49 CM/S
SINUS: 3.14 CM
STJ: 2.44 CM
TDI LATERAL: 0.08 M/S
TDI SEPTAL: 0.08 M/S
TDI: 0.08 M/S
TR MAX PG: 33 MMHG
TRICUSPID ANNULAR PLANE SYSTOLIC EXCURSION: 3.15 CM
TV REST PULMONARY ARTERY PRESSURE: 36 MMHG

## 2019-12-06 PROCEDURE — 99213 OFFICE O/P EST LOW 20 MIN: CPT | Mod: S$GLB,,, | Performed by: INTERNAL MEDICINE

## 2019-12-06 PROCEDURE — 3078F DIAST BP <80 MM HG: CPT | Mod: CPTII,S$GLB,, | Performed by: INTERNAL MEDICINE

## 2019-12-06 PROCEDURE — 3078F PR MOST RECENT DIASTOLIC BLOOD PRESSURE < 80 MM HG: ICD-10-PCS | Mod: CPTII,S$GLB,, | Performed by: INTERNAL MEDICINE

## 2019-12-06 PROCEDURE — 1126F AMNT PAIN NOTED NONE PRSNT: CPT | Mod: S$GLB,,, | Performed by: INTERNAL MEDICINE

## 2019-12-06 PROCEDURE — 99999 PR PBB SHADOW E&M-EST. PATIENT-LVL IV: CPT | Mod: PBBFAC,,, | Performed by: INTERNAL MEDICINE

## 2019-12-06 PROCEDURE — 99999 PR PBB SHADOW E&M-EST. PATIENT-LVL IV: ICD-10-PCS | Mod: PBBFAC,,, | Performed by: INTERNAL MEDICINE

## 2019-12-06 PROCEDURE — 93306 ECHO (CUPID ONLY): ICD-10-PCS | Mod: S$GLB,,, | Performed by: INTERNAL MEDICINE

## 2019-12-06 PROCEDURE — 99213 PR OFFICE/OUTPT VISIT, EST, LEVL III, 20-29 MIN: ICD-10-PCS | Mod: S$GLB,,, | Performed by: INTERNAL MEDICINE

## 2019-12-06 PROCEDURE — 3075F SYST BP GE 130 - 139MM HG: CPT | Mod: CPTII,S$GLB,, | Performed by: INTERNAL MEDICINE

## 2019-12-06 PROCEDURE — 1159F PR MEDICATION LIST DOCUMENTED IN MEDICAL RECORD: ICD-10-PCS | Mod: S$GLB,,, | Performed by: INTERNAL MEDICINE

## 2019-12-06 PROCEDURE — 3075F PR MOST RECENT SYSTOLIC BLOOD PRESS GE 130-139MM HG: ICD-10-PCS | Mod: CPTII,S$GLB,, | Performed by: INTERNAL MEDICINE

## 2019-12-06 PROCEDURE — 1101F PR PT FALLS ASSESS DOC 0-1 FALLS W/OUT INJ PAST YR: ICD-10-PCS | Mod: CPTII,S$GLB,, | Performed by: INTERNAL MEDICINE

## 2019-12-06 PROCEDURE — 1126F PR PAIN SEVERITY QUANTIFIED, NO PAIN PRESENT: ICD-10-PCS | Mod: S$GLB,,, | Performed by: INTERNAL MEDICINE

## 2019-12-06 PROCEDURE — 1101F PT FALLS ASSESS-DOCD LE1/YR: CPT | Mod: CPTII,S$GLB,, | Performed by: INTERNAL MEDICINE

## 2019-12-06 PROCEDURE — 93306 TTE W/DOPPLER COMPLETE: CPT | Mod: S$GLB,,, | Performed by: INTERNAL MEDICINE

## 2019-12-06 PROCEDURE — 1159F MED LIST DOCD IN RCRD: CPT | Mod: S$GLB,,, | Performed by: INTERNAL MEDICINE

## 2019-12-06 PROCEDURE — 99499 UNLISTED E&M SERVICE: CPT | Mod: S$GLB,,, | Performed by: INTERNAL MEDICINE

## 2019-12-06 PROCEDURE — 99499 RISK ADDL DX/OHS AUDIT: ICD-10-PCS | Mod: S$GLB,,, | Performed by: INTERNAL MEDICINE

## 2019-12-06 NOTE — ASSESSMENT & PLAN NOTE
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   - SBE prophylaxis for life

## 2019-12-06 NOTE — PROGRESS NOTES
Subjective:    Patient ID:  Selena Moulton is a 79 y.o. female who presents for one-month TAVR follow-up.    Referring Physician: Dr. Alberto  NYHA: I CCS: 0    HPI/  Ms. Moulton is a 79 year-old female with a past medical history of HTN and HLD who follows in the [facility]. She is s/p successful placement of a 23 mm Cierra S3 TAVR via LTF access on 10/8/19. No PPM was needed. Patient states that her quality of life has greatly improved. Prior to TAVR she was unable to perform any ADLs without SOB. She is now able to accomplish housework and ADLs without symptoms. She was able to walk from the parking garage to clinic without SOB. Her mobility is slightly limited due to a cervical injury in the past. She denies CP, PE, palpitations, orthopnea, and PND     Review of Systems   Constitution: Negative for chills and fever.   Eyes: Negative for blurred vision.   Cardiovascular: Negative for chest pain, claudication, dyspnea on exertion, irregular heartbeat, leg swelling, orthopnea, palpitations and syncope.   Respiratory: Negative for cough.    Skin: Negative for itching, rash and suspicious lesions.   Musculoskeletal: Negative for falls.   Gastrointestinal: Negative for abdominal pain.   Neurological: Negative for disturbances in coordination, dizziness and loss of balance.   Psychiatric/Behavioral: Negative for altered mental status.        Objective:     Past Medical History:   Diagnosis Date    Anticoagulant long-term use     Aortic stenosis     Arthritis     Class 2 severe obesity due to excess calories with serious comorbidity and body mass index (BMI) of 39.0 to 39.9 in adult 9/13/2018    Gout     Hypertension      Current Outpatient Medications on File Prior to Visit   Medication Sig Dispense Refill    allopurinol (ZYLOPRIM) 300 MG tablet Take 1 tablet (300 mg total) by mouth once daily. 90 tablet 3    amLODIPine (NORVASC) 2.5 MG tablet TAKE 1 TABLET(2.5 MG) BY MOUTH EVERY DAY 90 tablet 0     aspirin (ECOTRIN) 81 MG EC tablet Take 81 mg by mouth once daily. Hold for 7 days before surgery      atenolol (TENORMIN) 100 MG tablet Take 0.5 tablets (50 mg total) by mouth once daily. 45 tablet 1    atorvastatin (LIPITOR) 40 MG tablet Take 1 tablet (40 mg total) by mouth every evening. (Patient taking differently: Take 40 mg by mouth once daily. ) 90 tablet 3    clopidogrel (PLAVIX) 75 mg tablet Take 1 tablet (75 mg total) by mouth once daily. 30 tablet 5    fish oil-omega-3 fatty acids 300-1,000 mg capsule Take 1 capsule by mouth once daily. Hold for 7 days before surgery      furosemide (LASIX) 20 MG tablet Take 1 tablet (20 mg total) by mouth daily as needed (As directed for weight gain). 30 tablet 3    irbesartan-hydrochlorothiazide (AVALIDE) 150-12.5 mg per tablet Take 1 tablet by mouth once daily. 90 tablet 3    magnesium oxide (MAG-OX) 400 mg (241.3 mg magnesium) tablet Take 1 tablet (400 mg total) by mouth once daily. 90 tablet 0    montelukast (SINGULAIR) 10 mg tablet Take 1 tablet (10 mg total) by mouth every evening. 30 tablet 0    multivitamin capsule Take 1 capsule by mouth once daily. Hold AM of surgery      pantoprazole (PROTONIX) 20 MG tablet Take 1 tablet (20 mg total) by mouth once daily. 90 tablet 3    acetaminophen (TYLENOL) 500 MG tablet Take 500 mg by mouth every 8 (eight) hours as needed.      albuterol (VENTOLIN HFA) 90 mcg/actuation inhaler Inhale 2 puffs into the lungs every 6 (six) hours as needed for Wheezing. Rescue (Patient not taking: Reported on 2019) 18 g 0    [] doxycycline (MONODOX) 100 MG capsule Take 1 capsule (100 mg total) by mouth every 12 (twelve) hours. for 10 days 20 capsule 0     No current facility-administered medications on file prior to visit.      Vitals:    19 1342 19 1345   BP: 137/60 131/60   BP Location: Left arm Right arm   Patient Position: Sitting Sitting   BP Method: Large (Automatic) Large (Automatic)   Pulse: 63 61  "  SpO2: 98%    Weight: 114.5 kg (252 lb 6.8 oz)    Height: 5' 7" (1.702 m)      Body mass index is 39.54 kg/m².   Physical Exam   Constitutional: She is oriented to person, place, and time. She appears well-developed and well-nourished. No distress.   HENT:   Head: Normocephalic and atraumatic.   Eyes: EOM are normal.   Neck: Normal range of motion.   Cardiovascular:   Murmur heard.   Harsh midsystolic murmur is present with a grade of 1/6 at the upper right sternal border.  Pulmonary/Chest: Effort normal and breath sounds normal. No respiratory distress.   Neurological: She is alert and oriented to person, place, and time.   Skin: She is not diaphoretic.   Nursing note and vitals reviewed.    Echo Today:  Normal left ventricular systolic function. The estimated ejection fraction is 65%  Concentric left ventricular remodeling.  Grade II (moderate) left ventricular diastolic dysfunction consistent with pseudonormalization.  No wall motion abnormalities.  Normal right ventricular systolic function.  Severe left atrial enlargement.  Normal central venous pressure (3 mm Hg).  There is a 23 mm Rodgers Cierra S3 transcutaneously-placed aortic bioprosthesis present. There is mild paravalvular aortic insufficiency present.  Mild tricuspid regurgitation.  Severe right atrial enlargement.  The estimated PA systolic pressure is 36 mm Hg        Assessment:       1. S/P TAVR (transcatheter aortic valve replacement)    2. Severe aortic stenosis    3. Acute on chronic heart failure with preserved ejection fraction    4. Essential hypertension         Plan:           S/P TAVR (transcatheter aortic valve replacement)  - Successful placement of a 23 mm Cierra S3 TAVR via LTF access  - mild PL on echo today  - Follow up Structural clinic in 1 year with labs and Echo   - Continue Plavix for 6 months following procedure and ASA therapy for life  - No dirty procedures which could cause endocarditis for 6 months after procedure including " dental work, endoscopy, colonoscopy, and  procedures.   - SBE prophylaxis for life       Essential hypertension  - Stable. Continue home meds and follow-up with Dr. Alberto.     Acute on chronic heart failure with preserved ejection fraction  - No signs of fluid overload on exam today.  - Continue Lasix PRN

## 2020-02-03 ENCOUNTER — LAB VISIT (OUTPATIENT)
Dept: LAB | Facility: HOSPITAL | Age: 80
End: 2020-02-03
Attending: FAMILY MEDICINE
Payer: MEDICARE

## 2020-02-03 DIAGNOSIS — N18.30 CHRONIC KIDNEY DISEASE, STAGE 3: Primary | ICD-10-CM

## 2020-02-03 DIAGNOSIS — E83.42 HYPOMAGNESEMIA: ICD-10-CM

## 2020-02-03 DIAGNOSIS — I10 ESSENTIAL HYPERTENSION: ICD-10-CM

## 2020-02-03 DIAGNOSIS — E78.49 OTHER HYPERLIPIDEMIA: ICD-10-CM

## 2020-02-03 LAB
ALBUMIN SERPL BCP-MCNC: 3.8 G/DL (ref 3.5–5.2)
ALP SERPL-CCNC: 115 U/L (ref 55–135)
ALT SERPL W/O P-5'-P-CCNC: 13 U/L (ref 10–44)
ANION GAP SERPL CALC-SCNC: 9 MMOL/L (ref 8–16)
AST SERPL-CCNC: 18 U/L (ref 10–40)
BILIRUB SERPL-MCNC: 0.4 MG/DL (ref 0.1–1)
BUN SERPL-MCNC: 34 MG/DL (ref 8–23)
CALCIUM SERPL-MCNC: 10.1 MG/DL (ref 8.7–10.5)
CHLORIDE SERPL-SCNC: 104 MMOL/L (ref 95–110)
CHOLEST SERPL-MCNC: 159 MG/DL (ref 120–199)
CHOLEST/HDLC SERPL: 3.4 {RATIO} (ref 2–5)
CO2 SERPL-SCNC: 28 MMOL/L (ref 23–29)
CREAT SERPL-MCNC: 1.3 MG/DL (ref 0.5–1.4)
EST. GFR  (AFRICAN AMERICAN): 44.5 ML/MIN/1.73 M^2
EST. GFR  (NON AFRICAN AMERICAN): 38.6 ML/MIN/1.73 M^2
GLUCOSE SERPL-MCNC: 99 MG/DL (ref 70–110)
HDLC SERPL-MCNC: 47 MG/DL (ref 40–75)
HDLC SERPL: 29.6 % (ref 20–50)
LDLC SERPL CALC-MCNC: 89.6 MG/DL (ref 63–159)
MAGNESIUM SERPL-MCNC: 1.9 MG/DL (ref 1.6–2.6)
NONHDLC SERPL-MCNC: 112 MG/DL
POTASSIUM SERPL-SCNC: 4.4 MMOL/L (ref 3.5–5.1)
PROT SERPL-MCNC: 7.1 G/DL (ref 6–8.4)
SODIUM SERPL-SCNC: 141 MMOL/L (ref 136–145)
TRIGL SERPL-MCNC: 112 MG/DL (ref 30–150)

## 2020-02-03 PROCEDURE — 36415 COLL VENOUS BLD VENIPUNCTURE: CPT | Mod: PO

## 2020-02-03 PROCEDURE — 83735 ASSAY OF MAGNESIUM: CPT

## 2020-02-03 PROCEDURE — 80053 COMPREHEN METABOLIC PANEL: CPT

## 2020-02-03 PROCEDURE — 80061 LIPID PANEL: CPT

## 2020-02-08 ENCOUNTER — PATIENT OUTREACH (OUTPATIENT)
Dept: ADMINISTRATIVE | Facility: OTHER | Age: 80
End: 2020-02-08

## 2020-02-08 NOTE — PROGRESS NOTES
Chart reviewed.   Immunizations: Triggered Imm Registry     Orders placed: n/a  Upcoming appts to satisfy TK topics: n/a

## 2020-02-11 ENCOUNTER — OFFICE VISIT (OUTPATIENT)
Dept: NEPHROLOGY | Facility: CLINIC | Age: 80
End: 2020-02-11
Payer: MEDICARE

## 2020-02-11 VITALS
SYSTOLIC BLOOD PRESSURE: 140 MMHG | DIASTOLIC BLOOD PRESSURE: 85 MMHG | BODY MASS INDEX: 40.07 KG/M2 | HEART RATE: 83 BPM | WEIGHT: 255.31 LBS | HEIGHT: 67 IN

## 2020-02-11 DIAGNOSIS — E78.49 OTHER HYPERLIPIDEMIA: ICD-10-CM

## 2020-02-11 DIAGNOSIS — R73.9 HYPERGLYCEMIA: ICD-10-CM

## 2020-02-11 DIAGNOSIS — Z95.2 S/P TAVR (TRANSCATHETER AORTIC VALVE REPLACEMENT): ICD-10-CM

## 2020-02-11 DIAGNOSIS — N18.30 STAGE 3 CHRONIC KIDNEY DISEASE: Primary | ICD-10-CM

## 2020-02-11 DIAGNOSIS — I35.0 SEVERE AORTIC STENOSIS: ICD-10-CM

## 2020-02-11 DIAGNOSIS — I10 ESSENTIAL HYPERTENSION: Primary | ICD-10-CM

## 2020-02-11 DIAGNOSIS — M1A.0720 IDIOPATHIC CHRONIC GOUT OF LEFT FOOT WITHOUT TOPHUS: ICD-10-CM

## 2020-02-11 DIAGNOSIS — I10 ESSENTIAL HYPERTENSION: ICD-10-CM

## 2020-02-11 DIAGNOSIS — E83.42 HYPOMAGNESEMIA: ICD-10-CM

## 2020-02-11 DIAGNOSIS — E66.01 CLASS 2 SEVERE OBESITY DUE TO EXCESS CALORIES WITH SERIOUS COMORBIDITY AND BODY MASS INDEX (BMI) OF 39.0 TO 39.9 IN ADULT: ICD-10-CM

## 2020-02-11 DIAGNOSIS — R73.03 PRE-DIABETES: ICD-10-CM

## 2020-02-11 PROCEDURE — 1126F PR PAIN SEVERITY QUANTIFIED, NO PAIN PRESENT: ICD-10-PCS | Mod: S$GLB,,, | Performed by: INTERNAL MEDICINE

## 2020-02-11 PROCEDURE — 99204 PR OFFICE/OUTPT VISIT, NEW, LEVL IV, 45-59 MIN: ICD-10-PCS | Mod: S$GLB,,, | Performed by: INTERNAL MEDICINE

## 2020-02-11 PROCEDURE — 99204 OFFICE O/P NEW MOD 45 MIN: CPT | Mod: S$GLB,,, | Performed by: INTERNAL MEDICINE

## 2020-02-11 PROCEDURE — 1159F PR MEDICATION LIST DOCUMENTED IN MEDICAL RECORD: ICD-10-PCS | Mod: S$GLB,,, | Performed by: INTERNAL MEDICINE

## 2020-02-11 PROCEDURE — 3077F PR MOST RECENT SYSTOLIC BLOOD PRESSURE >= 140 MM HG: ICD-10-PCS | Mod: CPTII,S$GLB,, | Performed by: INTERNAL MEDICINE

## 2020-02-11 PROCEDURE — 1101F PR PT FALLS ASSESS DOC 0-1 FALLS W/OUT INJ PAST YR: ICD-10-PCS | Mod: CPTII,S$GLB,, | Performed by: INTERNAL MEDICINE

## 2020-02-11 PROCEDURE — 1101F PT FALLS ASSESS-DOCD LE1/YR: CPT | Mod: CPTII,S$GLB,, | Performed by: INTERNAL MEDICINE

## 2020-02-11 PROCEDURE — 99999 PR PBB SHADOW E&M-EST. PATIENT-LVL III: ICD-10-PCS | Mod: PBBFAC,,, | Performed by: INTERNAL MEDICINE

## 2020-02-11 PROCEDURE — 3079F DIAST BP 80-89 MM HG: CPT | Mod: CPTII,S$GLB,, | Performed by: INTERNAL MEDICINE

## 2020-02-11 PROCEDURE — 99999 PR PBB SHADOW E&M-EST. PATIENT-LVL III: CPT | Mod: PBBFAC,,, | Performed by: INTERNAL MEDICINE

## 2020-02-11 PROCEDURE — 1126F AMNT PAIN NOTED NONE PRSNT: CPT | Mod: S$GLB,,, | Performed by: INTERNAL MEDICINE

## 2020-02-11 PROCEDURE — 1159F MED LIST DOCD IN RCRD: CPT | Mod: S$GLB,,, | Performed by: INTERNAL MEDICINE

## 2020-02-11 PROCEDURE — 3079F PR MOST RECENT DIASTOLIC BLOOD PRESSURE 80-89 MM HG: ICD-10-PCS | Mod: CPTII,S$GLB,, | Performed by: INTERNAL MEDICINE

## 2020-02-11 PROCEDURE — 3077F SYST BP >= 140 MM HG: CPT | Mod: CPTII,S$GLB,, | Performed by: INTERNAL MEDICINE

## 2020-02-11 RX ORDER — AMLODIPINE BESYLATE 2.5 MG/1
TABLET ORAL
Qty: 90 TABLET | Refills: 0 | Status: SHIPPED | OUTPATIENT
Start: 2020-02-11 | End: 2020-05-13

## 2020-02-11 NOTE — LETTER
February 20, 2020      Aby Perales MD  [address] [facility] [address] [place] [address]           [facility]  [address] [place] [address]  Phone: [phone]          Patient: Selena Moulton   MR Number: [id number]   YOB: 1940   Date of Visit: 2/11/2020       Dear Dr. Aby Perales:    Thank you for referring Selena Moulton to me for evaluation. Attached you will find relevant portions of my assessment and plan of care.    If you have questions, please do not hesitate to call me. I look forward to following Selena Moulton along with you.    Sincerely,    Omar Miner  CC:  No Recipients    If you would like to receive this communication electronically, please contact [email or web address] or [phone] to request more information on [employer] access.    For providers and/or their staff who would like to refer a patient to [facility], please contact us through our one-stop-shop provider referral line, Allan Haji, at [phone].    If you feel you have received this communication in error or would no longer like to receive these types of communications, please e-mail [email or web address]

## 2020-02-11 NOTE — PROGRESS NOTES
Subjective:       Patient ID: Selena Moulton is a 79 y.o. White female who presents for new evaluation of Consult (Refer by PCP) and Chronic Kidney Disease    HPI     Referred by her PCP for CKD Stage since 2017 in the setting of HTN, gout, no DM     She denies foamy urine, no gross hematuria. No NSAID use, Tylenol only as needed. Lasix is prn, never needed daily for edema. She follows a low sodium diet.  She feels she stays hydrated with water and 2 diet Pepsis per day. She tells me she enjoys a healthy diet. Negative family history for kidney disease. No family history of kidney disease. She is maintained on a PPI    Review of Systems   Constitutional: Negative for activity change, appetite change, fatigue and unexpected weight change.   HENT: Negative for facial swelling.    Eyes: Negative for visual disturbance.   Respiratory: Negative for shortness of breath.    Cardiovascular: Negative for chest pain and leg swelling.   Gastrointestinal: Negative for constipation and diarrhea.   Endocrine: Negative for polyuria.   Genitourinary: Negative for difficulty urinating, dysuria and flank pain.   Musculoskeletal: Positive for back pain. Negative for arthralgias.   Skin: Negative for rash.   Allergic/Immunologic: Negative for immunocompromised state.   Neurological: Negative for weakness and headaches.   Hematological: Does not bruise/bleed easily.   Psychiatric/Behavioral: Negative for decreased concentration.       Objective:      Physical Exam   Constitutional: She is oriented to person, place, and time. She appears well-nourished. No distress.   HENT:   Mouth/Throat: Oropharynx is clear and moist.   Eyes: No scleral icterus.   Neck: No JVD present.   Cardiovascular: S1 normal and S2 normal. Exam reveals no friction rub.   Pulmonary/Chest: Breath sounds normal. She has no rales.   Abdominal: Soft.   Musculoskeletal: She exhibits no edema.   Neurological: She is alert and oriented to person, place, and time.    Skin: Skin is warm and dry.   Psychiatric: She has a normal mood and affect.   Nursing note and vitals reviewed.      Assessment:       1. Stage 3 chronic kidney disease    2. Pre-diabetes  Hba1c 5.7-6%    3. Hyperglycemia    4. Essential hypertension    5. Other hyperlipidemia    6. Severe aortic stenosis    7. S/P TAVR (transcatheter aortic valve replacement)    8. Hypomagnesemia    9. Idiopathic chronic gout of left foot without tophus    10. Class 2 severe obesity due to excess calories with serious comorbidity and body mass index (BMI) of 39.0 to 39.9 in adult        Plan:           CKD appearing to be at Stage 3 with several risk factors. Given he stability since 2017 and advanced age will defer an aggressive work up unless her kidney function declines significantly.   - Continue RAAS blockade for renal preservation  - Patient was advised to avoid NSAIDs, continue a low sodium diet, and ensure hydration    HTN  - controlled    Gout  - continue allopurinol     GI  - discussed PPI use. She will stop but if need then change to QOD      RTC 4mo

## 2020-02-17 DIAGNOSIS — I10 ESSENTIAL HYPERTENSION: ICD-10-CM

## 2020-02-17 RX ORDER — ATENOLOL 100 MG/1
50 TABLET ORAL DAILY
Qty: 45 TABLET | Refills: 3 | Status: SHIPPED | OUTPATIENT
Start: 2020-02-17 | End: 2021-02-09

## 2020-02-17 NOTE — PROGRESS NOTES
Refill Authorization Note     is requesting a refill authorization.    Brief assessment and rationale for refill: APPROVE: prr          Medication Therapy Plan: BP-elevated at Nephrology OV, previous 2 readings wnl; Approve 12 more months                              Comments:   Requested Prescriptions   Pending Prescriptions Disp Refills    atenoloL (TENORMIN) 100 MG tablet 45 tablet 3     Sig: Take 0.5 tablets (50 mg total) by mouth once daily.       Cardiovascular:  Beta Blockers Failed - 2/17/2020 11:23 AM        Failed - Last BP in normal range within 360 days.     BP Readings from Last 3 Encounters:   02/11/20 (!) 140/85   12/06/19 114/60   12/06/19 131/60              Passed - Patient is at least 18 years old        Passed - Last Heart Rate in normal range within 360 days.     Pulse Readings from Last 3 Encounters:   02/11/20 83   12/06/19 68   12/06/19 61             Passed - Office visit in past 12 months or future 90 days.     Recent Outpatient Visits            6 days ago Stage 3 chronic kidney disease    [facility] Brian Krishnan MD    2 months ago S/P TAVR (transcatheter aortic valve replacement)    [facility] Erika Cardenas MD    2 months ago Hypomagnesemia    [facility] Aby Perales MD    4 months ago Nodular calcific aortic valve stenosis    [facility]    5 months ago Acute idiopathic gout of left foot    [facility] Aby Perales MD          Future Appointments              In 3 months Aby Perales MD [facility]    In 3 months LAB, [place] [facility]    In 3 months [facility]                 Appointments  past 12m or future 3m with PCP    Date Provider   Last Visit   11/25/2019 Aby Perales MD   Next Visit   5/25/2020 Aby Perales MD   .  ED visits in past 90 days: 0       Note  composed:2:16 PM 02/17/2020

## 2020-05-13 DIAGNOSIS — I10 ESSENTIAL HYPERTENSION: ICD-10-CM

## 2020-05-13 RX ORDER — AMLODIPINE BESYLATE 2.5 MG/1
TABLET ORAL
Qty: 90 TABLET | Refills: 0 | Status: SHIPPED | OUTPATIENT
Start: 2020-05-13 | End: 2020-07-21 | Stop reason: SDUPTHER

## 2020-05-28 ENCOUNTER — TELEPHONE (OUTPATIENT)
Dept: CARDIOLOGY | Facility: CLINIC | Age: 80
End: 2020-05-28

## 2020-05-28 NOTE — TELEPHONE ENCOUNTER
----- Message from Che Stovall MA sent at 5/28/2020  1:26 PM CDT -----  Contact: patient  Type:  Sooner Apoointment Request    Caller is requesting a sooner appointment.      Name of Caller:  Selena  When is the first available appointment?  August 27  Symptoms:  F/u surgery - had to be postponed due to covid-19  Best Call Back Number:    Additional Information:

## 2020-06-15 ENCOUNTER — PATIENT OUTREACH (OUTPATIENT)
Dept: ADMINISTRATIVE | Facility: OTHER | Age: 80
End: 2020-06-15

## 2020-06-17 ENCOUNTER — OFFICE VISIT (OUTPATIENT)
Dept: NEPHROLOGY | Facility: CLINIC | Age: 80
End: 2020-06-17
Payer: MEDICARE

## 2020-06-17 VITALS
WEIGHT: 256.19 LBS | HEART RATE: 83 BPM | DIASTOLIC BLOOD PRESSURE: 68 MMHG | HEIGHT: 67 IN | SYSTOLIC BLOOD PRESSURE: 110 MMHG | BODY MASS INDEX: 40.21 KG/M2

## 2020-06-17 DIAGNOSIS — I35.0 SEVERE AORTIC STENOSIS: ICD-10-CM

## 2020-06-17 DIAGNOSIS — E66.01 CLASS 2 SEVERE OBESITY DUE TO EXCESS CALORIES WITH SERIOUS COMORBIDITY AND BODY MASS INDEX (BMI) OF 39.0 TO 39.9 IN ADULT: ICD-10-CM

## 2020-06-17 DIAGNOSIS — I10 ESSENTIAL HYPERTENSION: ICD-10-CM

## 2020-06-17 DIAGNOSIS — E83.42 HYPOMAGNESEMIA: ICD-10-CM

## 2020-06-17 DIAGNOSIS — N18.30 STAGE 3 CHRONIC KIDNEY DISEASE: Primary | ICD-10-CM

## 2020-06-17 DIAGNOSIS — Z95.2 S/P TAVR (TRANSCATHETER AORTIC VALVE REPLACEMENT): ICD-10-CM

## 2020-06-17 DIAGNOSIS — M1A.0720 IDIOPATHIC CHRONIC GOUT OF LEFT FOOT WITHOUT TOPHUS: ICD-10-CM

## 2020-06-17 DIAGNOSIS — R73.03 PRE-DIABETES: ICD-10-CM

## 2020-06-17 DIAGNOSIS — E78.49 OTHER HYPERLIPIDEMIA: ICD-10-CM

## 2020-06-17 DIAGNOSIS — R73.9 HYPERGLYCEMIA: ICD-10-CM

## 2020-06-17 PROCEDURE — 99999 PR PBB SHADOW E&M-EST. PATIENT-LVL IV: ICD-10-PCS | Mod: PBBFAC,,, | Performed by: INTERNAL MEDICINE

## 2020-06-17 PROCEDURE — 1101F PR PT FALLS ASSESS DOC 0-1 FALLS W/OUT INJ PAST YR: ICD-10-PCS | Mod: CPTII,S$GLB,, | Performed by: INTERNAL MEDICINE

## 2020-06-17 PROCEDURE — 1159F PR MEDICATION LIST DOCUMENTED IN MEDICAL RECORD: ICD-10-PCS | Mod: S$GLB,,, | Performed by: INTERNAL MEDICINE

## 2020-06-17 PROCEDURE — 1126F PR PAIN SEVERITY QUANTIFIED, NO PAIN PRESENT: ICD-10-PCS | Mod: S$GLB,,, | Performed by: INTERNAL MEDICINE

## 2020-06-17 PROCEDURE — 99214 PR OFFICE/OUTPT VISIT, EST, LEVL IV, 30-39 MIN: ICD-10-PCS | Mod: S$GLB,,, | Performed by: INTERNAL MEDICINE

## 2020-06-17 PROCEDURE — 1126F AMNT PAIN NOTED NONE PRSNT: CPT | Mod: S$GLB,,, | Performed by: INTERNAL MEDICINE

## 2020-06-17 PROCEDURE — 3078F PR MOST RECENT DIASTOLIC BLOOD PRESSURE < 80 MM HG: ICD-10-PCS | Mod: CPTII,S$GLB,, | Performed by: INTERNAL MEDICINE

## 2020-06-17 PROCEDURE — 1101F PT FALLS ASSESS-DOCD LE1/YR: CPT | Mod: CPTII,S$GLB,, | Performed by: INTERNAL MEDICINE

## 2020-06-17 PROCEDURE — 3074F SYST BP LT 130 MM HG: CPT | Mod: CPTII,S$GLB,, | Performed by: INTERNAL MEDICINE

## 2020-06-17 PROCEDURE — 3078F DIAST BP <80 MM HG: CPT | Mod: CPTII,S$GLB,, | Performed by: INTERNAL MEDICINE

## 2020-06-17 PROCEDURE — 3074F PR MOST RECENT SYSTOLIC BLOOD PRESSURE < 130 MM HG: ICD-10-PCS | Mod: CPTII,S$GLB,, | Performed by: INTERNAL MEDICINE

## 2020-06-17 PROCEDURE — 99999 PR PBB SHADOW E&M-EST. PATIENT-LVL IV: CPT | Mod: PBBFAC,,, | Performed by: INTERNAL MEDICINE

## 2020-06-17 PROCEDURE — 1159F MED LIST DOCD IN RCRD: CPT | Mod: S$GLB,,, | Performed by: INTERNAL MEDICINE

## 2020-06-17 PROCEDURE — 99214 OFFICE O/P EST MOD 30 MIN: CPT | Mod: S$GLB,,, | Performed by: INTERNAL MEDICINE

## 2020-06-17 NOTE — PROGRESS NOTES
Subjective:       Patient ID: Selena Moulton is a 79 y.o. White female who presents for new evaluation of Follow-up    Follow-up  Associated symptoms include arthralgias. Pertinent negatives include no chest pain, fatigue, headaches, rash or weakness.        Referred by her PCP for CKD Stage since 2017 in the setting of HTN, gout, no DM   She denies foamy urine, no gross hematuria. No NSAID use, Tylenol only as needed. Lasix is prn, never needed daily for edema. She follows a low sodium diet.  She feels she stays hydrated with water and 2 diet Pepsis per day. She tells me she enjoys a healthy diet. Negative family history for kidney disease. No family history of kidney disease. She is maintained on a PPI    June 2020:  She is feeling overall well. She lost her sister in early March and now her niece is dying. No GI problems, No LUTS    Review of Systems   Constitutional: Negative for activity change, appetite change, fatigue and unexpected weight change.   HENT: Negative for facial swelling.    Eyes: Negative for visual disturbance.   Respiratory: Negative for shortness of breath.    Cardiovascular: Positive for leg swelling (mild and intermittent). Negative for chest pain.   Gastrointestinal: Negative for constipation and diarrhea.   Endocrine: Negative for polyuria.   Genitourinary: Positive for frequency. Negative for difficulty urinating, dysuria and flank pain.   Musculoskeletal: Positive for arthralgias, back pain and gait problem.   Skin: Negative for rash.   Allergic/Immunologic: Positive for immunocompromised state.   Neurological: Negative for weakness and headaches.   Hematological: Does not bruise/bleed easily.   Psychiatric/Behavioral: Negative for decreased concentration.       Objective:      Physical Exam  Vitals signs and nursing note reviewed.   Constitutional:       General: She is not in acute distress.     Appearance: She is not ill-appearing.   HENT:      Mouth/Throat:      Mouth: Mucous  membranes are moist.   Eyes:      General: No scleral icterus.  Neck:      Vascular: No JVD.   Cardiovascular:      Rate and Rhythm: Normal rate.      Heart sounds: S1 normal and S2 normal. No friction rub.   Pulmonary:      Breath sounds: Normal breath sounds. No wheezing or rales.   Abdominal:      Palpations: Abdomen is soft.   Musculoskeletal:      Right lower leg: No edema.      Left lower leg: No edema.   Skin:     General: Skin is warm and dry.      Coloration: Skin is not jaundiced.   Neurological:      General: No focal deficit present.      Mental Status: She is alert and oriented to person, place, and time. Mental status is at baseline.   Psychiatric:         Mood and Affect: Mood normal.         Thought Content: Thought content normal.          Assessment:       1. Stage 3 chronic kidney disease    2. Pre-diabetes  Hba1c 5.7-6%    3. Hyperglycemia    4. Essential hypertension    5. Other hyperlipidemia    6. Severe aortic stenosis    7. S/P TAVR (transcatheter aortic valve replacement)    8. Hypomagnesemia    9. Idiopathic chronic gout of left foot without tophus    10. Class 2 severe obesity due to excess calories with serious comorbidity and body mass index (BMI) of 39.0 to 39.9 in adult        Plan:           CKD at Stage 3 with several risk factors.   (Given hre stability since 2017 and advanced age will defer an aggressive work up unless her kidney function declines significantly)  - Continue RAAS blockade for renal preservation  - discussed lab results in detail   - Patient was advised to avoid NSAIDs, continue a low sodium diet, and ensure hydration    HTN  - controlled    Gout  - continue allopurinol     GI  - discussed PPI use. She resumed QD      RTC 6 mo

## 2020-06-19 DIAGNOSIS — Z95.2 S/P TAVR (TRANSCATHETER AORTIC VALVE REPLACEMENT): Primary | ICD-10-CM

## 2020-06-19 RX ORDER — FUROSEMIDE 20 MG/1
20 TABLET ORAL DAILY PRN
Qty: 30 TABLET | Refills: 3 | Status: CANCELLED | OUTPATIENT
Start: 2020-06-19 | End: 2021-06-19

## 2020-07-09 ENCOUNTER — PATIENT OUTREACH (OUTPATIENT)
Dept: ADMINISTRATIVE | Facility: OTHER | Age: 80
End: 2020-07-09

## 2020-07-10 ENCOUNTER — OFFICE VISIT (OUTPATIENT)
Dept: CARDIOLOGY | Facility: CLINIC | Age: 80
End: 2020-07-10
Payer: MEDICARE

## 2020-07-10 ENCOUNTER — LAB VISIT (OUTPATIENT)
Dept: LAB | Facility: HOSPITAL | Age: 80
End: 2020-07-10
Attending: INTERNAL MEDICINE
Payer: MEDICARE

## 2020-07-10 VITALS
HEIGHT: 67 IN | HEART RATE: 88 BPM | DIASTOLIC BLOOD PRESSURE: 86 MMHG | WEIGHT: 252.88 LBS | BODY MASS INDEX: 39.69 KG/M2 | SYSTOLIC BLOOD PRESSURE: 129 MMHG

## 2020-07-10 DIAGNOSIS — E78.00 PURE HYPERCHOLESTEROLEMIA: ICD-10-CM

## 2020-07-10 DIAGNOSIS — E66.01 CLASS 2 SEVERE OBESITY DUE TO EXCESS CALORIES WITH SERIOUS COMORBIDITY AND BODY MASS INDEX (BMI) OF 39.0 TO 39.9 IN ADULT: ICD-10-CM

## 2020-07-10 DIAGNOSIS — Z79.02 LONG TERM (CURRENT) USE OF ANTITHROMBOTICS/ANTIPLATELETS: ICD-10-CM

## 2020-07-10 DIAGNOSIS — N18.30 STAGE 3 CHRONIC KIDNEY DISEASE: ICD-10-CM

## 2020-07-10 DIAGNOSIS — R73.9 HYPERGLYCEMIA: ICD-10-CM

## 2020-07-10 DIAGNOSIS — I10 ESSENTIAL HYPERTENSION: Primary | ICD-10-CM

## 2020-07-10 DIAGNOSIS — Z95.2 S/P TAVR (TRANSCATHETER AORTIC VALVE REPLACEMENT): ICD-10-CM

## 2020-07-10 LAB
ALBUMIN SERPL BCP-MCNC: 3.9 G/DL (ref 3.5–5.2)
ANION GAP SERPL CALC-SCNC: 12 MMOL/L (ref 8–16)
BUN SERPL-MCNC: 19 MG/DL (ref 8–23)
CALCIUM SERPL-MCNC: 10.2 MG/DL (ref 8.7–10.5)
CHLORIDE SERPL-SCNC: 106 MMOL/L (ref 95–110)
CO2 SERPL-SCNC: 23 MMOL/L (ref 23–29)
CREAT SERPL-MCNC: 1.1 MG/DL (ref 0.5–1.4)
EST. GFR  (AFRICAN AMERICAN): 54.4 ML/MIN/1.73 M^2
EST. GFR  (NON AFRICAN AMERICAN): 47.2 ML/MIN/1.73 M^2
ESTIMATED AVG GLUCOSE: 117 MG/DL (ref 68–131)
GLUCOSE SERPL-MCNC: 105 MG/DL (ref 70–110)
HBA1C MFR BLD HPLC: 5.7 % (ref 4–5.6)
PHOSPHATE SERPL-MCNC: 3.1 MG/DL (ref 2.7–4.5)
POTASSIUM SERPL-SCNC: 4 MMOL/L (ref 3.5–5.1)
PTH-INTACT SERPL-MCNC: 233 PG/ML (ref 9–77)
SODIUM SERPL-SCNC: 141 MMOL/L (ref 136–145)

## 2020-07-10 PROCEDURE — 1101F PR PT FALLS ASSESS DOC 0-1 FALLS W/OUT INJ PAST YR: ICD-10-PCS | Mod: CPTII,S$GLB,, | Performed by: INTERNAL MEDICINE

## 2020-07-10 PROCEDURE — 1126F PR PAIN SEVERITY QUANTIFIED, NO PAIN PRESENT: ICD-10-PCS | Mod: S$GLB,,, | Performed by: INTERNAL MEDICINE

## 2020-07-10 PROCEDURE — 3079F PR MOST RECENT DIASTOLIC BLOOD PRESSURE 80-89 MM HG: ICD-10-PCS | Mod: CPTII,S$GLB,, | Performed by: INTERNAL MEDICINE

## 2020-07-10 PROCEDURE — 3074F PR MOST RECENT SYSTOLIC BLOOD PRESSURE < 130 MM HG: ICD-10-PCS | Mod: CPTII,S$GLB,, | Performed by: INTERNAL MEDICINE

## 2020-07-10 PROCEDURE — 3074F SYST BP LT 130 MM HG: CPT | Mod: CPTII,S$GLB,, | Performed by: INTERNAL MEDICINE

## 2020-07-10 PROCEDURE — 83970 ASSAY OF PARATHORMONE: CPT

## 2020-07-10 PROCEDURE — 1159F MED LIST DOCD IN RCRD: CPT | Mod: S$GLB,,, | Performed by: INTERNAL MEDICINE

## 2020-07-10 PROCEDURE — 99214 OFFICE O/P EST MOD 30 MIN: CPT | Mod: S$GLB,,, | Performed by: INTERNAL MEDICINE

## 2020-07-10 PROCEDURE — 1101F PT FALLS ASSESS-DOCD LE1/YR: CPT | Mod: CPTII,S$GLB,, | Performed by: INTERNAL MEDICINE

## 2020-07-10 PROCEDURE — 83036 HEMOGLOBIN GLYCOSYLATED A1C: CPT

## 2020-07-10 PROCEDURE — 99999 PR PBB SHADOW E&M-EST. PATIENT-LVL IV: CPT | Mod: PBBFAC,,, | Performed by: INTERNAL MEDICINE

## 2020-07-10 PROCEDURE — 80069 RENAL FUNCTION PANEL: CPT

## 2020-07-10 PROCEDURE — 1126F AMNT PAIN NOTED NONE PRSNT: CPT | Mod: S$GLB,,, | Performed by: INTERNAL MEDICINE

## 2020-07-10 PROCEDURE — 99214 PR OFFICE/OUTPT VISIT, EST, LEVL IV, 30-39 MIN: ICD-10-PCS | Mod: S$GLB,,, | Performed by: INTERNAL MEDICINE

## 2020-07-10 PROCEDURE — 36415 COLL VENOUS BLD VENIPUNCTURE: CPT | Mod: PO

## 2020-07-10 PROCEDURE — 1159F PR MEDICATION LIST DOCUMENTED IN MEDICAL RECORD: ICD-10-PCS | Mod: S$GLB,,, | Performed by: INTERNAL MEDICINE

## 2020-07-10 PROCEDURE — 99999 PR PBB SHADOW E&M-EST. PATIENT-LVL IV: ICD-10-PCS | Mod: PBBFAC,,, | Performed by: INTERNAL MEDICINE

## 2020-07-10 PROCEDURE — 3079F DIAST BP 80-89 MM HG: CPT | Mod: CPTII,S$GLB,, | Performed by: INTERNAL MEDICINE

## 2020-07-10 RX ORDER — CLOPIDOGREL BISULFATE 75 MG/1
75 TABLET ORAL DAILY
Qty: 30 TABLET | Refills: 5 | Status: SHIPPED | OUTPATIENT
Start: 2020-07-10 | End: 2020-12-15

## 2020-07-10 NOTE — PROGRESS NOTES
Subjective:    Patient ID:  Selena Moulton is a 79 y.o. female who presents for Hypertension and Valvular Heart Disease        HPI    DISCUSSED LABS AND GOALS CMP OK, LDL 89, HAD TAVR IN October, ECHO IN DEC OK, DOING WELL, BREATHING BETTER, SISTER PASSED AWAY, NIECE WITH CANCER, SEE ROS  Past Medical History:   Diagnosis Date    Anticoagulant long-term use     Aortic stenosis     Arthritis     Class 2 severe obesity due to excess calories with serious comorbidity and body mass index (BMI) of 39.0 to 39.9 in adult 9/13/2018    Gout     Hypertension      Past Surgical History:   Procedure Laterality Date    AORTIC VALVE REPLACEMENT N/A 10/8/2019    Procedure: Replacement-valve-aortic;  Surgeon: Fidel Cardenas MD;  Location: [facility] CATH LAB;  Service: Cardiology;  Laterality: N/A;    BACK SURGERY      bone graft neck    CARPAL TUNNEL RELEASE Left 10/3/2018    Procedure: RELEASE, CARPAL TUNNEL  LEFT;  Surgeon: Meche Cárdenas MD;  Location: [facility];  Service: Neurosurgery;  Laterality: Left;    CARPAL TUNNEL RELEASE Right 1/17/2019    Procedure: RELEASE, CARPAL TUNNEL RIGHT;  Surgeon: Meche Cárdenas MD;  Location: [facility];  Service: Neurosurgery;  Laterality: Right;    CATHETERIZATION OF BOTH LEFT AND RIGHT HEART N/A 7/30/2019    Procedure: CATHETERIZATION, HEART, BOTH LEFT AND RIGHT;  Surgeon: Terrie Alberto MD;  Location: [facility] CATH;  Service: Cardiology;  Laterality: N/A;    CERVICAL FUSION  1984    x2     CORONARY ANGIOGRAPHY N/A 7/30/2019    Procedure: ANGIOGRAM, CORONARY ARTERY;  Surgeon: Terrie Alberto MD;  Location: [facility] CATH;  Service: Cardiology;  Laterality: N/A;    HERNIA REPAIR      umbilical    HYSTERECTOMY      TONSILLECTOMY       Family History   Problem Relation Age of Onset    Cancer Mother     Stroke Mother     Hypertension Mother     Ovarian cancer Mother     No Known Problems Father      Social History     Socioeconomic History    Marital status:       Spouse name: Not on file    Number of children: Not on file    Years of education: Not on file    Highest education level: Not on file   Occupational History    Not on file   Social Needs    Financial resource strain: Not on file    Food insecurity     Worry: Not on file     Inability: Not on file    Transportation needs     Medical: Not on file     Non-medical: Not on file   Tobacco Use    Smoking status: Never Smoker    Smokeless tobacco: Never Used   Substance and Sexual Activity    Alcohol use: No     Frequency: Never    Drug use: No    Sexual activity: Not on file   Lifestyle    Physical activity     Days per week: Not on file     Minutes per session: Not on file    Stress: Not on file   Relationships    Social connections     Talks on phone: Not on file     Gets together: Not on file     Attends Yazdanism service: Not on file     Active member of club or organization: Not on file     Attends meetings of clubs or organizations: Not on file     Relationship status: Not on file   Other Topics Concern    Not on file   Social History Narrative    Not on file       Review of patient's allergies indicates:   Allergen Reactions    Influenza virus vaccines Nausea And Vomiting    Iodine and iodide containing products     Morpholine analogues Nausea And Vomiting    Penicillins Other (See Comments)     High fever as a child, also was allergy tested 1980    Latex, natural rubber Rash    Tetanus vaccines and toxoid Rash       Current Outpatient Medications:     acetaminophen (TYLENOL) 500 MG tablet, Take 500 mg by mouth every 8 (eight) hours as needed., Disp: , Rfl:     allopurinoL (ZYLOPRIM) 300 MG tablet, TAKE 1 TABLET(300 MG) BY MOUTH EVERY DAY, Disp: 90 tablet, Rfl: 1    amLODIPine (NORVASC) 2.5 MG tablet, TAKE 1 TABLET(2.5 MG) BY MOUTH EVERY DAY, Disp: 90 tablet, Rfl: 0    aspirin (ECOTRIN) 81 MG EC tablet, Take 81 mg by mouth once daily. Hold for 7 days before surgery, Disp: , Rfl:      atenoloL (TENORMIN) 100 MG tablet, Take 0.5 tablets (50 mg total) by mouth once daily., Disp: 45 tablet, Rfl: 3    atorvastatin (LIPITOR) 40 MG tablet, Take 1 tablet (40 mg total) by mouth every evening. (Patient taking differently: Take 40 mg by mouth once daily. ), Disp: 90 tablet, Rfl: 3    fish oil-omega-3 fatty acids 300-1,000 mg capsule, Take 1 capsule by mouth once daily. Hold for 7 days before surgery, Disp: , Rfl:     furosemide (LASIX) 20 MG tablet, Take 1 tablet (20 mg total) by mouth daily as needed (As directed for weight gain)., Disp: 30 tablet, Rfl: 3    irbesartan-hydrochlorothiazide (AVALIDE) 150-12.5 mg per tablet, Take 1 tablet by mouth once daily., Disp: 90 tablet, Rfl: 3    multivitamin capsule, Take 1 capsule by mouth once daily. Hold AM of surgery, Disp: , Rfl:     pantoprazole (PROTONIX) 20 MG tablet, Take 1 tablet (20 mg total) by mouth once daily., Disp: 90 tablet, Rfl: 3    albuterol (VENTOLIN HFA) 90 mcg/actuation inhaler, Inhale 2 puffs into the lungs every 6 (six) hours as needed for Wheezing. Rescue (Patient not taking: Reported on 2/11/2020), Disp: 18 g, Rfl: 0    clopidogreL (PLAVIX) 75 mg tablet, Take 1 tablet (75 mg total) by mouth once daily., Disp: 30 tablet, Rfl: 5    Review of Systems   Constitution: Negative for chills, diaphoresis, malaise/fatigue and night sweats.   HENT: Negative for congestion, hearing loss and nosebleeds.    Eyes: Negative for blurred vision, discharge, double vision and visual disturbance.   Cardiovascular: Negative for chest pain, claudication, cyanosis, dyspnea on exertion, irregular heartbeat, leg swelling, near-syncope, orthopnea, palpitations, paroxysmal nocturnal dyspnea and syncope.   Respiratory: Negative for cough, hemoptysis, shortness of breath and wheezing.    Endocrine: Negative for cold intolerance, heat intolerance and polyuria.   Hematologic/Lymphatic: Negative for adenopathy and bleeding problem. Does not bruise/bleed easily.  "  Skin: Negative for color change, itching, nail changes and rash.   Musculoskeletal: Negative for back pain, falls and joint pain (KNEES).   Gastrointestinal: Negative for bloating, abdominal pain, change in bowel habit, dysphagia, heartburn, hematemesis, jaundice, melena and vomiting.   Genitourinary: Negative for dysuria, flank pain and frequency.   Neurological: Negative for brief paralysis, dizziness, focal weakness, light-headedness, loss of balance, numbness, tremors and weakness.   Psychiatric/Behavioral: Negative for altered mental status, depression, memory loss and substance abuse. The patient is not nervous/anxious.    Allergic/Immunologic: Negative for hives and persistent infections.        Objective:      Vitals:    07/10/20 0900   BP: 129/86   Pulse: 88   Weight: 114.7 kg (252 lb 13.9 oz)   Height: 5' 7" (1.702 m)   PainSc: 0-No pain     Body mass index is 39.6 kg/m².    Physical Exam   Constitutional: She is oriented to person, place, and time. She appears well-developed and well-nourished. She is active.   OVERWEIGHT   HENT:   Head: Normocephalic and atraumatic.   Mouth/Throat: Oropharynx is clear and moist and mucous membranes are normal.   Eyes: Pupils are equal, round, and reactive to light. Conjunctivae and EOM are normal.   Neck: Trachea normal and normal range of motion. Neck supple. Normal carotid pulses, no hepatojugular reflux and no JVD present. Carotid bruit is not present. No edema and no erythema present. No thyromegaly present.   Cardiovascular: Normal rate and regular rhythm.  No extrasystoles are present. PMI is not displaced. Exam reveals no gallop and no friction rub.   Murmur heard.   Midsystolic murmur is present with a grade of 1/6 at the upper left sternal border radiating to the neck.  Pulses:       Carotid pulses are 2+ on the right side and 2+ on the left side.       Radial pulses are 2+ on the right side and 2+ on the left side.        Dorsalis pedis pulses are 2+ on the " right side and 2+ on the left side.        Posterior tibial pulses are 2+ on the right side and 2+ on the left side.   Pulmonary/Chest: Effort normal and breath sounds normal. No tachypnea and no bradypnea. She has no wheezes. She has no rales. She exhibits no tenderness.   Abdominal: Soft. Bowel sounds are normal. She exhibits no distension and no mass. There is no hepatosplenomegaly. There is no CVA tenderness.   Musculoskeletal: Normal range of motion.         General: No tenderness or edema.      Comments: SLOW GAIT   Lymphadenopathy:     She has no cervical adenopathy.   Neurological: She is alert and oriented to person, place, and time. She has normal strength. She displays no tremor. No cranial nerve deficit.   Skin: Skin is warm and dry. No cyanosis or erythema. No pallor.   Psychiatric: She has a normal mood and affect. Her speech is normal and behavior is normal.               ..    Chemistry        Component Value Date/Time     07/10/2020 0855    K 4.0 07/10/2020 0855     07/10/2020 0855    CO2 23 07/10/2020 0855    BUN 19 07/10/2020 0855    CREATININE 1.1 07/10/2020 0855     07/10/2020 0855        Component Value Date/Time    CALCIUM 10.2 07/10/2020 0855    ALKPHOS 115 02/03/2020 0817    AST 18 02/03/2020 0817    ALT 13 02/03/2020 0817    BILITOT 0.4 02/03/2020 0817    ESTGFRAFRICA 54.4 (A) 07/10/2020 0855    EGFRNONAA 47.2 (A) 07/10/2020 0855            ..  Lab Results   Component Value Date    CHOL 159 02/03/2020    CHOL 153 11/07/2018     Lab Results   Component Value Date    HDL 47 02/03/2020    HDL 46 11/07/2018     Lab Results   Component Value Date    LDLCALC 89.6 02/03/2020    LDLCALC 76.6 11/07/2018     Lab Results   Component Value Date    TRIG 112 02/03/2020    TRIG 152 (H) 11/07/2018     Lab Results   Component Value Date    CHOLHDL 29.6 02/03/2020    CHOLHDL 30.1 11/07/2018     ..  Lab Results   Component Value Date    WBC 6.88 12/06/2019    HGB 11.8 (L) 12/06/2019    HCT  40.0 12/06/2019    MCV 94 12/06/2019     12/06/2019       Test(s) Reviewed  I have reviewed the following in detail:  [] Stress test   [] Angiography   [x] Echocardiogram   [x] Labs   [] Other:       Assessment:         ICD-10-CM ICD-9-CM   1. Essential hypertension  I10 401.9   2. Long term (current) use of antithrombotics/antiplatelets  Z79.02 V58.63   3. S/P TAVR (transcatheter aortic valve replacement)  Z95.2 V43.3   4. Class 2 severe obesity due to excess calories with serious comorbidity and body mass index (BMI) of 39.0 to 39.9 in adult  E66.01 278.01    Z68.39 V85.39   5. Pure hypercholesterolemia  E78.00 272.0     Problem List Items Addressed This Visit        Cardiac/Vascular    Essential hypertension - Primary    Pure hypercholesterolemia    S/P TAVR (transcatheter aortic valve replacement)    Relevant Orders    Echo Color Flow Doppler? Yes       Hematology    Long term (current) use of antithrombotics/antiplatelets       Other    Class 2 severe obesity due to excess calories with serious comorbidity and body mass index (BMI) of 39.0 to 39.9 in adult           Plan:     ALL CV CLINICALLY STABLE, NO ANGINA, NO HF, NO TIA, NO CLINICAL ARRHYTHMIA,CONTINUE CURRENT MEDS, EDUCATION, DIET, EXERCISE, WEIGHT LOSS, INCREASE IT AMBULATION, 6 MO WITH ECHO      Essential hypertension    Long term (current) use of antithrombotics/antiplatelets    S/P TAVR (transcatheter aortic valve replacement)  -     Echo Color Flow Doppler? Yes    Class 2 severe obesity due to excess calories with serious comorbidity and body mass index (BMI) of 39.0 to 39.9 in adult    Pure hypercholesterolemia    Other orders  -     clopidogreL (PLAVIX) 75 mg tablet; Take 1 tablet (75 mg total) by mouth once daily.  Dispense: 30 tablet; Refill: 5    RTC Low level/low impact aerobic exercise 5x's/wk. Heart healthy diet and risk factor modification.    See labs and med orders.    Aerobic exercise 5x's/wk. Heart healthy diet and risk factor  modification.    See labs and med orders.

## 2020-07-21 ENCOUNTER — OFFICE VISIT (OUTPATIENT)
Dept: FAMILY MEDICINE | Facility: CLINIC | Age: 80
End: 2020-07-21
Payer: MEDICARE

## 2020-07-21 VITALS
HEART RATE: 82 BPM | TEMPERATURE: 99 F | BODY MASS INDEX: 38.95 KG/M2 | OXYGEN SATURATION: 96 % | WEIGHT: 248.69 LBS | DIASTOLIC BLOOD PRESSURE: 74 MMHG | SYSTOLIC BLOOD PRESSURE: 134 MMHG

## 2020-07-21 DIAGNOSIS — N18.30 STAGE 3 CHRONIC KIDNEY DISEASE: ICD-10-CM

## 2020-07-21 DIAGNOSIS — I10 ESSENTIAL HYPERTENSION: Primary | ICD-10-CM

## 2020-07-21 DIAGNOSIS — I77.1 TORTUOUS AORTA: ICD-10-CM

## 2020-07-21 DIAGNOSIS — R06.02 SHORTNESS OF BREATH: ICD-10-CM

## 2020-07-21 DIAGNOSIS — E78.49 OTHER HYPERLIPIDEMIA: ICD-10-CM

## 2020-07-21 DIAGNOSIS — E66.01 CLASS 2 SEVERE OBESITY DUE TO EXCESS CALORIES WITH SERIOUS COMORBIDITY AND BODY MASS INDEX (BMI) OF 38.0 TO 38.9 IN ADULT: ICD-10-CM

## 2020-07-21 DIAGNOSIS — K21.9 GASTROESOPHAGEAL REFLUX DISEASE WITHOUT ESOPHAGITIS: ICD-10-CM

## 2020-07-21 DIAGNOSIS — F43.21 GRIEF REACTION: ICD-10-CM

## 2020-07-21 PROBLEM — I50.33 ACUTE ON CHRONIC HEART FAILURE WITH PRESERVED EJECTION FRACTION: Status: RESOLVED | Noted: 2019-12-06 | Resolved: 2020-07-21

## 2020-07-21 PROCEDURE — 99999 PR PBB SHADOW E&M-EST. PATIENT-LVL IV: ICD-10-PCS | Mod: PBBFAC,,, | Performed by: FAMILY MEDICINE

## 2020-07-21 PROCEDURE — 3075F SYST BP GE 130 - 139MM HG: CPT | Mod: CPTII,S$GLB,, | Performed by: FAMILY MEDICINE

## 2020-07-21 PROCEDURE — 3078F DIAST BP <80 MM HG: CPT | Mod: CPTII,S$GLB,, | Performed by: FAMILY MEDICINE

## 2020-07-21 PROCEDURE — 99499 UNLISTED E&M SERVICE: CPT | Mod: S$GLB,,, | Performed by: FAMILY MEDICINE

## 2020-07-21 PROCEDURE — 3078F PR MOST RECENT DIASTOLIC BLOOD PRESSURE < 80 MM HG: ICD-10-PCS | Mod: CPTII,S$GLB,, | Performed by: FAMILY MEDICINE

## 2020-07-21 PROCEDURE — 3075F PR MOST RECENT SYSTOLIC BLOOD PRESS GE 130-139MM HG: ICD-10-PCS | Mod: CPTII,S$GLB,, | Performed by: FAMILY MEDICINE

## 2020-07-21 PROCEDURE — 1101F PT FALLS ASSESS-DOCD LE1/YR: CPT | Mod: CPTII,S$GLB,, | Performed by: FAMILY MEDICINE

## 2020-07-21 PROCEDURE — 99214 OFFICE O/P EST MOD 30 MIN: CPT | Mod: S$GLB,,, | Performed by: FAMILY MEDICINE

## 2020-07-21 PROCEDURE — 99499 RISK ADDL DX/OHS AUDIT: ICD-10-PCS | Mod: S$GLB,,, | Performed by: FAMILY MEDICINE

## 2020-07-21 PROCEDURE — 99214 PR OFFICE/OUTPT VISIT, EST, LEVL IV, 30-39 MIN: ICD-10-PCS | Mod: S$GLB,,, | Performed by: FAMILY MEDICINE

## 2020-07-21 PROCEDURE — 1101F PR PT FALLS ASSESS DOC 0-1 FALLS W/OUT INJ PAST YR: ICD-10-PCS | Mod: CPTII,S$GLB,, | Performed by: FAMILY MEDICINE

## 2020-07-21 PROCEDURE — 1159F MED LIST DOCD IN RCRD: CPT | Mod: S$GLB,,, | Performed by: FAMILY MEDICINE

## 2020-07-21 PROCEDURE — 1126F PR PAIN SEVERITY QUANTIFIED, NO PAIN PRESENT: ICD-10-PCS | Mod: S$GLB,,, | Performed by: FAMILY MEDICINE

## 2020-07-21 PROCEDURE — 99999 PR PBB SHADOW E&M-EST. PATIENT-LVL IV: CPT | Mod: PBBFAC,,, | Performed by: FAMILY MEDICINE

## 2020-07-21 PROCEDURE — 1126F AMNT PAIN NOTED NONE PRSNT: CPT | Mod: S$GLB,,, | Performed by: FAMILY MEDICINE

## 2020-07-21 PROCEDURE — 1159F PR MEDICATION LIST DOCUMENTED IN MEDICAL RECORD: ICD-10-PCS | Mod: S$GLB,,, | Performed by: FAMILY MEDICINE

## 2020-07-21 RX ORDER — AMLODIPINE BESYLATE 2.5 MG/1
2.5 TABLET ORAL DAILY
Qty: 90 TABLET | Refills: 3 | Status: SHIPPED | OUTPATIENT
Start: 2020-07-21 | End: 2021-07-16

## 2020-07-21 RX ORDER — ATORVASTATIN CALCIUM 40 MG/1
40 TABLET, FILM COATED ORAL NIGHTLY
Qty: 90 TABLET | Refills: 3 | Status: SHIPPED | OUTPATIENT
Start: 2020-07-21 | End: 2021-07-16

## 2020-07-21 NOTE — PROGRESS NOTES
Subjective:       Patient ID: Selena Moulton is a 79 y.o. female.    Chief Complaint: Follow-up    HPI     The patient is coming here today for a follow-up visit.    Hypertension:  The patient has been taking her blood pressure medications as directed, denies any side effects of the medication.  She also is seeing the kidney specialist secondary to chronic kidney disease stage 3.  The patient was told that is okay to drink 2 diet Cokes per day.  The patient lost 4 lb since her last office visit.  She denies any new symptoms of chest pain or worsening shortness of breath at this office visit.  She is not able to exercise as much because of COVID-19.    Hyperlipidemia:  The last cholesterol levels were therapeutic, the patient is taking cholesterol medication every night as directed.    Grief reaction:  The patient stated that her sister passed in March and she went to visit her to [place].  The patient still feeling sad.    Obesity:  The patient stated that she lost 4 lb since her last office visit, she is trying to exercise more but not be able because of not getting outdoors too much secondary to COVID-19.    GERD:  The patient was told by the kidney specialist that she need to be off of the pantoprazole but the patient tried and the acid reflux was worse and she can back taking the medication.  The patient stated the symptoms as reflux are controlled when she takes pantoprazole daily.    Past medical history, past social history was reviewed and discussed with the patient.    Review of Systems   Constitutional: Negative for activity change and appetite change.   HENT: Negative for congestion and ear discharge.    Eyes: Negative for discharge and itching.   Respiratory: Positive for shortness of breath. Negative for choking and chest tightness.    Cardiovascular: Negative for chest pain, palpitations and leg swelling.   Gastrointestinal: Negative for abdominal distention and abdominal pain.   Endocrine:  Negative for cold intolerance and heat intolerance.   Genitourinary: Negative for dysuria and flank pain.   Musculoskeletal: Positive for arthralgias. Negative for back pain.   Skin: Negative for pallor and rash.   Allergic/Immunologic: Negative for environmental allergies and food allergies.   Neurological: Negative for dizziness, facial asymmetry and headaches.   Hematological: Negative for adenopathy. Does not bruise/bleed easily.   Psychiatric/Behavioral: Negative for agitation and confusion.       Objective:      Physical Exam  Vitals signs and nursing note reviewed.   Constitutional:       General: She is not in acute distress.     Appearance: She is well-developed. She is obese. She is not diaphoretic.      Comments: Limited ambulation, the patient is using a walker for mobilization   HENT:      Head: Normocephalic and atraumatic.      Right Ear: External ear normal.      Left Ear: External ear normal.      Nose: Nose normal.      Mouth/Throat:      Pharynx: No oropharyngeal exudate or posterior oropharyngeal erythema.   Eyes:      General:         Right eye: No discharge.         Left eye: No discharge.      Conjunctiva/sclera: Conjunctivae normal.      Pupils: Pupils are equal, round, and reactive to light.   Neck:      Musculoskeletal: Neck supple.      Thyroid: No thyromegaly.      Vascular: No JVD.      Trachea: No tracheal deviation.   Cardiovascular:      Rate and Rhythm: Normal rate and regular rhythm.      Heart sounds: Normal heart sounds. No murmur.   Pulmonary:      Effort: Pulmonary effort is normal. No respiratory distress.      Breath sounds: Normal breath sounds. No wheezing.   Musculoskeletal:         General: Deformity (Bilateral knees) present. No tenderness.   Lymphadenopathy:      Cervical: No cervical adenopathy.   Skin:     Coloration: Skin is not pale.      Findings: No erythema.   Neurological:      Cranial Nerves: No cranial nerve deficit.   Psychiatric:         Behavior: Behavior  normal.         Thought Content: Thought content normal.         Judgment: Judgment normal.           Assessment and plan:    Essential hypertension:  Stable   -     amLODIPine (NORVASC) 2.5 MG tablet; Take 1 tablet (2.5 mg total) by mouth once daily.  Dispense: 90 tablet; Refill: 3    Tortuous aorta:  Stable    Other hyperlipidemia:  Well controlled  -     atorvastatin (LIPITOR) 40 MG tablet; Take 1 tablet (40 mg total) by mouth every evening.  Dispense: 90 tablet; Refill: 3    Gastroesophageal reflux disease without esophagitis:  Stable    Stage 3 chronic kidney disease:  Improved    Shortness of breath:  Stable    Grief reaction:  New problem, no further workup needed    Class 2 severe obesity due to excess calories with serious comorbidity and body mass index (BMI) of 38.0 to 38.9 in adult:  Improved    Healthy habits, avoid fried foods, red meat and processed starches, the patient was advised to avoid drinking any sodas, drink more water, follow up with the cardiologist as directed.  Will continue with Norvasc and Lipitor.  The patient's BMI has been recorded in the chart. The patient has been provided educational materials regarding the benefits of attaining and maintaining a normal weight. We will continue to address and follow this issue during follow up visits.   Patient agreed with assessment and plan. Patient verbalized understanding.

## 2020-07-21 NOTE — PATIENT INSTRUCTIONS
Eating Heart-Healthy Food: Using the DASH Plan    Eating for your heart doesnt have to be hard or boring. You just need to know how to make healthier choices. The DASH eating plan has been developed to help you do just that. DASH stands for Dietary Approaches to Stop Hypertension. It is a plan that has been proven to be healthier for your heart and to lower your risk for high blood pressure. It can also help lower your risk for cancer, heart disease, osteoporosis, and diabetes.  Choosing from each food group  Choose foods from each of the food groups below each day. Try to get the recommended number of servings for each food group. The serving numbers are based on a diet of 2,000 calories a day. Talk to your doctor if youre unsure about your calorie needs. Along with getting the correct servings, the DASH plan also recommends a sodium intake less than 2,300 mg per day.        Grains  Servings: 6 to 8 a day  A serving is:  · 1 slice bread  · 1 ounce dry cereal  · Half a cup cooked rice, pasta or cereal  Best choices: Whole grains and any grains high in fiber. Vegetables  Servings: 4 to 5 a day  A serving is:  · 1 cup raw leafy vegetable  · Half a cup cut-up raw or cooked vegetable  · Half a cup vegetable juice  Best choices: Fresh or frozen vegetables prepared without added salt or fat.   Fruits  Servings: 4 to 5 a day  A serving is:  · 1 medium fruit  · One-quarter cup dried fruit  · Half a cup fresh, frozen, or canned fruit  · Half a cup of 100% fruit juices  Best choices: A variety of fresh fruits of different colors. Whole fruits are a better choice than fruit juices. Low-fat or fat-free dairy  Servings: 2 to 3 a day  A serving is:  · 1 cup milk  · 1 cup yogurt  · One and a half ounces cheese  Best choices: Skim or 1% milk, low-fat or fat-free yogurt or buttermilk, and low-fat cheeses.         Lean meats, poultry, fish  Servings: 6 or fewer a day  A serving is:  · 1 ounce cooked meats, poultry, or fish  · 1  egg  Best choices: Lean poultry and fish. Trim away visible fat. Broil, grill, roast, or boil instead of frying. Remove skin from poultry before eating. Limit how much red meat you eat.  Nuts, seeds, beans  Servings: 4 to 5 a week  A serving is:  · One-third cup nuts (one and a half ounces)  · 2 tablespoons nut butter or seeds  · Half a cup cooked dry beans or legumes  Best choices: Dry roasted nuts with no salt added, lentils, kidney beans, garbanzo beans, and whole denson beans.   Fats and oils  Servings: 2 to 3 a day  A serving is:  · 1 teaspoon vegetable oil  · 1 teaspoon soft margarine  · 1 tablespoon mayonnaise  · 2 tablespoons salad dressing  Best choices: Nut and vegetable oils (nontropical vegetable oils), such as olive and canola oil. Sweets  Servings: 5 a week or fewer  A serving is:  · 1 tablespoon sugar, maple syrup, or honey  · 1 tablespoon jam or jelly  · 1 half-ounce jelly beans (about 15)  · 1 cup lemonade  Best choices: Dried fruit can be a satisfying sweet. Choose low-fat sweets. And watch your serving sizes!      For more on the DASH eating plan, visit:  www.nhlbi.nih.gov/health/health-topics/topics/dash   Date Last Reviewed: 6/1/2016  © 1005-9962 [employer]. [address] [place] [address], [place] [address]. All rights reserved. This information is not intended as a substitute for professional medical care. Always follow your healthcare professional's instructions.

## 2020-10-23 ENCOUNTER — PES CALL (OUTPATIENT)
Dept: ADMINISTRATIVE | Facility: CLINIC | Age: 80
End: 2020-10-23

## 2020-11-21 DIAGNOSIS — M1A.0720 IDIOPATHIC CHRONIC GOUT OF LEFT FOOT WITHOUT TOPHUS: ICD-10-CM

## 2020-11-21 NOTE — TELEPHONE ENCOUNTER
No new care gaps identified.  Powered by [employer]. Reference number: [id number]. 11/21/2020 3:16:47 AM   CST

## 2020-11-22 RX ORDER — ALLOPURINOL 300 MG/1
TABLET ORAL
Qty: 90 TABLET | Refills: 0 | Status: SHIPPED | OUTPATIENT
Start: 2020-11-22 | End: 2021-02-22

## 2020-11-22 NOTE — PROGRESS NOTES
Refill Routing Note   Medication(s) are not appropriate for processing by [facility] for the following reason(s):     - Drug-Disease Interaction ( allopurinoL and Stage 3 chronic kidney disease)    [facility] actions taken in this encounter: Defer    Medication-related problems identified:   Drug-disease interaction  Requires labs  Medication Therapy Plan: CDMR. LABS: (URIC ACID); Drug-Disease: allopurinoL and Stage 3 chronic kidney disease; Defer to PCP  Medication reconciliation completed: No   Automatic Epic Generated Protocol Data:        Requested Prescriptions   Pending Prescriptions Disp Refills    allopurinoL (ZYLOPRIM) 300 MG tablet [Pharmacy Med Name: ALLOPURINOL 300MG TABLETS] 90 tablet 0     Sig: TAKE 1 TABLET(300 MG) BY MOUTH EVERY DAY       Endocrinology:  Gout Agents - allopurinol Failed - 11/21/2020 10:57 AM        Failed - Uric Acid within 360 days     Uric Acid   Date Value Ref Range Status   09/04/2019 5.7 2.4 - 5.7 mg/dL Final   11/07/2018 5.3 2.4 - 5.7 mg/dL Final              Failed - eGFR is 60 or above and within 360 days     eGFR if non    Date Value Ref Range Status   07/10/2020 47.2 (A) >60 mL/min/1.73 m^2 Final     Comment:     Calculation used to obtain the estimated glomerular filtration  rate (eGFR) is the CKD-EPI equation.      02/03/2020 38.6 (A) >60 mL/min/1.73 m^2 Final     Comment:     Calculation used to obtain the estimated glomerular filtration  rate (eGFR) is the CKD-EPI equation.      12/06/2019 47.2 (A) >60 mL/min/1.73 m^2 Final     Comment:     Calculation used to obtain the estimated glomerular filtration  rate (eGFR) is the CKD-EPI equation.        eGFR if    Date Value Ref Range Status   07/10/2020 54.4 (A) >60 mL/min/1.73 m^2 Final   02/03/2020 44.5 (A) >60 mL/min/1.73 m^2 Final   12/06/2019 54.4 (A) >60 mL/min/1.73 m^2 Final              Passed - Patient is at least 18 years old        Passed - Office visit in past 12 months or  future 90 days     Recent Outpatient Visits            4 months ago Essential hypertension    [facility] Aby Perales MD    4 months ago Essential hypertension    [place] - Cardiology Terrie Alberto MD    5 months ago Stage 3 chronic kidney disease    [place] - Nephrology Brian Krishnan MD    9 months ago Stage 3 chronic kidney disease    [place] - Nephrology Brian rKishnan MD    11 months ago S/P TAVR (transcatheter aortic valve replacement)    [facility] 3rd Fl Fidle Cardenas MD          Future Appointments              In 2 weeks Brian Krishnan MD [place] - Nephrology, [place]    In 3 weeks LAB, SAME DAY [facility], [facility]    In 3 weeks ECHO, MAIN CAMPUS [facility], [facility]    In 3 weeks [facility] 3rd Fl, [facility]    In 1 month ECHO, [facility] - Cardiology, [place]    In 2 months Terrie Alberto MD [place] [facility], [place]    In 2 months Aby Perales MD [facility], [place]                Passed - Cr is 1.4 or below and within 360 days     Creatinine   Date Value Ref Range Status   07/10/2020 1.1 0.5 - 1.4 mg/dL Final   02/03/2020 1.3 0.5 - 1.4 mg/dL Final   12/06/2019 1.1 0.5 - 1.4 mg/dL Final              Passed - WBC within 360 days     WBC   Date Value Ref Range Status   12/06/2019 6.88 3.90 - 12.70 K/uL Final   10/09/2019 11.53 3.90 - 12.70 K/uL Final   10/08/2019 4.79 3.90 - 12.70 K/uL Final              Passed - RBC within 360 days     RBC   Date Value Ref Range Status   12/06/2019 4.28 4.00 - 5.40 M/uL Final   10/09/2019 4.07 4.00 - 5.40 M/uL Final   10/08/2019 3.92 (L) 4.00 - 5.40 M/uL Final              Passed - HGB within 360 days     Hemoglobin   Date Value Ref Range Status   12/06/2019 11.8 (L) 12.0 - 16.0 g/dL Final   10/09/2019 11.7 (L) 12.0 - 16.0 g/dL Final   10/08/2019 11.3 (L) 12.0 - 16.0 g/dL Final               Passed - HCT within 360 days     Hematocrit   Date Value Ref Range Status   12/06/2019 40.0 37.0 - 48.5 % Final   10/09/2019 37.9 37.0 - 48.5 % Final   10/08/2019 36.2 (L) 37.0 - 48.5 % Final              Passed - PLT within 360 days     Platelets   Date Value Ref Range Status   12/06/2019 160 150 - 350 K/uL Final   10/09/2019 180 150 - 350 K/uL Final   10/08/2019 160 150 - 350 K/uL Final              Passed - ALT is 94 or below and within 360 days     ALT   Date Value Ref Range Status   02/03/2020 13 10 - 44 U/L Final   01/09/2019 27 10 - 44 U/L Final   11/07/2018 26 10 - 44 U/L Final              Passed - AST is 54 or below and within 360 days     AST   Date Value Ref Range Status   02/03/2020 18 10 - 40 U/L Final   01/09/2019 22 14 - 36 U/L Final   11/07/2018 18 10 - 40 U/L Final                    Appointments  past 12m or future 3m with PCP    Date Provider   Last Visit   7/21/2020 Aby Perales MD   Next Visit   1/22/2021 Aby Perales MD   ED visits in past 90 days: 0        Note composed:11:00 PM 11/21/2020

## 2020-11-23 NOTE — PROGRESS NOTES
Provider Staff:     Action is required for this patient.     Please schedule patient for Labs (URIC ACID)    Thanks!  [facility]     Appointments  past 12m or future 3m with PCP    Date Provider   Last Visit   7/21/2020 Aby Perales MD   Next Visit   1/22/2021 Aby Perales MD     Note composed:9:50 PM 11/22/2020

## 2020-12-15 ENCOUNTER — TELEPHONE (OUTPATIENT)
Dept: CARDIOLOGY | Facility: CLINIC | Age: 80
End: 2020-12-15

## 2020-12-15 ENCOUNTER — OFFICE VISIT (OUTPATIENT)
Dept: CARDIOLOGY | Facility: CLINIC | Age: 80
End: 2020-12-15
Payer: MEDICARE

## 2020-12-15 ENCOUNTER — HOSPITAL ENCOUNTER (OUTPATIENT)
Dept: CARDIOLOGY | Facility: HOSPITAL | Age: 80
Discharge: HOME | End: 2020-12-15
Attending: INTERNAL MEDICINE
Payer: MEDICARE

## 2020-12-15 VITALS
HEART RATE: 80 BPM | DIASTOLIC BLOOD PRESSURE: 60 MMHG | HEART RATE: 82 BPM | DIASTOLIC BLOOD PRESSURE: 74 MMHG | WEIGHT: 248 LBS | HEIGHT: 67 IN | OXYGEN SATURATION: 96 % | HEIGHT: 67 IN | WEIGHT: 252.19 LBS | SYSTOLIC BLOOD PRESSURE: 134 MMHG | SYSTOLIC BLOOD PRESSURE: 128 MMHG | BODY MASS INDEX: 38.92 KG/M2 | BODY MASS INDEX: 39.58 KG/M2

## 2020-12-15 DIAGNOSIS — I35.0 NODULAR CALCIFIC AORTIC VALVE STENOSIS: ICD-10-CM

## 2020-12-15 DIAGNOSIS — I50.32 CHRONIC DIASTOLIC HEART FAILURE: ICD-10-CM

## 2020-12-15 DIAGNOSIS — Z95.2 S/P TAVR (TRANSCATHETER AORTIC VALVE REPLACEMENT): ICD-10-CM

## 2020-12-15 DIAGNOSIS — Z95.2 S/P TAVR (TRANSCATHETER AORTIC VALVE REPLACEMENT): Primary | ICD-10-CM

## 2020-12-15 DIAGNOSIS — I10 ESSENTIAL HYPERTENSION: ICD-10-CM

## 2020-12-15 LAB
ASCENDING AORTA: 2.89 CM
AV INDEX (PROSTH): 0.35
AV MEAN GRADIENT: 16 MMHG
AV PEAK GRADIENT: 27 MMHG
AV VALVE AREA: 1.51 CM2
AV VELOCITY RATIO: 0.34
BSA FOR ECHO PROCEDURE: 2.31 M2
CV ECHO LV RWT: 0.34 CM
DOP CALC AO PEAK VEL: 2.61 M/S
DOP CALC AO VTI: 52.01 CM
DOP CALC LVOT AREA: 4.3 CM2
DOP CALC LVOT DIAMETER: 2.34 CM
DOP CALC LVOT PEAK VEL: 0.89 M/S
DOP CALC LVOT STROKE VOLUME: 78.66 CM3
DOP CALCLVOT PEAK VEL VTI: 18.3 CM
E WAVE DECELERATION TIME: 195.13 MSEC
E/A RATIO: 3.32
E/E' RATIO: 10.7 M/S
ECHO LV POSTERIOR WALL: 0.82 CM (ref 0.6–1.1)
FRACTIONAL SHORTENING: 37 % (ref 28–44)
INTERVENTRICULAR SEPTUM: 0.9 CM (ref 0.6–1.1)
IVRT: 115.6 MSEC
LA MAJOR: 6.04 CM
LA MINOR: 6.03 CM
LA WIDTH: 3.89 CM
LEFT ATRIUM SIZE: 4.67 CM
LEFT ATRIUM VOLUME INDEX MOD: 34.3 ML/M2
LEFT ATRIUM VOLUME INDEX: 42.1 ML/M2
LEFT ATRIUM VOLUME MOD: 76.03 CM3
LEFT ATRIUM VOLUME: 93.19 CM3
LEFT INTERNAL DIMENSION IN SYSTOLE: 3.07 CM (ref 2.1–4)
LEFT VENTRICLE DIASTOLIC VOLUME INDEX: 49.75 ML/M2
LEFT VENTRICLE DIASTOLIC VOLUME: 110.23 ML
LEFT VENTRICLE MASS INDEX: 64 G/M2
LEFT VENTRICLE SYSTOLIC VOLUME INDEX: 16.7 ML/M2
LEFT VENTRICLE SYSTOLIC VOLUME: 36.98 ML
LEFT VENTRICULAR INTERNAL DIMENSION IN DIASTOLE: 4.85 CM (ref 3.5–6)
LEFT VENTRICULAR MASS: 141.63 G
LV LATERAL E/E' RATIO: 10.25 M/S
LV SEPTAL E/E' RATIO: 11.18 M/S
MV A" WAVE DURATION": 5.42 MSEC
MV PEAK A VEL: 0.37 M/S
MV PEAK E VEL: 1.23 M/S
PISA TR MAX VEL: 2.49 M/S
PULM VEIN S/D RATIO: 0.6
PV PEAK D VEL: 0.73 M/S
PV PEAK S VEL: 0.44 M/S
RA MAJOR: 5.8 CM
RA PRESSURE: 3 MMHG
RA WIDTH: 3.79 CM
RIGHT VENTRICULAR END-DIASTOLIC DIMENSION: 4.53 CM
RV TISSUE DOPPLER FREE WALL SYSTOLIC VELOCITY 1 (APICAL 4 CHAMBER VIEW): 13.23 CM/S
SINUS: 2.55 CM
STJ: 2.26 CM
TDI LATERAL: 0.12 M/S
TDI SEPTAL: 0.11 M/S
TDI: 0.12 M/S
TR MAX PG: 25 MMHG
TRICUSPID ANNULAR PLANE SYSTOLIC EXCURSION: 1.78 CM
TV REST PULMONARY ARTERY PRESSURE: 28 MMHG

## 2020-12-15 PROCEDURE — 93306 TTE W/DOPPLER COMPLETE: CPT

## 2020-12-15 PROCEDURE — 99999 PR PBB SHADOW E&M-EST. PATIENT-LVL III: CPT | Mod: PBBFAC,,,

## 2020-12-15 PROCEDURE — 99999 PR PBB SHADOW E&M-EST. PATIENT-LVL III: ICD-10-PCS | Mod: PBBFAC,,,

## 2020-12-15 PROCEDURE — 1159F MED LIST DOCD IN RCRD: CPT | Mod: S$GLB,,, | Performed by: INTERNAL MEDICINE

## 2020-12-15 PROCEDURE — 99214 PR OFFICE/OUTPT VISIT, EST, LEVL IV, 30-39 MIN: ICD-10-PCS | Mod: S$GLB,,, | Performed by: INTERNAL MEDICINE

## 2020-12-15 PROCEDURE — 3074F SYST BP LT 130 MM HG: CPT | Mod: CPTII,S$GLB,, | Performed by: INTERNAL MEDICINE

## 2020-12-15 PROCEDURE — 1126F AMNT PAIN NOTED NONE PRSNT: CPT | Mod: S$GLB,,, | Performed by: INTERNAL MEDICINE

## 2020-12-15 PROCEDURE — 99499 RISK ADDL DX/OHS AUDIT: ICD-10-PCS | Mod: S$GLB,,, | Performed by: PHYSICIAN ASSISTANT

## 2020-12-15 PROCEDURE — 93306 TTE W/DOPPLER COMPLETE: CPT | Mod: 26,,, | Performed by: INTERNAL MEDICINE

## 2020-12-15 PROCEDURE — 93306 ECHO (CUPID ONLY): ICD-10-PCS | Mod: 26,,, | Performed by: INTERNAL MEDICINE

## 2020-12-15 PROCEDURE — 99499 UNLISTED E&M SERVICE: CPT | Mod: S$GLB,,, | Performed by: PHYSICIAN ASSISTANT

## 2020-12-15 PROCEDURE — 3078F DIAST BP <80 MM HG: CPT | Mod: CPTII,S$GLB,, | Performed by: INTERNAL MEDICINE

## 2020-12-15 PROCEDURE — 99214 OFFICE O/P EST MOD 30 MIN: CPT | Mod: S$GLB,,, | Performed by: INTERNAL MEDICINE

## 2020-12-15 PROCEDURE — 3074F PR MOST RECENT SYSTOLIC BLOOD PRESSURE < 130 MM HG: ICD-10-PCS | Mod: CPTII,S$GLB,, | Performed by: INTERNAL MEDICINE

## 2020-12-15 PROCEDURE — 1126F PR PAIN SEVERITY QUANTIFIED, NO PAIN PRESENT: ICD-10-PCS | Mod: S$GLB,,, | Performed by: INTERNAL MEDICINE

## 2020-12-15 PROCEDURE — 3078F PR MOST RECENT DIASTOLIC BLOOD PRESSURE < 80 MM HG: ICD-10-PCS | Mod: CPTII,S$GLB,, | Performed by: INTERNAL MEDICINE

## 2020-12-15 PROCEDURE — 1159F PR MEDICATION LIST DOCUMENTED IN MEDICAL RECORD: ICD-10-PCS | Mod: S$GLB,,, | Performed by: INTERNAL MEDICINE

## 2020-12-15 NOTE — PROGRESS NOTES
Subjective:    Patient ID:  Selena Moulton is a 80 y.o. female who presents for follow-up of TAVR    Referring Physician:  Dr. Dolly MCQUEEN  Selena Moulton is a 80 y.o. female who presents 1 year s/p successful placement of a 23 mm Cierra S3 TAVR via LTF access on 10/8/19. Prior to TAVR she was unable to perform any ADLs without SOB. She is now able to accomplish housework and ADLs without symptoms. She was able to walk from the parking garage to clinic without SOB. Her mobility is slightly limited due to a cervical injury in the past. She denies CP, PE, palpitations, orthopnea, and PND     NYHA: I CCS: 0    Review of Systems   Constitution: Negative for chills and fever.   HENT: Negative for sore throat.    Eyes: Negative for blurred vision.   Cardiovascular: Negative for chest pain, claudication, cyanosis, dyspnea on exertion, irregular heartbeat, leg swelling, near-syncope, orthopnea, palpitations, paroxysmal nocturnal dyspnea and syncope.   Respiratory: Negative for cough and sputum production.    Hematologic/Lymphatic: Does not bruise/bleed easily.   Skin: Negative for itching, rash and suspicious lesions.   Musculoskeletal: Negative for falls.   Gastrointestinal: Negative for abdominal pain and change in bowel habit.   Genitourinary: Negative for dysuria.   Neurological: Negative for disturbances in coordination, dizziness and loss of balance.   Psychiatric/Behavioral: Negative for altered mental status.          Past Medical History:   Diagnosis Date    Anticoagulant long-term use     Aortic stenosis     Arthritis     Class 2 severe obesity due to excess calories with serious comorbidity and body mass index (BMI) of 39.0 to 39.9 in adult 9/13/2018    Gout     Hypertension        Current Outpatient Medications:     furosemide (LASIX) 20 MG tablet, Take 1 tablet (20 mg total) by mouth daily as needed (As directed for weight gain)., Disp: 30 tablet, Rfl: 3    acetaminophen (TYLENOL) 500 MG  tablet, Take 500 mg by mouth every 8 (eight) hours as needed., Disp: , Rfl:     albuterol (VENTOLIN HFA) 90 mcg/actuation inhaler, Inhale 2 puffs into the lungs every 6 (six) hours as needed for Wheezing. Rescue, Disp: 18 g, Rfl: 0    allopurinoL (ZYLOPRIM) 300 MG tablet, TAKE 1 TABLET(300 MG) BY MOUTH EVERY DAY, Disp: 90 tablet, Rfl: 0    amLODIPine (NORVASC) 2.5 MG tablet, Take 1 tablet (2.5 mg total) by mouth once daily., Disp: 90 tablet, Rfl: 3    aspirin (ECOTRIN) 81 MG EC tablet, Take 81 mg by mouth once daily. Hold for 7 days before surgery, Disp: , Rfl:     atenoloL (TENORMIN) 100 MG tablet, Take 0.5 tablets (50 mg total) by mouth once daily., Disp: 45 tablet, Rfl: 3    atorvastatin (LIPITOR) 40 MG tablet, Take 1 tablet (40 mg total) by mouth every evening., Disp: 90 tablet, Rfl: 3    clopidogreL (PLAVIX) 75 mg tablet, Take 1 tablet (75 mg total) by mouth once daily., Disp: 30 tablet, Rfl: 5    fish oil-omega-3 fatty acids 300-1,000 mg capsule, Take 1 capsule by mouth once daily. Hold for 7 days before surgery, Disp: , Rfl:     irbesartan-hydrochlorothiazide (AVALIDE) 150-12.5 mg per tablet, TAKE 1 TABLET BY MOUTH EVERY DAY, Disp: 90 tablet, Rfl: 1    multivitamin capsule, Take 1 capsule by mouth once daily. Hold AM of surgery, Disp: , Rfl:     pantoprazole (PROTONIX) 20 MG tablet, TAKE 1 TABLET(20 MG) BY MOUTH EVERY DAY, Disp: 90 tablet, Rfl: 2    Objective:    Physical Exam   Constitutional: She is oriented to person, place, and time. She appears well-developed and well-nourished. No distress.   HENT:   Head: Normocephalic and atraumatic.   Eyes: EOM are normal.   Neck: Normal range of motion. No JVD present.   Cardiovascular: Normal rate, regular rhythm and intact distal pulses.   No murmur heard.  Pulmonary/Chest: Effort normal and breath sounds normal. No respiratory distress.   Abdominal: Soft. She exhibits no distension.   Musculoskeletal:         General: No edema.   Neurological: She is  "alert and oriented to person, place, and time.   Skin: Skin is warm and dry. She is not diaphoretic.   Vitals reviewed.          Vitals:    12/15/20 1350 12/15/20 1352   BP: 134/68 128/60   BP Location: Right arm Left arm   Patient Position: Sitting Sitting   BP Method: Large (Automatic) Large (Automatic)   Pulse: 82 82   SpO2: 96%    Weight: 114.4 kg (252 lb 3.3 oz)    Height: 5' 7" (1.702 m)      Body mass index is 39.5 kg/m².    Test(s) Reviewed  I have reviewed the following in detail:  [] Stress test   [] Angiography   [] Echocardiogram   [x] Labs: H/H 14.6/47.6, Cr 1.3   [] Other:     TTE Today:  · The left ventricle is normal in size with normal systolic function. The estimated ejection fraction is 65%.  · Grade III left ventricular diastolic dysfunction.  · Normal right ventricular size with normal right ventricular systolic function.  · Moderate left atrial enlargement.  · Mild right atrial enlargement.  · Presence of 23 mm Edward Cierra TAVR valve. Mean AV mean gradient is 16 mmHg with a dimensionless index of 0.35(unchanged from prior). There is no AI.  · The estimated PA systolic pressure is 28 mmHg.  · Normal central venous pressure (3 mmHg).    Assessment:   S/P TAVR (transcatheter aortic valve replacement)  Successful placement of a 23 mm Cierra S3 TAVR via LTF access. TTE personally reviewed today which showed no PVL.      Aortic stenosis  Resolved post TAVR. NYHA now I. Mean gradient 16 mmHg, peak velocity 2.61 m/s.      Essential hypertension  Stable. Continue home meds and follow-up with Dr. Alberto.     Chronic diastolic heart failure  No signs of fluid overload on exam today. Continue Lasix PRN    Plan:     1. Follow up with [facility] PRN.   2. Continue ASA indefinitely.   3. OK to discontinue Plavix now. 6 month therapy completed.   4. SBE prophylaxis for life.   5. Continue to follow up with Cardiology, Dr. Sherita Fried, PA-C  Valve and Structural Heart Disease  [facility]

## 2020-12-15 NOTE — TELEPHONE ENCOUNTER
----- Message from Felisha York MA sent at 12/15/2020  1:47 PM CST -----  Regarding: appointments  Pt is here today for her 1yr f/u for her TAVR. On today she had labs and echo so she was wondering if she still need the echo and office visit on 1/22.

## 2020-12-24 ENCOUNTER — PATIENT OUTREACH (OUTPATIENT)
Dept: ADMINISTRATIVE | Facility: OTHER | Age: 80
End: 2020-12-24

## 2020-12-24 NOTE — PROGRESS NOTES
Health Maintenance Due   Topic Date Due    TETANUS VACCINE  08/10/1958    Shingles Vaccine (1 of 2) 08/10/1988    Influenza Vaccine (1) 08/01/2020     Updates were requested from care everywhere.  Chart was reviewed for overdue Proactive [facility] En[facility] (TK) topics (CRS, Breast Cancer Screening, Eye exam)  Health Maintenance has been updated.  LINKS immunization registry triggered.  Immunizations were reconciled.

## 2020-12-30 ENCOUNTER — OFFICE VISIT (OUTPATIENT)
Dept: NEPHROLOGY | Facility: CLINIC | Age: 80
End: 2020-12-30
Payer: MEDICARE

## 2020-12-30 VITALS
WEIGHT: 257.25 LBS | HEIGHT: 67 IN | DIASTOLIC BLOOD PRESSURE: 70 MMHG | BODY MASS INDEX: 40.38 KG/M2 | HEART RATE: 68 BPM | SYSTOLIC BLOOD PRESSURE: 128 MMHG

## 2020-12-30 DIAGNOSIS — I10 ESSENTIAL HYPERTENSION: ICD-10-CM

## 2020-12-30 DIAGNOSIS — N28.1 ACQUIRED CYST OF KIDNEY: ICD-10-CM

## 2020-12-30 DIAGNOSIS — N25.81 SECONDARY HYPERPARATHYROIDISM: ICD-10-CM

## 2020-12-30 DIAGNOSIS — N18.30 STAGE 3 CHRONIC KIDNEY DISEASE, UNSPECIFIED WHETHER STAGE 3A OR 3B CKD: Primary | ICD-10-CM

## 2020-12-30 PROCEDURE — 3288F FALL RISK ASSESSMENT DOCD: CPT | Mod: CPTII,S$GLB,, | Performed by: INTERNAL MEDICINE

## 2020-12-30 PROCEDURE — 1101F PR PT FALLS ASSESS DOC 0-1 FALLS W/OUT INJ PAST YR: ICD-10-PCS | Mod: CPTII,S$GLB,, | Performed by: INTERNAL MEDICINE

## 2020-12-30 PROCEDURE — 3078F PR MOST RECENT DIASTOLIC BLOOD PRESSURE < 80 MM HG: ICD-10-PCS | Mod: CPTII,S$GLB,, | Performed by: INTERNAL MEDICINE

## 2020-12-30 PROCEDURE — 99214 OFFICE O/P EST MOD 30 MIN: CPT | Mod: S$GLB,,, | Performed by: INTERNAL MEDICINE

## 2020-12-30 PROCEDURE — 99499 UNLISTED E&M SERVICE: CPT | Mod: S$GLB,,, | Performed by: INTERNAL MEDICINE

## 2020-12-30 PROCEDURE — 1159F PR MEDICATION LIST DOCUMENTED IN MEDICAL RECORD: ICD-10-PCS | Mod: S$GLB,,, | Performed by: INTERNAL MEDICINE

## 2020-12-30 PROCEDURE — 3074F PR MOST RECENT SYSTOLIC BLOOD PRESSURE < 130 MM HG: ICD-10-PCS | Mod: CPTII,S$GLB,, | Performed by: INTERNAL MEDICINE

## 2020-12-30 PROCEDURE — 99214 PR OFFICE/OUTPT VISIT, EST, LEVL IV, 30-39 MIN: ICD-10-PCS | Mod: S$GLB,,, | Performed by: INTERNAL MEDICINE

## 2020-12-30 PROCEDURE — 1159F MED LIST DOCD IN RCRD: CPT | Mod: S$GLB,,, | Performed by: INTERNAL MEDICINE

## 2020-12-30 PROCEDURE — 1101F PT FALLS ASSESS-DOCD LE1/YR: CPT | Mod: CPTII,S$GLB,, | Performed by: INTERNAL MEDICINE

## 2020-12-30 PROCEDURE — 99999 PR PBB SHADOW E&M-EST. PATIENT-LVL III: CPT | Mod: PBBFAC,,, | Performed by: INTERNAL MEDICINE

## 2020-12-30 PROCEDURE — 99999 PR PBB SHADOW E&M-EST. PATIENT-LVL III: ICD-10-PCS | Mod: PBBFAC,,, | Performed by: INTERNAL MEDICINE

## 2020-12-30 PROCEDURE — 3078F DIAST BP <80 MM HG: CPT | Mod: CPTII,S$GLB,, | Performed by: INTERNAL MEDICINE

## 2020-12-30 PROCEDURE — 3074F SYST BP LT 130 MM HG: CPT | Mod: CPTII,S$GLB,, | Performed by: INTERNAL MEDICINE

## 2020-12-30 PROCEDURE — 3288F PR FALLS RISK ASSESSMENT DOCUMENTED: ICD-10-PCS | Mod: CPTII,S$GLB,, | Performed by: INTERNAL MEDICINE

## 2020-12-30 PROCEDURE — 99499 RISK ADDL DX/OHS AUDIT: ICD-10-PCS | Mod: S$GLB,,, | Performed by: INTERNAL MEDICINE

## 2020-12-30 NOTE — PROGRESS NOTES
"Subjective:       Patient ID: Selena Moulton is a 80 y.o. White female who presents for return patient evaluation for chronic renal failure.    She has no uremic or urinary symptoms and is in her usual state of health except for some occasional urinary incontinence.  There have been no recent illnesses, hospitalizations or procedures.        Review of Systems   Constitutional: Negative for appetite change, chills and fever.   HENT: Negative for congestion.    Eyes: Negative for visual disturbance.   Respiratory: Negative for cough and shortness of breath.    Cardiovascular: Negative for chest pain and leg swelling.   Gastrointestinal: Negative for abdominal pain, diarrhea, nausea and vomiting.   Genitourinary: Negative for difficulty urinating, dysuria and hematuria.   Musculoskeletal: Negative for myalgias.   Skin: Negative for rash.   Neurological: Negative for headaches.   Psychiatric/Behavioral: Negative for sleep disturbance.       The past medical, family and social histories were reviewed for this encounter.     /70   Pulse 68   Ht 5' 7" (1.702 m)   Wt 116.7 kg (257 lb 4.4 oz)   BMI 40.30 kg/m²     Objective:      Physical Exam  Vitals signs reviewed.   Constitutional:       General: She is not in acute distress.     Appearance: She is well-developed.   HENT:      Head: Normocephalic and atraumatic.   Eyes:      General: No scleral icterus.     Conjunctiva/sclera: Conjunctivae normal.   Neck:      Musculoskeletal: Normal range of motion.      Vascular: No JVD.   Cardiovascular:      Rate and Rhythm: Normal rate and regular rhythm.      Heart sounds: Normal heart sounds. No murmur. No friction rub. No gallop.    Pulmonary:      Effort: Pulmonary effort is normal. No respiratory distress.      Breath sounds: Normal breath sounds. No wheezing.   Abdominal:      General: Bowel sounds are normal. There is no distension.      Palpations: Abdomen is soft.      Tenderness: There is no abdominal " tenderness.   Musculoskeletal:      Right lower leg: No edema.      Left lower leg: No edema.   Skin:     General: Skin is warm and dry.      Findings: No rash.   Neurological:      Mental Status: She is alert and oriented to person, place, and time.   Psychiatric:         Mood and Affect: Mood normal.         Behavior: Behavior normal.         Assessment:       1. Stage 3 chronic kidney disease, unspecified whether stage 3a or 3b CKD    2. Essential hypertension    3. Acquired cyst of kidney    4. Secondary hyperparathyroidism        Plan:   Return to clinic in 6 months.  Labs for next visit include rp pth ohd uric acid.  Baseline creatinine is 1.1-1.3 since 2018.  PTH is 233 with a calcium of 10.0.  Start D3 1000 units daily.  Renal US shows R 10.3 cm L 9.9 cm.  Blood pressure is controlled on the current regimen.  Continue current medications as prescribed and reviewed.

## 2021-01-21 ENCOUNTER — TELEPHONE (OUTPATIENT)
Dept: FAMILY MEDICINE | Facility: CLINIC | Age: 81
End: 2021-01-21

## 2021-02-08 DIAGNOSIS — I10 ESSENTIAL HYPERTENSION: ICD-10-CM

## 2021-02-09 RX ORDER — ATENOLOL 100 MG/1
TABLET ORAL
Qty: 45 TABLET | Refills: 1 | Status: SHIPPED | OUTPATIENT
Start: 2021-02-09 | End: 2021-08-04

## 2021-02-19 DIAGNOSIS — M1A.0720 IDIOPATHIC CHRONIC GOUT OF LEFT FOOT WITHOUT TOPHUS: ICD-10-CM

## 2021-02-19 DIAGNOSIS — I10 ESSENTIAL HYPERTENSION: ICD-10-CM

## 2021-02-22 RX ORDER — ALLOPURINOL 300 MG/1
TABLET ORAL
Qty: 90 TABLET | Refills: 0 | Status: SHIPPED | OUTPATIENT
Start: 2021-02-22 | End: 2021-05-26

## 2021-02-22 RX ORDER — IRBESARTAN AND HYDROCHLOROTHIAZIDE 150; 12.5 MG/1; MG/1
TABLET, FILM COATED ORAL
Qty: 90 TABLET | Refills: 1 | Status: SHIPPED | OUTPATIENT
Start: 2021-02-22 | End: 2021-08-04 | Stop reason: SDUPTHER

## 2021-05-22 DIAGNOSIS — M1A.0720 IDIOPATHIC CHRONIC GOUT OF LEFT FOOT WITHOUT TOPHUS: ICD-10-CM

## 2021-05-27 RX ORDER — ALLOPURINOL 300 MG/1
TABLET ORAL
Qty: 90 TABLET | Refills: 0 | Status: SHIPPED | OUTPATIENT
Start: 2021-05-27 | End: 2021-08-04 | Stop reason: SDUPTHER

## 2021-06-25 ENCOUNTER — PATIENT OUTREACH (OUTPATIENT)
Dept: ADMINISTRATIVE | Facility: OTHER | Age: 81
End: 2021-06-25

## 2021-06-28 ENCOUNTER — OFFICE VISIT (OUTPATIENT)
Dept: NEPHROLOGY | Facility: CLINIC | Age: 81
End: 2021-06-28
Payer: MEDICARE

## 2021-06-28 VITALS
DIASTOLIC BLOOD PRESSURE: 82 MMHG | WEIGHT: 248.69 LBS | HEIGHT: 67 IN | SYSTOLIC BLOOD PRESSURE: 126 MMHG | HEART RATE: 80 BPM | OXYGEN SATURATION: 96 % | BODY MASS INDEX: 39.03 KG/M2

## 2021-06-28 DIAGNOSIS — N28.1 ACQUIRED CYST OF KIDNEY: ICD-10-CM

## 2021-06-28 DIAGNOSIS — N18.30 STAGE 3 CHRONIC KIDNEY DISEASE, UNSPECIFIED WHETHER STAGE 3A OR 3B CKD: Primary | ICD-10-CM

## 2021-06-28 DIAGNOSIS — N25.81 SECONDARY HYPERPARATHYROIDISM: ICD-10-CM

## 2021-06-28 DIAGNOSIS — I10 ESSENTIAL HYPERTENSION: ICD-10-CM

## 2021-06-28 PROCEDURE — 1101F PT FALLS ASSESS-DOCD LE1/YR: CPT | Mod: CPTII,S$GLB,, | Performed by: INTERNAL MEDICINE

## 2021-06-28 PROCEDURE — 3288F PR FALLS RISK ASSESSMENT DOCUMENTED: ICD-10-PCS | Mod: CPTII,S$GLB,, | Performed by: INTERNAL MEDICINE

## 2021-06-28 PROCEDURE — 99499 UNLISTED E&M SERVICE: CPT | Mod: S$GLB,,, | Performed by: INTERNAL MEDICINE

## 2021-06-28 PROCEDURE — 99214 OFFICE O/P EST MOD 30 MIN: CPT | Mod: S$GLB,,, | Performed by: INTERNAL MEDICINE

## 2021-06-28 PROCEDURE — 1159F MED LIST DOCD IN RCRD: CPT | Mod: S$GLB,,, | Performed by: INTERNAL MEDICINE

## 2021-06-28 PROCEDURE — 99499 RISK ADDL DX/OHS AUDIT: ICD-10-PCS | Mod: S$GLB,,, | Performed by: INTERNAL MEDICINE

## 2021-06-28 PROCEDURE — 1101F PR PT FALLS ASSESS DOC 0-1 FALLS W/OUT INJ PAST YR: ICD-10-PCS | Mod: CPTII,S$GLB,, | Performed by: INTERNAL MEDICINE

## 2021-06-28 PROCEDURE — 1159F PR MEDICATION LIST DOCUMENTED IN MEDICAL RECORD: ICD-10-PCS | Mod: S$GLB,,, | Performed by: INTERNAL MEDICINE

## 2021-06-28 PROCEDURE — 3288F FALL RISK ASSESSMENT DOCD: CPT | Mod: CPTII,S$GLB,, | Performed by: INTERNAL MEDICINE

## 2021-06-28 PROCEDURE — 99999 PR PBB SHADOW E&M-EST. PATIENT-LVL III: ICD-10-PCS | Mod: PBBFAC,,, | Performed by: INTERNAL MEDICINE

## 2021-06-28 PROCEDURE — 99999 PR PBB SHADOW E&M-EST. PATIENT-LVL III: CPT | Mod: PBBFAC,,, | Performed by: INTERNAL MEDICINE

## 2021-06-28 PROCEDURE — 99214 PR OFFICE/OUTPT VISIT, EST, LEVL IV, 30-39 MIN: ICD-10-PCS | Mod: S$GLB,,, | Performed by: INTERNAL MEDICINE

## 2021-08-04 ENCOUNTER — IMMUNIZATION (OUTPATIENT)
Dept: FAMILY MEDICINE | Facility: CLINIC | Age: 81
End: 2021-08-04
Payer: MEDICARE

## 2021-08-04 ENCOUNTER — OFFICE VISIT (OUTPATIENT)
Dept: FAMILY MEDICINE | Facility: CLINIC | Age: 81
End: 2021-08-04
Payer: MEDICARE

## 2021-08-04 ENCOUNTER — LAB VISIT (OUTPATIENT)
Dept: LAB | Facility: HOSPITAL | Age: 81
End: 2021-08-04
Attending: FAMILY MEDICINE
Payer: MEDICARE

## 2021-08-04 VITALS
WEIGHT: 251.13 LBS | DIASTOLIC BLOOD PRESSURE: 82 MMHG | HEART RATE: 68 BPM | BODY MASS INDEX: 39.33 KG/M2 | OXYGEN SATURATION: 97 % | SYSTOLIC BLOOD PRESSURE: 132 MMHG

## 2021-08-04 DIAGNOSIS — Z23 NEED FOR VACCINATION: Primary | ICD-10-CM

## 2021-08-04 DIAGNOSIS — E66.01 CLASS 2 SEVERE OBESITY DUE TO EXCESS CALORIES WITH SERIOUS COMORBIDITY AND BODY MASS INDEX (BMI) OF 39.0 TO 39.9 IN ADULT: ICD-10-CM

## 2021-08-04 DIAGNOSIS — M1A.0720 IDIOPATHIC CHRONIC GOUT OF LEFT FOOT WITHOUT TOPHUS: ICD-10-CM

## 2021-08-04 DIAGNOSIS — K21.9 GASTROESOPHAGEAL REFLUX DISEASE WITHOUT ESOPHAGITIS: ICD-10-CM

## 2021-08-04 DIAGNOSIS — E78.49 OTHER HYPERLIPIDEMIA: ICD-10-CM

## 2021-08-04 DIAGNOSIS — N18.32 STAGE 3B CHRONIC KIDNEY DISEASE: ICD-10-CM

## 2021-08-04 DIAGNOSIS — Z00.01 ANNUAL VISIT FOR GENERAL ADULT MEDICAL EXAMINATION WITH ABNORMAL FINDINGS: Primary | ICD-10-CM

## 2021-08-04 DIAGNOSIS — R73.03 PRE-DIABETES: ICD-10-CM

## 2021-08-04 DIAGNOSIS — I77.1 TORTUOUS AORTA: ICD-10-CM

## 2021-08-04 DIAGNOSIS — I10 ESSENTIAL HYPERTENSION: ICD-10-CM

## 2021-08-04 PROBLEM — E66.812 CLASS 2 SEVERE OBESITY DUE TO EXCESS CALORIES WITH SERIOUS COMORBIDITY AND BODY MASS INDEX (BMI) OF 38.0 TO 38.9 IN ADULT: Status: RESOLVED | Noted: 2018-09-13 | Resolved: 2021-08-04

## 2021-08-04 PROBLEM — E66.812 CLASS 2 SEVERE OBESITY DUE TO EXCESS CALORIES WITH SERIOUS COMORBIDITY AND BODY MASS INDEX (BMI) OF 39.0 TO 39.9 IN ADULT: Status: ACTIVE | Noted: 2021-08-04

## 2021-08-04 PROBLEM — I50.32 CHRONIC DIASTOLIC HEART FAILURE: Status: RESOLVED | Noted: 2020-12-15 | Resolved: 2021-08-04

## 2021-08-04 LAB
25(OH)D3+25(OH)D2 SERPL-MCNC: 43 NG/ML (ref 30–96)
ALBUMIN SERPL BCP-MCNC: 3.8 G/DL (ref 3.5–5.2)
ALP SERPL-CCNC: 95 U/L (ref 55–135)
ALT SERPL W/O P-5'-P-CCNC: 15 U/L (ref 10–44)
ANION GAP SERPL CALC-SCNC: 7 MMOL/L (ref 8–16)
AST SERPL-CCNC: 20 U/L (ref 10–40)
BASOPHILS # BLD AUTO: 0.05 K/UL (ref 0–0.2)
BASOPHILS NFR BLD: 0.8 % (ref 0–1.9)
BILIRUB SERPL-MCNC: 0.5 MG/DL (ref 0.1–1)
BUN SERPL-MCNC: 23 MG/DL (ref 8–23)
CALCIUM SERPL-MCNC: 10.1 MG/DL (ref 8.7–10.5)
CHLORIDE SERPL-SCNC: 101 MMOL/L (ref 95–110)
CHOLEST SERPL-MCNC: 131 MG/DL (ref 120–199)
CHOLEST/HDLC SERPL: 2.7 {RATIO} (ref 2–5)
CO2 SERPL-SCNC: 31 MMOL/L (ref 23–29)
CREAT SERPL-MCNC: 1.2 MG/DL (ref 0.5–1.4)
DIFFERENTIAL METHOD: ABNORMAL
EOSINOPHIL # BLD AUTO: 0.2 K/UL (ref 0–0.5)
EOSINOPHIL NFR BLD: 2.8 % (ref 0–8)
ERYTHROCYTE [DISTWIDTH] IN BLOOD BY AUTOMATED COUNT: 15.3 % (ref 11.5–14.5)
EST. GFR  (AFRICAN AMERICAN): 48.6 ML/MIN/1.73 M^2
EST. GFR  (NON AFRICAN AMERICAN): 42.2 ML/MIN/1.73 M^2
ESTIMATED AVG GLUCOSE: 120 MG/DL (ref 68–131)
GLUCOSE SERPL-MCNC: 101 MG/DL (ref 70–110)
HBA1C MFR BLD: 5.8 % (ref 4–5.6)
HCT VFR BLD AUTO: 46.4 % (ref 37–48.5)
HDLC SERPL-MCNC: 48 MG/DL (ref 40–75)
HDLC SERPL: 36.6 % (ref 20–50)
HGB BLD-MCNC: 14.2 G/DL (ref 12–16)
IMM GRANULOCYTES # BLD AUTO: 0.02 K/UL (ref 0–0.04)
IMM GRANULOCYTES NFR BLD AUTO: 0.3 % (ref 0–0.5)
LDLC SERPL CALC-MCNC: 64.2 MG/DL (ref 63–159)
LYMPHOCYTES # BLD AUTO: 1.9 K/UL (ref 1–4.8)
LYMPHOCYTES NFR BLD: 29.4 % (ref 18–48)
MCH RBC QN AUTO: 28.2 PG (ref 27–31)
MCHC RBC AUTO-ENTMCNC: 30.6 G/DL (ref 32–36)
MCV RBC AUTO: 92 FL (ref 82–98)
MONOCYTES # BLD AUTO: 0.6 K/UL (ref 0.3–1)
MONOCYTES NFR BLD: 9 % (ref 4–15)
NEUTROPHILS # BLD AUTO: 3.7 K/UL (ref 1.8–7.7)
NEUTROPHILS NFR BLD: 57.7 % (ref 38–73)
NONHDLC SERPL-MCNC: 83 MG/DL
NRBC BLD-RTO: 0 /100 WBC
PLATELET # BLD AUTO: 172 K/UL (ref 150–450)
PMV BLD AUTO: 11.4 FL (ref 9.2–12.9)
POTASSIUM SERPL-SCNC: 4.1 MMOL/L (ref 3.5–5.1)
PROT SERPL-MCNC: 7.1 G/DL (ref 6–8.4)
RBC # BLD AUTO: 5.04 M/UL (ref 4–5.4)
SODIUM SERPL-SCNC: 139 MMOL/L (ref 136–145)
TRIGL SERPL-MCNC: 94 MG/DL (ref 30–150)
URATE SERPL-MCNC: 5.7 MG/DL (ref 2.4–5.7)
WBC # BLD AUTO: 6.42 K/UL (ref 3.9–12.7)

## 2021-08-04 PROCEDURE — 36415 COLL VENOUS BLD VENIPUNCTURE: CPT | Mod: PO | Performed by: FAMILY MEDICINE

## 2021-08-04 PROCEDURE — 3288F PR FALLS RISK ASSESSMENT DOCUMENTED: ICD-10-PCS | Mod: CPTII,S$GLB,, | Performed by: FAMILY MEDICINE

## 2021-08-04 PROCEDURE — 83036 HEMOGLOBIN GLYCOSYLATED A1C: CPT | Performed by: FAMILY MEDICINE

## 2021-08-04 PROCEDURE — 1101F PR PT FALLS ASSESS DOC 0-1 FALLS W/OUT INJ PAST YR: ICD-10-PCS | Mod: CPTII,S$GLB,, | Performed by: FAMILY MEDICINE

## 2021-08-04 PROCEDURE — 99214 PR OFFICE/OUTPT VISIT, EST, LEVL IV, 30-39 MIN: ICD-10-PCS | Mod: S$GLB,,, | Performed by: FAMILY MEDICINE

## 2021-08-04 PROCEDURE — 82306 VITAMIN D 25 HYDROXY: CPT | Performed by: FAMILY MEDICINE

## 2021-08-04 PROCEDURE — 1126F PR PAIN SEVERITY QUANTIFIED, NO PAIN PRESENT: ICD-10-PCS | Mod: CPTII,S$GLB,, | Performed by: FAMILY MEDICINE

## 2021-08-04 PROCEDURE — 91300 COVID-19, MRNA, LNP-S, PF, 30 MCG/0.3 ML DOSE VACCINE: CPT | Mod: ,,, | Performed by: INTERNAL MEDICINE

## 2021-08-04 PROCEDURE — 80061 LIPID PANEL: CPT | Performed by: FAMILY MEDICINE

## 2021-08-04 PROCEDURE — 1126F AMNT PAIN NOTED NONE PRSNT: CPT | Mod: CPTII,S$GLB,, | Performed by: FAMILY MEDICINE

## 2021-08-04 PROCEDURE — 80053 COMPREHEN METABOLIC PANEL: CPT | Performed by: FAMILY MEDICINE

## 2021-08-04 PROCEDURE — 84550 ASSAY OF BLOOD/URIC ACID: CPT | Performed by: FAMILY MEDICINE

## 2021-08-04 PROCEDURE — 99499 RISK ADDL DX/OHS AUDIT: ICD-10-PCS | Mod: S$GLB,,, | Performed by: FAMILY MEDICINE

## 2021-08-04 PROCEDURE — 3075F PR MOST RECENT SYSTOLIC BLOOD PRESS GE 130-139MM HG: ICD-10-PCS | Mod: CPTII,S$GLB,, | Performed by: FAMILY MEDICINE

## 2021-08-04 PROCEDURE — 85025 COMPLETE CBC W/AUTO DIFF WBC: CPT | Performed by: FAMILY MEDICINE

## 2021-08-04 PROCEDURE — 99499 UNLISTED E&M SERVICE: CPT | Mod: S$GLB,,, | Performed by: FAMILY MEDICINE

## 2021-08-04 PROCEDURE — 91300 COVID-19, MRNA, LNP-S, PF, 30 MCG/0.3 ML DOSE VACCINE: ICD-10-PCS | Mod: ,,, | Performed by: INTERNAL MEDICINE

## 2021-08-04 PROCEDURE — 3075F SYST BP GE 130 - 139MM HG: CPT | Mod: CPTII,S$GLB,, | Performed by: FAMILY MEDICINE

## 2021-08-04 PROCEDURE — 1159F PR MEDICATION LIST DOCUMENTED IN MEDICAL RECORD: ICD-10-PCS | Mod: CPTII,S$GLB,, | Performed by: FAMILY MEDICINE

## 2021-08-04 PROCEDURE — 3079F DIAST BP 80-89 MM HG: CPT | Mod: CPTII,S$GLB,, | Performed by: FAMILY MEDICINE

## 2021-08-04 PROCEDURE — 99214 OFFICE O/P EST MOD 30 MIN: CPT | Mod: S$GLB,,, | Performed by: FAMILY MEDICINE

## 2021-08-04 PROCEDURE — 1160F RVW MEDS BY RX/DR IN RCRD: CPT | Mod: CPTII,S$GLB,, | Performed by: FAMILY MEDICINE

## 2021-08-04 PROCEDURE — 99999 PR PBB SHADOW E&M-EST. PATIENT-LVL III: CPT | Mod: PBBFAC,,, | Performed by: FAMILY MEDICINE

## 2021-08-04 PROCEDURE — 1101F PT FALLS ASSESS-DOCD LE1/YR: CPT | Mod: CPTII,S$GLB,, | Performed by: FAMILY MEDICINE

## 2021-08-04 PROCEDURE — 99999 PR PBB SHADOW E&M-EST. PATIENT-LVL III: ICD-10-PCS | Mod: PBBFAC,,, | Performed by: FAMILY MEDICINE

## 2021-08-04 PROCEDURE — 1160F PR REVIEW ALL MEDS BY PRESCRIBER/CLIN PHARMACIST DOCUMENTED: ICD-10-PCS | Mod: CPTII,S$GLB,, | Performed by: FAMILY MEDICINE

## 2021-08-04 PROCEDURE — 1159F MED LIST DOCD IN RCRD: CPT | Mod: CPTII,S$GLB,, | Performed by: FAMILY MEDICINE

## 2021-08-04 PROCEDURE — 0001A COVID-19, MRNA, LNP-S, PF, 30 MCG/0.3 ML DOSE VACCINE: ICD-10-PCS | Mod: CV19,,, | Performed by: INTERNAL MEDICINE

## 2021-08-04 PROCEDURE — 3079F PR MOST RECENT DIASTOLIC BLOOD PRESSURE 80-89 MM HG: ICD-10-PCS | Mod: CPTII,S$GLB,, | Performed by: FAMILY MEDICINE

## 2021-08-04 PROCEDURE — 3288F FALL RISK ASSESSMENT DOCD: CPT | Mod: CPTII,S$GLB,, | Performed by: FAMILY MEDICINE

## 2021-08-04 PROCEDURE — 0001A COVID-19, MRNA, LNP-S, PF, 30 MCG/0.3 ML DOSE VACCINE: CPT | Mod: CV19,,, | Performed by: INTERNAL MEDICINE

## 2021-08-04 RX ORDER — PANTOPRAZOLE SODIUM 20 MG/1
20 TABLET, DELAYED RELEASE ORAL DAILY
Qty: 90 TABLET | Refills: 3 | Status: SHIPPED | OUTPATIENT
Start: 2021-08-04 | End: 2022-08-01

## 2021-08-04 RX ORDER — ATENOLOL 100 MG/1
TABLET ORAL
Qty: 45 TABLET | Refills: 3 | Status: SHIPPED | OUTPATIENT
Start: 2021-08-04 | End: 2022-06-28

## 2021-08-04 RX ORDER — AMLODIPINE BESYLATE 2.5 MG/1
2.5 TABLET ORAL DAILY
Qty: 90 TABLET | Refills: 3 | Status: SHIPPED | OUTPATIENT
Start: 2021-08-04 | End: 2022-05-10

## 2021-08-04 RX ORDER — IRBESARTAN AND HYDROCHLOROTHIAZIDE 150; 12.5 MG/1; MG/1
1 TABLET, FILM COATED ORAL DAILY
Qty: 90 TABLET | Refills: 3 | Status: SHIPPED | OUTPATIENT
Start: 2021-08-04 | End: 2021-08-24

## 2021-08-04 RX ORDER — ATORVASTATIN CALCIUM 40 MG/1
40 TABLET, FILM COATED ORAL NIGHTLY
Qty: 90 TABLET | Refills: 3 | Status: SHIPPED | OUTPATIENT
Start: 2021-08-04 | End: 2022-10-06

## 2021-08-04 RX ORDER — ALLOPURINOL 300 MG/1
300 TABLET ORAL DAILY
Qty: 90 TABLET | Refills: 3 | Status: SHIPPED | OUTPATIENT
Start: 2021-08-04 | End: 2022-08-02

## 2021-08-22 DIAGNOSIS — I10 ESSENTIAL HYPERTENSION: ICD-10-CM

## 2021-08-24 RX ORDER — IRBESARTAN AND HYDROCHLOROTHIAZIDE 150; 12.5 MG/1; MG/1
TABLET, FILM COATED ORAL
Qty: 90 TABLET | Refills: 3 | Status: SHIPPED | OUTPATIENT
Start: 2021-08-24 | End: 2022-08-02

## 2021-08-25 ENCOUNTER — IMMUNIZATION (OUTPATIENT)
Dept: FAMILY MEDICINE | Facility: CLINIC | Age: 81
End: 2021-08-25
Payer: MEDICARE

## 2021-08-25 DIAGNOSIS — Z23 NEED FOR VACCINATION: Primary | ICD-10-CM

## 2021-08-25 PROCEDURE — 0002A COVID-19, MRNA, LNP-S, PF, 30 MCG/0.3 ML DOSE VACCINE: ICD-10-PCS | Mod: CV19,,, | Performed by: INTERNAL MEDICINE

## 2021-08-25 PROCEDURE — 91300 COVID-19, MRNA, LNP-S, PF, 30 MCG/0.3 ML DOSE VACCINE: CPT | Mod: ,,, | Performed by: INTERNAL MEDICINE

## 2021-08-25 PROCEDURE — 91300 COVID-19, MRNA, LNP-S, PF, 30 MCG/0.3 ML DOSE VACCINE: ICD-10-PCS | Mod: ,,, | Performed by: INTERNAL MEDICINE

## 2021-08-25 PROCEDURE — 0002A COVID-19, MRNA, LNP-S, PF, 30 MCG/0.3 ML DOSE VACCINE: CPT | Mod: CV19,,, | Performed by: INTERNAL MEDICINE

## 2021-12-17 ENCOUNTER — OFFICE VISIT (OUTPATIENT)
Dept: NEPHROLOGY | Facility: CLINIC | Age: 81
End: 2021-12-17
Payer: MEDICARE

## 2021-12-17 VITALS
SYSTOLIC BLOOD PRESSURE: 138 MMHG | DIASTOLIC BLOOD PRESSURE: 90 MMHG | BODY MASS INDEX: 39.33 KG/M2 | HEIGHT: 67 IN | OXYGEN SATURATION: 98 % | HEART RATE: 77 BPM

## 2021-12-17 DIAGNOSIS — E83.42 HYPOMAGNESEMIA: ICD-10-CM

## 2021-12-17 DIAGNOSIS — E66.01 CLASS 2 SEVERE OBESITY DUE TO EXCESS CALORIES WITH SERIOUS COMORBIDITY AND BODY MASS INDEX (BMI) OF 39.0 TO 39.9 IN ADULT: ICD-10-CM

## 2021-12-17 DIAGNOSIS — N18.30 STAGE 3 CHRONIC KIDNEY DISEASE, UNSPECIFIED WHETHER STAGE 3A OR 3B CKD: ICD-10-CM

## 2021-12-17 DIAGNOSIS — I10 ESSENTIAL HYPERTENSION: Primary | ICD-10-CM

## 2021-12-17 DIAGNOSIS — R73.03 PRE-DIABETES: ICD-10-CM

## 2021-12-17 PROCEDURE — 99999 PR PBB SHADOW E&M-EST. PATIENT-LVL II: CPT | Mod: PBBFAC,,, | Performed by: INTERNAL MEDICINE

## 2021-12-17 PROCEDURE — 99999 PR PBB SHADOW E&M-EST. PATIENT-LVL II: ICD-10-PCS | Mod: PBBFAC,,, | Performed by: INTERNAL MEDICINE

## 2021-12-17 PROCEDURE — 99499 RISK ADDL DX/OHS AUDIT: ICD-10-PCS | Mod: S$GLB,,, | Performed by: INTERNAL MEDICINE

## 2021-12-17 PROCEDURE — 99214 PR OFFICE/OUTPT VISIT, EST, LEVL IV, 30-39 MIN: ICD-10-PCS | Mod: S$GLB,,, | Performed by: INTERNAL MEDICINE

## 2021-12-17 PROCEDURE — 99214 OFFICE O/P EST MOD 30 MIN: CPT | Mod: S$GLB,,, | Performed by: INTERNAL MEDICINE

## 2021-12-17 PROCEDURE — 99499 UNLISTED E&M SERVICE: CPT | Mod: S$GLB,,, | Performed by: INTERNAL MEDICINE

## 2021-12-21 ENCOUNTER — HOSPITAL ENCOUNTER (OUTPATIENT)
Dept: RADIOLOGY | Facility: HOSPITAL | Age: 81
Discharge: HOME | End: 2021-12-21
Attending: INTERNAL MEDICINE
Payer: MEDICARE

## 2021-12-21 DIAGNOSIS — N28.1 ACQUIRED CYST OF KIDNEY: ICD-10-CM

## 2021-12-21 PROCEDURE — 76770 US EXAM ABDO BACK WALL COMP: CPT | Mod: 26,,, | Performed by: RADIOLOGY

## 2021-12-21 PROCEDURE — 76770 US EXAM ABDO BACK WALL COMP: CPT | Mod: TC,PO

## 2021-12-21 PROCEDURE — 76770 US RETROPERITONEAL COMPLETE: ICD-10-PCS | Mod: 26,,, | Performed by: RADIOLOGY

## 2021-12-30 ENCOUNTER — PES CALL (OUTPATIENT)
Dept: ADMINISTRATIVE | Facility: CLINIC | Age: 81
End: 2021-12-30
Payer: MEDICARE

## 2022-02-09 ENCOUNTER — OFFICE VISIT (OUTPATIENT)
Dept: FAMILY MEDICINE | Facility: CLINIC | Age: 82
End: 2022-02-09
Payer: MEDICARE

## 2022-02-09 ENCOUNTER — TELEPHONE (OUTPATIENT)
Dept: FAMILY MEDICINE | Facility: CLINIC | Age: 82
End: 2022-02-09
Payer: MEDICARE

## 2022-02-09 VITALS
HEIGHT: 67 IN | OXYGEN SATURATION: 99 % | SYSTOLIC BLOOD PRESSURE: 110 MMHG | DIASTOLIC BLOOD PRESSURE: 60 MMHG | WEIGHT: 248.25 LBS | HEART RATE: 80 BPM | BODY MASS INDEX: 38.96 KG/M2

## 2022-02-09 DIAGNOSIS — N18.31 STAGE 3A CHRONIC KIDNEY DISEASE: ICD-10-CM

## 2022-02-09 DIAGNOSIS — I77.1 STRICTURE OF ARTERY: ICD-10-CM

## 2022-02-09 DIAGNOSIS — Z78.0 POSTMENOPAUSAL: ICD-10-CM

## 2022-02-09 DIAGNOSIS — R07.89 OTHER CHEST PAIN: ICD-10-CM

## 2022-02-09 DIAGNOSIS — N25.81 SECONDARY HYPERPARATHYROIDISM OF RENAL ORIGIN: ICD-10-CM

## 2022-02-09 DIAGNOSIS — E78.00 PURE HYPERCHOLESTEROLEMIA: ICD-10-CM

## 2022-02-09 DIAGNOSIS — I10 ESSENTIAL HYPERTENSION: Primary | ICD-10-CM

## 2022-02-09 DIAGNOSIS — E66.01 CLASS 2 SEVERE OBESITY DUE TO EXCESS CALORIES WITH SERIOUS COMORBIDITY AND BODY MASS INDEX (BMI) OF 39.0 TO 39.9 IN ADULT: ICD-10-CM

## 2022-02-09 DIAGNOSIS — I48.91 ATRIAL FIBRILLATION, UNSPECIFIED TYPE: ICD-10-CM

## 2022-02-09 PROCEDURE — 3074F PR MOST RECENT SYSTOLIC BLOOD PRESSURE < 130 MM HG: ICD-10-PCS | Mod: CPTII,S$GLB,, | Performed by: FAMILY MEDICINE

## 2022-02-09 PROCEDURE — 93010 ELECTROCARDIOGRAM REPORT: CPT | Mod: S$GLB,,, | Performed by: INTERNAL MEDICINE

## 2022-02-09 PROCEDURE — 1159F MED LIST DOCD IN RCRD: CPT | Mod: CPTII,S$GLB,, | Performed by: FAMILY MEDICINE

## 2022-02-09 PROCEDURE — 1126F AMNT PAIN NOTED NONE PRSNT: CPT | Mod: CPTII,S$GLB,, | Performed by: FAMILY MEDICINE

## 2022-02-09 PROCEDURE — 3078F DIAST BP <80 MM HG: CPT | Mod: CPTII,S$GLB,, | Performed by: FAMILY MEDICINE

## 2022-02-09 PROCEDURE — 99214 PR OFFICE/OUTPT VISIT, EST, LEVL IV, 30-39 MIN: ICD-10-PCS | Mod: S$GLB,,, | Performed by: FAMILY MEDICINE

## 2022-02-09 PROCEDURE — 1159F PR MEDICATION LIST DOCUMENTED IN MEDICAL RECORD: ICD-10-PCS | Mod: CPTII,S$GLB,, | Performed by: FAMILY MEDICINE

## 2022-02-09 PROCEDURE — 93005 EKG 12-LEAD: ICD-10-PCS | Mod: S$GLB,,, | Performed by: FAMILY MEDICINE

## 2022-02-09 PROCEDURE — 99999 PR PBB SHADOW E&M-EST. PATIENT-LVL V: ICD-10-PCS | Mod: PBBFAC,,, | Performed by: FAMILY MEDICINE

## 2022-02-09 PROCEDURE — 99999 PR PBB SHADOW E&M-EST. PATIENT-LVL V: CPT | Mod: PBBFAC,,, | Performed by: FAMILY MEDICINE

## 2022-02-09 PROCEDURE — 99214 OFFICE O/P EST MOD 30 MIN: CPT | Mod: S$GLB,,, | Performed by: FAMILY MEDICINE

## 2022-02-09 PROCEDURE — 3074F SYST BP LT 130 MM HG: CPT | Mod: CPTII,S$GLB,, | Performed by: FAMILY MEDICINE

## 2022-02-09 PROCEDURE — 1101F PT FALLS ASSESS-DOCD LE1/YR: CPT | Mod: CPTII,S$GLB,, | Performed by: FAMILY MEDICINE

## 2022-02-09 PROCEDURE — 99499 UNLISTED E&M SERVICE: CPT | Mod: S$GLB,,, | Performed by: FAMILY MEDICINE

## 2022-02-09 PROCEDURE — 3288F FALL RISK ASSESSMENT DOCD: CPT | Mod: CPTII,S$GLB,, | Performed by: FAMILY MEDICINE

## 2022-02-09 PROCEDURE — 1101F PR PT FALLS ASSESS DOC 0-1 FALLS W/OUT INJ PAST YR: ICD-10-PCS | Mod: CPTII,S$GLB,, | Performed by: FAMILY MEDICINE

## 2022-02-09 PROCEDURE — 1126F PR PAIN SEVERITY QUANTIFIED, NO PAIN PRESENT: ICD-10-PCS | Mod: CPTII,S$GLB,, | Performed by: FAMILY MEDICINE

## 2022-02-09 PROCEDURE — 93010 EKG 12-LEAD: ICD-10-PCS | Mod: S$GLB,,, | Performed by: INTERNAL MEDICINE

## 2022-02-09 PROCEDURE — 99499 RISK ADDL DX/OHS AUDIT: ICD-10-PCS | Mod: S$GLB,,, | Performed by: FAMILY MEDICINE

## 2022-02-09 PROCEDURE — 3078F PR MOST RECENT DIASTOLIC BLOOD PRESSURE < 80 MM HG: ICD-10-PCS | Mod: CPTII,S$GLB,, | Performed by: FAMILY MEDICINE

## 2022-02-09 PROCEDURE — 93005 ELECTROCARDIOGRAM TRACING: CPT | Mod: S$GLB,,, | Performed by: FAMILY MEDICINE

## 2022-02-09 PROCEDURE — 3288F PR FALLS RISK ASSESSMENT DOCUMENTED: ICD-10-PCS | Mod: CPTII,S$GLB,, | Performed by: FAMILY MEDICINE

## 2022-02-09 NOTE — PATIENT INSTRUCTIONS
Patient Education       Chest Pain Discharge Instructions   About this topic   Chest pain is felt in the upper part of your body from your neck to your belly. You may feel pain, pressure, or tightness. Many things can cause chest pain. Some are serious things like heart disease or lung disease. Less serious things like stomach problems can also cause chest pain.  The doctors may not be able to find all serious causes of chest pain the first time they see you. It is important that you follow up with your doctor. Treatment will depend on what is causing your chest pain       What care is needed at home?   · Ask your doctor what you need to do when you go home. Make sure you ask questions if you do not understand what the doctor says.  · Follow your regular doctors orders to keep your blood pressure, cholesterol, and high blood sugar (diabetes) under control.  · If you smoke, try to quit. Your doctor or nurse can help.  · Keep a healthy weight. If you are too heavy, lose weight.  · Eat a healthy diet, as discussed with your doctor or nurse.  What follow-up care is needed?   · Your doctor may ask you to make visits to the office to check on your progress. Be sure to keep these visits.  · Your doctor will tell you if other tests are needed.  · You doctor will tell you if you need to see a specialist like a heart doctor or cardiologist.  · Your doctor may order a heart rehab program for you after you leave the hospital. This is an important part of your care. Share your discharge information with the rehab staff so they can plan a program to help you recover. Let your doctor know if you need help finding a program.  What drugs may be needed?   If chest pain is caused by a heart-related problem, the doctor may order drugs to:  · Thin the blood  · Dissolve a blood clot  · Lower cholesterol  · Lessen the work of your heart  · Correct or prevent an abnormal heartbeat  · Lower your blood pressure  · Increase blood flow to the  heart muscle  · Relax the heart and help avoid spasms in the arteries  Check with your doctor before you take drugs like ibuprofen, naproxen, vitamin, or hormone replacement therapy.  If chest pain is caused by a something other than your heart, the doctor may order drugs to:  · Help with pain  · Treat stomach problems  · Help with breathing  · Help you relax  · Control coughing  Will physical activity be limited?   · Limit activities that can trigger chest pain.  · You may need to be less active at first, and then slowly return to normal levels. Talk to your doctor about the right amount of activity for you.  What problems could happen?   · Heart attack  · Other heart problems  · Blood clot in the lungs  When do I need to call the doctor?   Activate the emergency medical system right away if you have signs of a heart attack. Call 911 in the [place] or [place]. The sooner treatment begins, the better your chances for recovery. Call for emergency help right away if you have:  · Signs of heart attack, which may include:  ? Severe chest pain, pressure, or discomfort with:  § Breathing trouble; sweating; upset stomach; or cold, clammy skin.  § Pain in your arms, back, or jaw.  § Worse pain with activity like walking up stairs.  · Fast or irregular heartbeat.  · Feeling dizzy, faint, or weak.  · Do not drive yourself to the hospital or have someone drive you. The emergency rescue people can begin to treat you the minute they arrive.       · If you are to take nitroglycerin pills or spray with chest pain:  ? Rest. Sit or lie down.  ? Spray or place one pill under your tongue and let it melt.  ? If the pain is not better or is getting worse after 5 minutes, call for emergency help. Then take one more spray or pill under your tongue.  ? If the pain does not get better after another 3 to 5 minutes, take a third spray or pill under your tongue.  ? Drink a sip of water to wet your mouth if it is too dry to let the pills  break down.  · If nitroglycerin pills or spray have been ordered, always carry some with you and make sure they are not out of date. They need to be replaced 6 to 12 months after opening the bottle. Keep them in their original bottle and at room temperature. The pill should burn or tingle when you put it under your tongue. If it does not, it may need to be replaced.  Call your doctor if:  · You have a fever of 100.4°F (38°C) or higher or chills.  · You cough up yellow or green mucus.  · You are more tired than normal or more trouble breathing with activity.  Teach Back: Helping You Understand   The Teach Back Method helps you understand the information we are giving you. After you talk with the staff, tell them in your own words what you learned. This helps to make sure the staff has described each thing clearly. It also helps to explain things that may have been confusing. Before going home, make sure you can do these:  · I can tell you about my pain.  · I can tell you when I can go back to my normal activities.  · I can tell you what I will do if I have signs of a heart attack.  Where can I learn more?   American Heart Association  http://www.heart.org/HEARTORG/Conditions/HeartAttack/AboutHeartAttacks/About-Heart-Attacks_UCM_002038_Article.jsp   American Heart Association  http://www.heart.org/HEARTORG/Conditions/HeartAttack/SymptomsDiagnosisofHeartAttack/Angina-[email or web address]_UC_450308_Article.jsp   FamilyDoctor.org  http://familydoctor.org/familydoctor/en/diseases-conditions/angina.printerview.all.html   NHS Choices  https://www.nhs.uk/conditions/chest-pain/   Last Reviewed Date   2021-06-16  Consumer Information Use and Disclaimer   This information is not specific medical advice and does not replace information you receive from your health care provider. This is only a brief summary of general information. It does NOT include all information about conditions, illnesses, injuries, tests, procedures, treatments,  therapies, discharge instructions or life-style choices that may apply to you. You must talk with your health care provider for complete information about your health and treatment options. This information should not be used to decide whether or not to accept your health care providers advice, instructions or recommendations. Only your health care provider has the knowledge and training to provide advice that is right for you.  Copyright   Copyright © 2021 [employer] and its affiliates and/or licensors. All rights reserved.

## 2022-02-09 NOTE — MEDICAL/APP STUDENT
Subjective:       Patient ID: Selena Moulton is a 81 y.o. female.    Chief Complaint: Follow up, chest pain    Chest Pain   This is a new problem. The current episode started 1 to 4 weeks ago. The onset quality is gradual. The problem occurs daily. The problem has been unchanged. The pain is present in the substernal region. The pain is at a severity of 5/10. The pain is moderate. The quality of the pain is described as heavy and tightness. The pain radiates to the right arm. Associated symptoms include exertional chest pressure, leg pain and shortness of breath. Pertinent negatives include no cough, fever, hemoptysis, lower extremity edema, orthopnea or syncope. She has tried nothing for the symptoms. Risk factors include being elderly, obesity, stress and post-menopausal.   Her past medical history is significant for hypertension and valve disorder.     Review of Systems   Constitutional: Negative for activity change, appetite change and fever.   Respiratory: Positive for shortness of breath. Negative for cough and hemoptysis.    Cardiovascular: Positive for chest pain. Negative for orthopnea and syncope.   Genitourinary: Positive for frequency.   Musculoskeletal: Positive for leg pain.   Neurological: Negative for disturbances in coordination and coordination difficulties.   Psychiatric/Behavioral: Positive for sleep disturbance. Negative for decreased concentration and dysphoric mood.         Objective:      Physical Exam  Constitutional:       General: She is not in acute distress.     Appearance: She is not ill-appearing.   HENT:      Head: Normocephalic.      Nose: Nose normal. No congestion or rhinorrhea.      Mouth/Throat:      Mouth: Mucous membranes are moist.      Pharynx: No oropharyngeal exudate or posterior oropharyngeal erythema.   Eyes:      Pupils: Pupils are equal, round, and reactive to light.   Cardiovascular:      Rate and Rhythm: Normal rate and regular rhythm.      Pulses: Normal  pulses.      Heart sounds: Normal heart sounds.   Pulmonary:      Effort: Pulmonary effort is normal. No respiratory distress.      Breath sounds: Normal breath sounds. No wheezing or rales.   Chest:      Chest wall: No tenderness.   Neurological:      Mental Status: She is alert and oriented to person, place, and time.   Psychiatric:         Mood and Affect: Mood normal.         Behavior: Behavior normal.         Assessment:       Problem List Items Addressed This Visit        Cardiac/Vascular    Essential hypertension - Primary    Pure hypercholesterolemia       Renal/    Stage 3 chronic kidney disease       Endocrine    Secondary hyperparathyroidism of renal origin       Other    Class 2 severe obesity due to excess calories with serious comorbidity and body mass index (BMI) of 39.0 to 39.9 in adult      Other Visit Diagnoses     Stricture of artery        Postmenopausal        Relevant Orders    DXA Bone Density Spine And Hip    Other chest pain        Relevant Orders    EKG 12-lead          Plan:       Chest pain   -EKG    Essential hypertension  -continue irbersartan-HCTZ  -continue amlodipine    Pure hypercholesterolemia  -continue atorvastatin

## 2022-02-09 NOTE — PROGRESS NOTES
Subjective:       Patient ID: Selena Moulton is a 81 y.o. female.    Chief Complaint: Follow-up (When active . )    HPI     Hypertension:  The patient is been taking her blood pressure medicines as directed, denies any side effects of the medication, the patient stated that is been experience for the last 2 months chest pain intermittently that are mild to moderate without exertion and sometimes radiated to the right upper extremity.  The patient stated that she is not having any chest pain at this office visit.  The patient also missed her appointment with the cardiologist last month and currently taking baby aspirin every day.    Hyperlipidemia:  The last cholesterol levels were in range, the patient is taking atorvastatin 40 mg daily, denies any side effects of the medication.    Pre diabetes:  The last hemoglobin A1c was 5.8, the patient is not taking any medications for this problem.    Upon review of the last blood work, the patient has increased PTH levels.    Past medical history, past social history was reviewed and discussed with the patient.    Review of Systems   Constitutional: Negative for activity change and appetite change.   HENT: Negative for congestion and ear discharge.    Eyes: Negative for discharge and itching.   Respiratory: Negative for choking and chest tightness.    Cardiovascular: Positive for chest pain. Negative for palpitations and leg swelling.   Gastrointestinal: Negative for abdominal distention, abdominal pain and constipation.   Endocrine: Negative for cold intolerance and heat intolerance.   Genitourinary: Negative for dysuria and flank pain.   Musculoskeletal: Positive for arthralgias. Negative for back pain.   Skin: Negative for pallor and rash.   Allergic/Immunologic: Negative for environmental allergies and food allergies.   Neurological: Negative for dizziness and facial asymmetry.   Hematological: Negative for adenopathy. Does not bruise/bleed easily.    Psychiatric/Behavioral: Negative for agitation, confusion and sleep disturbance.       Objective:      Physical Exam  Vitals and nursing note reviewed.   Constitutional:       General: She is not in acute distress.     Appearance: Normal appearance. She is well-developed and well-nourished. She is obese. She is not diaphoretic.      Comments: Limited ambulation, the patient is using a walker for mobilization.   HENT:      Head: Normocephalic and atraumatic.      Right Ear: External ear normal.      Left Ear: External ear normal.      Nose: Nose normal.      Mouth/Throat:      Mouth: Oropharynx is clear and moist.   Eyes:      General: No scleral icterus.        Right eye: No discharge.         Left eye: No discharge.      Conjunctiva/sclera: Conjunctivae normal.   Cardiovascular:      Rate and Rhythm: Normal rate and regular rhythm.      Pulses: Intact distal pulses.      Heart sounds: Normal heart sounds. No murmur heard.      Pulmonary:      Effort: Pulmonary effort is normal. No respiratory distress.      Breath sounds: Normal breath sounds. No wheezing.   Musculoskeletal:         General: No tenderness or deformity.      Cervical back: Neck supple.   Skin:     Coloration: Skin is not pale.      Comments: Varicosity veins of bilateral lower extremities   Neurological:      Mental Status: She is alert.      Cranial Nerves: No cranial nerve deficit.      Coordination: Coordination normal.   Psychiatric:         Mood and Affect: Mood and affect normal.         Behavior: Behavior normal.         Thought Content: Thought content normal.         Judgment: Judgment normal.         Assessment:       1. Essential hypertension    2. Pure hypercholesterolemia    3. Class 2 severe obesity due to excess calories with serious comorbidity and body mass index (BMI) of 39.0 to 39.9 in adult    4. Secondary hyperparathyroidism of renal origin    5. Stricture of artery    6. Stage 3a chronic kidney disease    7. Postmenopausal     8. Other chest pain        Plan:       Essential hypertension:  Stable     Pure hypercholesterolemia:  Stable    Class 2 severe obesity due to excess calories with serious comorbidity and body mass index (BMI) of 39.0 to 39.9 in adult:  Stable    Secondary hyperparathyroidism of renal origin:  Stable    Stricture of artery:  Stable    Stage 3a chronic kidney disease:  Stable    Postmenopausal  -     DXA Bone Density Spine And Hip; Future; Expected date: 02/09/2022    Other chest pain:  New problem workup needed   -     EKG 12-lead; Future  -     Ambulatory referral/consult to Cardiology; Future; Expected date: 02/16/2022    Atrial fibrillation, unspecified type: new problem workup needed  -     apixaban (ELIQUIS) 5 mg Tab; Take 1 tablet (5 mg total) by mouth 2 (two) times daily.  Dispense: 60 tablet; Refill: 0      Healthy habits, avoid processed foods processed starches.  Spoke with patient's cardiologist Dr. Alberto secondary to the new AFib and abnormal EKG, to hold on aspirin and start patient on Eliquis 5 mg twice daily and follow-up with him.  The patient's BMI has been recorded in the chart. The patient has been provided educational materials regarding the benefits of attaining and maintaining a normal weight. We will continue to address and follow this issue during follow up visits.   Patient agreed with assessment and plan. Patient verbalized understanding.

## 2022-02-10 ENCOUNTER — HOSPITAL ENCOUNTER (OUTPATIENT)
Dept: RADIOLOGY | Facility: HOSPITAL | Age: 82
Discharge: HOME | End: 2022-02-10
Attending: FAMILY MEDICINE
Payer: MEDICARE

## 2022-02-10 DIAGNOSIS — Z78.0 POSTMENOPAUSAL: ICD-10-CM

## 2022-02-10 PROCEDURE — 77080 DXA BONE DENSITY AXIAL: CPT | Mod: TC,PO

## 2022-02-10 PROCEDURE — 77080 DEXA BONE DENSITY SPINE HIP: ICD-10-PCS | Mod: 26,,, | Performed by: RADIOLOGY

## 2022-02-10 PROCEDURE — 77080 DXA BONE DENSITY AXIAL: CPT | Mod: 26,,, | Performed by: RADIOLOGY

## 2022-02-11 ENCOUNTER — HOSPITAL ENCOUNTER (OUTPATIENT)
Dept: RADIOLOGY | Facility: HOSPITAL | Age: 82
Discharge: HOME | End: 2022-02-11
Attending: INTERNAL MEDICINE
Payer: MEDICARE

## 2022-02-11 ENCOUNTER — OFFICE VISIT (OUTPATIENT)
Dept: CARDIOLOGY | Facility: CLINIC | Age: 82
End: 2022-02-11
Payer: MEDICARE

## 2022-02-11 VITALS
BODY MASS INDEX: 39.21 KG/M2 | WEIGHT: 249.81 LBS | DIASTOLIC BLOOD PRESSURE: 95 MMHG | SYSTOLIC BLOOD PRESSURE: 150 MMHG | HEIGHT: 67 IN | HEART RATE: 84 BPM

## 2022-02-11 DIAGNOSIS — I10 ESSENTIAL HYPERTENSION: Primary | ICD-10-CM

## 2022-02-11 DIAGNOSIS — N18.31 STAGE 3A CHRONIC KIDNEY DISEASE: ICD-10-CM

## 2022-02-11 DIAGNOSIS — R07.89 OTHER CHEST PAIN: ICD-10-CM

## 2022-02-11 DIAGNOSIS — R42 DIZZINESS: ICD-10-CM

## 2022-02-11 DIAGNOSIS — I48.20 CHRONIC ATRIAL FIBRILLATION: ICD-10-CM

## 2022-02-11 DIAGNOSIS — I48.0 PAROXYSMAL ATRIAL FIBRILLATION: ICD-10-CM

## 2022-02-11 DIAGNOSIS — R06.02 SOB (SHORTNESS OF BREATH): ICD-10-CM

## 2022-02-11 DIAGNOSIS — I10 ESSENTIAL HYPERTENSION: ICD-10-CM

## 2022-02-11 DIAGNOSIS — I35.0 SEVERE AORTIC STENOSIS: ICD-10-CM

## 2022-02-11 DIAGNOSIS — E78.00 PURE HYPERCHOLESTEROLEMIA: ICD-10-CM

## 2022-02-11 DIAGNOSIS — Z95.2 S/P TAVR (TRANSCATHETER AORTIC VALVE REPLACEMENT): ICD-10-CM

## 2022-02-11 PROCEDURE — 99999 PR PBB SHADOW E&M-EST. PATIENT-LVL III: ICD-10-PCS | Mod: PBBFAC,,, | Performed by: INTERNAL MEDICINE

## 2022-02-11 PROCEDURE — 99214 PR OFFICE/OUTPT VISIT, EST, LEVL IV, 30-39 MIN: ICD-10-PCS | Mod: S$GLB,,, | Performed by: INTERNAL MEDICINE

## 2022-02-11 PROCEDURE — 1159F MED LIST DOCD IN RCRD: CPT | Mod: CPTII,S$GLB,, | Performed by: INTERNAL MEDICINE

## 2022-02-11 PROCEDURE — 99214 OFFICE O/P EST MOD 30 MIN: CPT | Mod: S$GLB,,, | Performed by: INTERNAL MEDICINE

## 2022-02-11 PROCEDURE — 1159F PR MEDICATION LIST DOCUMENTED IN MEDICAL RECORD: ICD-10-PCS | Mod: CPTII,S$GLB,, | Performed by: INTERNAL MEDICINE

## 2022-02-11 PROCEDURE — 71046 X-RAY EXAM CHEST 2 VIEWS: CPT | Mod: 26,,, | Performed by: RADIOLOGY

## 2022-02-11 PROCEDURE — 71046 XR CHEST PA AND LATERAL: ICD-10-PCS | Mod: 26,,, | Performed by: RADIOLOGY

## 2022-02-11 PROCEDURE — 1126F AMNT PAIN NOTED NONE PRSNT: CPT | Mod: CPTII,S$GLB,, | Performed by: INTERNAL MEDICINE

## 2022-02-11 PROCEDURE — 71046 X-RAY EXAM CHEST 2 VIEWS: CPT | Mod: TC,FY,PO

## 2022-02-11 PROCEDURE — 3077F SYST BP >= 140 MM HG: CPT | Mod: CPTII,S$GLB,, | Performed by: INTERNAL MEDICINE

## 2022-02-11 PROCEDURE — 1126F PR PAIN SEVERITY QUANTIFIED, NO PAIN PRESENT: ICD-10-PCS | Mod: CPTII,S$GLB,, | Performed by: INTERNAL MEDICINE

## 2022-02-11 PROCEDURE — 3080F DIAST BP >= 90 MM HG: CPT | Mod: CPTII,S$GLB,, | Performed by: INTERNAL MEDICINE

## 2022-02-11 PROCEDURE — 1160F PR REVIEW ALL MEDS BY PRESCRIBER/CLIN PHARMACIST DOCUMENTED: ICD-10-PCS | Mod: CPTII,S$GLB,, | Performed by: INTERNAL MEDICINE

## 2022-02-11 PROCEDURE — 3077F PR MOST RECENT SYSTOLIC BLOOD PRESSURE >= 140 MM HG: ICD-10-PCS | Mod: CPTII,S$GLB,, | Performed by: INTERNAL MEDICINE

## 2022-02-11 PROCEDURE — 3080F PR MOST RECENT DIASTOLIC BLOOD PRESSURE >= 90 MM HG: ICD-10-PCS | Mod: CPTII,S$GLB,, | Performed by: INTERNAL MEDICINE

## 2022-02-11 PROCEDURE — 1160F RVW MEDS BY RX/DR IN RCRD: CPT | Mod: CPTII,S$GLB,, | Performed by: INTERNAL MEDICINE

## 2022-02-11 PROCEDURE — 99999 PR PBB SHADOW E&M-EST. PATIENT-LVL III: CPT | Mod: PBBFAC,,, | Performed by: INTERNAL MEDICINE

## 2022-02-11 NOTE — PROGRESS NOTES
Subjective:    Patient ID:  Selena Moulton is a 81 y.o. female patient here for evaluation Chest Pain      History of Present Illness:  Patient follow-up.  History of TAVR, critical aortic stenosis.  Left heart catheterization 20/19 with no discrete high-grade epicardial coronary artery lesion that required intervention.  Patient presents with a one-month history of progressive chest tightness, shortness of breath.  Resting electrocardiogram done recently revealed atrial fibrillation with controlled response, atrial fibrillation actually somewhat regular.  Patient placed on Eliquis.    History of chronic orthopnea.  No PND.  No edema.  No history of DVT PE.  No recent cough fever chills.             Review of patient's allergies indicates:   Allergen Reactions    Influenza virus vaccines Nausea And Vomiting    Iodine and iodide containing products     Morpholine analogues Nausea And Vomiting    Penicillins Other (See Comments)     High fever as a child, also was allergy tested 1980    Latex, natural rubber Rash    Tetanus vaccines and toxoid Rash       Past Medical History:   Diagnosis Date    Anticoagulant long-term use     Aortic stenosis     Arthritis     Class 2 severe obesity due to excess calories with serious comorbidity and body mass index (BMI) of 39.0 to 39.9 in adult 9/13/2018    Gout     Hypertension      Past Surgical History:   Procedure Laterality Date    AORTIC VALVE REPLACEMENT N/A 10/8/2019    Procedure: Replacement-valve-aortic;  Surgeon: Fidel Cardenas MD;  Location: [facility] CATH LAB;  Service: Cardiology;  Laterality: N/A;    BACK SURGERY      bone graft neck    CARDIAC VALVE SURGERY      CARPAL TUNNEL RELEASE Left 10/3/2018    Procedure: RELEASE, CARPAL TUNNEL  LEFT;  Surgeon: Meche Cárdenas MD;  Location: [facility];  Service: Neurosurgery;  Laterality: Left;    CARPAL TUNNEL RELEASE Right 1/17/2019    Procedure: RELEASE, CARPAL TUNNEL RIGHT;  Surgeon: Meche GUZMAN  MD Melia;  Location: [facility] CSC;  Service: Neurosurgery;  Laterality: Right;    CATHETERIZATION OF BOTH LEFT AND RIGHT HEART N/A 7/30/2019    Procedure: CATHETERIZATION, HEART, BOTH LEFT AND RIGHT;  Surgeon: Terrie Alberto MD;  Location: [facility] CATH;  Service: Cardiology;  Laterality: N/A;    CERVICAL FUSION  1984    x2     CORONARY ANGIOGRAPHY N/A 7/30/2019    Procedure: ANGIOGRAM, CORONARY ARTERY;  Surgeon: Terrie Alberto MD;  Location: [facility] CATH;  Service: Cardiology;  Laterality: N/A;    HERNIA REPAIR      umbilical    HYSTERECTOMY      TONSILLECTOMY       Social History     Tobacco Use    Smoking status: Never Smoker    Smokeless tobacco: Never Used   Substance Use Topics    Alcohol use: No    Drug use: No        Review of Systems:    As noted in HPI in addition         REVIEW OF SYSTEMS  Review of Systems   Constitutional: Negative for decreased appetite, diaphoresis, night sweats, weight gain and weight loss.   HENT: Negative for nosebleeds and odynophagia.    Eyes: Negative for double vision and photophobia.   Cardiovascular: Positive for chest pain, dyspnea on exertion and irregular heartbeat. Negative for claudication, cyanosis, leg swelling, near-syncope, orthopnea, palpitations, paroxysmal nocturnal dyspnea and syncope.   Respiratory: Negative for cough, hemoptysis, shortness of breath and wheezing.    Hematologic/Lymphatic: Negative for adenopathy.   Skin: Negative for flushing, skin cancer and suspicious lesions.   Musculoskeletal: Negative for gout, myalgias and neck pain.   Gastrointestinal: Negative for abdominal pain, heartburn, hematemesis and hematochezia.   Genitourinary: Negative for bladder incontinence, hesitancy and nocturia.   Neurological: Negative for focal weakness, headaches, light-headedness and paresthesias.   Psychiatric/Behavioral: Negative for memory loss and substance abuse.       Objective:        Vitals:    02/11/22 1329   BP: (!) 150/95   Pulse: 84       Lab  Results   Component Value Date    WBC 6.42 08/04/2021    HGB 14.2 08/04/2021    HCT 46.4 08/04/2021     08/04/2021    CHOL 131 08/04/2021    TRIG 94 08/04/2021    HDL 48 08/04/2021    ALT 15 08/04/2021    AST 20 08/04/2021     12/21/2021    K 3.8 12/21/2021     12/21/2021    CREATININE 1.1 12/21/2021    BUN 28 (H) 12/21/2021    CO2 31 (H) 12/21/2021    INR 0.9 10/08/2019    HGBA1C 5.8 (H) 08/04/2021      CARDIOGRAM RESULTS  Results for orders placed during the hospital encounter of 12/15/20    Echo Color Flow Doppler? Yes    Interpretation Summary  · The left ventricle is normal in size with normal systolic function. The estimated ejection fraction is 65%.  · Grade III left ventricular diastolic dysfunction.  · Normal right ventricular size with normal right ventricular systolic function.  · Moderate left atrial enlargement.  · Mild right atrial enlargement.  · Presence of 23 mm Edward Cierra TAVR valve. Mean AV mean gradient is 16 mmHg with a dimensionless index of 0.35(unchanged from prior). There is no AI.  · The estimated PA systolic pressure is 28 mmHg.  · Normal central venous pressure (3 mmHg).        CURRENT/PREVIOUS VISIT EKG  Results for orders placed or performed in visit on 02/09/22   EKG 12-lead    Collection Time: 02/09/22 10:49 AM    Narrative    Test Reason : R07.89,    Vent. Rate : 069 BPM     Atrial Rate : 068 BPM     P-R Int : 000 ms          QRS Dur : 098 ms      QT Int : 418 ms       P-R-T Axes : 000 055 045 degrees     QTc Int : 447 ms    Atrial fibrillation  Nonspecific ST abnormality  Abnormal ECG  When compared with ECG of 09-OCT-2019 06:00,  Atrial fibrillation has replaced Sinus rhythm  T wave inversion now evident in Anterior leads  Confirmed by ENZO MCGOWAN MD ([id number]) on 2/10/2022 11:31:00 AM    Referred By: MARY ALVAREZ           Confirmed By:ENZO MCGOWAN MD     No valid procedures specified.   Results for orders placed during the hospital encounter of  04/17/19    Stress test with myocardial perfusion    Interpretation Summary  · The perfusion scan is free of evidence from myocardial ischemia or injury.  · There is a mild intensity defect in the anteroseptal wall of the left ventricle, secondary to breast attenuation.  · An ejection fraction of 71 % at rest, stress EF 73%.  · Resting wall motion is physiologic.  · The EKG portion of this study is negative for myocardial ischemia.  · There were no arrhythmias during stress.  · The patient reported no symptoms during the stress test.    No valid procedures specified.    PHYSICAL EXAM  CONSTITUTIONAL: Well built, well nourished in no apparent distress  NECK: no carotid bruit, no JVD  LUNGS:  mildly decreased lung sounds bases.  CHEST WALL: no tenderness,  HEART:  slightly irregular irregular rhythm.  Variable intensity S1-S2.  Over 6 to 2/6 crescendo decrescendo murmur aortic area.  PMI poorly localized.  ABDOMEN: soft, non-tender; bowel sounds normal; no masses,  no organomegaly  EXTREMITIES: Extremities normal, no edema, no calf tenderness noted  VASCULAR EXAM: 2 PLUS UPPER AND +1 LOWER EXT PULSES  NEURO: AAO X 3, NO ACUTE FOCAL OR LATERALIZING FINDINGS    I HAVE REVIEWED :    The vital signs, nurses notes, and all the pertinent radiology and labs.         Current Outpatient Medications   Medication Instructions    acetaminophen (TYLENOL) 500 mg, Oral, Every 8 hours PRN    allopurinoL (ZYLOPRIM) 300 mg, Oral, Daily    amLODIPine (NORVASC) 2.5 mg, Oral, Daily    apixaban (ELIQUIS) 5 mg, Oral, 2 times daily    atenoloL (TENORMIN) 100 MG tablet TAKE 1/2 TABLET(50 MG) BY MOUTH EVERY DAY    atorvastatin (LIPITOR) 40 mg, Oral, Nightly    fish oil-omega-3 fatty acids 300-1,000 mg capsule 1 capsule, Oral, Daily, Hold for 7 days before surgery    furosemide (LASIX) 20 mg, Oral, Daily PRN    irbesartan-hydrochlorothiazide (AVALIDE) 150-12.5 mg per tablet TAKE 1 TABLET BY MOUTH EVERY DAY    multivitamin capsule 1  capsule, Oral, Daily, Hold AM of surgery    pantoprazole (PROTONIX) 20 mg, Oral, Daily          Assessment:   Valvular heart disease.  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  For critical aortic stenosis with preserved ejection fraction.    Atrial fibrillation controlled response uncertain duration.  Associated complaints of chest tightness shortness of breath.  Heart rate currently controlled    Hypertension, dyslipidemia        Plan:   Continue Eliquis.  Add Lasix 40 daily.  Follow-up cardiac echo and labs.  Return to clinic results.                    Chest x-ray, Holter monitor.        No follow-ups on file.

## 2022-02-12 ENCOUNTER — TELEPHONE (OUTPATIENT)
Dept: FAMILY MEDICINE | Facility: CLINIC | Age: 82
End: 2022-02-12
Payer: MEDICARE

## 2022-02-12 NOTE — TELEPHONE ENCOUNTER
----- Message from Aby Perales MD sent at 2/11/2022  6:47 AM CST -----  Please notify patient that the results of the bone density test is in normal range.  Thank you.

## 2022-02-12 NOTE — TELEPHONE ENCOUNTER
----- Message from Hiwot Rosenberg sent at 2/12/2022  9:21 AM CST -----  Contact: pt  Type:  Patient Returning Call    Who Called:  pt  Who Left Message for Patient: Maddison  Does the patient know what this is regarding?:  no  Best Call Back Number: [phone]  Additional Information:  pt waiting  by phone for call back

## 2022-02-23 ENCOUNTER — CLINICAL SUPPORT (OUTPATIENT)
Dept: CARDIOLOGY | Facility: HOSPITAL | Age: 82
End: 2022-02-23
Attending: INTERNAL MEDICINE
Payer: MEDICARE

## 2022-02-23 ENCOUNTER — OFFICE VISIT (OUTPATIENT)
Dept: CARDIOLOGY | Facility: CLINIC | Age: 82
End: 2022-02-23
Payer: MEDICARE

## 2022-02-23 VITALS — HEIGHT: 67 IN | WEIGHT: 249.81 LBS | HEART RATE: 73 BPM | BODY MASS INDEX: 39.21 KG/M2

## 2022-02-23 VITALS
SYSTOLIC BLOOD PRESSURE: 112 MMHG | HEART RATE: 82 BPM | WEIGHT: 243.81 LBS | HEIGHT: 67 IN | DIASTOLIC BLOOD PRESSURE: 53 MMHG | BODY MASS INDEX: 38.27 KG/M2

## 2022-02-23 DIAGNOSIS — I10 ESSENTIAL HYPERTENSION: ICD-10-CM

## 2022-02-23 DIAGNOSIS — N18.31 STAGE 3A CHRONIC KIDNEY DISEASE: ICD-10-CM

## 2022-02-23 DIAGNOSIS — R07.89 OTHER CHEST PAIN: ICD-10-CM

## 2022-02-23 DIAGNOSIS — Z95.2 S/P TAVR (TRANSCATHETER AORTIC VALVE REPLACEMENT): ICD-10-CM

## 2022-02-23 DIAGNOSIS — R06.02 SOB (SHORTNESS OF BREATH): ICD-10-CM

## 2022-02-23 DIAGNOSIS — I35.0 SEVERE AORTIC STENOSIS: ICD-10-CM

## 2022-02-23 DIAGNOSIS — E78.00 PURE HYPERCHOLESTEROLEMIA: ICD-10-CM

## 2022-02-23 DIAGNOSIS — I48.0 PAROXYSMAL ATRIAL FIBRILLATION: ICD-10-CM

## 2022-02-23 DIAGNOSIS — Z01.810 ENCOUNTER FOR PRE-OPERATIVE CARDIOVASCULAR CLEARANCE: Primary | ICD-10-CM

## 2022-02-23 DIAGNOSIS — I48.91 ATRIAL FIBRILLATION, UNSPECIFIED TYPE: ICD-10-CM

## 2022-02-23 LAB
ASCENDING AORTA: 2.81 CM
AV INDEX (PROSTH): 0.48
AV MEAN GRADIENT: 10 MMHG
AV PEAK GRADIENT: 16 MMHG
AV VALVE AREA: 1.91 CM2
AV VELOCITY RATIO: 0.52
BSA FOR ECHO PROCEDURE: 2.31 M2
CV ECHO LV RWT: 0.51 CM
DOP CALC AO PEAK VEL: 2 M/S
DOP CALC AO VTI: 45.47 CM
DOP CALC LVOT AREA: 4 CM2
DOP CALC LVOT DIAMETER: 2.26 CM
DOP CALC LVOT PEAK VEL: 1.04 M/S
DOP CALC LVOT STROKE VOLUME: 86.97 CM3
DOP CALCLVOT PEAK VEL VTI: 21.69 CM
ECHO LV POSTERIOR WALL: 1 CM (ref 0.6–1.1)
EJECTION FRACTION: 55 %
FRACTIONAL SHORTENING: 32 % (ref 28–44)
INTERVENTRICULAR SEPTUM: 1.13 CM (ref 0.6–1.1)
IVRT: 108.47 MSEC
LA MAJOR: 5.96 CM
LA MINOR: 5.21 CM
LA WIDTH: 4.23 CM
LEFT ATRIUM SIZE: 4.3 CM
LEFT ATRIUM VOLUME INDEX: 38.7 ML/M2
LEFT ATRIUM VOLUME: 85.96 CM3
LEFT INTERNAL DIMENSION IN SYSTOLE: 2.69 CM (ref 2.1–4)
LEFT VENTRICLE DIASTOLIC VOLUME INDEX: 30.56 ML/M2
LEFT VENTRICLE DIASTOLIC VOLUME: 67.84 ML
LEFT VENTRICLE MASS INDEX: 61 G/M2
LEFT VENTRICLE SYSTOLIC VOLUME INDEX: 12.1 ML/M2
LEFT VENTRICLE SYSTOLIC VOLUME: 26.79 ML
LEFT VENTRICULAR INTERNAL DIMENSION IN DIASTOLE: 3.95 CM (ref 3.5–6)
LEFT VENTRICULAR MASS: 136.32 G
PISA TR MAX VEL: 2.38 M/S
RA MAJOR: 7.25 CM
RA PRESSURE: 8 MMHG
RA WIDTH: 3.17 CM
RIGHT VENTRICULAR END-DIASTOLIC DIMENSION: 3.79 CM
SINUS: 2.49 CM
STJ: 2.18 CM
TR MAX PG: 23 MMHG
TRICUSPID ANNULAR PLANE SYSTOLIC EXCURSION: 1.51 CM
TV REST PULMONARY ARTERY PRESSURE: 31 MMHG

## 2022-02-23 PROCEDURE — 1160F RVW MEDS BY RX/DR IN RCRD: CPT | Mod: CPTII,S$GLB,, | Performed by: INTERNAL MEDICINE

## 2022-02-23 PROCEDURE — 1101F PT FALLS ASSESS-DOCD LE1/YR: CPT | Mod: CPTII,S$GLB,, | Performed by: INTERNAL MEDICINE

## 2022-02-23 PROCEDURE — 3074F SYST BP LT 130 MM HG: CPT | Mod: CPTII,S$GLB,, | Performed by: INTERNAL MEDICINE

## 2022-02-23 PROCEDURE — 3078F PR MOST RECENT DIASTOLIC BLOOD PRESSURE < 80 MM HG: ICD-10-PCS | Mod: CPTII,S$GLB,, | Performed by: INTERNAL MEDICINE

## 2022-02-23 PROCEDURE — 1101F PR PT FALLS ASSESS DOC 0-1 FALLS W/OUT INJ PAST YR: ICD-10-PCS | Mod: CPTII,S$GLB,, | Performed by: INTERNAL MEDICINE

## 2022-02-23 PROCEDURE — 1126F PR PAIN SEVERITY QUANTIFIED, NO PAIN PRESENT: ICD-10-PCS | Mod: CPTII,S$GLB,, | Performed by: INTERNAL MEDICINE

## 2022-02-23 PROCEDURE — 1160F PR REVIEW ALL MEDS BY PRESCRIBER/CLIN PHARMACIST DOCUMENTED: ICD-10-PCS | Mod: CPTII,S$GLB,, | Performed by: INTERNAL MEDICINE

## 2022-02-23 PROCEDURE — 1126F AMNT PAIN NOTED NONE PRSNT: CPT | Mod: CPTII,S$GLB,, | Performed by: INTERNAL MEDICINE

## 2022-02-23 PROCEDURE — 3288F PR FALLS RISK ASSESSMENT DOCUMENTED: ICD-10-PCS | Mod: CPTII,S$GLB,, | Performed by: INTERNAL MEDICINE

## 2022-02-23 PROCEDURE — 3078F DIAST BP <80 MM HG: CPT | Mod: CPTII,S$GLB,, | Performed by: INTERNAL MEDICINE

## 2022-02-23 PROCEDURE — 99999 PR PBB SHADOW E&M-EST. PATIENT-LVL III: ICD-10-PCS | Mod: PBBFAC,,, | Performed by: INTERNAL MEDICINE

## 2022-02-23 PROCEDURE — 93306 TTE W/DOPPLER COMPLETE: CPT | Mod: PO

## 2022-02-23 PROCEDURE — 99214 OFFICE O/P EST MOD 30 MIN: CPT | Mod: 25,S$GLB,, | Performed by: INTERNAL MEDICINE

## 2022-02-23 PROCEDURE — 99999 PR PBB SHADOW E&M-EST. PATIENT-LVL III: CPT | Mod: PBBFAC,,, | Performed by: INTERNAL MEDICINE

## 2022-02-23 PROCEDURE — 93306 ECHO (CUPID ONLY): ICD-10-PCS | Mod: 26,,, | Performed by: INTERNAL MEDICINE

## 2022-02-23 PROCEDURE — 1159F MED LIST DOCD IN RCRD: CPT | Mod: CPTII,S$GLB,, | Performed by: INTERNAL MEDICINE

## 2022-02-23 PROCEDURE — 3288F FALL RISK ASSESSMENT DOCD: CPT | Mod: CPTII,S$GLB,, | Performed by: INTERNAL MEDICINE

## 2022-02-23 PROCEDURE — 3074F PR MOST RECENT SYSTOLIC BLOOD PRESSURE < 130 MM HG: ICD-10-PCS | Mod: CPTII,S$GLB,, | Performed by: INTERNAL MEDICINE

## 2022-02-23 PROCEDURE — 93306 TTE W/DOPPLER COMPLETE: CPT | Mod: 26,,, | Performed by: INTERNAL MEDICINE

## 2022-02-23 PROCEDURE — 99214 PR OFFICE/OUTPT VISIT, EST, LEVL IV, 30-39 MIN: ICD-10-PCS | Mod: 25,S$GLB,, | Performed by: INTERNAL MEDICINE

## 2022-02-23 PROCEDURE — 1159F PR MEDICATION LIST DOCUMENTED IN MEDICAL RECORD: ICD-10-PCS | Mod: CPTII,S$GLB,, | Performed by: INTERNAL MEDICINE

## 2022-02-23 NOTE — PROGRESS NOTES
Subjective:    Patient ID:  Selena Moulton is a 81 y.o. female patient here for evaluation Hypertension and Results      History of Present Illness:  Cardiology follow-up.  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  No significant coronary disease noted time.  Recent onset of dyspnea on exertion fatigue shortness of breath.  EKG with new onset atrial fibrillation, patient currently on Eliquis 5 b.i.d., duration about 2 weeks.  Follow-up labs, echo essentially unremarkable.  Ejection fraction normal with mild katt valvular leak.  Chest x-ray no active disease.  Echo pending.  Clinically patient remains in atrial fibrillation with controlled response today.             Review of patient's allergies indicates:   Allergen Reactions    Influenza virus vaccines Nausea And Vomiting    Iodine and iodide containing products     Morpholine analogues Nausea And Vomiting    Penicillins Other (See Comments)     High fever as a child, also was allergy tested 1980    Latex, natural rubber Rash    Tetanus vaccines and toxoid Rash       Past Medical History:   Diagnosis Date    Anticoagulant long-term use     Aortic stenosis     Arthritis     Class 2 severe obesity due to excess calories with serious comorbidity and body mass index (BMI) of 39.0 to 39.9 in adult 9/13/2018    Gout     Hypertension      Past Surgical History:   Procedure Laterality Date    AORTIC VALVE REPLACEMENT N/A 10/8/2019    Procedure: Replacement-valve-aortic;  Surgeon: Fidel Cardenas MD;  Location: [facility] CATH LAB;  Service: Cardiology;  Laterality: N/A;    BACK SURGERY      bone graft neck    CARDIAC VALVE SURGERY      CARPAL TUNNEL RELEASE Left 10/3/2018    Procedure: RELEASE, CARPAL TUNNEL  LEFT;  Surgeon: Meche Cárdenas MD;  Location: [facility];  Service: Neurosurgery;  Laterality: Left;    CARPAL TUNNEL RELEASE Right 1/17/2019    Procedure: RELEASE, CARPAL TUNNEL RIGHT;  Surgeon: Meche Cárdenas MD;  Location: [facility];  Service:  Neurosurgery;  Laterality: Right;    CATHETERIZATION OF BOTH LEFT AND RIGHT HEART N/A 7/30/2019    Procedure: CATHETERIZATION, HEART, BOTH LEFT AND RIGHT;  Surgeon: Terrie Alberto MD;  Location: [facility] CATH;  Service: Cardiology;  Laterality: N/A;    CERVICAL FUSION  1984    x2     CORONARY ANGIOGRAPHY N/A 7/30/2019    Procedure: ANGIOGRAM, CORONARY ARTERY;  Surgeon: Terrie Alberto MD;  Location: [facility] CATH;  Service: Cardiology;  Laterality: N/A;    HERNIA REPAIR      umbilical    HYSTERECTOMY      TONSILLECTOMY       Social History     Tobacco Use    Smoking status: Never Smoker    Smokeless tobacco: Never Used   Substance Use Topics    Alcohol use: No    Drug use: No        Review of Systems:    As noted in HPI in addition      REVIEW OF SYSTEMS  Review of Systems   Constitutional: Positive for malaise/fatigue. Negative for decreased appetite, diaphoresis, night sweats, weight gain and weight loss.   HENT: Negative for nosebleeds and odynophagia.    Eyes: Negative for double vision and photophobia.   Cardiovascular: Positive for dyspnea on exertion and irregular heartbeat. Negative for chest pain, claudication, cyanosis, leg swelling, near-syncope, orthopnea, palpitations, paroxysmal nocturnal dyspnea and syncope.   Respiratory: Negative for cough, hemoptysis, shortness of breath and wheezing.    Hematologic/Lymphatic: Negative for adenopathy.   Skin: Negative for flushing, skin cancer and suspicious lesions.   Musculoskeletal: Negative for gout, myalgias and neck pain.   Gastrointestinal: Negative for abdominal pain, heartburn, hematemesis and hematochezia.   Genitourinary: Negative for bladder incontinence, hesitancy and nocturia.   Neurological: Negative for focal weakness, headaches, light-headedness and paresthesias.   Psychiatric/Behavioral: Negative for memory loss and substance abuse.              Objective:        Vitals:    02/23/22 1150   BP: (!) 112/53   Pulse: 82       Lab Results   Component  Value Date    WBC 8.97 02/11/2022    HGB 14.5 02/11/2022    HCT 48.1 02/11/2022     02/11/2022    CHOL 131 08/04/2021    TRIG 94 08/04/2021    HDL 48 08/04/2021    ALT 16 02/11/2022    AST 22 02/11/2022     02/11/2022     02/11/2022    K 4.1 02/11/2022    K 4.1 02/11/2022     02/11/2022     02/11/2022    CREATININE 1.1 02/11/2022    CREATININE 1.1 02/11/2022    BUN 24 (H) 02/11/2022    BUN 24 (H) 02/11/2022    CO2 28 02/11/2022    CO2 28 02/11/2022    INR 0.9 10/08/2019    HGBA1C 5.8 (H) 08/04/2021        ECHOCARDIOGRAM RESULTS  Results for orders placed in visit on 02/23/22    Echo    Interpretation Summary  · Concentric remodeling and normal systolic function.  · The estimated ejection fraction is 55%.  · Normal left ventricular diastolic function.  · Mild left atrial enlargement.  · Normal right ventricular size with normal right ventricular systolic function.  · There is a FERNANDEZ IVETTE 28 transcutaneously-placed aortic bioprosthesis present.  · The aortic valve mean gradient is 10 mmHg with a dimensionless index of 0.48.  · Mild aortic regurgitation.  · Mild mitral regurgitation.  · Intermediate central venous pressure (8 mmHg).  · The estimated PA systolic pressure is 31 mmHg.  · Mild tricuspid regurgitation.        CURRENT/PREVIOUS VISIT EKG  Results for orders placed or performed in visit on 02/09/22   EKG 12-lead    Collection Time: 02/09/22 10:49 AM    Narrative    Test Reason : R07.89,    Vent. Rate : 069 BPM     Atrial Rate : 068 BPM     P-R Int : 000 ms          QRS Dur : 098 ms      QT Int : 418 ms       P-R-T Axes : 000 055 045 degrees     QTc Int : 447 ms    Atrial fibrillation  Nonspecific ST abnormality  Abnormal ECG  When compared with ECG of 09-OCT-2019 06:00,  Atrial fibrillation has replaced Sinus rhythm  T wave inversion now evident in Anterior leads  Confirmed by ENZO MCGOWAN MD ([id number]) on 2/10/2022 11:31:00 AM    Referred By: MARY ALVAREZ           Confirmed  By:ENZO MCGOWAN MD     No valid procedures specified.   Results for orders placed during the hospital encounter of 04/17/19    Stress test with myocardial perfusion    Interpretation Summary  · The perfusion scan is free of evidence from myocardial ischemia or injury.  · There is a mild intensity defect in the anteroseptal wall of the left ventricle, secondary to breast attenuation.  · An ejection fraction of 71 % at rest, stress EF 73%.  · Resting wall motion is physiologic.  · The EKG portion of this study is negative for myocardial ischemia.  · There were no arrhythmias during stress.  · The patient reported no symptoms during the stress test.    No valid procedures specified.    PHYSICAL EXAM  CONSTITUTIONAL: Well built, well nourished in no apparent distress  NECK: no carotid bruit, no JVD  LUNGS: CTA  CHEST WALL: no tenderness,  HEART:  irregular irregular rhythm.  Variable intensity S1-S2.  1/6 crescendo decrescendo murmur aortic area.  ABDOMEN: soft, non-tender; bowel sounds normal; no masses,  no organomegaly  EXTREMITIES: Extremities normal, no edema, no calf tenderness noted  NEURO: AAO X 3    I HAVE REVIEWED :    The vital signs, nurses notes, and all the pertinent radiology and labs.         Current Outpatient Medications   Medication Instructions    acetaminophen (TYLENOL) 500 mg, Oral, Every 8 hours PRN    allopurinoL (ZYLOPRIM) 300 mg, Oral, Daily    amLODIPine (NORVASC) 2.5 mg, Oral, Daily    apixaban (ELIQUIS) 5 mg, Oral, 2 times daily    atenoloL (TENORMIN) 100 MG tablet TAKE 1/2 TABLET(50 MG) BY MOUTH EVERY DAY    atorvastatin (LIPITOR) 40 mg, Oral, Nightly    fish oil-omega-3 fatty acids 300-1,000 mg capsule 1 capsule, Oral, Daily, Hold for 7 days before surgery    furosemide (LASIX) 20 mg, Oral, Daily PRN    irbesartan-hydrochlorothiazide (AVALIDE) 150-12.5 mg per tablet TAKE 1 TABLET BY MOUTH EVERY DAY    multivitamin capsule 1 capsule, Oral, Daily, Hold AM of surgery     pantoprazole (PROTONIX) 20 mg, Oral, Daily          Assessment:   TAVR 20/19.  Left heart catheterization at that time no significant coronary artery disease.  Preserved ejection fraction by history.  Recent echo with mild perivalvular aortic leak, no change ejection fraction.  Labs, chest x-ray unremarkable.  Lytes fatigue dyspnea on exertion, patient remains in atrial fibrillation new onset.  Holter monitor pending.        Plan:     Continue Eliquis.  Return to clinic approximately 2 weeks, EKG at minimum, discuss possible DC, MURRAY cardioversion.        No follow-ups on file.

## 2022-03-09 ENCOUNTER — CLINICAL SUPPORT (OUTPATIENT)
Dept: CARDIOLOGY | Facility: HOSPITAL | Age: 82
End: 2022-03-09
Attending: INTERNAL MEDICINE
Payer: MEDICARE

## 2022-03-09 DIAGNOSIS — R06.02 SOB (SHORTNESS OF BREATH): ICD-10-CM

## 2022-03-09 DIAGNOSIS — N18.31 STAGE 3A CHRONIC KIDNEY DISEASE: ICD-10-CM

## 2022-03-09 DIAGNOSIS — I35.0 SEVERE AORTIC STENOSIS: ICD-10-CM

## 2022-03-09 DIAGNOSIS — Z95.2 S/P TAVR (TRANSCATHETER AORTIC VALVE REPLACEMENT): ICD-10-CM

## 2022-03-09 DIAGNOSIS — I10 ESSENTIAL HYPERTENSION: ICD-10-CM

## 2022-03-09 DIAGNOSIS — R07.89 OTHER CHEST PAIN: ICD-10-CM

## 2022-03-09 PROCEDURE — 93227 XTRNL ECG REC<48 HR R&I: CPT | Mod: ,,, | Performed by: INTERNAL MEDICINE

## 2022-03-09 PROCEDURE — 93226 XTRNL ECG REC<48 HR SCAN A/R: CPT | Mod: PO

## 2022-03-09 PROCEDURE — 93227 HOLTER MONITOR - 24 HOUR (CUPID ONLY): ICD-10-PCS | Mod: ,,, | Performed by: INTERNAL MEDICINE

## 2022-03-11 ENCOUNTER — OFFICE VISIT (OUTPATIENT)
Dept: CARDIOLOGY | Facility: CLINIC | Age: 82
End: 2022-03-11
Payer: MEDICARE

## 2022-03-11 VITALS
WEIGHT: 246.06 LBS | HEART RATE: 75 BPM | HEIGHT: 67 IN | SYSTOLIC BLOOD PRESSURE: 126 MMHG | DIASTOLIC BLOOD PRESSURE: 70 MMHG | BODY MASS INDEX: 38.62 KG/M2

## 2022-03-11 DIAGNOSIS — I48.20 CHRONIC ATRIAL FIBRILLATION: ICD-10-CM

## 2022-03-11 DIAGNOSIS — Z95.2 S/P TAVR (TRANSCATHETER AORTIC VALVE REPLACEMENT): ICD-10-CM

## 2022-03-11 DIAGNOSIS — I48.0 PAROXYSMAL ATRIAL FIBRILLATION: ICD-10-CM

## 2022-03-11 DIAGNOSIS — E78.00 PURE HYPERCHOLESTEROLEMIA: ICD-10-CM

## 2022-03-11 DIAGNOSIS — I10 ESSENTIAL HYPERTENSION: Primary | ICD-10-CM

## 2022-03-11 DIAGNOSIS — I48.91 ATRIAL FIBRILLATION, UNSPECIFIED TYPE: Primary | ICD-10-CM

## 2022-03-11 DIAGNOSIS — I48.91 ATRIAL FIBRILLATION, UNSPECIFIED TYPE: ICD-10-CM

## 2022-03-11 LAB
OHS CV EVENT MONITOR DAY: 0
OHS CV HOLTER LENGTH DECIMAL HOURS: 24
OHS CV HOLTER LENGTH HOURS: 24
OHS CV HOLTER LENGTH MINUTES: 0
OHS CV HOLTER SINUS AVERAGE HR: 72
OHS CV HOLTER SINUS MAX HR: 105
OHS CV HOLTER SINUS MIN HR: 48

## 2022-03-11 PROCEDURE — 1126F PR PAIN SEVERITY QUANTIFIED, NO PAIN PRESENT: ICD-10-PCS | Mod: CPTII,S$GLB,, | Performed by: INTERNAL MEDICINE

## 2022-03-11 PROCEDURE — 3074F PR MOST RECENT SYSTOLIC BLOOD PRESSURE < 130 MM HG: ICD-10-PCS | Mod: CPTII,S$GLB,, | Performed by: INTERNAL MEDICINE

## 2022-03-11 PROCEDURE — 3288F PR FALLS RISK ASSESSMENT DOCUMENTED: ICD-10-PCS | Mod: CPTII,S$GLB,, | Performed by: INTERNAL MEDICINE

## 2022-03-11 PROCEDURE — 1159F PR MEDICATION LIST DOCUMENTED IN MEDICAL RECORD: ICD-10-PCS | Mod: CPTII,S$GLB,, | Performed by: INTERNAL MEDICINE

## 2022-03-11 PROCEDURE — 93010 ELECTROCARDIOGRAM REPORT: CPT | Mod: S$GLB,,, | Performed by: INTERNAL MEDICINE

## 2022-03-11 PROCEDURE — 1101F PT FALLS ASSESS-DOCD LE1/YR: CPT | Mod: CPTII,S$GLB,, | Performed by: INTERNAL MEDICINE

## 2022-03-11 PROCEDURE — 99999 PR PBB SHADOW E&M-EST. PATIENT-LVL III: CPT | Mod: PBBFAC,,, | Performed by: INTERNAL MEDICINE

## 2022-03-11 PROCEDURE — 99214 PR OFFICE/OUTPT VISIT, EST, LEVL IV, 30-39 MIN: ICD-10-PCS | Mod: S$GLB,,, | Performed by: INTERNAL MEDICINE

## 2022-03-11 PROCEDURE — 3078F DIAST BP <80 MM HG: CPT | Mod: CPTII,S$GLB,, | Performed by: INTERNAL MEDICINE

## 2022-03-11 PROCEDURE — 3288F FALL RISK ASSESSMENT DOCD: CPT | Mod: CPTII,S$GLB,, | Performed by: INTERNAL MEDICINE

## 2022-03-11 PROCEDURE — 1101F PR PT FALLS ASSESS DOC 0-1 FALLS W/OUT INJ PAST YR: ICD-10-PCS | Mod: CPTII,S$GLB,, | Performed by: INTERNAL MEDICINE

## 2022-03-11 PROCEDURE — 1126F AMNT PAIN NOTED NONE PRSNT: CPT | Mod: CPTII,S$GLB,, | Performed by: INTERNAL MEDICINE

## 2022-03-11 PROCEDURE — 1159F MED LIST DOCD IN RCRD: CPT | Mod: CPTII,S$GLB,, | Performed by: INTERNAL MEDICINE

## 2022-03-11 PROCEDURE — 3074F SYST BP LT 130 MM HG: CPT | Mod: CPTII,S$GLB,, | Performed by: INTERNAL MEDICINE

## 2022-03-11 PROCEDURE — 93010 EKG 12-LEAD: ICD-10-PCS | Mod: S$GLB,,, | Performed by: INTERNAL MEDICINE

## 2022-03-11 PROCEDURE — 93005 ELECTROCARDIOGRAM TRACING: CPT | Mod: PO

## 2022-03-11 PROCEDURE — 1160F PR REVIEW ALL MEDS BY PRESCRIBER/CLIN PHARMACIST DOCUMENTED: ICD-10-PCS | Mod: CPTII,S$GLB,, | Performed by: INTERNAL MEDICINE

## 2022-03-11 PROCEDURE — 99214 OFFICE O/P EST MOD 30 MIN: CPT | Mod: S$GLB,,, | Performed by: INTERNAL MEDICINE

## 2022-03-11 PROCEDURE — 3078F PR MOST RECENT DIASTOLIC BLOOD PRESSURE < 80 MM HG: ICD-10-PCS | Mod: CPTII,S$GLB,, | Performed by: INTERNAL MEDICINE

## 2022-03-11 PROCEDURE — 99999 PR PBB SHADOW E&M-EST. PATIENT-LVL III: ICD-10-PCS | Mod: PBBFAC,,, | Performed by: INTERNAL MEDICINE

## 2022-03-11 PROCEDURE — 1160F RVW MEDS BY RX/DR IN RCRD: CPT | Mod: CPTII,S$GLB,, | Performed by: INTERNAL MEDICINE

## 2022-03-11 NOTE — PATIENT INSTRUCTIONS
MURRAY / Cardioversion    Arrive for your procedure at:  [facility]. 3/18/22 @ 9 AM.  YOU MAY ENTER THROUGH THE MAIN ENTRANCE.  YOU MAY BRING 1 PERSON WITH YOU.  THE PROCEDURE WILL START AT 10 AM WITH DR. ABHISHEK PALACIO.       FASTING:  You MAY NOT have anything to eat or drink AFTER MIDNIGHT.        MEDICATIONS:  You may take your regular morning medications with a small sip of water.     Hold or adjust the following:  Fluid pills.  Diabetes medications.    Continue: Coumadin, Plavix, Effient, Aspirin, Anti-coagulants, Blood thinners    Please refer to pre-op instructions received from [facility].

## 2022-03-16 ENCOUNTER — CLINICAL SUPPORT (OUTPATIENT)
Dept: URGENT CARE | Facility: CLINIC | Age: 82
End: 2022-03-16
Payer: MEDICARE

## 2022-03-16 DIAGNOSIS — Z11.52 ENCOUNTER FOR PREOPERATIVE SCREENING LABORATORY TESTING FOR COVID-19 VIRUS: Primary | ICD-10-CM

## 2022-03-16 DIAGNOSIS — Z01.812 ENCOUNTER FOR PREOPERATIVE SCREENING LABORATORY TESTING FOR COVID-19 VIRUS: Primary | ICD-10-CM

## 2022-03-16 LAB
CTP QC/QA: YES
SARS-COV-2 RDRP RESP QL NAA+PROBE: NEGATIVE

## 2022-03-16 PROCEDURE — 99211 OFF/OP EST MAY X REQ PHY/QHP: CPT | Mod: S$GLB,,, | Performed by: FAMILY MEDICINE

## 2022-03-16 PROCEDURE — U0002: ICD-10-PCS | Mod: QW,S$GLB,, | Performed by: FAMILY MEDICINE

## 2022-03-16 PROCEDURE — U0002 COVID-19 LAB TEST NON-CDC: HCPCS | Mod: QW,S$GLB,, | Performed by: FAMILY MEDICINE

## 2022-03-16 PROCEDURE — 99211 PR OFFICE/OUTPT VISIT, EST, LEVL I: ICD-10-PCS | Mod: S$GLB,,, | Performed by: FAMILY MEDICINE

## 2022-03-25 ENCOUNTER — OFFICE VISIT (OUTPATIENT)
Dept: CARDIOLOGY | Facility: CLINIC | Age: 82
End: 2022-03-25
Payer: MEDICARE

## 2022-03-25 VITALS
HEIGHT: 67 IN | SYSTOLIC BLOOD PRESSURE: 103 MMHG | WEIGHT: 245.81 LBS | DIASTOLIC BLOOD PRESSURE: 57 MMHG | HEART RATE: 72 BPM | BODY MASS INDEX: 38.58 KG/M2

## 2022-03-25 DIAGNOSIS — Z95.2 S/P TAVR (TRANSCATHETER AORTIC VALVE REPLACEMENT): Primary | ICD-10-CM

## 2022-03-25 DIAGNOSIS — I48.0 PAROXYSMAL ATRIAL FIBRILLATION: ICD-10-CM

## 2022-03-25 DIAGNOSIS — N18.31 STAGE 3A CHRONIC KIDNEY DISEASE: ICD-10-CM

## 2022-03-25 DIAGNOSIS — I10 ESSENTIAL HYPERTENSION: Primary | ICD-10-CM

## 2022-03-25 DIAGNOSIS — I35.0 SEVERE AORTIC STENOSIS: ICD-10-CM

## 2022-03-25 DIAGNOSIS — Z79.02 LONG TERM (CURRENT) USE OF ANTITHROMBOTICS/ANTIPLATELETS: ICD-10-CM

## 2022-03-25 DIAGNOSIS — E78.00 PURE HYPERCHOLESTEROLEMIA: ICD-10-CM

## 2022-03-25 DIAGNOSIS — I10 ESSENTIAL HYPERTENSION: ICD-10-CM

## 2022-03-25 PROCEDURE — 99999 PR PBB SHADOW E&M-EST. PATIENT-LVL III: ICD-10-PCS | Mod: PBBFAC,,, | Performed by: INTERNAL MEDICINE

## 2022-03-25 PROCEDURE — 3074F SYST BP LT 130 MM HG: CPT | Mod: CPTII,S$GLB,, | Performed by: INTERNAL MEDICINE

## 2022-03-25 PROCEDURE — 99999 PR PBB SHADOW E&M-EST. PATIENT-LVL III: CPT | Mod: PBBFAC,,, | Performed by: INTERNAL MEDICINE

## 2022-03-25 PROCEDURE — 93010 ELECTROCARDIOGRAM REPORT: CPT | Mod: S$GLB,,, | Performed by: INTERNAL MEDICINE

## 2022-03-25 PROCEDURE — 1160F PR REVIEW ALL MEDS BY PRESCRIBER/CLIN PHARMACIST DOCUMENTED: ICD-10-PCS | Mod: CPTII,S$GLB,, | Performed by: INTERNAL MEDICINE

## 2022-03-25 PROCEDURE — 99214 OFFICE O/P EST MOD 30 MIN: CPT | Mod: S$GLB,,, | Performed by: INTERNAL MEDICINE

## 2022-03-25 PROCEDURE — 3078F DIAST BP <80 MM HG: CPT | Mod: CPTII,S$GLB,, | Performed by: INTERNAL MEDICINE

## 2022-03-25 PROCEDURE — 1126F PR PAIN SEVERITY QUANTIFIED, NO PAIN PRESENT: ICD-10-PCS | Mod: CPTII,S$GLB,, | Performed by: INTERNAL MEDICINE

## 2022-03-25 PROCEDURE — 93005 ELECTROCARDIOGRAM TRACING: CPT | Mod: PO

## 2022-03-25 PROCEDURE — 99499 RISK ADDL DX/OHS AUDIT: ICD-10-PCS | Mod: S$GLB,,, | Performed by: INTERNAL MEDICINE

## 2022-03-25 PROCEDURE — 1160F RVW MEDS BY RX/DR IN RCRD: CPT | Mod: CPTII,S$GLB,, | Performed by: INTERNAL MEDICINE

## 2022-03-25 PROCEDURE — 1159F PR MEDICATION LIST DOCUMENTED IN MEDICAL RECORD: ICD-10-PCS | Mod: CPTII,S$GLB,, | Performed by: INTERNAL MEDICINE

## 2022-03-25 PROCEDURE — 3078F PR MOST RECENT DIASTOLIC BLOOD PRESSURE < 80 MM HG: ICD-10-PCS | Mod: CPTII,S$GLB,, | Performed by: INTERNAL MEDICINE

## 2022-03-25 PROCEDURE — 1159F MED LIST DOCD IN RCRD: CPT | Mod: CPTII,S$GLB,, | Performed by: INTERNAL MEDICINE

## 2022-03-25 PROCEDURE — 1126F AMNT PAIN NOTED NONE PRSNT: CPT | Mod: CPTII,S$GLB,, | Performed by: INTERNAL MEDICINE

## 2022-03-25 PROCEDURE — 93010 EKG 12-LEAD: ICD-10-PCS | Mod: S$GLB,,, | Performed by: INTERNAL MEDICINE

## 2022-03-25 PROCEDURE — 3074F PR MOST RECENT SYSTOLIC BLOOD PRESSURE < 130 MM HG: ICD-10-PCS | Mod: CPTII,S$GLB,, | Performed by: INTERNAL MEDICINE

## 2022-03-25 PROCEDURE — 99214 PR OFFICE/OUTPT VISIT, EST, LEVL IV, 30-39 MIN: ICD-10-PCS | Mod: S$GLB,,, | Performed by: INTERNAL MEDICINE

## 2022-03-25 PROCEDURE — 99499 UNLISTED E&M SERVICE: CPT | Mod: S$GLB,,, | Performed by: INTERNAL MEDICINE

## 2022-03-25 RX ORDER — AMIODARONE HYDROCHLORIDE 200 MG/1
200 TABLET ORAL DAILY
Qty: 30 TABLET | Refills: 11 | Status: SHIPPED | OUTPATIENT
Start: 2022-03-25 | End: 2023-03-16

## 2022-03-25 NOTE — PROGRESS NOTES
Subjective:    Patient ID:  Selena Moulton is a 81 y.o. female patient here for evaluation Chronic afib  (Post MURRAY/DCCV)      History of Present Illness:  Cardiology follow-up.  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  Recent onset atrial fibrillation.  Status post MURRAY with cardioversion initially successful now back in a fib with controlled ventricular response.   Patient on chronic anticoagulation with Eliquis 5 b.i.d.    No dyspnea, no PND orthopnea no chest pain no syncope/presyncope.     Echo with mild perivalvular aortic insufficiency leak.  Left atrial diameter 4.3 with a left atrial volume index of 38.2.  EF preserved            Review of patient's allergies indicates:   Allergen Reactions    Opioids - morphine analogues      nause and fever     Influenza virus vaccines Nausea And Vomiting    Iodine and iodide containing products     Morpholine analogues Nausea And Vomiting    Penicillins Other (See Comments)     High fever as a child, also was allergy tested 1980    Latex, natural rubber Rash    Tetanus vaccines and toxoid Rash       Past Medical History:   Diagnosis Date    Anticoagulant long-term use     Aortic stenosis     Arthritis     Class 2 severe obesity due to excess calories with serious comorbidity and body mass index (BMI) of 39.0 to 39.9 in adult 09/13/2018    Coronary artery disease     AFIB    Gout     Hypertension      Past Surgical History:   Procedure Laterality Date    AORTIC VALVE REPLACEMENT N/A 10/8/2019    Procedure: Replacement-valve-aortic;  Surgeon: Fidel Cardenas MD;  Location: [facility] CATH LAB;  Service: Cardiology;  Laterality: N/A;    BACK SURGERY      bone graft neck    CARDIAC VALVE SURGERY      CARPAL TUNNEL RELEASE Left 10/3/2018    Procedure: RELEASE, CARPAL TUNNEL  LEFT;  Surgeon: Meche Cárdenas MD;  Location: [facility];  Service: Neurosurgery;  Laterality: Left;    CARPAL TUNNEL RELEASE Right 1/17/2019    Procedure: RELEASE, CARPAL TUNNEL RIGHT;  Surgeon:  Meche Cárdenas MD;  Location: [facility];  Service: Neurosurgery;  Laterality: Right;    CATHETERIZATION OF BOTH LEFT AND RIGHT HEART N/A 7/30/2019    Procedure: CATHETERIZATION, HEART, BOTH LEFT AND RIGHT;  Surgeon: Terrie Alberto MD;  Location: [facility] C[facility];  Service: Cardiology;  Laterality: N/A;    CERVICAL FUSION  1984    x2     CORONARY ANGIOGRAPHY N/A 7/30/2019    Procedure: ANGIOGRAM, CORONARY ARTERY;  Surgeon: Terrie Alberto MD;  Location: [facility] CATH;  Service: Cardiology;  Laterality: N/A;    HERNIA REPAIR      umbilical    HYSTERECTOMY      TONSILLECTOMY       Social History     Tobacco Use    Smoking status: Never Smoker    Smokeless tobacco: Never Used   Substance Use Topics    Alcohol use: No    Drug use: No        Review of Systems:    As noted in HPI in addition      REVIEW OF SYSTEMS  Review of Systems   Constitutional: Negative for decreased appetite, diaphoresis, night sweats, weight gain and weight loss.   HENT: Negative for nosebleeds and odynophagia.    Eyes: Negative for double vision and photophobia.   Cardiovascular: Negative for chest pain, claudication, cyanosis, dyspnea on exertion, irregular heartbeat, leg swelling, near-syncope, orthopnea, palpitations, paroxysmal nocturnal dyspnea and syncope.   Respiratory: Negative for cough, hemoptysis, shortness of breath and wheezing.    Hematologic/Lymphatic: Negative for adenopathy.   Skin: Negative for flushing, skin cancer and suspicious lesions.   Musculoskeletal: Negative for gout, myalgias and neck pain.   Gastrointestinal: Negative for abdominal pain, heartburn, hematemesis and hematochezia.   Genitourinary: Negative for bladder incontinence, hesitancy and nocturia.   Neurological: Negative for focal weakness, headaches, light-headedness and paresthesias.   Psychiatric/Behavioral: Negative for memory loss and substance abuse.              Objective:        Vitals:    03/25/22 1138   BP: (!) 103/57   Pulse: 72       Lab Results    Component Value Date    WBC 8.97 02/11/2022    HGB 14.5 02/11/2022    HCT 48.1 02/11/2022     02/11/2022    CHOL 131 08/04/2021    TRIG 94 08/04/2021    HDL 48 08/04/2021    ALT 16 02/11/2022    AST 22 02/11/2022     02/11/2022     02/11/2022    K 4.1 02/11/2022    K 4.1 02/11/2022     02/11/2022     02/11/2022    CREATININE 1.1 02/11/2022    CREATININE 1.1 02/11/2022    BUN 24 (H) 02/11/2022    BUN 24 (H) 02/11/2022    CO2 28 02/11/2022    CO2 28 02/11/2022    INR 0.9 10/08/2019    HGBA1C 5.8 (H) 08/04/2021        ECHOCARDIOGRAM RESULTS  Results for orders placed during the hospital encounter of 03/18/22    Transesophageal echo (BRODY) with possible cardioversion    Interpretation Summary  · Normal systolic function.  · The estimated ejection fraction is 55%.  · Normal left ventricular diastolic function.  · Normal right ventricular size with normal right ventricular systolic function.  · Mild left atrial enlargement.  · Mild right atrial enlargement.  · Mild mitral regurgitation.  · There is a transcutaneously-placed aortic bioprosthesis present. There is mild paravalvular aortic insufficiency present.  · A 120 J synchronized cardioversion was unsuccessful without restoration of normal sinus rhythm.  · A 150 J synchronized cardioversion was unsuccessful without restoration of normal sinus rhythm.  · A 200 J synchronized cardioversion was unsuccessful without restoration of normal sinus rhythm.    Successful DC cardioversion after 3rd shock with 200 biphasic w 2nd.  Brody negative for intracavitary mass thrombi.  Aortic valve is TAVR with mild perivalvular leak.  Complications none        CURRENT/PREVIOUS VISIT EKG  Results for orders placed or performed during the hospital encounter of 03/18/22   EKG 12-LEAD    Collection Time: 03/18/22 10:28 AM    Narrative    Test Reason : I48.20,I48.91,    Vent. Rate : 063 BPM     Atrial Rate : 063 BPM     P-R Int : 240 ms          QRS Dur : 102  ms      QT Int : 450 ms       P-R-T Axes : 069 055 053 degrees     QTc Int : 460 ms    Sinus rhythm with 1st degree A-V block with Premature atrial complexes  Low voltage QRS  Borderline Abnormal ECG  When compared with ECG of 11-MAR-2022 11:52,  Sinus rhythm has replaced Atrial fibrillation  Confirmed by Armando Reinoso MD ([id number]) on 3/18/2022 1:52:07 PM    Referred By:             Confirmed By:Armando Reinoso MD     No valid procedures specified.   Results for orders placed during the hospital encounter of 04/17/19    Stress test with myocardial perfusion    Interpretation Summary  · The perfusion scan is free of evidence from myocardial ischemia or injury.  · There is a mild intensity defect in the anteroseptal wall of the left ventricle, secondary to breast attenuation.  · An ejection fraction of 71 % at rest, stress EF 73%.  · Resting wall motion is physiologic.  · The EKG portion of this study is negative for myocardial ischemia.  · There were no arrhythmias during stress.  · The patient reported no symptoms during the stress test.    No valid procedures specified.    PHYSICAL EXAM  CONSTITUTIONAL: Well built, well nourished in no apparent distress  NECK: no carotid bruit, no JVD  LUNGS: CTA  CHEST WALL: no tenderness,  HEART: regular rate and rhythm, S1, S2 normal, no murmur, click, rub or gallop   ABDOMEN: soft, non-tender; bowel sounds normal; no masses,  no organomegaly  EXTREMITIES: Extremities normal, no edema, no calf tenderness noted  NEURO: AAO X 3    I HAVE REVIEWED :    The vital signs, nurses notes, and all the pertinent radiology and labs.         Current Outpatient Medications   Medication Instructions    acetaminophen (TYLENOL) 500 mg, Oral, Every 8 hours PRN    allopurinoL (ZYLOPRIM) 300 mg, Oral, Daily    amLODIPine (NORVASC) 2.5 mg, Oral, Daily    apixaban (ELIQUIS) 5 mg, Oral, 2 times daily    atenoloL (TENORMIN) 100 MG tablet TAKE 1/2 TABLET(50 MG) BY MOUTH EVERY DAY    atorvastatin  (LIPITOR) 40 mg, Oral, Nightly    fish oil-omega-3 fatty acids 300-1,000 mg capsule 1 capsule, Oral, Daily, Hold for 7 days before surgery    furosemide (LASIX) 20 mg, Oral, Daily PRN    irbesartan-hydrochlorothiazide (AVALIDE) 150-12.5 mg per tablet TAKE 1 TABLET BY MOUTH EVERY DAY    multivitamin capsule 1 capsule, Oral, Daily, Hold AM of surgery    pantoprazole (PROTONIX) 20 mg, Oral, Daily          Assessment:   TAVR 20/19  Hypertension dyslipidemia  Paroxysmal atrial fibrillation status post MURRAY with DC cardioversion  Recurrent AFib on chronic oral anticoagulation          Plan:   Continue Eliquis.  Add Pacerone 200 mg daily, return to clinic in 6 weeks.          No follow-ups on file.

## 2022-05-06 ENCOUNTER — OFFICE VISIT (OUTPATIENT)
Dept: CARDIOLOGY | Facility: CLINIC | Age: 82
End: 2022-05-06
Payer: MEDICARE

## 2022-05-06 VITALS
SYSTOLIC BLOOD PRESSURE: 100 MMHG | DIASTOLIC BLOOD PRESSURE: 66 MMHG | BODY MASS INDEX: 38.2 KG/M2 | WEIGHT: 243.38 LBS | HEIGHT: 67 IN | HEART RATE: 69 BPM | OXYGEN SATURATION: 100 %

## 2022-05-06 DIAGNOSIS — I35.0 NODULAR CALCIFIC AORTIC VALVE STENOSIS: ICD-10-CM

## 2022-05-06 DIAGNOSIS — I77.1 TORTUOUS AORTA: ICD-10-CM

## 2022-05-06 DIAGNOSIS — Z01.810 ENCOUNTER FOR PRE-OPERATIVE CARDIOVASCULAR CLEARANCE: ICD-10-CM

## 2022-05-06 DIAGNOSIS — R42 DIZZINESS: ICD-10-CM

## 2022-05-06 DIAGNOSIS — I48.0 PAROXYSMAL ATRIAL FIBRILLATION: Primary | ICD-10-CM

## 2022-05-06 DIAGNOSIS — I10 ESSENTIAL HYPERTENSION: ICD-10-CM

## 2022-05-06 DIAGNOSIS — R06.02 SOB (SHORTNESS OF BREATH): ICD-10-CM

## 2022-05-06 DIAGNOSIS — E66.01 CLASS 2 SEVERE OBESITY DUE TO EXCESS CALORIES WITH SERIOUS COMORBIDITY AND BODY MASS INDEX (BMI) OF 39.0 TO 39.9 IN ADULT: ICD-10-CM

## 2022-05-06 DIAGNOSIS — I48.91 ATRIAL FIBRILLATION, UNSPECIFIED TYPE: ICD-10-CM

## 2022-05-06 DIAGNOSIS — I35.0 SEVERE AORTIC STENOSIS: ICD-10-CM

## 2022-05-06 DIAGNOSIS — Z95.2 S/P TAVR (TRANSCATHETER AORTIC VALVE REPLACEMENT): ICD-10-CM

## 2022-05-06 DIAGNOSIS — I48.0 PAROXYSMAL ATRIAL FIBRILLATION: ICD-10-CM

## 2022-05-06 DIAGNOSIS — R07.89 OTHER CHEST PAIN: Primary | ICD-10-CM

## 2022-05-06 DIAGNOSIS — E78.00 PURE HYPERCHOLESTEROLEMIA: ICD-10-CM

## 2022-05-06 PROCEDURE — 1101F PR PT FALLS ASSESS DOC 0-1 FALLS W/OUT INJ PAST YR: ICD-10-PCS | Mod: CPTII,S$GLB,, | Performed by: INTERNAL MEDICINE

## 2022-05-06 PROCEDURE — 99214 OFFICE O/P EST MOD 30 MIN: CPT | Mod: S$GLB,,, | Performed by: INTERNAL MEDICINE

## 2022-05-06 PROCEDURE — 99999 PR PBB SHADOW E&M-EST. PATIENT-LVL II: ICD-10-PCS | Mod: PBBFAC,,, | Performed by: INTERNAL MEDICINE

## 2022-05-06 PROCEDURE — 99214 PR OFFICE/OUTPT VISIT, EST, LEVL IV, 30-39 MIN: ICD-10-PCS | Mod: S$GLB,,, | Performed by: INTERNAL MEDICINE

## 2022-05-06 PROCEDURE — 1126F AMNT PAIN NOTED NONE PRSNT: CPT | Mod: CPTII,S$GLB,, | Performed by: INTERNAL MEDICINE

## 2022-05-06 PROCEDURE — 93005 ELECTROCARDIOGRAM TRACING: CPT | Mod: PO

## 2022-05-06 PROCEDURE — 3288F FALL RISK ASSESSMENT DOCD: CPT | Mod: CPTII,S$GLB,, | Performed by: INTERNAL MEDICINE

## 2022-05-06 PROCEDURE — 3074F PR MOST RECENT SYSTOLIC BLOOD PRESSURE < 130 MM HG: ICD-10-PCS | Mod: CPTII,S$GLB,, | Performed by: INTERNAL MEDICINE

## 2022-05-06 PROCEDURE — 93010 EKG 12-LEAD: ICD-10-PCS | Mod: S$GLB,,, | Performed by: INTERNAL MEDICINE

## 2022-05-06 PROCEDURE — 99499 RISK ADDL DX/OHS AUDIT: ICD-10-PCS | Mod: S$GLB,,, | Performed by: INTERNAL MEDICINE

## 2022-05-06 PROCEDURE — 93010 ELECTROCARDIOGRAM REPORT: CPT | Mod: S$GLB,,, | Performed by: INTERNAL MEDICINE

## 2022-05-06 PROCEDURE — 3288F PR FALLS RISK ASSESSMENT DOCUMENTED: ICD-10-PCS | Mod: CPTII,S$GLB,, | Performed by: INTERNAL MEDICINE

## 2022-05-06 PROCEDURE — 1126F PR PAIN SEVERITY QUANTIFIED, NO PAIN PRESENT: ICD-10-PCS | Mod: CPTII,S$GLB,, | Performed by: INTERNAL MEDICINE

## 2022-05-06 PROCEDURE — 3078F PR MOST RECENT DIASTOLIC BLOOD PRESSURE < 80 MM HG: ICD-10-PCS | Mod: CPTII,S$GLB,, | Performed by: INTERNAL MEDICINE

## 2022-05-06 PROCEDURE — 99499 UNLISTED E&M SERVICE: CPT | Mod: S$GLB,,, | Performed by: INTERNAL MEDICINE

## 2022-05-06 PROCEDURE — 1101F PT FALLS ASSESS-DOCD LE1/YR: CPT | Mod: CPTII,S$GLB,, | Performed by: INTERNAL MEDICINE

## 2022-05-06 PROCEDURE — 3074F SYST BP LT 130 MM HG: CPT | Mod: CPTII,S$GLB,, | Performed by: INTERNAL MEDICINE

## 2022-05-06 PROCEDURE — 99999 PR PBB SHADOW E&M-EST. PATIENT-LVL II: CPT | Mod: PBBFAC,,, | Performed by: INTERNAL MEDICINE

## 2022-05-06 PROCEDURE — 3078F DIAST BP <80 MM HG: CPT | Mod: CPTII,S$GLB,, | Performed by: INTERNAL MEDICINE

## 2022-05-06 NOTE — PROGRESS NOTES
Subjective:    Patient ID:  Selena Moulton is a 81 y.o. female patient here for evaluation No chief complaint on file.      History of Present Illness:  Cardiology follow-up, recurrent atrial fibrillation status post DC cardioversion.  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  Patient remains on Eliquis 5 b.i.d..  He amiodarone was added during her last visit.  To mg daily.     Ongoing risk factors hypertension dyslipidemia.    With SCHMIDT, fatigue.  Still has good and bad days.  Overall blood pressures been well controlled.    No definite orthopnea or PND.  No syncope/presyncope    Review of patient's allergies indicates:   Allergen Reactions    Opioids - morphine analogues      nause and fever     Influenza virus vaccines Nausea And Vomiting    Iodine and iodide containing products     Morpholine analogues Nausea And Vomiting    Penicillins Other (See Comments)     High fever as a child, also was allergy tested 1980    Latex, natural rubber Rash    Tetanus vaccines and toxoid Rash       Past Medical History:   Diagnosis Date    Anticoagulant long-term use     Aortic stenosis     Arthritis     Class 2 severe obesity due to excess calories with serious comorbidity and body mass index (BMI) of 39.0 to 39.9 in adult 09/13/2018    Coronary artery disease     AFIB    Gout     Hypertension      Past Surgical History:   Procedure Laterality Date    AORTIC VALVE REPLACEMENT N/A 10/8/2019    Procedure: Replacement-valve-aortic;  Surgeon: Fidel Cardenas MD;  Location: [facility] CATH LAB;  Service: Cardiology;  Laterality: N/A;    BACK SURGERY      bone graft neck    CARDIAC VALVE SURGERY      CARPAL TUNNEL RELEASE Left 10/3/2018    Procedure: RELEASE, CARPAL TUNNEL  LEFT;  Surgeon: Meche Cárdenas MD;  Location: [facility];  Service: Neurosurgery;  Laterality: Left;    CARPAL TUNNEL RELEASE Right 1/17/2019    Procedure: RELEASE, CARPAL TUNNEL RIGHT;  Surgeon: Meche Cárdenas MD;  Location: [facility];  Service:  Neurosurgery;  Laterality: Right;    CATHETERIZATION OF BOTH LEFT AND RIGHT HEART N/A 7/30/2019    Procedure: CATHETERIZATION, HEART, BOTH LEFT AND RIGHT;  Surgeon: Terrie Alberto MD;  Location: [facility] CATH;  Service: Cardiology;  Laterality: N/A;    CERVICAL FUSION  1984    x2     CORONARY ANGIOGRAPHY N/A 7/30/2019    Procedure: ANGIOGRAM, CORONARY ARTERY;  Surgeon: Terrie Alberto MD;  Location: [facility] CATH;  Service: Cardiology;  Laterality: N/A;    HERNIA REPAIR      umbilical    HYSTERECTOMY      TONSILLECTOMY       Social History     Tobacco Use    Smoking status: Never Smoker    Smokeless tobacco: Never Used   Substance Use Topics    Alcohol use: No    Drug use: No        Review of Systems:    As noted in HPI in addition      REVIEW OF SYSTEMS  Review of Systems   Constitutional: Positive for malaise/fatigue. Negative for decreased appetite, diaphoresis, night sweats, weight gain and weight loss.   HENT: Negative for nosebleeds and odynophagia.    Eyes: Negative for double vision and photophobia.   Cardiovascular: Positive for dyspnea on exertion. Negative for chest pain, claudication, cyanosis, irregular heartbeat, leg swelling, near-syncope, orthopnea, palpitations, paroxysmal nocturnal dyspnea and syncope.   Respiratory: Negative for cough, hemoptysis, shortness of breath and wheezing.    Hematologic/Lymphatic: Negative for adenopathy.   Skin: Negative for flushing, skin cancer and suspicious lesions.   Musculoskeletal: Negative for gout, myalgias and neck pain.   Gastrointestinal: Negative for abdominal pain, heartburn, hematemesis and hematochezia.   Genitourinary: Negative for bladder incontinence, hesitancy and nocturia.   Neurological: Negative for focal weakness, headaches, light-headedness and paresthesias.   Psychiatric/Behavioral: Negative for memory loss and substance abuse.              Objective:        Vitals:    05/06/22 0954   BP: 100/66   Pulse: 69       Lab Results   Component Value  Date    WBC 8.97 02/11/2022    HGB 14.5 02/11/2022    HCT 48.1 02/11/2022     02/11/2022    CHOL 131 08/04/2021    TRIG 94 08/04/2021    HDL 48 08/04/2021    ALT 16 02/11/2022    AST 22 02/11/2022     02/11/2022     02/11/2022    K 4.1 02/11/2022    K 4.1 02/11/2022     02/11/2022     02/11/2022    CREATININE 1.1 02/11/2022    CREATININE 1.1 02/11/2022    BUN 24 (H) 02/11/2022    BUN 24 (H) 02/11/2022    CO2 28 02/11/2022    CO2 28 02/11/2022    INR 0.9 10/08/2019    HGBA1C 5.8 (H) 08/04/2021        ECHOCARDIOGRAM RESULTS  Results for orders placed during the hospital encounter of 03/18/22    Transesophageal echo (BRODY) with possible cardioversion    Interpretation Summary  · Normal systolic function.  · The estimated ejection fraction is 55%.  · Normal left ventricular diastolic function.  · Normal right ventricular size with normal right ventricular systolic function.  · Mild left atrial enlargement.  · Mild right atrial enlargement.  · Mild mitral regurgitation.  · There is a transcutaneously-placed aortic bioprosthesis present. There is mild paravalvular aortic insufficiency present.  · A 120 J synchronized cardioversion was unsuccessful without restoration of normal sinus rhythm.  · A 150 J synchronized cardioversion was unsuccessful without restoration of normal sinus rhythm.  · A 200 J synchronized cardioversion was unsuccessful without restoration of normal sinus rhythm.    Successful DC cardioversion after 3rd shock with 200 biphasic w 2nd.  Brody negative for intracavitary mass thrombi.  Aortic valve is TAVR with mild perivalvular leak.  Complications none        CURRENT/PREVIOUS VISIT EKG  Results for orders placed or performed in visit on 03/25/22   IN OFFICE EKG 12-LEAD (to [employer])    Collection Time: 03/25/22 10:44 AM    Narrative    Test Reason : I49.9    Vent. Rate : 076 BPM     Atrial Rate : 075 BPM     P-R Int : 000 ms          QRS Dur : 098 ms      QT Int : 416 ms        P-R-T Axes : 000 082 073 degrees     QTc Int : 468 ms    Atrial fibrillation  Abnormal ECG  When compared with ECG of 18-MAR-2022 10:28,  Atrial fibrillation has replaced Sinus rhythm  Confirmed by ENZO MCGOWAN MD ([id number]) on 3/28/2022 8:25:00 AM    Referred By: MARTIN PALACIO           Confirmed By:ENZO MCGOWAN MD     No valid procedures specified.   Results for orders placed during the hospital encounter of 04/17/19    Stress test with myocardial perfusion    Interpretation Summary  · The perfusion scan is free of evidence from myocardial ischemia or injury.  · There is a mild intensity defect in the anteroseptal wall of the left ventricle, secondary to breast attenuation.  · An ejection fraction of 71 % at rest, stress EF 73%.  · Resting wall motion is physiologic.  · The EKG portion of this study is negative for myocardial ischemia.  · There were no arrhythmias during stress.  · The patient reported no symptoms during the stress test.    No valid procedures specified.    PHYSICAL EXAM  CONSTITUTIONAL: Well built, well nourished in no apparent distress  NECK: no carotid bruit, no JVD  LUNGS: CTA  CHEST WALL: no tenderness,  HEART: ,S1, S2 slightly distant.  Grade 1/6 crescendo decrescendo murmur aortic area.  Slightly irregular rhythm.  ABDOMEN: soft, non-tender; bowel sounds normal; no masses,  no organomegaly  EXTREMITIES: Extremities normal, no edema, no calf tenderness noted  NEURO: AAO X 3    I HAVE REVIEWED :    The vital signs, nurses notes, and all the pertinent radiology and labs.         Current Outpatient Medications   Medication Instructions    acetaminophen (TYLENOL) 500 mg, Oral, Every 8 hours PRN    allopurinoL (ZYLOPRIM) 300 mg, Oral, Daily    amiodarone (PACERONE) 200 mg, Oral, Daily    amLODIPine (NORVASC) 2.5 mg, Oral, Daily    atenoloL (TENORMIN) 100 MG tablet TAKE 1/2 TABLET(50 MG) BY MOUTH EVERY DAY    atorvastatin (LIPITOR) 40 mg, Oral, Nightly    ELIQUIS 5 mg Tab TAKE 1 TABLET(5 MG)  BY MOUTH TWICE DAILY    fish oil-omega-3 fatty acids 300-1,000 mg capsule 1 capsule, Oral, Daily, Hold for 7 days before surgery    furosemide (LASIX) 20 mg, Oral, Daily PRN    irbesartan-hydrochlorothiazide (AVALIDE) 150-12.5 mg per tablet TAKE 1 TABLET BY MOUTH EVERY DAY    multivitamin capsule 1 capsule, Oral, Daily, Hold AM of surgery    pantoprazole (PROTONIX) 20 mg, Oral, Daily          Assessment:   Paroxysmal atrial fibrillation currently on amiodarone and apixaban.  Hypertension,dyslipidemia  History of TAVR 2019        Plan:   Reschedule DC cardioversion.  Continue present medications.          No follow-ups on file.

## 2022-05-06 NOTE — PATIENT INSTRUCTIONS
Cardioversion    Arrive for your procedure at:  [facility] TUES. 6/14/22 @ 8:00 AM.  YOU MAY ENTER THROUGH THE MAIN ENTRANCE.  LET THEM KNOW YOU ARE THERE FOR AN OUTPATIENT PROCEDURE.  THE PROCEDURE WILL START AT 9 AM WITH DR. PALACIO.      FASTING:  You MAY NOT have anything to eat or drink AFTER MIDNIGHT.        MEDICATIONS:  You may take your regular morning medications with a small sip of water.     Hold or adjust the following:  Fluid pills.  Diabetes medications.    Continue: Coumadin, Plavix, Effient, Aspirin, Anti-coagulants, Blood thinners    Please refer to pre-op instructions received from [facility].

## 2022-05-10 ENCOUNTER — OFFICE VISIT (OUTPATIENT)
Dept: FAMILY MEDICINE | Facility: CLINIC | Age: 82
End: 2022-05-10
Payer: MEDICARE

## 2022-05-10 VITALS
WEIGHT: 242.5 LBS | HEART RATE: 60 BPM | OXYGEN SATURATION: 98 % | BODY MASS INDEX: 37.98 KG/M2 | DIASTOLIC BLOOD PRESSURE: 58 MMHG | SYSTOLIC BLOOD PRESSURE: 98 MMHG

## 2022-05-10 DIAGNOSIS — I48.0 PAROXYSMAL ATRIAL FIBRILLATION: ICD-10-CM

## 2022-05-10 DIAGNOSIS — E66.01 CLASS 2 SEVERE OBESITY DUE TO EXCESS CALORIES WITH SERIOUS COMORBIDITY AND BODY MASS INDEX (BMI) OF 39.0 TO 39.9 IN ADULT: ICD-10-CM

## 2022-05-10 DIAGNOSIS — I77.1 TORTUOUS AORTA: ICD-10-CM

## 2022-05-10 DIAGNOSIS — I10 ESSENTIAL HYPERTENSION: Primary | ICD-10-CM

## 2022-05-10 DIAGNOSIS — N18.32 STAGE 3B CHRONIC KIDNEY DISEASE: ICD-10-CM

## 2022-05-10 DIAGNOSIS — R53.83 OTHER FATIGUE: ICD-10-CM

## 2022-05-10 DIAGNOSIS — R73.03 PREDIABETES: ICD-10-CM

## 2022-05-10 PROCEDURE — 3078F DIAST BP <80 MM HG: CPT | Mod: CPTII,S$GLB,, | Performed by: FAMILY MEDICINE

## 2022-05-10 PROCEDURE — 99999 PR PBB SHADOW E&M-EST. PATIENT-LVL III: ICD-10-PCS | Mod: PBBFAC,,, | Performed by: FAMILY MEDICINE

## 2022-05-10 PROCEDURE — 1101F PR PT FALLS ASSESS DOC 0-1 FALLS W/OUT INJ PAST YR: ICD-10-PCS | Mod: CPTII,S$GLB,, | Performed by: FAMILY MEDICINE

## 2022-05-10 PROCEDURE — 99499 UNLISTED E&M SERVICE: CPT | Mod: S$GLB,,, | Performed by: FAMILY MEDICINE

## 2022-05-10 PROCEDURE — 3288F PR FALLS RISK ASSESSMENT DOCUMENTED: ICD-10-PCS | Mod: CPTII,S$GLB,, | Performed by: FAMILY MEDICINE

## 2022-05-10 PROCEDURE — 99999 PR PBB SHADOW E&M-EST. PATIENT-LVL III: CPT | Mod: PBBFAC,,, | Performed by: FAMILY MEDICINE

## 2022-05-10 PROCEDURE — 99214 OFFICE O/P EST MOD 30 MIN: CPT | Mod: S$GLB,,, | Performed by: FAMILY MEDICINE

## 2022-05-10 PROCEDURE — 3074F SYST BP LT 130 MM HG: CPT | Mod: CPTII,S$GLB,, | Performed by: FAMILY MEDICINE

## 2022-05-10 PROCEDURE — 3074F PR MOST RECENT SYSTOLIC BLOOD PRESSURE < 130 MM HG: ICD-10-PCS | Mod: CPTII,S$GLB,, | Performed by: FAMILY MEDICINE

## 2022-05-10 PROCEDURE — 1101F PT FALLS ASSESS-DOCD LE1/YR: CPT | Mod: CPTII,S$GLB,, | Performed by: FAMILY MEDICINE

## 2022-05-10 PROCEDURE — 99214 PR OFFICE/OUTPT VISIT, EST, LEVL IV, 30-39 MIN: ICD-10-PCS | Mod: S$GLB,,, | Performed by: FAMILY MEDICINE

## 2022-05-10 PROCEDURE — 3288F FALL RISK ASSESSMENT DOCD: CPT | Mod: CPTII,S$GLB,, | Performed by: FAMILY MEDICINE

## 2022-05-10 PROCEDURE — 99499 RISK ADDL DX/OHS AUDIT: ICD-10-PCS | Mod: S$GLB,,, | Performed by: FAMILY MEDICINE

## 2022-05-10 PROCEDURE — 3078F PR MOST RECENT DIASTOLIC BLOOD PRESSURE < 80 MM HG: ICD-10-PCS | Mod: CPTII,S$GLB,, | Performed by: FAMILY MEDICINE

## 2022-05-20 NOTE — PROGRESS NOTES
Assessment:       1. Essential hypertension    2. Paroxysmal atrial fibrillation    3. Tortuous aorta    4. Class 2 severe obesity due to excess calories with serious comorbidity and body mass index (BMI) of 39.0 to 39.9 in adult    5. Prediabetes    6. Other fatigue    7. Stage 3b chronic kidney disease        Plan:       Essential hypertension:  On the lower side  -     Lipid Panel; Future; Expected date: 05/10/2022  -     Comprehensive Metabolic Panel; Future; Expected date: 05/10/2022  -     Microalbumin/Creatinine Ratio, Urine; Future; Expected date: 05/10/2022    Paroxysmal atrial fibrillation, stable    Tortuous aorta:  Stable  -     Lipid Panel; Future; Expected date: 05/10/2022  -     CBC Auto Differential; Future; Expected date: 05/10/2022    Class 2 severe obesity due to excess calories with serious comorbidity and body mass index (BMI) of 39.0 to 39.9 in adult:  Stable    Prediabetes:  Stable  -     Hemoglobin A1C; Future; Expected date: 05/10/2022    Other fatigue:  Stable    Stage 3b chronic kidney disease:  Stable  -     Uric Acid; Future; Expected date: 05/10/2022      Continue healthy habits, avoid processed foods processed starches, the patient was advised to drink more water, avoid any anti-inflammatories over-the-counter, weight loss was recommended for the patient.  The patient's BMI has been recorded in the chart. The patient has been provided educational materials regarding the benefits of attaining and maintaining a normal weight. We will continue to address and follow this issue during follow up visits.   Patient agreed with assessment and plan. Patient verbalized understanding.     Subjective:       Patient ID: Selena Moulton is a 81 y.o. female.    Chief Complaint: Hypertension    HPI     Hypertension:  The patient has been taking her blood pressure medicines as directed, the patient last kidney function was in the chronic kidney disease stage 3, the patient stated that she usually  eats healthy.  She is feeling tired.    Pre diabetes:  Patient not taking medications for this problem, the last hemoglobin A1c was 5.8.    Atrial fibrillation:  Patient currently taking Eliquis, seen the cardiologist, the patient also has tortuous aorta and is taking atorvastatin 40 mg at bedtime.  The last cholesterol levels were therapeutic.  The patient denies symptoms of chest pain or shortness of breath at this office visit.    Past medical history, past social history was reviewed and discussed with the patient.    Review of Systems   Constitutional: Positive for fatigue. Negative for activity change, appetite change and chills.   HENT: Negative for congestion and ear discharge.    Eyes: Negative for discharge and itching.   Respiratory: Negative for choking and chest tightness.    Cardiovascular: Negative for chest pain, palpitations and leg swelling.   Gastrointestinal: Negative for abdominal distention, abdominal pain and constipation.   Endocrine: Negative for cold intolerance and heat intolerance.   Genitourinary: Negative for dysuria and flank pain.   Musculoskeletal: Positive for arthralgias. Negative for back pain.   Skin: Negative for pallor and rash.   Allergic/Immunologic: Negative for environmental allergies and food allergies.   Neurological: Negative for dizziness, facial asymmetry and headaches.   Hematological: Negative for adenopathy. Does not bruise/bleed easily.   Psychiatric/Behavioral: Negative for agitation, confusion, decreased concentration and sleep disturbance.       Objective:      Physical Exam  Vitals and nursing note reviewed.   Constitutional:       General: She is not in acute distress.     Appearance: Normal appearance. She is well-developed. She is obese. She is not diaphoretic.      Comments: Limited ambulation   HENT:      Head: Normocephalic and atraumatic.      Right Ear: External ear normal.      Left Ear: External ear normal.      Nose: Nose normal.   Eyes:       General: No scleral icterus.        Right eye: No discharge.         Left eye: No discharge.      Conjunctiva/sclera: Conjunctivae normal.   Cardiovascular:      Rate and Rhythm: Normal rate and regular rhythm.      Heart sounds: Normal heart sounds.   Pulmonary:      Effort: Pulmonary effort is normal. No respiratory distress.      Breath sounds: Normal breath sounds. No wheezing.   Musculoskeletal:         General: No deformity.      Cervical back: Neck supple.   Skin:     Coloration: Skin is not pale.   Neurological:      Mental Status: She is alert.      Cranial Nerves: No cranial nerve deficit.   Psychiatric:         Behavior: Behavior normal.         Thought Content: Thought content normal.         Judgment: Judgment normal.

## 2022-05-26 ENCOUNTER — OFFICE VISIT (OUTPATIENT)
Dept: URGENT CARE | Facility: CLINIC | Age: 82
End: 2022-05-26
Payer: MEDICARE

## 2022-05-26 VITALS
DIASTOLIC BLOOD PRESSURE: 61 MMHG | TEMPERATURE: 100 F | RESPIRATION RATE: 18 BRPM | OXYGEN SATURATION: 99 % | HEART RATE: 95 BPM | WEIGHT: 242 LBS | BODY MASS INDEX: 37.98 KG/M2 | HEIGHT: 67 IN | SYSTOLIC BLOOD PRESSURE: 117 MMHG

## 2022-05-26 DIAGNOSIS — U07.1 COVID-19 VIRUS DETECTED: ICD-10-CM

## 2022-05-26 DIAGNOSIS — U07.1 COVID-19: Primary | ICD-10-CM

## 2022-05-26 DIAGNOSIS — U07.1 COVID: ICD-10-CM

## 2022-05-26 DIAGNOSIS — J02.9 SORE THROAT: ICD-10-CM

## 2022-05-26 LAB
CTP QC/QA: YES
SARS-COV-2 RDRP RESP QL NAA+PROBE: POSITIVE

## 2022-05-26 PROCEDURE — 99214 PR OFFICE/OUTPT VISIT, EST, LEVL IV, 30-39 MIN: ICD-10-PCS | Mod: S$GLB,,, | Performed by: FAMILY MEDICINE

## 2022-05-26 PROCEDURE — 3078F DIAST BP <80 MM HG: CPT | Mod: CPTII,S$GLB,, | Performed by: FAMILY MEDICINE

## 2022-05-26 PROCEDURE — 1159F PR MEDICATION LIST DOCUMENTED IN MEDICAL RECORD: ICD-10-PCS | Mod: CPTII,S$GLB,, | Performed by: FAMILY MEDICINE

## 2022-05-26 PROCEDURE — U0002: ICD-10-PCS | Mod: QW,S$GLB,, | Performed by: FAMILY MEDICINE

## 2022-05-26 PROCEDURE — 3078F PR MOST RECENT DIASTOLIC BLOOD PRESSURE < 80 MM HG: ICD-10-PCS | Mod: CPTII,S$GLB,, | Performed by: FAMILY MEDICINE

## 2022-05-26 PROCEDURE — 3074F PR MOST RECENT SYSTOLIC BLOOD PRESSURE < 130 MM HG: ICD-10-PCS | Mod: CPTII,S$GLB,, | Performed by: FAMILY MEDICINE

## 2022-05-26 PROCEDURE — 1159F MED LIST DOCD IN RCRD: CPT | Mod: CPTII,S$GLB,, | Performed by: FAMILY MEDICINE

## 2022-05-26 PROCEDURE — 99214 OFFICE O/P EST MOD 30 MIN: CPT | Mod: S$GLB,,, | Performed by: FAMILY MEDICINE

## 2022-05-26 PROCEDURE — 3074F SYST BP LT 130 MM HG: CPT | Mod: CPTII,S$GLB,, | Performed by: FAMILY MEDICINE

## 2022-05-26 PROCEDURE — U0002 COVID-19 LAB TEST NON-CDC: HCPCS | Mod: QW,S$GLB,, | Performed by: FAMILY MEDICINE

## 2022-05-26 RX ORDER — BENZONATATE 100 MG/1
100 CAPSULE ORAL EVERY 6 HOURS PRN
Qty: 30 CAPSULE | Refills: 1 | Status: SHIPPED | OUTPATIENT
Start: 2022-05-26 | End: 2022-06-02 | Stop reason: SDUPTHER

## 2022-05-26 RX ORDER — ALBUTEROL SULFATE 90 UG/1
2 AEROSOL, METERED RESPIRATORY (INHALATION) EVERY 6 HOURS PRN
Qty: 18 G | Refills: 2 | Status: SHIPPED | OUTPATIENT
Start: 2022-05-26 | End: 2022-06-02 | Stop reason: SDUPTHER

## 2022-05-26 NOTE — PROGRESS NOTES
"Subjective:       Patient ID: Selena Moulton is a 81 y.o. female.    Vitals:  height is 5' 7" (1.702 m) and weight is 109.8 kg (242 lb). Her oral temperature is 99.6 °F (37.6 °C). Her blood pressure is 117/61 and her pulse is 95. Her respiration is 18 and oxygen saturation is 99%.     Chief Complaint: Cough    Cough, sore throat, and congestion since Monday. Pain 5/10 . Taking tylenol     Cough  This is a new problem. The current episode started in the past 7 days. The problem has been unchanged. The cough is non-productive. Associated symptoms include ear congestion, a fever, nasal congestion and a sore throat. Nothing aggravates the symptoms. She has tried nothing for the symptoms. The treatment provided no relief. Her past medical history is significant for pneumonia. There is no history of asthma, bronchitis or COPD.       Constitution: Positive for fever.   HENT: Positive for sore throat.    Respiratory: Positive for cough.        Objective:      Physical Exam     Physical Exam  Vitals signs and nursing note reviewed.   Constitutional:       Appearance: Pt is well-developed. Alert, NAD but not feeling well.   HENT:      Head: Normocephalic and atraumatic. Pt appears well-developed and well-nourished. Pt is cooperative.  Non-toxic appearance. Pt does not have a sickly appearance. Pt does not appear ill. No distress     Right Ear: External ear normal. external ear and ear canal normal.      Left Ear: External ear normal. external ear and ear canal normal.   Eyes:      General: Lids are normal.      Conjunctiva/sclera: Conjunctivae normal. Visual tracking is normal. Right eye exhibits no exudate. Left eye exhibits no exudate. No scleral icterus.     Pupils: Pupils are equal, round  Neck:      Musculoskeletal: Full passive range of motion without pain and neck supple.      Trachea: Trachea and phonation normal.   Cardiovascular:      Rate and Rhythm: Normal rate. Extremities well perfused.   Pulmonary:      " Effort: Pulmonary effort is normal. No respiratory distress.     Breath sounds: Normal breath sounds.   Abdomen: NO obvious distention.  Musculoskeletal: Normal range of motion. No ambulation issues  Skin:     General: Skin is warm and dry. No open wounds or abrasions. No petechiae No cyanosis  no jaundice not diaphoretic, not pale, not purpuric  Neurological:      Mental Status:Pt is alert and oriented to person, place, and time.   Psychiatric:         Speech: Speech normal.         Behavior: Behavior normal.         Thought Content: Thought content normal.         Judgment: Judgment normal.             Assessment:       1. COVID-19    2. Sore throat    3. COVID          Plan:       5    COVID-19  -     Ambulatory referral/consult to [facility]    Sore throat  -     POCT COVID-19 Rapid Screening    COVID    Other orders  -     albuterol (PROVENTIL/VENTOLIN HFA) 90 mcg/actuation inhaler; Inhale 2 puffs into the lungs every 6 (six) hours as needed for Wheezing. Rescue  Dispense: 18 g; Refill: 2  -     benzonatate (TESSALON PERLES) 100 MG capsule; Take 1 capsule (100 mg total) by mouth every 6 (six) hours as needed for Cough.  Dispense: 30 capsule; Refill: 1

## 2022-05-26 NOTE — PATIENT INSTRUCTIONS
Symptomatic treatment:    AVOID ANY MEDICATIONS RECOMMENDED IF YOU HAVE AN ALLERGY OR HAVE HAD AN ADVERSE REACTION TO IN THE PAST      Alternate Tylenol and Ibuprofen every 3 hrs for fever, pain and inflammation. Avoid NSAIDS if you are pregnant or have advanced kidney disease or take another daily medication with which it would interfere.      salt water gargles to soothe throat from post nasal drip  Honey/lemon water or warm tea to soothe throat from post nasal drip  Cepachol helps to soothe the discomfort in throat from post nasal drip    Cold-eeze (ZINC) helps to reduce the duration of URI symptoms if taken early  Elderberry to reduce duration of viral URI symptoms    Nasal saline spray reduces inflammation and dryness  Warm face compresses/hot showers as often as you can to open sinuses and allow to drain.   Flonase OTC or Nasacort OTC to help decrease inflammation in nasal turbinates and allow sinuses to drain    Vicks vapor rub at night  Simple foods like chicken noodle soup help provide hydration and nutrition    Delsym helps with coughing at night    Pepcid will help if there is reflux from the post nasal drip and helpful to take at night    Zyrtec/Claritin during the day and Benadryl at night may help if allergy component concurrently with URI    POSITIONALLY, SLEEPING ON STOMACH HELPS IF YOU ARE DIAGNOSED WITH COVID-19    Rest as much as you can    Your symptoms will likely last 5-7 days, maybe longer depending on how it affects your body.  You are possibly contagious, so minimize contact with others to reduce the spread to others and stay home from work or school as we discussed. Dehydration is preventable but is one of the main reasons why you will feel so badly. Drink pedialyte, gatorade or propel. Stay hydrated.  Antibiotics are not needed unless a complication(such as Otitis Media, Bacterial sinus infection or pneumonia) develops. Taking antibiotics for Flu/Cold is not supported by evidence-based  medicine and can expose you to unnecessary side effects of the medication, such as anaphylaxis, yeast infection and leads to antibiotic resistance.   If you experience any: Chest pain, shortness of breath, wheezing or difficulty breathing, Severe headache, face, neck or ear pain,New rash,Fever over 101.5º F (38.6 C) for more than 5-7 days,  confusion, behavior change or seizure, Severe weakness or dizziness, please go to the ER immediately for further testing.

## 2022-05-27 ENCOUNTER — TELEPHONE (OUTPATIENT)
Dept: URGENT CARE | Facility: CLINIC | Age: 82
End: 2022-05-27
Payer: MEDICARE

## 2022-05-27 DIAGNOSIS — U07.1 COVID-19: Primary | ICD-10-CM

## 2022-05-27 RX ORDER — ONDANSETRON 4 MG/1
4 TABLET, ORALLY DISINTEGRATING ORAL
Status: ACTIVE | OUTPATIENT
Start: 2022-05-27 | End: 2022-05-28

## 2022-05-27 RX ORDER — ALBUTEROL SULFATE 90 UG/1
2 AEROSOL, METERED RESPIRATORY (INHALATION)
Status: ACTIVE | OUTPATIENT
Start: 2022-05-27 | End: 2022-05-30

## 2022-05-27 RX ORDER — BEBTELOVIMAB 87.5 MG/ML
175 INJECTION, SOLUTION INTRAVENOUS
Status: COMPLETED | OUTPATIENT
Start: 2022-05-27 | End: 2022-05-30

## 2022-05-27 RX ORDER — EPINEPHRINE 0.3 MG/.3ML
0.3 INJECTION SUBCUTANEOUS
Status: ACTIVE | OUTPATIENT
Start: 2022-05-27 | End: 2022-05-30

## 2022-05-27 RX ORDER — ACETAMINOPHEN 325 MG/1
650 TABLET ORAL
Status: ACTIVE | OUTPATIENT
Start: 2022-05-27 | End: 2022-05-28

## 2022-05-27 RX ORDER — DIPHENHYDRAMINE HYDROCHLORIDE 50 MG/ML
25 INJECTION INTRAMUSCULAR; INTRAVENOUS
Status: ACTIVE | OUTPATIENT
Start: 2022-05-27 | End: 2022-05-28

## 2022-05-30 ENCOUNTER — INFUSION (OUTPATIENT)
Dept: INFECTIOUS DISEASES | Facility: HOSPITAL | Age: 82
End: 2022-05-30
Attending: FAMILY MEDICINE
Payer: MEDICARE

## 2022-05-30 VITALS
TEMPERATURE: 98 F | HEIGHT: 67 IN | OXYGEN SATURATION: 96 % | SYSTOLIC BLOOD PRESSURE: 115 MMHG | HEART RATE: 64 BPM | BODY MASS INDEX: 37.98 KG/M2 | WEIGHT: 242 LBS | DIASTOLIC BLOOD PRESSURE: 65 MMHG

## 2022-05-30 PROCEDURE — 63600175 PHARM REV CODE 636 W HCPCS: Mod: PN | Performed by: INTERNAL MEDICINE

## 2022-05-30 PROCEDURE — M0222 HC IV INJECTION, BEBTELOVIMAB, INCL POST ADMIN MONIT: HCPCS | Performed by: INTERNAL MEDICINE

## 2022-05-30 RX ADMIN — BEBTELOVIMAB 175 MG: 87.5 INJECTION, SOLUTION INTRAVENOUS at 09:05

## 2022-05-30 NOTE — PLAN OF CARE
Patient tolerated Covid [facility] medication (per MAR) well, d/c in no acute distress after wait period with AVS in hand.

## 2022-05-31 ENCOUNTER — TELEPHONE (OUTPATIENT)
Dept: URGENT CARE | Facility: CLINIC | Age: 82
End: 2022-05-31
Payer: MEDICARE

## 2022-06-02 RX ORDER — BENZONATATE 100 MG/1
100 CAPSULE ORAL EVERY 6 HOURS PRN
Qty: 30 CAPSULE | Refills: 1 | Status: SHIPPED | OUTPATIENT
Start: 2022-06-02 | End: 2022-08-10

## 2022-06-02 RX ORDER — ALBUTEROL SULFATE 90 UG/1
2 AEROSOL, METERED RESPIRATORY (INHALATION) EVERY 6 HOURS PRN
Qty: 18 G | Refills: 2 | Status: SHIPPED | OUTPATIENT
Start: 2022-06-02 | End: 2022-08-10

## 2022-06-02 NOTE — TELEPHONE ENCOUNTER
No new care gaps identified.  [facility] Embedded Care Gaps. Reference number: [id number]. 6/02/2022   3:00:18 PM CDT

## 2022-06-02 NOTE — TELEPHONE ENCOUNTER
----- Message from Chacorta Thomas sent at 6/2/2022 11:27 AM CDT -----  Type: Patient Call Back         Who called:Pt          What is the request in detail:  Pt called in regarding Requesting a Refill on Medication that Dr Perales sent in on 5/26/22 for Covid . Pt states that she still have Covid symptoms and would like a refill if possible and send to her local Pharmacy [id number] [place] [address] [address] AT [facility] [address]           Can the clinic reply by [facility]?no          Would the patient rather a call back or a response via [facility]?call back          Best call back number:[phone] (mobile)          Additional Information:           Thank You

## 2022-06-08 ENCOUNTER — TELEPHONE (OUTPATIENT)
Dept: CARDIOLOGY | Facility: CLINIC | Age: 82
End: 2022-06-08
Payer: MEDICARE

## 2022-06-08 NOTE — TELEPHONE ENCOUNTER
----- Message from Rae Lee sent at 6/8/2022  2:51 PM CDT -----  Contact: self  Patient is ret a call but couldn't see who called and MA from you office said to have Sweta ret her call.  Patient states she can move the appt from 6/14 to 6/16/2022 so please just call her to confirm at [phone] (home) and thanks

## 2022-06-08 NOTE — TELEPHONE ENCOUNTER
Cardioversion/MURRAY 6/17/22 at 11 am . Pt vu     Arrive for your procedure at:  [facility]      ? FASTING:  You MAY NOT have anything to eat or drink AFTER MIDNIGHT.  If your procedure is scheduled in the afternoon, you may have a LIGHT BREAKFAST BEFORE 6:00 A.M.  For example: Two slices of toast; black coffee or black tea.    ? MEDICATIONS:  You may take your regular morning medications with a small sip of water.     Hold or adjust the following:   Fluid pills.   Diabetes medications.    Continue: Coumadin, Plavix, Effient, Aspirin, Anti-coagulants, Blood thinners    Please refer to pre-op instructions received from [facility].

## 2022-06-09 ENCOUNTER — TELEPHONE (OUTPATIENT)
Dept: NEPHROLOGY | Facility: CLINIC | Age: 82
End: 2022-06-09
Payer: MEDICARE

## 2022-06-10 ENCOUNTER — HOSPITAL ENCOUNTER (OUTPATIENT)
Dept: RADIOLOGY | Facility: HOSPITAL | Age: 82
Discharge: HOME | End: 2022-06-10
Attending: INTERNAL MEDICINE
Payer: MEDICARE

## 2022-06-10 DIAGNOSIS — N18.30 STAGE 3 CHRONIC KIDNEY DISEASE, UNSPECIFIED WHETHER STAGE 3A OR 3B CKD: ICD-10-CM

## 2022-06-10 DIAGNOSIS — I10 ESSENTIAL HYPERTENSION: ICD-10-CM

## 2022-06-10 DIAGNOSIS — E66.01 CLASS 2 SEVERE OBESITY DUE TO EXCESS CALORIES WITH SERIOUS COMORBIDITY AND BODY MASS INDEX (BMI) OF 39.0 TO 39.9 IN ADULT: ICD-10-CM

## 2022-06-10 DIAGNOSIS — R73.03 PRE-DIABETES: ICD-10-CM

## 2022-06-10 DIAGNOSIS — E83.42 HYPOMAGNESEMIA: ICD-10-CM

## 2022-06-10 PROCEDURE — 76770 US EXAM ABDO BACK WALL COMP: CPT | Mod: TC,PO

## 2022-06-10 PROCEDURE — 76770 US RETROPERITONEAL COMPLETE: ICD-10-PCS | Mod: 26,,, | Performed by: RADIOLOGY

## 2022-06-10 PROCEDURE — 76770 US EXAM ABDO BACK WALL COMP: CPT | Mod: 26,,, | Performed by: RADIOLOGY

## 2022-06-20 ENCOUNTER — OFFICE VISIT (OUTPATIENT)
Dept: NEPHROLOGY | Facility: CLINIC | Age: 82
End: 2022-06-20
Payer: MEDICARE

## 2022-06-20 VITALS
HEART RATE: 52 BPM | HEIGHT: 67 IN | DIASTOLIC BLOOD PRESSURE: 42 MMHG | SYSTOLIC BLOOD PRESSURE: 108 MMHG | BODY MASS INDEX: 37.51 KG/M2 | OXYGEN SATURATION: 95 % | WEIGHT: 239 LBS

## 2022-06-20 DIAGNOSIS — N18.30 STAGE 3 CHRONIC KIDNEY DISEASE, UNSPECIFIED WHETHER STAGE 3A OR 3B CKD: Primary | ICD-10-CM

## 2022-06-20 DIAGNOSIS — N28.1 ACQUIRED CYST OF KIDNEY: ICD-10-CM

## 2022-06-20 DIAGNOSIS — I10 ESSENTIAL HYPERTENSION: ICD-10-CM

## 2022-06-20 DIAGNOSIS — E83.42 HYPOMAGNESEMIA: ICD-10-CM

## 2022-06-20 DIAGNOSIS — N25.81 SECONDARY HYPERPARATHYROIDISM: ICD-10-CM

## 2022-06-20 PROCEDURE — 3288F FALL RISK ASSESSMENT DOCD: CPT | Mod: CPTII,S$GLB,, | Performed by: INTERNAL MEDICINE

## 2022-06-20 PROCEDURE — 99214 PR OFFICE/OUTPT VISIT, EST, LEVL IV, 30-39 MIN: ICD-10-PCS | Mod: S$GLB,,, | Performed by: INTERNAL MEDICINE

## 2022-06-20 PROCEDURE — 99999 PR PBB SHADOW E&M-EST. PATIENT-LVL III: CPT | Mod: PBBFAC,,, | Performed by: INTERNAL MEDICINE

## 2022-06-20 PROCEDURE — 99214 OFFICE O/P EST MOD 30 MIN: CPT | Mod: S$GLB,,, | Performed by: INTERNAL MEDICINE

## 2022-06-20 PROCEDURE — 1159F PR MEDICATION LIST DOCUMENTED IN MEDICAL RECORD: ICD-10-PCS | Mod: CPTII,S$GLB,, | Performed by: INTERNAL MEDICINE

## 2022-06-20 PROCEDURE — 99999 PR PBB SHADOW E&M-EST. PATIENT-LVL III: ICD-10-PCS | Mod: PBBFAC,,, | Performed by: INTERNAL MEDICINE

## 2022-06-20 PROCEDURE — 3078F DIAST BP <80 MM HG: CPT | Mod: CPTII,S$GLB,, | Performed by: INTERNAL MEDICINE

## 2022-06-20 PROCEDURE — 1101F PT FALLS ASSESS-DOCD LE1/YR: CPT | Mod: CPTII,S$GLB,, | Performed by: INTERNAL MEDICINE

## 2022-06-20 PROCEDURE — 1160F RVW MEDS BY RX/DR IN RCRD: CPT | Mod: CPTII,S$GLB,, | Performed by: INTERNAL MEDICINE

## 2022-06-20 PROCEDURE — 3074F PR MOST RECENT SYSTOLIC BLOOD PRESSURE < 130 MM HG: ICD-10-PCS | Mod: CPTII,S$GLB,, | Performed by: INTERNAL MEDICINE

## 2022-06-20 PROCEDURE — 3288F PR FALLS RISK ASSESSMENT DOCUMENTED: ICD-10-PCS | Mod: CPTII,S$GLB,, | Performed by: INTERNAL MEDICINE

## 2022-06-20 PROCEDURE — 1159F MED LIST DOCD IN RCRD: CPT | Mod: CPTII,S$GLB,, | Performed by: INTERNAL MEDICINE

## 2022-06-20 PROCEDURE — 1160F PR REVIEW ALL MEDS BY PRESCRIBER/CLIN PHARMACIST DOCUMENTED: ICD-10-PCS | Mod: CPTII,S$GLB,, | Performed by: INTERNAL MEDICINE

## 2022-06-20 PROCEDURE — 1101F PR PT FALLS ASSESS DOC 0-1 FALLS W/OUT INJ PAST YR: ICD-10-PCS | Mod: CPTII,S$GLB,, | Performed by: INTERNAL MEDICINE

## 2022-06-20 PROCEDURE — 3078F PR MOST RECENT DIASTOLIC BLOOD PRESSURE < 80 MM HG: ICD-10-PCS | Mod: CPTII,S$GLB,, | Performed by: INTERNAL MEDICINE

## 2022-06-20 PROCEDURE — 3074F SYST BP LT 130 MM HG: CPT | Mod: CPTII,S$GLB,, | Performed by: INTERNAL MEDICINE

## 2022-06-20 RX ORDER — CHOLECALCIFEROL (VITAMIN D3) 25 MCG
1000 TABLET ORAL DAILY
COMMUNITY

## 2022-06-20 NOTE — PROGRESS NOTES
"Subjective:       Patient ID: Selena Moulton is a 81 y.o. White female who presents for return patient evaluation for chronic renal failure.      She has no uremic or urinary symptoms and is in her usual state of health except for some occasional urinary incontinence.  She had COVID on May 26th.      Review of Systems   Constitutional: Negative for appetite change, chills and fever.   HENT: Negative for congestion and rhinorrhea.    Eyes: Negative for visual disturbance.   Respiratory: Positive for cough (improving). Negative for shortness of breath.    Cardiovascular: Negative for chest pain and leg swelling.   Gastrointestinal: Negative for abdominal pain, diarrhea, nausea and vomiting.   Genitourinary: Negative for difficulty urinating, dysuria and hematuria.   Musculoskeletal: Negative for myalgias.   Skin: Negative for rash.   Neurological: Negative for headaches.   Hematological: Bruises/bleeds easily.   Psychiatric/Behavioral: Negative for sleep disturbance.       The past medical, family and social histories were reviewed for this encounter.     BP (!) 108/42 (BP Location: Right arm, Patient Position: Sitting, BP Method: Large (Manual))   Pulse (!) 52   Ht 5' 7" (1.702 m)   Wt 108.4 kg (239 lb)   SpO2 95%   BMI 37.43 kg/m²     Objective:      Physical Exam  Vitals reviewed.   Constitutional:       General: She is not in acute distress.     Appearance: She is well-developed.   HENT:      Head: Normocephalic and atraumatic.   Eyes:      General: No scleral icterus.     Conjunctiva/sclera: Conjunctivae normal.   Neck:      Vascular: No JVD.   Cardiovascular:      Rate and Rhythm: Normal rate and regular rhythm.      Heart sounds: Normal heart sounds. No murmur heard.    No friction rub. No gallop.   Pulmonary:      Effort: Pulmonary effort is normal. No respiratory distress.      Breath sounds: Normal breath sounds. No wheezing.   Abdominal:      General: Bowel sounds are normal. There is no " distension.      Palpations: Abdomen is soft.      Tenderness: There is no abdominal tenderness.   Musculoskeletal:      Cervical back: Normal range of motion.      Right lower leg: No edema.      Left lower leg: No edema.   Skin:     General: Skin is warm and dry.      Findings: No rash.   Neurological:      Mental Status: She is alert and oriented to person, place, and time.   Psychiatric:         Mood and Affect: Mood normal.         Behavior: Behavior normal.         Assessment:       1. Stage 3 chronic kidney disease, unspecified whether stage 3a or 3b CKD    2. Essential hypertension    3. Hypomagnesemia    4. Acquired cyst of kidney    5. Secondary hyperparathyroidism        Plan:   Return to clinic in 6 months.  Labs for next visit include rp pth.  RP and ua this week.  Baseline creatinine is 1.1-1.3 since 2018.  PTH is 318 with a calcium of 10.0.  Continue D3 1000 units daily.  Renal US shows R 10.7 cm L 9.8 cm.  Blood pressure is controlled on the current regimen.  Continue current medications as prescribed and reviewed.   She developed COVID since her last visit and I suspect she had an injury from that.  I will recheck it this week.

## 2022-06-21 ENCOUNTER — LAB VISIT (OUTPATIENT)
Dept: LAB | Facility: HOSPITAL | Age: 82
End: 2022-06-21
Attending: INTERNAL MEDICINE
Payer: MEDICARE

## 2022-06-21 DIAGNOSIS — N18.30 STAGE 3 CHRONIC KIDNEY DISEASE, UNSPECIFIED WHETHER STAGE 3A OR 3B CKD: ICD-10-CM

## 2022-06-21 LAB
ALBUMIN SERPL BCP-MCNC: 3.7 G/DL (ref 3.5–5.2)
ANION GAP SERPL CALC-SCNC: 10 MMOL/L (ref 8–16)
BUN SERPL-MCNC: 39 MG/DL (ref 8–23)
CALCIUM SERPL-MCNC: 10.1 MG/DL (ref 8.7–10.5)
CHLORIDE SERPL-SCNC: 104 MMOL/L (ref 95–110)
CO2 SERPL-SCNC: 29 MMOL/L (ref 23–29)
CREAT SERPL-MCNC: 1.7 MG/DL (ref 0.5–1.4)
EST. GFR  (AFRICAN AMERICAN): 31.7 ML/MIN/1.73 M^2
EST. GFR  (NON AFRICAN AMERICAN): 27.5 ML/MIN/1.73 M^2
GLUCOSE SERPL-MCNC: 104 MG/DL (ref 70–110)
PHOSPHATE SERPL-MCNC: 3.3 MG/DL (ref 2.7–4.5)
POTASSIUM SERPL-SCNC: 3.9 MMOL/L (ref 3.5–5.1)
SODIUM SERPL-SCNC: 143 MMOL/L (ref 136–145)

## 2022-06-21 PROCEDURE — 80069 RENAL FUNCTION PANEL: CPT | Performed by: INTERNAL MEDICINE

## 2022-06-21 PROCEDURE — 36415 COLL VENOUS BLD VENIPUNCTURE: CPT | Mod: PO | Performed by: INTERNAL MEDICINE

## 2022-06-28 ENCOUNTER — OFFICE VISIT (OUTPATIENT)
Dept: CARDIOLOGY | Facility: CLINIC | Age: 82
End: 2022-06-28
Payer: MEDICARE

## 2022-06-28 VITALS
DIASTOLIC BLOOD PRESSURE: 55 MMHG | HEIGHT: 67 IN | BODY MASS INDEX: 38.06 KG/M2 | HEART RATE: 47 BPM | WEIGHT: 242.5 LBS | SYSTOLIC BLOOD PRESSURE: 106 MMHG

## 2022-06-28 DIAGNOSIS — I35.0 SEVERE AORTIC STENOSIS: ICD-10-CM

## 2022-06-28 DIAGNOSIS — Z79.02 LONG TERM (CURRENT) USE OF ANTITHROMBOTICS/ANTIPLATELETS: ICD-10-CM

## 2022-06-28 DIAGNOSIS — Z95.2 S/P TAVR (TRANSCATHETER AORTIC VALVE REPLACEMENT): ICD-10-CM

## 2022-06-28 DIAGNOSIS — I48.0 PAROXYSMAL ATRIAL FIBRILLATION: ICD-10-CM

## 2022-06-28 DIAGNOSIS — R06.02 SOB (SHORTNESS OF BREATH): Primary | ICD-10-CM

## 2022-06-28 PROCEDURE — 99999 PR PBB SHADOW E&M-EST. PATIENT-LVL III: CPT | Mod: PBBFAC,,, | Performed by: INTERNAL MEDICINE

## 2022-06-28 PROCEDURE — 99999 PR PBB SHADOW E&M-EST. PATIENT-LVL III: ICD-10-PCS | Mod: PBBFAC,,, | Performed by: INTERNAL MEDICINE

## 2022-06-28 PROCEDURE — 93010 EKG 12-LEAD: ICD-10-PCS | Mod: S$GLB,,, | Performed by: INTERNAL MEDICINE

## 2022-06-28 PROCEDURE — 1159F MED LIST DOCD IN RCRD: CPT | Mod: CPTII,S$GLB,, | Performed by: INTERNAL MEDICINE

## 2022-06-28 PROCEDURE — 3074F SYST BP LT 130 MM HG: CPT | Mod: CPTII,S$GLB,, | Performed by: INTERNAL MEDICINE

## 2022-06-28 PROCEDURE — 3078F DIAST BP <80 MM HG: CPT | Mod: CPTII,S$GLB,, | Performed by: INTERNAL MEDICINE

## 2022-06-28 PROCEDURE — 1126F AMNT PAIN NOTED NONE PRSNT: CPT | Mod: CPTII,S$GLB,, | Performed by: INTERNAL MEDICINE

## 2022-06-28 PROCEDURE — 93005 ELECTROCARDIOGRAM TRACING: CPT | Mod: PO

## 2022-06-28 PROCEDURE — 99214 PR OFFICE/OUTPT VISIT, EST, LEVL IV, 30-39 MIN: ICD-10-PCS | Mod: S$GLB,,, | Performed by: INTERNAL MEDICINE

## 2022-06-28 PROCEDURE — 1160F RVW MEDS BY RX/DR IN RCRD: CPT | Mod: CPTII,S$GLB,, | Performed by: INTERNAL MEDICINE

## 2022-06-28 PROCEDURE — 99214 OFFICE O/P EST MOD 30 MIN: CPT | Mod: S$GLB,,, | Performed by: INTERNAL MEDICINE

## 2022-06-28 PROCEDURE — 93010 ELECTROCARDIOGRAM REPORT: CPT | Mod: S$GLB,,, | Performed by: INTERNAL MEDICINE

## 2022-06-28 PROCEDURE — 3078F PR MOST RECENT DIASTOLIC BLOOD PRESSURE < 80 MM HG: ICD-10-PCS | Mod: CPTII,S$GLB,, | Performed by: INTERNAL MEDICINE

## 2022-06-28 PROCEDURE — 1159F PR MEDICATION LIST DOCUMENTED IN MEDICAL RECORD: ICD-10-PCS | Mod: CPTII,S$GLB,, | Performed by: INTERNAL MEDICINE

## 2022-06-28 PROCEDURE — 1160F PR REVIEW ALL MEDS BY PRESCRIBER/CLIN PHARMACIST DOCUMENTED: ICD-10-PCS | Mod: CPTII,S$GLB,, | Performed by: INTERNAL MEDICINE

## 2022-06-28 PROCEDURE — 3074F PR MOST RECENT SYSTOLIC BLOOD PRESSURE < 130 MM HG: ICD-10-PCS | Mod: CPTII,S$GLB,, | Performed by: INTERNAL MEDICINE

## 2022-06-28 PROCEDURE — 1126F PR PAIN SEVERITY QUANTIFIED, NO PAIN PRESENT: ICD-10-PCS | Mod: CPTII,S$GLB,, | Performed by: INTERNAL MEDICINE

## 2022-06-28 RX ORDER — ATENOLOL 25 MG/1
25 TABLET ORAL DAILY
Qty: 30 TABLET | Refills: 11 | Status: SHIPPED | OUTPATIENT
Start: 2022-06-28 | End: 2022-08-10

## 2022-06-28 NOTE — PROGRESS NOTES
Subjective:    Patient ID:  Selena Moulton is a 81 y.o. female patient here for evaluation No chief complaint on file.      History of Present Illness:  Cardiology follow-up.  Recent MURRAY, DC cardioversion.  Patient remains on chronic oral anticoagulation, amiodarone 200 mg daily.  Clinically in normal sinus rhythm today with bradycardia.  No syncope/presyncope.  No chest pain, no significant dyspnea.          Review of patient's allergies indicates:   Allergen Reactions    Opioids - morphine analogues      nause and fever     Influenza virus vaccines Nausea And Vomiting    Iodine and iodide containing products     Morpholine analogues Nausea And Vomiting    Penicillins Other (See Comments)     High fever as a child, also was allergy tested 1980    Latex, natural rubber Rash    Tetanus vaccines and toxoid Rash       Past Medical History:   Diagnosis Date    Anticoagulant long-term use     Aortic stenosis     Arthritis     CKD (chronic kidney disease), stage III     Class 2 severe obesity due to excess calories with serious comorbidity and body mass index (BMI) of 39.0 to 39.9 in adult 09/13/2018    Digestive disorder     GERD (gastroesophageal reflux disease)     Gout     Hypercholesteremia     Hypertension     PAF (paroxysmal atrial fibrillation)     Personal history of COVID-19 05/2022    S/P TAVR (transcatheter aortic valve replacement)      Past Surgical History:   Procedure Laterality Date    AORTIC VALVE REPLACEMENT N/A 10/8/2019    Procedure: Replacement-valve-aortic;  Surgeon: Fidel Cardenas MD;  Location: [facility] CATH LAB;  Service: Cardiology;  Laterality: N/A;    BACK SURGERY      bone graft neck    CARDIAC VALVE SURGERY      CARPAL TUNNEL RELEASE Left 10/3/2018    Procedure: RELEASE, CARPAL TUNNEL  LEFT;  Surgeon: Meche Cárdenas MD;  Location: [facility];  Service: Neurosurgery;  Laterality: Left;    CARPAL TUNNEL RELEASE Right 1/17/2019    Procedure: RELEASE, CARPAL  TUNNEL RIGHT;  Surgeon: Meche Cárdenas MD;  Location: [facility] CSC;  Service: Neurosurgery;  Laterality: Right;    CATHETERIZATION OF BOTH LEFT AND RIGHT HEART N/A 7/30/2019    Procedure: CATHETERIZATION, HEART, BOTH LEFT AND RIGHT;  Surgeon: Terrie Alberto MD;  Location: [facility] CATH;  Service: Cardiology;  Laterality: N/A;    CERVICAL FUSION  1984    x2     CORONARY ANGIOGRAPHY N/A 7/30/2019    Procedure: ANGIOGRAM, CORONARY ARTERY;  Surgeon: Terrie Alberto MD;  Location: [facility] CATH;  Service: Cardiology;  Laterality: N/A;    HERNIA REPAIR      umbilical    HYSTERECTOMY      TONSILLECTOMY       Social History     Tobacco Use    Smoking status: Never Smoker    Smokeless tobacco: Never Used   Substance Use Topics    Alcohol use: No    Drug use: No        Review of Systems:    As noted in HPI in addition      REVIEW OF SYSTEMS  Review of Systems   Constitutional: Negative for decreased appetite, diaphoresis, night sweats, weight gain and weight loss.   HENT: Negative for nosebleeds and odynophagia.    Eyes: Negative for double vision and photophobia.   Cardiovascular: Negative for chest pain, claudication, cyanosis, dyspnea on exertion, irregular heartbeat, leg swelling, near-syncope, orthopnea, palpitations, paroxysmal nocturnal dyspnea and syncope.   Respiratory: Negative for cough, hemoptysis, shortness of breath and wheezing.    Hematologic/Lymphatic: Negative for adenopathy.   Skin: Negative for flushing, skin cancer and suspicious lesions.   Musculoskeletal: Negative for gout, myalgias and neck pain.   Gastrointestinal: Negative for abdominal pain, heartburn, hematemesis and hematochezia.   Genitourinary: Negative for bladder incontinence, hesitancy and nocturia.   Neurological: Negative for focal weakness, headaches, light-headedness and paresthesias.   Psychiatric/Behavioral: Negative for memory loss and substance abuse.              Objective:        Vitals:    06/28/22 1046   BP: (!) 106/55    Pulse: (!) 47       Lab Results   Component Value Date    WBC 8.97 02/11/2022    HGB 14.5 02/11/2022    HCT 48.1 02/11/2022     02/11/2022    CHOL 131 08/04/2021    TRIG 94 08/04/2021    HDL 48 08/04/2021    ALT 16 02/11/2022    AST 22 02/11/2022     06/21/2022    K 3.9 06/21/2022     06/21/2022    CREATININE 1.7 (H) 06/21/2022    BUN 39 (H) 06/21/2022    CO2 29 06/21/2022    INR 0.9 10/08/2019    HGBA1C 5.8 (H) 08/04/2021        ECHOCARDIOGRAM RESULTS  Results for orders placed during the hospital encounter of 06/17/22    Transesophageal echo (MURRAY) with possible cardioversion    Interpretation Summary  · The left ventricle is normal in size with low normal systolic function.  · Normal right ventricular size with normal right ventricular systolic function.  · Mild left atrial enlargement.  · There is a transcutaneously-placed aortic bioprosthesis present. Prosthetic aortic valve is normal.  · Mild-to-moderate mitral regurgitation.  · Mild tricuspid regurgitation.  · The estimated ejection fraction is 50%.  · A 150 J synchronized cardioversion was unsuccessful without restoration of normal sinus rhythm.  · A 200 J synchronized cardioversion was successfully performed with restoration of normal sinus rhythm.        CURRENT/PREVIOUS VISIT EKG  Results for orders placed or performed during the hospital encounter of 06/17/22   EKG 12-LEAD    Collection Time: 06/17/22 12:20 PM    Narrative    Test Reason : I48.0,R06.02,I48.91,    Vent. Rate : 056 BPM     Atrial Rate : 056 BPM     P-R Int : 216 ms          QRS Dur : 104 ms      QT Int : 484 ms       P-R-T Axes : 087 050 065 degrees     QTc Int : 467 ms    Sinus bradycardia with 1st degree A-V block with Premature atrial complexes   in a pattern of bigeminy  Low voltage QRS  Incomplete right bundle branch block  Nonspecific T wave abnormality  Abnormal ECG  When compared with ECG of 06-MAY-2022 09:23,  Sinus rhythm has replaced Atrial  fibrillation  Incomplete right bundle branch block is now Present  Criteria for Septal infarct are no longer Present  Confirmed by Danny SEARS MD, Ryland CORDON ([id number]) on 6/21/2022 6:03:04 AM    Referred By: ABHISHEK PALACIO           Confirmed By:Ryland Delgado III, MD     No valid procedures specified.   Results for orders placed during the hospital encounter of 04/17/19    Stress test with myocardial perfusion    Interpretation Summary  · The perfusion scan is free of evidence from myocardial ischemia or injury.  · There is a mild intensity defect in the anteroseptal wall of the left ventricle, secondary to breast attenuation.  · An ejection fraction of 71 % at rest, stress EF 73%.  · Resting wall motion is physiologic.  · The EKG portion of this study is negative for myocardial ischemia.  · There were no arrhythmias during stress.  · The patient reported no symptoms during the stress test.    No valid procedures specified.    PHYSICAL EXAM  CONSTITUTIONAL: Well built, well nourished in no apparent distress  NECK: no carotid bruit, no JVD  LUNGS: CTA  CHEST WALL: no tenderness,  HEART: regular rate and rhythm, bradycardia.  Grade 1/6 crescendo decrescendo murmur aortic area.  ABDOMEN: soft, non-tender; bowel sounds normal; no masses,  no organomegaly  EXTREMITIES: Extremities normal, no edema, no calf tenderness noted  NEURO: AAO X 3    I HAVE REVIEWED :    The vital signs, nurses notes, and all the pertinent radiology and labs.         Current Outpatient Medications   Medication Instructions    acetaminophen (TYLENOL) 500 mg, Oral, Every 8 hours PRN    albuterol (PROVENTIL/VENTOLIN HFA) 90 mcg/actuation inhaler 2 puffs, Inhalation, Every 6 hours PRN, Rescue    allopurinoL (ZYLOPRIM) 300 mg, Oral, Daily    amiodarone (PACERONE) 200 mg, Oral, Daily    atenoloL (TENORMIN) 100 MG tablet TAKE 1/2 TABLET(50 MG) BY MOUTH EVERY DAY    atorvastatin (LIPITOR) 40 mg, Oral, Nightly    benzonatate (TESSALON PERLES) 100 mg,  Oral, Every 6 hours PRN    ELIQUIS 5 mg Tab TAKE 1 TABLET(5 MG) BY MOUTH TWICE DAILY    fish oil-omega-3 fatty acids 300-1,000 mg capsule 1 capsule, Oral, Daily, Hold for 7 days before surgery    furosemide (LASIX) 20 mg, Oral, 2 times daily    irbesartan-hydrochlorothiazide (AVALIDE) 150-12.5 mg per tablet TAKE 1 TABLET BY MOUTH EVERY DAY    multivitamin capsule 1 capsule, Oral, Daily, Hold AM of surgery    pantoprazole (PROTONIX) 20 mg, Oral, Daily    vitamin D (VITAMIN D3) 1,000 Units, Oral, Daily          Assessment:   TAVR 20/19.  Paroxysmal atrial fibrillation, recent DC MURRAY cardioversion.  Hypertension, dyslipidemia        Plan:   EKG.  SINUS BRADYCARDIA.  DECREASE ATENOLOL TO 25 DAILY, CONTINUE AMIODARONE 200 MG DAILY  RTC   1 MONTH        No follow-ups on file.

## 2022-06-30 ENCOUNTER — TELEPHONE (OUTPATIENT)
Dept: NEPHROLOGY | Facility: CLINIC | Age: 82
End: 2022-06-30
Payer: MEDICARE

## 2022-06-30 DIAGNOSIS — N18.30 STAGE 3 CHRONIC KIDNEY DISEASE, UNSPECIFIED WHETHER STAGE 3A OR 3B CKD: Primary | ICD-10-CM

## 2022-06-30 NOTE — PROGRESS NOTES
I suspect you did have some degree of injury to your kidneys from COVID but it also appears as if you are in the process of recovering from it as well.  Let's recheck a renal panel on you when you go for labs in a month.

## 2022-06-30 NOTE — TELEPHONE ENCOUNTER
----- Message from Thony Brennan MD sent at 6/30/2022  2:23 PM CDT -----  I suspect you did have some degree of injury to your kidneys from COVID but it also appears as if you are in the process of recovering from it as well.  Let's recheck a renal panel on you when you go for labs in a month.

## 2022-07-18 PROBLEM — I48.19 PERSISTENT ATRIAL FIBRILLATION: Status: ACTIVE | Noted: 2022-07-18

## 2022-08-01 RX ORDER — AMLODIPINE BESYLATE 2.5 MG/1
2.5 TABLET ORAL DAILY
COMMUNITY
Start: 2022-07-09 | End: 2022-10-06

## 2022-08-02 ENCOUNTER — OFFICE VISIT (OUTPATIENT)
Dept: CARDIOLOGY | Facility: CLINIC | Age: 82
End: 2022-08-02
Payer: MEDICARE

## 2022-08-02 VITALS
WEIGHT: 242.5 LBS | HEART RATE: 55 BPM | DIASTOLIC BLOOD PRESSURE: 59 MMHG | BODY MASS INDEX: 38.06 KG/M2 | HEIGHT: 67 IN | SYSTOLIC BLOOD PRESSURE: 101 MMHG

## 2022-08-02 DIAGNOSIS — R73.03 PRE-DIABETES: ICD-10-CM

## 2022-08-02 DIAGNOSIS — Z95.2 S/P TAVR (TRANSCATHETER AORTIC VALVE REPLACEMENT): ICD-10-CM

## 2022-08-02 DIAGNOSIS — I35.0 SEVERE AORTIC STENOSIS: ICD-10-CM

## 2022-08-02 DIAGNOSIS — R42 DIZZINESS: ICD-10-CM

## 2022-08-02 DIAGNOSIS — R06.02 SOB (SHORTNESS OF BREATH): Primary | ICD-10-CM

## 2022-08-02 DIAGNOSIS — I48.0 PAROXYSMAL ATRIAL FIBRILLATION: ICD-10-CM

## 2022-08-02 DIAGNOSIS — R94.31 ABNORMAL ELECTROCARDIOGRAM (ECG) (EKG): ICD-10-CM

## 2022-08-02 DIAGNOSIS — E78.00 PURE HYPERCHOLESTEROLEMIA: ICD-10-CM

## 2022-08-02 DIAGNOSIS — I10 ESSENTIAL HYPERTENSION: ICD-10-CM

## 2022-08-02 DIAGNOSIS — Z79.02 LONG TERM (CURRENT) USE OF ANTITHROMBOTICS/ANTIPLATELETS: ICD-10-CM

## 2022-08-02 PROCEDURE — 3074F PR MOST RECENT SYSTOLIC BLOOD PRESSURE < 130 MM HG: ICD-10-PCS | Mod: CPTII,S$GLB,, | Performed by: INTERNAL MEDICINE

## 2022-08-02 PROCEDURE — 1126F AMNT PAIN NOTED NONE PRSNT: CPT | Mod: CPTII,S$GLB,, | Performed by: INTERNAL MEDICINE

## 2022-08-02 PROCEDURE — 3288F PR FALLS RISK ASSESSMENT DOCUMENTED: ICD-10-PCS | Mod: CPTII,S$GLB,, | Performed by: INTERNAL MEDICINE

## 2022-08-02 PROCEDURE — 99999 PR PBB SHADOW E&M-EST. PATIENT-LVL III: CPT | Mod: PBBFAC,,, | Performed by: INTERNAL MEDICINE

## 2022-08-02 PROCEDURE — 3078F DIAST BP <80 MM HG: CPT | Mod: CPTII,S$GLB,, | Performed by: INTERNAL MEDICINE

## 2022-08-02 PROCEDURE — 3074F SYST BP LT 130 MM HG: CPT | Mod: CPTII,S$GLB,, | Performed by: INTERNAL MEDICINE

## 2022-08-02 PROCEDURE — 99214 PR OFFICE/OUTPT VISIT, EST, LEVL IV, 30-39 MIN: ICD-10-PCS | Mod: S$GLB,,, | Performed by: INTERNAL MEDICINE

## 2022-08-02 PROCEDURE — 93005 ELECTROCARDIOGRAM TRACING: CPT | Mod: PO

## 2022-08-02 PROCEDURE — 3078F PR MOST RECENT DIASTOLIC BLOOD PRESSURE < 80 MM HG: ICD-10-PCS | Mod: CPTII,S$GLB,, | Performed by: INTERNAL MEDICINE

## 2022-08-02 PROCEDURE — 93010 ELECTROCARDIOGRAM REPORT: CPT | Mod: S$GLB,,, | Performed by: INTERNAL MEDICINE

## 2022-08-02 PROCEDURE — 1101F PT FALLS ASSESS-DOCD LE1/YR: CPT | Mod: CPTII,S$GLB,, | Performed by: INTERNAL MEDICINE

## 2022-08-02 PROCEDURE — 99214 OFFICE O/P EST MOD 30 MIN: CPT | Mod: S$GLB,,, | Performed by: INTERNAL MEDICINE

## 2022-08-02 PROCEDURE — 1159F PR MEDICATION LIST DOCUMENTED IN MEDICAL RECORD: ICD-10-PCS | Mod: CPTII,S$GLB,, | Performed by: INTERNAL MEDICINE

## 2022-08-02 PROCEDURE — 93010 EKG 12-LEAD: ICD-10-PCS | Mod: S$GLB,,, | Performed by: INTERNAL MEDICINE

## 2022-08-02 PROCEDURE — 1101F PR PT FALLS ASSESS DOC 0-1 FALLS W/OUT INJ PAST YR: ICD-10-PCS | Mod: CPTII,S$GLB,, | Performed by: INTERNAL MEDICINE

## 2022-08-02 PROCEDURE — 99999 PR PBB SHADOW E&M-EST. PATIENT-LVL III: ICD-10-PCS | Mod: PBBFAC,,, | Performed by: INTERNAL MEDICINE

## 2022-08-02 PROCEDURE — 1126F PR PAIN SEVERITY QUANTIFIED, NO PAIN PRESENT: ICD-10-PCS | Mod: CPTII,S$GLB,, | Performed by: INTERNAL MEDICINE

## 2022-08-02 PROCEDURE — 1159F MED LIST DOCD IN RCRD: CPT | Mod: CPTII,S$GLB,, | Performed by: INTERNAL MEDICINE

## 2022-08-02 PROCEDURE — 3288F FALL RISK ASSESSMENT DOCD: CPT | Mod: CPTII,S$GLB,, | Performed by: INTERNAL MEDICINE

## 2022-08-02 NOTE — PROGRESS NOTES
Subjective:    Patient ID:  Selena Moulton is a 81 y.o. female patient here for evaluation Follow-up (1mo)      History of Present Illness:  Cardiology follow-up.  History of TAVR 2019. Pre TAVR left heart catheterization with nonobstructive coronary disease.  Ejection preserved.  Recent MURRAY, DC cardioversion.  Patient remains on oral anticoagulation.  Medications recently changed due to bradycardia.  Atenolol was decreased to 25 daily.  Patient remains on amiodarone 200 mg daily.     Complains of fatigue, constant intermittent atypical chest pain.  Dyspnea with exertion.  No definite syncope/presyncope.        Review of patient's allergies indicates:   Allergen Reactions    Opioids - morphine analogues      nause and fever     Influenza virus vaccines Nausea And Vomiting    Iodine and iodide containing products     Morpholine analogues Nausea And Vomiting    Penicillins Other (See Comments)     High fever as a child, also was allergy tested 1980    Latex, natural rubber Rash    Tetanus vaccines and toxoid Rash       Past Medical History:   Diagnosis Date    Anticoagulant long-term use     Aortic stenosis     Arthritis     CKD (chronic kidney disease), stage III     Class 2 severe obesity due to excess calories with serious comorbidity and body mass index (BMI) of 39.0 to 39.9 in adult 09/13/2018    Digestive disorder     GERD (gastroesophageal reflux disease)     Gout     Hypercholesteremia     Hypertension     PAF (paroxysmal atrial fibrillation)     Personal history of COVID-19 05/2022    S/P TAVR (transcatheter aortic valve replacement)      Past Surgical History:   Procedure Laterality Date    AORTIC VALVE REPLACEMENT N/A 10/8/2019    Procedure: Replacement-valve-aortic;  Surgeon: Fidel Cardenas MD;  Location: [facility] CATH LAB;  Service: Cardiology;  Laterality: N/A;    BACK SURGERY      bone graft neck    CARDIAC VALVE SURGERY      CARPAL TUNNEL RELEASE Left 10/3/2018     Procedure: RELEASE, CARPAL TUNNEL  LEFT;  Surgeon: Meche Cárdenas MD;  Location: [facility];  Service: Neurosurgery;  Laterality: Left;    CARPAL TUNNEL RELEASE Right 1/17/2019    Procedure: RELEASE, CARPAL TUNNEL RIGHT;  Surgeon: Meche Cárdenas MD;  Location: [facility];  Service: Neurosurgery;  Laterality: Right;    CATHETERIZATION OF BOTH LEFT AND RIGHT HEART N/A 7/30/2019    Procedure: CATHETERIZATION, HEART, BOTH LEFT AND RIGHT;  Surgeon: Terrie Alberto MD;  Location: [facility] CATH;  Service: Cardiology;  Laterality: N/A;    CERVICAL FUSION  1984    x2     CORONARY ANGIOGRAPHY N/A 7/30/2019    Procedure: ANGIOGRAM, CORONARY ARTERY;  Surgeon: Terrie Alberto MD;  Location: [facility] CATH;  Service: Cardiology;  Laterality: N/A;    HERNIA REPAIR      umbilical    HYSTERECTOMY      TONSILLECTOMY       Social History     Tobacco Use    Smoking status: Never Smoker    Smokeless tobacco: Never Used   Substance Use Topics    Alcohol use: No    Drug use: No        Review of Systems:    As noted in HPI in addition      REVIEW OF SYSTEMS  Review of Systems   Constitutional: Negative for decreased appetite, diaphoresis, night sweats, weight gain and weight loss.   HENT: Negative for nosebleeds and odynophagia.    Eyes: Negative for double vision and photophobia.   Cardiovascular: Positive for chest pain. Negative for claudication, cyanosis, dyspnea on exertion, irregular heartbeat, leg swelling, near-syncope, orthopnea, palpitations, paroxysmal nocturnal dyspnea and syncope.   Respiratory: Positive for shortness of breath. Negative for cough, hemoptysis and wheezing.    Hematologic/Lymphatic: Negative for adenopathy.   Skin: Negative for flushing, skin cancer and suspicious lesions.   Musculoskeletal: Negative for gout, myalgias and neck pain.   Gastrointestinal: Negative for abdominal pain, heartburn, hematemesis and hematochezia.   Genitourinary: Negative for bladder incontinence, hesitancy and nocturia.    Neurological: Negative for focal weakness, headaches, light-headedness and paresthesias.   Psychiatric/Behavioral: Negative for memory loss and substance abuse.              Objective:        Vitals:    08/02/22 0948   BP: (!) 101/59   Pulse: (!) 55       Lab Results   Component Value Date    WBC 8.97 02/11/2022    HGB 14.5 02/11/2022    HCT 48.1 02/11/2022     02/11/2022    CHOL 131 08/04/2021    TRIG 94 08/04/2021    HDL 48 08/04/2021    ALT 16 02/11/2022    AST 22 02/11/2022     06/21/2022    K 3.9 06/21/2022     06/21/2022    CREATININE 1.7 (H) 06/21/2022    BUN 39 (H) 06/21/2022    CO2 29 06/21/2022    INR 0.9 10/08/2019    HGBA1C 5.8 (H) 08/04/2021        ECHOCARDIOGRAM RESULTS  Results for orders placed during the hospital encounter of 06/17/22    Transesophageal echo (MURRAY) with possible cardioversion    Interpretation Summary  · The left ventricle is normal in size with low normal systolic function.  · Normal right ventricular size with normal right ventricular systolic function.  · Mild left atrial enlargement.  · There is a transcutaneously-placed aortic bioprosthesis present. Prosthetic aortic valve is normal.  · Mild-to-moderate mitral regurgitation.  · Mild tricuspid regurgitation.  · The estimated ejection fraction is 50%.  · A 150 J synchronized cardioversion was unsuccessful without restoration of normal sinus rhythm.  · A 200 J synchronized cardioversion was successfully performed with restoration of normal sinus rhythm.        CURRENT/PREVIOUS VISIT EKG  Results for orders placed or performed in visit on 06/28/22   IN OFFICE EKG 12-LEAD (to [employer])    Collection Time: 06/28/22 10:16 AM    Narrative    Test Reason : Z00.00    Vent. Rate : 045 BPM     Atrial Rate : 045 BPM     P-R Int : 218 ms          QRS Dur : 094 ms      QT Int : 518 ms       P-R-T Axes : 086 057 066 degrees     QTc Int : 448 ms    Marked sinus bradycardia with 1st degree A-V block  Low voltage QRS  Abnormal  ECG  When compared with ECG of 17-JUN-2022 12:20,  Premature atrial complexes are no longer Present  Incomplete right bundle branch block is no longer Present    Confirmed by ENZO MCGOWAN MD ([id number]) on 6/29/2022 4:30:21 PM    Referred By:  HAKEEM           Confirmed By:ENZO MCGOWAN MD     No valid procedures specified.   Results for orders placed during the hospital encounter of 04/17/19    Stress test with myocardial perfusion    Interpretation Summary  · The perfusion scan is free of evidence from myocardial ischemia or injury.  · There is a mild intensity defect in the anteroseptal wall of the left ventricle, secondary to breast attenuation.  · An ejection fraction of 71 % at rest, stress EF 73%.  · Resting wall motion is physiologic.  · The EKG portion of this study is negative for myocardial ischemia.  · There were no arrhythmias during stress.  · The patient reported no symptoms during the stress test.    No valid procedures specified.    PHYSICAL EXAM  CONSTITUTIONAL: Well built, well nourished in no apparent distress  NECK: no carotid bruit, no JVD  LUNGS: CTA  CHEST WALL: no tenderness,  HEART: regular rate and rhythm, S1, S2 normal, no murmur, click, rub or gallop   ABDOMEN: soft, non-tender; bowel sounds normal; no masses,  no organomegaly  EXTREMITIES: Extremities normal, no edema, no calf tenderness noted  NEURO: AAO X 3    I HAVE REVIEWED :    The vital signs, nurses notes, and all the pertinent radiology and labs.         Current Outpatient Medications   Medication Instructions    acetaminophen (TYLENOL) 500 mg, Oral, Every 8 hours PRN    albuterol (PROVENTIL/VENTOLIN HFA) 90 mcg/actuation inhaler 2 puffs, Inhalation, Every 6 hours PRN, Rescue    allopurinoL (ZYLOPRIM) 300 MG tablet TAKE 1 TABLET(300 MG) BY MOUTH EVERY DAY    amiodarone (PACERONE) 200 mg, Oral, Daily    amLODIPine (NORVASC) 2.5 mg, Oral, Daily    atenoloL (TENORMIN) 25 mg, Oral, Daily    atorvastatin (LIPITOR) 40 mg, Oral,  Nightly    benzonatate (TESSALON PERLES) 100 mg, Oral, Every 6 hours PRN    ELIQUIS 5 mg Tab TAKE 1 TABLET(5 MG) BY MOUTH TWICE DAILY    fish oil-omega-3 fatty acids 300-1,000 mg capsule 1 capsule, Oral, Daily, Hold for 7 days before surgery    furosemide (LASIX) 20 mg, Oral, 2 times daily    irbesartan-hydrochlorothiazide (AVALIDE) 150-12.5 mg per tablet TAKE 1 TABLET BY MOUTH EVERY DAY    multivitamin capsule 1 capsule, Oral, Daily, Hold AM of surgery    pantoprazole (PROTONIX) 20 MG tablet TAKE 1 TABLET(20 MG) BY MOUTH EVERY DAY    vitamin D (VITAMIN D3) 1,000 Units, Oral, Daily          Assessment:   TAVR 2019.  History nonobstructive coronary disease.  Ejection fraction 60%.  Paroxysmal atrial fibrillation on chronic oral anticoagulation, remains on atenolol 25, amiodarone 200 daily.  Chronic intermittent chest pain.      Plan:   EKG today, sinus rhythm.  Heart rate 48, sinus bradycardia.  Lexiscan, Holter monitor.  Return to clinic results.  Stop atenolol.  Continue amiodarone and oral anticoagulation.        No follow-ups on file.

## 2022-08-03 ENCOUNTER — LAB VISIT (OUTPATIENT)
Dept: LAB | Facility: HOSPITAL | Age: 82
End: 2022-08-03
Attending: FAMILY MEDICINE
Payer: MEDICARE

## 2022-08-03 DIAGNOSIS — I77.1 TORTUOUS AORTA: ICD-10-CM

## 2022-08-03 DIAGNOSIS — R73.03 PREDIABETES: ICD-10-CM

## 2022-08-03 DIAGNOSIS — I10 ESSENTIAL HYPERTENSION: ICD-10-CM

## 2022-08-03 DIAGNOSIS — N18.32 STAGE 3B CHRONIC KIDNEY DISEASE: ICD-10-CM

## 2022-08-03 DIAGNOSIS — N18.30 STAGE 3 CHRONIC KIDNEY DISEASE, UNSPECIFIED WHETHER STAGE 3A OR 3B CKD: ICD-10-CM

## 2022-08-03 LAB
ALBUMIN SERPL BCP-MCNC: 3.8 G/DL (ref 3.5–5.2)
ALBUMIN SERPL BCP-MCNC: 3.8 G/DL (ref 3.5–5.2)
ALP SERPL-CCNC: 85 U/L (ref 55–135)
ALT SERPL W/O P-5'-P-CCNC: 18 U/L (ref 10–44)
ANION GAP SERPL CALC-SCNC: 10 MMOL/L (ref 8–16)
ANION GAP SERPL CALC-SCNC: 10 MMOL/L (ref 8–16)
AST SERPL-CCNC: 21 U/L (ref 10–40)
BASOPHILS # BLD AUTO: 0.06 K/UL (ref 0–0.2)
BASOPHILS NFR BLD: 0.8 % (ref 0–1.9)
BILIRUB SERPL-MCNC: 0.6 MG/DL (ref 0.1–1)
BUN SERPL-MCNC: 49 MG/DL (ref 8–23)
BUN SERPL-MCNC: 49 MG/DL (ref 8–23)
CALCIUM SERPL-MCNC: 10.4 MG/DL (ref 8.7–10.5)
CALCIUM SERPL-MCNC: 10.4 MG/DL (ref 8.7–10.5)
CHLORIDE SERPL-SCNC: 104 MMOL/L (ref 95–110)
CHLORIDE SERPL-SCNC: 104 MMOL/L (ref 95–110)
CHOLEST SERPL-MCNC: 170 MG/DL (ref 120–199)
CHOLEST/HDLC SERPL: 3 {RATIO} (ref 2–5)
CO2 SERPL-SCNC: 27 MMOL/L (ref 23–29)
CO2 SERPL-SCNC: 27 MMOL/L (ref 23–29)
CREAT SERPL-MCNC: 1.8 MG/DL (ref 0.5–1.4)
CREAT SERPL-MCNC: 1.8 MG/DL (ref 0.5–1.4)
DIFFERENTIAL METHOD: ABNORMAL
EOSINOPHIL # BLD AUTO: 0.2 K/UL (ref 0–0.5)
EOSINOPHIL NFR BLD: 2.1 % (ref 0–8)
ERYTHROCYTE [DISTWIDTH] IN BLOOD BY AUTOMATED COUNT: 14.8 % (ref 11.5–14.5)
EST. GFR  (NO RACE VARIABLE): 27.6 ML/MIN/1.73 M^2
EST. GFR  (NO RACE VARIABLE): 27.6 ML/MIN/1.73 M^2
ESTIMATED AVG GLUCOSE: 117 MG/DL (ref 68–131)
GLUCOSE SERPL-MCNC: 114 MG/DL (ref 70–110)
GLUCOSE SERPL-MCNC: 114 MG/DL (ref 70–110)
HBA1C MFR BLD: 5.7 % (ref 4–5.6)
HCT VFR BLD AUTO: 39.9 % (ref 37–48.5)
HDLC SERPL-MCNC: 56 MG/DL (ref 40–75)
HDLC SERPL: 32.9 % (ref 20–50)
HGB BLD-MCNC: 13.2 G/DL (ref 12–16)
IMM GRANULOCYTES # BLD AUTO: 0.02 K/UL (ref 0–0.04)
IMM GRANULOCYTES NFR BLD AUTO: 0.3 % (ref 0–0.5)
LDLC SERPL CALC-MCNC: 85.4 MG/DL (ref 63–159)
LYMPHOCYTES # BLD AUTO: 2.3 K/UL (ref 1–4.8)
LYMPHOCYTES NFR BLD: 32.2 % (ref 18–48)
MCH RBC QN AUTO: 30.3 PG (ref 27–31)
MCHC RBC AUTO-ENTMCNC: 33.1 G/DL (ref 32–36)
MCV RBC AUTO: 92 FL (ref 82–98)
MONOCYTES # BLD AUTO: 0.5 K/UL (ref 0.3–1)
MONOCYTES NFR BLD: 7.3 % (ref 4–15)
NEUTROPHILS # BLD AUTO: 4.1 K/UL (ref 1.8–7.7)
NEUTROPHILS NFR BLD: 57.3 % (ref 38–73)
NONHDLC SERPL-MCNC: 114 MG/DL
NRBC BLD-RTO: 0 /100 WBC
PHOSPHATE SERPL-MCNC: 3.4 MG/DL (ref 2.7–4.5)
PLATELET # BLD AUTO: 141 K/UL (ref 150–450)
PMV BLD AUTO: 11.4 FL (ref 9.2–12.9)
POTASSIUM SERPL-SCNC: 3.5 MMOL/L (ref 3.5–5.1)
POTASSIUM SERPL-SCNC: 3.5 MMOL/L (ref 3.5–5.1)
PROT SERPL-MCNC: 7 G/DL (ref 6–8.4)
RBC # BLD AUTO: 4.35 M/UL (ref 4–5.4)
SODIUM SERPL-SCNC: 141 MMOL/L (ref 136–145)
SODIUM SERPL-SCNC: 141 MMOL/L (ref 136–145)
TRIGL SERPL-MCNC: 143 MG/DL (ref 30–150)
URATE SERPL-MCNC: 6.1 MG/DL (ref 2.4–5.7)
WBC # BLD AUTO: 7.08 K/UL (ref 3.9–12.7)

## 2022-08-03 PROCEDURE — 80053 COMPREHEN METABOLIC PANEL: CPT | Performed by: FAMILY MEDICINE

## 2022-08-03 PROCEDURE — 85025 COMPLETE CBC W/AUTO DIFF WBC: CPT | Performed by: FAMILY MEDICINE

## 2022-08-03 PROCEDURE — 80061 LIPID PANEL: CPT | Performed by: FAMILY MEDICINE

## 2022-08-03 PROCEDURE — 80069 RENAL FUNCTION PANEL: CPT | Performed by: INTERNAL MEDICINE

## 2022-08-03 PROCEDURE — 83036 HEMOGLOBIN GLYCOSYLATED A1C: CPT | Performed by: FAMILY MEDICINE

## 2022-08-03 PROCEDURE — 36415 COLL VENOUS BLD VENIPUNCTURE: CPT | Mod: PO | Performed by: FAMILY MEDICINE

## 2022-08-03 PROCEDURE — 84550 ASSAY OF BLOOD/URIC ACID: CPT | Performed by: FAMILY MEDICINE

## 2022-08-05 ENCOUNTER — TELEPHONE (OUTPATIENT)
Dept: CARDIOLOGY | Facility: CLINIC | Age: 82
End: 2022-08-05
Payer: MEDICARE

## 2022-08-05 DIAGNOSIS — I48.0 PAROXYSMAL ATRIAL FIBRILLATION: Primary | ICD-10-CM

## 2022-08-05 NOTE — TELEPHONE ENCOUNTER
----- Message from RT Charles sent at 8/5/2022  9:51 AM CDT -----  Regarding: EKG  Please put the EKG order in for the EKG performed on                  8/2/22      ,  then attach the order to the EKG appointment or the MD appointment for the date performed.      EKG will not be read until order is received.    Thank you

## 2022-08-09 ENCOUNTER — HOSPITAL ENCOUNTER (OUTPATIENT)
Dept: RADIOLOGY | Facility: HOSPITAL | Age: 82
Discharge: HOME | End: 2022-08-09
Attending: INTERNAL MEDICINE
Payer: MEDICARE

## 2022-08-09 ENCOUNTER — CLINICAL SUPPORT (OUTPATIENT)
Dept: CARDIOLOGY | Facility: HOSPITAL | Age: 82
End: 2022-08-09
Attending: INTERNAL MEDICINE
Payer: MEDICARE

## 2022-08-09 VITALS — WEIGHT: 242 LBS | HEIGHT: 67 IN | BODY MASS INDEX: 37.98 KG/M2

## 2022-08-09 DIAGNOSIS — R73.03 PRE-DIABETES: ICD-10-CM

## 2022-08-09 DIAGNOSIS — I10 ESSENTIAL HYPERTENSION: ICD-10-CM

## 2022-08-09 DIAGNOSIS — R42 DIZZINESS: ICD-10-CM

## 2022-08-09 DIAGNOSIS — R94.31 ABNORMAL ELECTROCARDIOGRAM (ECG) (EKG): ICD-10-CM

## 2022-08-09 DIAGNOSIS — Z95.2 S/P TAVR (TRANSCATHETER AORTIC VALVE REPLACEMENT): ICD-10-CM

## 2022-08-09 PROCEDURE — A9502 TC99M TETROFOSMIN: HCPCS | Mod: PO

## 2022-08-09 PROCEDURE — 93016 CV STRESS TEST SUPVJ ONLY: CPT | Mod: ,,, | Performed by: INTERNAL MEDICINE

## 2022-08-09 PROCEDURE — 93016 STRESS TEST WITH MYOCARDIAL PERFUSION (CUPID ONLY): ICD-10-PCS | Mod: ,,, | Performed by: INTERNAL MEDICINE

## 2022-08-09 PROCEDURE — 78452 HT MUSCLE IMAGE SPECT MULT: CPT | Mod: 26,,, | Performed by: INTERNAL MEDICINE

## 2022-08-09 PROCEDURE — 93017 CV STRESS TEST TRACING ONLY: CPT | Mod: PO

## 2022-08-09 PROCEDURE — 78452 STRESS TEST WITH MYOCARDIAL PERFUSION (CUPID ONLY): ICD-10-PCS | Mod: 26,,, | Performed by: INTERNAL MEDICINE

## 2022-08-09 PROCEDURE — 93018 CV STRESS TEST I&R ONLY: CPT | Mod: ,,, | Performed by: INTERNAL MEDICINE

## 2022-08-09 PROCEDURE — 63600175 PHARM REV CODE 636 W HCPCS: Mod: PO | Performed by: INTERNAL MEDICINE

## 2022-08-09 PROCEDURE — 93018 PR CARDIAC STRESS TST,INTERP/REPT ONLY: ICD-10-PCS | Mod: ,,, | Performed by: INTERNAL MEDICINE

## 2022-08-09 RX ORDER — REGADENOSON 0.08 MG/ML
0.4 INJECTION, SOLUTION INTRAVENOUS ONCE
Status: COMPLETED | OUTPATIENT
Start: 2022-08-09 | End: 2022-08-09

## 2022-08-09 RX ADMIN — REGADENOSON 0.4 MG: 0.08 INJECTION, SOLUTION INTRAVENOUS at 01:08

## 2022-08-10 ENCOUNTER — OFFICE VISIT (OUTPATIENT)
Dept: FAMILY MEDICINE | Facility: CLINIC | Age: 82
End: 2022-08-10
Payer: MEDICARE

## 2022-08-10 VITALS
SYSTOLIC BLOOD PRESSURE: 114 MMHG | OXYGEN SATURATION: 98 % | HEART RATE: 84 BPM | BODY MASS INDEX: 38.09 KG/M2 | DIASTOLIC BLOOD PRESSURE: 70 MMHG | WEIGHT: 243.19 LBS

## 2022-08-10 DIAGNOSIS — I10 ESSENTIAL HYPERTENSION: Primary | ICD-10-CM

## 2022-08-10 DIAGNOSIS — D69.6 THROMBOCYTOPENIA: ICD-10-CM

## 2022-08-10 DIAGNOSIS — R73.03 PREDIABETES: ICD-10-CM

## 2022-08-10 DIAGNOSIS — E78.49 OTHER HYPERLIPIDEMIA: ICD-10-CM

## 2022-08-10 DIAGNOSIS — Z23 NEED FOR VACCINATION: ICD-10-CM

## 2022-08-10 DIAGNOSIS — N18.4 STAGE 4 CHRONIC KIDNEY DISEASE: ICD-10-CM

## 2022-08-10 LAB
CV PHARM DOSE: 0.4 MG
CV STRESS BASE HR: 81 BPM
DIASTOLIC BLOOD PRESSURE: 57 MMHG
NUC STRESS EJECTION FRACTION: 83 %
OHS CV CPX 1 MINUTE RECOVERY HEART RATE: 88 BPM
OHS CV CPX 85 PERCENT MAX PREDICTED HEART RATE MALE: 113
OHS CV CPX MAX PREDICTED HEART RATE: 133
OHS CV CPX PATIENT IS FEMALE: 1
OHS CV CPX PATIENT IS MALE: 0
OHS CV CPX PEAK DIASTOLIC BLOOD PRESSURE: 57 MMHG
OHS CV CPX PEAK HEAR RATE: 95 BPM
OHS CV CPX PEAK RATE PRESSURE PRODUCT: NORMAL
OHS CV CPX PEAK SYSTOLIC BLOOD PRESSURE: 115 MMHG
OHS CV CPX PERCENT MAX PREDICTED HEART RATE ACHIEVED: 71
OHS CV CPX RATE PRESSURE PRODUCT PRESENTING: 9315
OHS CV PHARM TIME: 1351 MIN
SYSTOLIC BLOOD PRESSURE: 115 MMHG

## 2022-08-10 PROCEDURE — 99214 PR OFFICE/OUTPT VISIT, EST, LEVL IV, 30-39 MIN: ICD-10-PCS | Mod: S$GLB,,, | Performed by: FAMILY MEDICINE

## 2022-08-10 PROCEDURE — 99999 PR PBB SHADOW E&M-EST. PATIENT-LVL IV: CPT | Mod: PBBFAC,,, | Performed by: FAMILY MEDICINE

## 2022-08-10 PROCEDURE — 3074F PR MOST RECENT SYSTOLIC BLOOD PRESSURE < 130 MM HG: ICD-10-PCS | Mod: CPTII,S$GLB,, | Performed by: FAMILY MEDICINE

## 2022-08-10 PROCEDURE — 1159F MED LIST DOCD IN RCRD: CPT | Mod: CPTII,S$GLB,, | Performed by: FAMILY MEDICINE

## 2022-08-10 PROCEDURE — 1159F PR MEDICATION LIST DOCUMENTED IN MEDICAL RECORD: ICD-10-PCS | Mod: CPTII,S$GLB,, | Performed by: FAMILY MEDICINE

## 2022-08-10 PROCEDURE — 3078F DIAST BP <80 MM HG: CPT | Mod: CPTII,S$GLB,, | Performed by: FAMILY MEDICINE

## 2022-08-10 PROCEDURE — 1126F AMNT PAIN NOTED NONE PRSNT: CPT | Mod: CPTII,S$GLB,, | Performed by: FAMILY MEDICINE

## 2022-08-10 PROCEDURE — 3288F FALL RISK ASSESSMENT DOCD: CPT | Mod: CPTII,S$GLB,, | Performed by: FAMILY MEDICINE

## 2022-08-10 PROCEDURE — 3074F SYST BP LT 130 MM HG: CPT | Mod: CPTII,S$GLB,, | Performed by: FAMILY MEDICINE

## 2022-08-10 PROCEDURE — 1101F PT FALLS ASSESS-DOCD LE1/YR: CPT | Mod: CPTII,S$GLB,, | Performed by: FAMILY MEDICINE

## 2022-08-10 PROCEDURE — 1126F PR PAIN SEVERITY QUANTIFIED, NO PAIN PRESENT: ICD-10-PCS | Mod: CPTII,S$GLB,, | Performed by: FAMILY MEDICINE

## 2022-08-10 PROCEDURE — 99499 UNLISTED E&M SERVICE: CPT | Mod: S$GLB,,, | Performed by: FAMILY MEDICINE

## 2022-08-10 PROCEDURE — 99999 PR PBB SHADOW E&M-EST. PATIENT-LVL IV: ICD-10-PCS | Mod: PBBFAC,,, | Performed by: FAMILY MEDICINE

## 2022-08-10 PROCEDURE — 99214 OFFICE O/P EST MOD 30 MIN: CPT | Mod: S$GLB,,, | Performed by: FAMILY MEDICINE

## 2022-08-10 PROCEDURE — 1160F RVW MEDS BY RX/DR IN RCRD: CPT | Mod: CPTII,S$GLB,, | Performed by: FAMILY MEDICINE

## 2022-08-10 PROCEDURE — 3288F PR FALLS RISK ASSESSMENT DOCUMENTED: ICD-10-PCS | Mod: CPTII,S$GLB,, | Performed by: FAMILY MEDICINE

## 2022-08-10 PROCEDURE — 99499 RISK ADDL DX/OHS AUDIT: ICD-10-PCS | Mod: S$GLB,,, | Performed by: FAMILY MEDICINE

## 2022-08-10 PROCEDURE — 1101F PR PT FALLS ASSESS DOC 0-1 FALLS W/OUT INJ PAST YR: ICD-10-PCS | Mod: CPTII,S$GLB,, | Performed by: FAMILY MEDICINE

## 2022-08-10 PROCEDURE — 1160F PR REVIEW ALL MEDS BY PRESCRIBER/CLIN PHARMACIST DOCUMENTED: ICD-10-PCS | Mod: CPTII,S$GLB,, | Performed by: FAMILY MEDICINE

## 2022-08-10 PROCEDURE — 3078F PR MOST RECENT DIASTOLIC BLOOD PRESSURE < 80 MM HG: ICD-10-PCS | Mod: CPTII,S$GLB,, | Performed by: FAMILY MEDICINE

## 2022-08-10 PROCEDURE — 91305 COVID-19, MRNA, LNP-S, PF, 30 MCG/0.3 ML DOSE VACCINE (PFIZER): CPT | Mod: PBBFAC | Performed by: FAMILY MEDICINE

## 2022-08-14 NOTE — PROGRESS NOTES
Assessment:       1. Essential hypertension    2. Thrombocytopenia    3. Stage 4 chronic kidney disease    4. Other hyperlipidemia    5. Prediabetes    6. Need for vaccination        Plan:       Essential hypertension: Stable  -     Comprehensive Metabolic Panel; Future; Expected date: 08/10/2022    Thrombocytopenia: Stable  -     CBC Auto Differential; Future; Expected date: 08/10/2022    Stage 4 chronic kidney disease: Stable  -     CBC Auto Differential; Future; Expected date: 08/10/2022  -     Vitamin D; Future; Expected date: 08/10/2022  -     Lipid Panel; Future; Expected date: 08/10/2022  -     Uric Acid; Future; Expected date: 08/10/2022    Other hyperlipidemia: Stable  -     Lipid Panel; Future; Expected date: 08/10/2022    Prediabetes: Stable  -     Hemoglobin A1C; Future; Expected date: 08/10/2022    Need for vaccination  -     COVID-19-MRNA-(PF)(Pfizer)Vaccine  -     COVID-19 PFIZER 2ND DOSAGE APPT REQUEST; Future; Expected date: 12/10/2022      COVID vaccine was ordered, the patient was advised to maintain hydration, avoid processed foods, processed starches, eat more veggies, increase physical activity.  The patient's BMI has been recorded in the chart. The patient has been provided educational materials regarding the benefits of attaining and maintaining a normal weight. We will continue to address and follow this issue during follow up visits.   Patient agreed with assessment and plan. Patient verbalized understanding.     Subjective:       Patient ID: Selena Moulton is a 82 y.o. female.    Chief Complaint:  Hypertension    HPI     Hypertension:  The patient is currently taking it irbesartan HCTZ, amlodipine 2.5 mg daily, she denies any side effects of the medication, the patient has chronic kidney disease stage 4, currently seen the kidney specialist.  Denies any symptoms of chest pain or worsening symptoms of shortness of breath.  Upon review of the last blood work, the patient has also  thrombocytopenia.    Hyperlipidemia:  The last cholesterol levels were okay, the patient is currently taking cholesterol medication, denies any side effects of the medication.  The patient also is due for COVID vaccine and she would like this to be done.    Pre diabetes:  The last hemoglobin A1c was 5.7, went down slightly from 5.8, the patient is trying to eat healthier and lose weight.    Past medical history, past social history was reviewed and discussed with the patient.    Review of Systems   Constitutional: Negative for activity change, appetite change and chills.   HENT: Negative for congestion and ear discharge.    Eyes: Negative for discharge and itching.   Respiratory: Negative for choking and chest tightness.    Cardiovascular: Negative for chest pain, palpitations and leg swelling.   Gastrointestinal: Negative for abdominal distention, abdominal pain and constipation.   Endocrine: Negative for cold intolerance and heat intolerance.   Genitourinary: Negative for dysuria and flank pain.   Musculoskeletal: Negative for arthralgias and back pain.   Skin: Negative for pallor and rash.   Allergic/Immunologic: Negative for environmental allergies and food allergies.   Neurological: Negative for dizziness, facial asymmetry and headaches.   Hematological: Negative for adenopathy. Does not bruise/bleed easily.   Psychiatric/Behavioral: Negative for agitation, confusion, decreased concentration and sleep disturbance.       Objective:      Physical Exam  Vitals and nursing note reviewed.   Constitutional:       General: She is not in acute distress.     Appearance: Normal appearance. She is well-developed. She is obese. She is not diaphoretic.      Comments: The patient has limited ambulation   HENT:      Head: Normocephalic and atraumatic.      Right Ear: External ear normal.      Left Ear: External ear normal.      Nose: Nose normal.   Eyes:      General: No scleral icterus.        Right eye: No discharge.          Left eye: No discharge.      Conjunctiva/sclera: Conjunctivae normal.   Cardiovascular:      Rate and Rhythm: Normal rate and regular rhythm.      Heart sounds: Normal heart sounds.   Pulmonary:      Effort: Pulmonary effort is normal. No respiratory distress.      Breath sounds: Normal breath sounds. No wheezing.   Abdominal:      Palpations: There is no mass.      Tenderness: There is no guarding.   Musculoskeletal:         General: No tenderness or deformity.      Cervical back: Neck supple.   Skin:     Coloration: Skin is not pale.   Neurological:      Mental Status: She is alert.      Cranial Nerves: No cranial nerve deficit.      Coordination: Coordination normal.   Psychiatric:         Behavior: Behavior normal.         Thought Content: Thought content normal.         Judgment: Judgment normal.

## 2022-08-30 ENCOUNTER — CLINICAL SUPPORT (OUTPATIENT)
Dept: CARDIOLOGY | Facility: HOSPITAL | Age: 82
End: 2022-08-30
Attending: INTERNAL MEDICINE
Payer: MEDICARE

## 2022-08-30 DIAGNOSIS — R73.03 PRE-DIABETES: ICD-10-CM

## 2022-08-30 DIAGNOSIS — Z95.2 S/P TAVR (TRANSCATHETER AORTIC VALVE REPLACEMENT): ICD-10-CM

## 2022-08-30 DIAGNOSIS — I10 ESSENTIAL HYPERTENSION: ICD-10-CM

## 2022-08-30 DIAGNOSIS — R42 DIZZINESS: ICD-10-CM

## 2022-08-30 PROCEDURE — 93225 XTRNL ECG REC<48 HRS REC: CPT | Mod: PO

## 2022-08-30 PROCEDURE — 93227 XTRNL ECG REC<48 HR R&I: CPT | Mod: ,,, | Performed by: INTERNAL MEDICINE

## 2022-08-30 PROCEDURE — 93227 HOLTER MONITOR - 24 HOUR (CUPID ONLY): ICD-10-PCS | Mod: ,,, | Performed by: INTERNAL MEDICINE

## 2022-08-31 ENCOUNTER — PES CALL (OUTPATIENT)
Dept: ADMINISTRATIVE | Facility: CLINIC | Age: 82
End: 2022-08-31
Payer: MEDICARE

## 2022-09-01 LAB
OHS CV EVENT MONITOR DAY: 0
OHS CV HOLTER LENGTH DECIMAL HOURS: 24
OHS CV HOLTER LENGTH HOURS: 24
OHS CV HOLTER LENGTH MINUTES: 0
OHS CV HOLTER SINUS AVERAGE HR: 56
OHS CV HOLTER SINUS MAX HR: 74
OHS CV HOLTER SINUS MIN HR: 48

## 2022-09-13 ENCOUNTER — OFFICE VISIT (OUTPATIENT)
Dept: CARDIOLOGY | Facility: CLINIC | Age: 82
End: 2022-09-13
Payer: MEDICARE

## 2022-09-13 VITALS
HEART RATE: 51 BPM | HEIGHT: 67 IN | SYSTOLIC BLOOD PRESSURE: 112 MMHG | BODY MASS INDEX: 38.24 KG/M2 | WEIGHT: 243.63 LBS | DIASTOLIC BLOOD PRESSURE: 66 MMHG

## 2022-09-13 DIAGNOSIS — R42 DIZZINESS: ICD-10-CM

## 2022-09-13 DIAGNOSIS — I48.0 PAROXYSMAL ATRIAL FIBRILLATION: ICD-10-CM

## 2022-09-13 DIAGNOSIS — Z95.2 S/P TAVR (TRANSCATHETER AORTIC VALVE REPLACEMENT): ICD-10-CM

## 2022-09-13 DIAGNOSIS — I10 ESSENTIAL HYPERTENSION: Primary | ICD-10-CM

## 2022-09-13 DIAGNOSIS — I48.0 PAROXYSMAL ATRIAL FIBRILLATION: Primary | ICD-10-CM

## 2022-09-13 DIAGNOSIS — I35.0 SEVERE AORTIC STENOSIS: ICD-10-CM

## 2022-09-13 DIAGNOSIS — N18.31 STAGE 3A CHRONIC KIDNEY DISEASE: ICD-10-CM

## 2022-09-13 PROCEDURE — 99999 PR PBB SHADOW E&M-EST. PATIENT-LVL III: ICD-10-PCS | Mod: PBBFAC,,, | Performed by: INTERNAL MEDICINE

## 2022-09-13 PROCEDURE — 3288F FALL RISK ASSESSMENT DOCD: CPT | Mod: CPTII,S$GLB,, | Performed by: INTERNAL MEDICINE

## 2022-09-13 PROCEDURE — 1160F PR REVIEW ALL MEDS BY PRESCRIBER/CLIN PHARMACIST DOCUMENTED: ICD-10-PCS | Mod: CPTII,S$GLB,, | Performed by: INTERNAL MEDICINE

## 2022-09-13 PROCEDURE — 3078F PR MOST RECENT DIASTOLIC BLOOD PRESSURE < 80 MM HG: ICD-10-PCS | Mod: CPTII,S$GLB,, | Performed by: INTERNAL MEDICINE

## 2022-09-13 PROCEDURE — 1159F PR MEDICATION LIST DOCUMENTED IN MEDICAL RECORD: ICD-10-PCS | Mod: CPTII,S$GLB,, | Performed by: INTERNAL MEDICINE

## 2022-09-13 PROCEDURE — 93005 ELECTROCARDIOGRAM TRACING: CPT | Mod: PO

## 2022-09-13 PROCEDURE — 99214 OFFICE O/P EST MOD 30 MIN: CPT | Mod: S$GLB,,, | Performed by: INTERNAL MEDICINE

## 2022-09-13 PROCEDURE — 1126F PR PAIN SEVERITY QUANTIFIED, NO PAIN PRESENT: ICD-10-PCS | Mod: CPTII,S$GLB,, | Performed by: INTERNAL MEDICINE

## 2022-09-13 PROCEDURE — 3074F PR MOST RECENT SYSTOLIC BLOOD PRESSURE < 130 MM HG: ICD-10-PCS | Mod: CPTII,S$GLB,, | Performed by: INTERNAL MEDICINE

## 2022-09-13 PROCEDURE — 1126F AMNT PAIN NOTED NONE PRSNT: CPT | Mod: CPTII,S$GLB,, | Performed by: INTERNAL MEDICINE

## 2022-09-13 PROCEDURE — 99999 PR PBB SHADOW E&M-EST. PATIENT-LVL III: CPT | Mod: PBBFAC,,, | Performed by: INTERNAL MEDICINE

## 2022-09-13 PROCEDURE — 3078F DIAST BP <80 MM HG: CPT | Mod: CPTII,S$GLB,, | Performed by: INTERNAL MEDICINE

## 2022-09-13 PROCEDURE — 1160F RVW MEDS BY RX/DR IN RCRD: CPT | Mod: CPTII,S$GLB,, | Performed by: INTERNAL MEDICINE

## 2022-09-13 PROCEDURE — 1101F PR PT FALLS ASSESS DOC 0-1 FALLS W/OUT INJ PAST YR: ICD-10-PCS | Mod: CPTII,S$GLB,, | Performed by: INTERNAL MEDICINE

## 2022-09-13 PROCEDURE — 3074F SYST BP LT 130 MM HG: CPT | Mod: CPTII,S$GLB,, | Performed by: INTERNAL MEDICINE

## 2022-09-13 PROCEDURE — 1159F MED LIST DOCD IN RCRD: CPT | Mod: CPTII,S$GLB,, | Performed by: INTERNAL MEDICINE

## 2022-09-13 PROCEDURE — 99214 PR OFFICE/OUTPT VISIT, EST, LEVL IV, 30-39 MIN: ICD-10-PCS | Mod: S$GLB,,, | Performed by: INTERNAL MEDICINE

## 2022-09-13 PROCEDURE — 1101F PT FALLS ASSESS-DOCD LE1/YR: CPT | Mod: CPTII,S$GLB,, | Performed by: INTERNAL MEDICINE

## 2022-09-13 PROCEDURE — 3288F PR FALLS RISK ASSESSMENT DOCUMENTED: ICD-10-PCS | Mod: CPTII,S$GLB,, | Performed by: INTERNAL MEDICINE

## 2022-09-13 PROCEDURE — 93010 ELECTROCARDIOGRAM REPORT: CPT | Mod: S$GLB,,, | Performed by: INTERNAL MEDICINE

## 2022-09-13 PROCEDURE — 93010 EKG 12-LEAD: ICD-10-PCS | Mod: S$GLB,,, | Performed by: INTERNAL MEDICINE

## 2022-09-13 NOTE — PROGRESS NOTES
Subjective:    Patient ID:  Selena Moulton is a 82 y.o. female patient here for evaluation Follow-up (results)      History of Present Illness:  Cardiology follow-up.  Valvular heart disease.  TAVR 20/19.  Paroxysmal atrial fibrillation, MURRAY DC cardioversion 6/22.  Patient remains on chronic oral anticoagulation, low-dose amiodarone as well as  atenolol 25 mg daily.  Complaints of fatigue.  Heart rate remains bradycardia.    No angina chest pain.  No syncope/presyncope.  No PND orthopnea.  No cough hemoptysis.             Review of patient's allergies indicates:   Allergen Reactions    Opioids - morphine analogues      nause and fever     Influenza virus vaccines Nausea And Vomiting    Iodine and iodide containing products     Morpholine analogues Nausea And Vomiting    Penicillins Other (See Comments)     High fever as a child, also was allergy tested 1980    Latex, natural rubber Rash    Tetanus vaccines and toxoid Rash       Past Medical History:   Diagnosis Date    Anticoagulant long-term use     Aortic stenosis     Arthritis     CKD (chronic kidney disease), stage III     Class 2 severe obesity due to excess calories with serious comorbidity and body mass index (BMI) of 39.0 to 39.9 in adult 09/13/2018    Digestive disorder     GERD (gastroesophageal reflux disease)     Gout     Hypercholesteremia     Hypertension     PAF (paroxysmal atrial fibrillation)     Personal history of COVID-19 05/2022    S/P TAVR (transcatheter aortic valve replacement)      Past Surgical History:   Procedure Laterality Date    AORTIC VALVE REPLACEMENT N/A 10/8/2019    Procedure: Replacement-valve-aortic;  Surgeon: Fidel Cardenas MD;  Location: [facility] CATH LAB;  Service: Cardiology;  Laterality: N/A;    BACK SURGERY      bone graft neck    CARDIAC VALVE SURGERY      CARPAL TUNNEL RELEASE Left 10/3/2018    Procedure: RELEASE, CARPAL TUNNEL  LEFT;  Surgeon: Meche Cárdenas MD;  Location: [facility];  Service: Neurosurgery;   Laterality: Left;    CARPAL TUNNEL RELEASE Right 1/17/2019    Procedure: RELEASE, CARPAL TUNNEL RIGHT;  Surgeon: Meche Cárdenas MD;  Location: [facility];  Service: Neurosurgery;  Laterality: Right;    CATHETERIZATION OF BOTH LEFT AND RIGHT HEART N/A 7/30/2019    Procedure: CATHETERIZATION, HEART, BOTH LEFT AND RIGHT;  Surgeon: Terrie Alberto MD;  Location: [facility] CATH;  Service: Cardiology;  Laterality: N/A;    CERVICAL FUSION  1984    x2     CORONARY ANGIOGRAPHY N/A 7/30/2019    Procedure: ANGIOGRAM, CORONARY ARTERY;  Surgeon: Terrie Alberto MD;  Location: [facility] CATH;  Service: Cardiology;  Laterality: N/A;    HERNIA REPAIR      umbilical    HYSTERECTOMY      TONSILLECTOMY       Social History     Tobacco Use    Smoking status: Never    Smokeless tobacco: Never   Substance Use Topics    Alcohol use: No    Drug use: No        Review of Systems:    As noted in HPI in addition      REVIEW OF SYSTEMS  Review of Systems   Constitutional: Positive for malaise/fatigue. Negative for decreased appetite, diaphoresis, night sweats, weight gain and weight loss.   HENT:  Negative for nosebleeds and odynophagia.    Eyes:  Negative for double vision and photophobia.   Cardiovascular:  Negative for chest pain, claudication, cyanosis, dyspnea on exertion, irregular heartbeat, leg swelling, near-syncope, orthopnea, palpitations, paroxysmal nocturnal dyspnea and syncope.   Respiratory:  Negative for cough, hemoptysis, shortness of breath and wheezing.    Hematologic/Lymphatic: Negative for adenopathy.   Skin:  Negative for flushing, skin cancer and suspicious lesions.   Musculoskeletal:  Negative for gout, myalgias and neck pain.   Gastrointestinal:  Negative for abdominal pain, heartburn, hematemesis and hematochezia.   Genitourinary:  Negative for bladder incontinence, hesitancy and nocturia.   Neurological:  Negative for focal weakness, headaches, light-headedness and paresthesias.   Psychiatric/Behavioral:  Negative for memory  loss and substance abuse.             Objective:        Vitals:    09/13/22 0925   BP: 112/66   Pulse: (!) 51       Lab Results   Component Value Date    WBC 7.08 08/03/2022    HGB 13.2 08/03/2022    HCT 39.9 08/03/2022     (L) 08/03/2022    CHOL 170 08/03/2022    TRIG 143 08/03/2022    HDL 56 08/03/2022    ALT 18 08/03/2022    AST 21 08/03/2022     08/03/2022     08/03/2022    K 3.5 08/03/2022    K 3.5 08/03/2022     08/03/2022     08/03/2022    CREATININE 1.8 (H) 08/03/2022    CREATININE 1.8 (H) 08/03/2022    BUN 49 (H) 08/03/2022    BUN 49 (H) 08/03/2022    CO2 27 08/03/2022    CO2 27 08/03/2022    INR 0.9 10/08/2019    HGBA1C 5.7 (H) 08/03/2022        ECHOCARDIOGRAM RESULTS  Results for orders placed during the hospital encounter of 06/17/22    Transesophageal echo (MURRAY) with possible cardioversion    Interpretation Summary  · The left ventricle is normal in size with low normal systolic function.  · Normal right ventricular size with normal right ventricular systolic function.  · Mild left atrial enlargement.  · There is a transcutaneously-placed aortic bioprosthesis present. Prosthetic aortic valve is normal.  · Mild-to-moderate mitral regurgitation.  · Mild tricuspid regurgitation.  · The estimated ejection fraction is 50%.  · A 150 J synchronized cardioversion was unsuccessful without restoration of normal sinus rhythm.  · A 200 J synchronized cardioversion was successfully performed with restoration of normal sinus rhythm.        CURRENT/PREVIOUS VISIT EKG  Results for orders placed or performed in visit on 08/02/22   IN OFFICE EKG 12-LEAD (to [place])    Collection Time: 08/02/22  9:20 AM    Narrative    Test Reason : I48.0,    Vent. Rate : 048 BPM     Atrial Rate : 048 BPM     P-R Int : 218 ms          QRS Dur : 102 ms      QT Int : 520 ms       P-R-T Axes : 087 054 057 degrees     QTc Int : 464 ms    Marked sinus bradycardia with 1st degree A-V block  Abnormal ECG  When  compared with ECG of 28-JUN-2022 10:16,  No significant change was found  Confirmed by ENZO MCGOWAN MD ([id number]) on 8/8/2022 11:25:39 AM    Referred By:  HAKEEM           Confirmed By:ENZO MCGOWAN MD     No valid procedures specified.   Results for orders placed during the hospital encounter of 08/09/22    Nuclear Stress - Cardiology Interpreted    Interpretation Summary    Normal myocardial perfusion scan. There is no evidence of myocardial ischemia or infarction.    The gated perfusion images showed an ejection fraction of 83% post stress.    LV cavity size is normal at rest and normal at stress.    The EKG portion of this study is abnormal but not diagnostic.    No valid procedures specified.    PHYSICAL EXAM  CONSTITUTIONAL: Well built, well nourished in no apparent distress  NECK: no carotid bruit, no JVD  LUNGS: CTA  CHEST WALL: no tenderness,  HEART: regular rate, bradycardia.  Very soft 1/6 systolic murmur aortic area.  Normal S1-S2.  ABDOMEN: soft, non-tender; bowel sounds normal; no masses,  no organomegaly  EXTREMITIES: Extremities normal, no edema, no calf tenderness noted  NEURO: AAO X 3    I HAVE REVIEWED :    The vital signs, nurses notes, and all the pertinent radiology and labs.         Current Outpatient Medications   Medication Instructions    acetaminophen (TYLENOL) 500 mg, Oral, Every 8 hours PRN    allopurinoL (ZYLOPRIM) 300 MG tablet TAKE 1 TABLET(300 MG) BY MOUTH EVERY DAY    amiodarone (PACERONE) 200 mg, Oral, Daily    amLODIPine (NORVASC) 2.5 mg, Oral, Daily    atorvastatin (LIPITOR) 40 mg, Oral, Nightly    ELIQUIS 5 mg Tab TAKE 1 TABLET(5 MG) BY MOUTH TWICE DAILY    fish oil-omega-3 fatty acids 300-1,000 mg capsule 1 capsule, Oral, Daily, Hold for 7 days before surgery    furosemide (LASIX) 20 mg, Oral, 2 times daily    irbesartan-hydrochlorothiazide (AVALIDE) 150-12.5 mg per tablet TAKE 1 TABLET BY MOUTH EVERY DAY    multivitamin capsule 1 capsule, Oral, Daily, Hold AM of surgery     pantoprazole (PROTONIX) 20 MG tablet TAKE 1 TABLET(20 MG) BY MOUTH EVERY DAY    vitamin D (VITAMIN D3) 1,000 Units, Oral, Daily          Assessment:   TAVR 20/19.  Nonobstructive coronary disease by left heart catheterization.  Hypertension, dyslipidemia  Paroxysmal atrial fibrillation.  Brody/DC cardioversion 6/22 remains in sinus rhythm, bradycardia.      Plan:   Patient remains sinus rhythm.  Sinus bradycardia with today's EKG.    Continue amiodarone and oral anticoagulation.  Months 4          No follow-ups on file.

## 2022-09-14 RX ORDER — ATENOLOL 25 MG/1
25 TABLET ORAL EVERY OTHER DAY
Qty: 15 TABLET | Refills: 11 | Status: SHIPPED | OUTPATIENT
Start: 2022-09-14 | End: 2023-08-28

## 2022-09-15 ENCOUNTER — TELEPHONE (OUTPATIENT)
Dept: CARDIOLOGY | Facility: CLINIC | Age: 82
End: 2022-09-15
Payer: MEDICARE

## 2022-09-15 NOTE — TELEPHONE ENCOUNTER
----- Message from Paulie Caballero sent at 9/15/2022  1:52 PM CDT -----  Contact: Self  Type: Needs Medical Advice  Who Called:  Patient  Pharmacy name and phone #:    [id number] - [place], [place] - [address] [facility] [address] [place] [address]  [id number] [place] [address]  Phone: [phone] Fax: [phone]    Best Call Back Number: [phone]   Additional Information:  Called to speak with office. States Rx prescribed alongside amiodarone (not on chart) was too expensive. Wanted to discuss alternative.

## 2022-09-29 DIAGNOSIS — I48.91 ATRIAL FIBRILLATION, UNSPECIFIED TYPE: ICD-10-CM

## 2022-09-29 NOTE — TELEPHONE ENCOUNTER
----- Message from Zena Gaines sent at 9/29/2022  2:28 PM CDT -----  Contact: Self  Patient is calling in regards to her Eloquis 5mg, stated the pharmacy told her she has reached her Cap and she would have to pay almost $200 for this prescription. She is calling to see if we can call in an alternative blood thinner that she can take that is on her list.   She uses the pharmacy:  [facility] #[id number] [place] [address] AT [facility] [address] [address]  Phone: [phone] Fax: [phone]    Please call pt back in regards to this medication at [phone]. Thank You.

## 2022-10-03 ENCOUNTER — TELEPHONE (OUTPATIENT)
Dept: PHARMACY | Facility: CLINIC | Age: 82
End: 2022-10-03
Payer: MEDICARE

## 2022-10-03 NOTE — TELEPHONE ENCOUNTER
I have reached out to Selena Moulton to inform her of the [employer]  application process for Eliquis and whats required to apply.  Selena Moulton did not answer. I left a voicemail and mailed a letter introducing her to the pharmacy patient assistance program. I will follow up in 5 business days.

## 2022-10-12 ENCOUNTER — PES CALL (OUTPATIENT)
Dept: ADMINISTRATIVE | Facility: CLINIC | Age: 82
End: 2022-10-12
Payer: MEDICARE

## 2022-11-02 ENCOUNTER — PES CALL (OUTPATIENT)
Dept: ADMINISTRATIVE | Facility: CLINIC | Age: 82
End: 2022-11-02
Payer: MEDICARE

## 2022-11-02 DIAGNOSIS — I48.91 ATRIAL FIBRILLATION, UNSPECIFIED TYPE: ICD-10-CM

## 2022-11-02 RX ORDER — APIXABAN 5 MG/1
5 TABLET, FILM COATED ORAL 2 TIMES DAILY
Qty: 60 TABLET | Refills: 0 | Status: CANCELLED | OUTPATIENT
Start: 2022-11-02

## 2022-11-03 DIAGNOSIS — I48.91 ATRIAL FIBRILLATION, UNSPECIFIED TYPE: ICD-10-CM

## 2022-11-03 RX ORDER — APIXABAN 5 MG/1
5 TABLET, FILM COATED ORAL 2 TIMES DAILY
Qty: 60 TABLET | Refills: 0 | Status: CANCELLED | OUTPATIENT
Start: 2022-11-03

## 2022-11-08 DIAGNOSIS — I48.91 ATRIAL FIBRILLATION, UNSPECIFIED TYPE: ICD-10-CM

## 2023-02-15 ENCOUNTER — OFFICE VISIT (OUTPATIENT)
Dept: FAMILY MEDICINE | Facility: CLINIC | Age: 83
End: 2023-02-15
Payer: MEDICARE

## 2023-02-15 ENCOUNTER — LAB VISIT (OUTPATIENT)
Dept: LAB | Facility: HOSPITAL | Age: 83
End: 2023-02-15
Attending: FAMILY MEDICINE
Payer: MEDICARE

## 2023-02-15 VITALS — HEART RATE: 58 BPM | HEIGHT: 67 IN | WEIGHT: 243.63 LBS | BODY MASS INDEX: 38.24 KG/M2 | OXYGEN SATURATION: 99 %

## 2023-02-15 DIAGNOSIS — I10 ESSENTIAL HYPERTENSION: ICD-10-CM

## 2023-02-15 DIAGNOSIS — N18.4 STAGE 4 CHRONIC KIDNEY DISEASE: ICD-10-CM

## 2023-02-15 DIAGNOSIS — E83.42 HYPOMAGNESEMIA: Primary | ICD-10-CM

## 2023-02-15 DIAGNOSIS — M79.605 PAIN OF LEFT LOWER EXTREMITY: ICD-10-CM

## 2023-02-15 DIAGNOSIS — N25.81 SECONDARY HYPERPARATHYROIDISM: ICD-10-CM

## 2023-02-15 DIAGNOSIS — D69.6 THROMBOCYTOPENIA: ICD-10-CM

## 2023-02-15 DIAGNOSIS — R73.03 PREDIABETES: ICD-10-CM

## 2023-02-15 DIAGNOSIS — I77.1 TORTUOUS AORTA: ICD-10-CM

## 2023-02-15 DIAGNOSIS — E83.42 HYPOMAGNESEMIA: ICD-10-CM

## 2023-02-15 DIAGNOSIS — E78.49 OTHER HYPERLIPIDEMIA: ICD-10-CM

## 2023-02-15 DIAGNOSIS — E66.01 CLASS 2 SEVERE OBESITY DUE TO EXCESS CALORIES WITH SERIOUS COMORBIDITY AND BODY MASS INDEX (BMI) OF 38.0 TO 38.9 IN ADULT: ICD-10-CM

## 2023-02-15 DIAGNOSIS — I48.0 PAROXYSMAL ATRIAL FIBRILLATION: ICD-10-CM

## 2023-02-15 DIAGNOSIS — R09.89 DECREASED DORSALIS PEDIS PULSE: ICD-10-CM

## 2023-02-15 PROBLEM — E66.812 CLASS 2 SEVERE OBESITY DUE TO EXCESS CALORIES WITH SERIOUS COMORBIDITY AND BODY MASS INDEX (BMI) OF 38.0 TO 38.9 IN ADULT: Status: ACTIVE | Noted: 2023-02-15

## 2023-02-15 PROBLEM — E66.812 CLASS 2 SEVERE OBESITY DUE TO EXCESS CALORIES WITH SERIOUS COMORBIDITY AND BODY MASS INDEX (BMI) OF 39.0 TO 39.9 IN ADULT: Status: RESOLVED | Noted: 2021-08-04 | Resolved: 2023-02-15

## 2023-02-15 LAB
25(OH)D3+25(OH)D2 SERPL-MCNC: 39 NG/ML (ref 30–96)
ALBUMIN SERPL BCP-MCNC: 4.2 G/DL (ref 3.5–5.2)
ALP SERPL-CCNC: 107 U/L (ref 55–135)
ALT SERPL W/O P-5'-P-CCNC: 21 U/L (ref 10–44)
ANION GAP SERPL CALC-SCNC: 10 MMOL/L (ref 8–16)
AST SERPL-CCNC: 22 U/L (ref 10–40)
BASOPHILS # BLD AUTO: 0.08 K/UL (ref 0–0.2)
BASOPHILS NFR BLD: 1 % (ref 0–1.9)
BILIRUB SERPL-MCNC: 0.6 MG/DL (ref 0.1–1)
BUN SERPL-MCNC: 59 MG/DL (ref 8–23)
CALCIUM SERPL-MCNC: 10.6 MG/DL (ref 8.7–10.5)
CHLORIDE SERPL-SCNC: 104 MMOL/L (ref 95–110)
CHOLEST SERPL-MCNC: 177 MG/DL (ref 120–199)
CHOLEST/HDLC SERPL: 3 {RATIO} (ref 2–5)
CO2 SERPL-SCNC: 28 MMOL/L (ref 23–29)
CREAT SERPL-MCNC: 2.5 MG/DL (ref 0.5–1.4)
DIFFERENTIAL METHOD: ABNORMAL
EOSINOPHIL # BLD AUTO: 0.1 K/UL (ref 0–0.5)
EOSINOPHIL NFR BLD: 1.2 % (ref 0–8)
ERYTHROCYTE [DISTWIDTH] IN BLOOD BY AUTOMATED COUNT: 14.1 % (ref 11.5–14.5)
EST. GFR  (NO RACE VARIABLE): 18.5 ML/MIN/1.73 M^2
ESTIMATED AVG GLUCOSE: 105 MG/DL (ref 68–131)
GLUCOSE SERPL-MCNC: 101 MG/DL (ref 70–110)
HBA1C MFR BLD: 5.3 % (ref 4–5.6)
HCT VFR BLD AUTO: 42.8 % (ref 37–48.5)
HDLC SERPL-MCNC: 60 MG/DL (ref 40–75)
HDLC SERPL: 33.9 % (ref 20–50)
HGB BLD-MCNC: 13.1 G/DL (ref 12–16)
IMM GRANULOCYTES # BLD AUTO: 0.02 K/UL (ref 0–0.04)
IMM GRANULOCYTES NFR BLD AUTO: 0.3 % (ref 0–0.5)
LDLC SERPL CALC-MCNC: 98.4 MG/DL (ref 63–159)
LYMPHOCYTES # BLD AUTO: 1.8 K/UL (ref 1–4.8)
LYMPHOCYTES NFR BLD: 23.6 % (ref 18–48)
MCH RBC QN AUTO: 29.2 PG (ref 27–31)
MCHC RBC AUTO-ENTMCNC: 30.6 G/DL (ref 32–36)
MCV RBC AUTO: 96 FL (ref 82–98)
MONOCYTES # BLD AUTO: 0.6 K/UL (ref 0.3–1)
MONOCYTES NFR BLD: 7.9 % (ref 4–15)
NEUTROPHILS # BLD AUTO: 5.1 K/UL (ref 1.8–7.7)
NEUTROPHILS NFR BLD: 66 % (ref 38–73)
NONHDLC SERPL-MCNC: 117 MG/DL
NRBC BLD-RTO: 0 /100 WBC
PLATELET # BLD AUTO: 171 K/UL (ref 150–450)
PMV BLD AUTO: 10.9 FL (ref 9.2–12.9)
POTASSIUM SERPL-SCNC: 4.5 MMOL/L (ref 3.5–5.1)
PROT SERPL-MCNC: 7.4 G/DL (ref 6–8.4)
RBC # BLD AUTO: 4.48 M/UL (ref 4–5.4)
SODIUM SERPL-SCNC: 142 MMOL/L (ref 136–145)
TRIGL SERPL-MCNC: 93 MG/DL (ref 30–150)
URATE SERPL-MCNC: 6.6 MG/DL (ref 2.4–5.7)
WBC # BLD AUTO: 7.68 K/UL (ref 3.9–12.7)

## 2023-02-15 PROCEDURE — 36415 COLL VENOUS BLD VENIPUNCTURE: CPT | Mod: PO | Performed by: FAMILY MEDICINE

## 2023-02-15 PROCEDURE — 83036 HEMOGLOBIN GLYCOSYLATED A1C: CPT | Performed by: FAMILY MEDICINE

## 2023-02-15 PROCEDURE — 85025 COMPLETE CBC W/AUTO DIFF WBC: CPT | Performed by: FAMILY MEDICINE

## 2023-02-15 PROCEDURE — 1101F PT FALLS ASSESS-DOCD LE1/YR: CPT | Mod: CPTII,S$GLB,, | Performed by: FAMILY MEDICINE

## 2023-02-15 PROCEDURE — 1126F AMNT PAIN NOTED NONE PRSNT: CPT | Mod: CPTII,S$GLB,, | Performed by: FAMILY MEDICINE

## 2023-02-15 PROCEDURE — 1126F PR PAIN SEVERITY QUANTIFIED, NO PAIN PRESENT: ICD-10-PCS | Mod: CPTII,S$GLB,, | Performed by: FAMILY MEDICINE

## 2023-02-15 PROCEDURE — 3288F PR FALLS RISK ASSESSMENT DOCUMENTED: ICD-10-PCS | Mod: CPTII,S$GLB,, | Performed by: FAMILY MEDICINE

## 2023-02-15 PROCEDURE — 84550 ASSAY OF BLOOD/URIC ACID: CPT | Performed by: FAMILY MEDICINE

## 2023-02-15 PROCEDURE — 1160F RVW MEDS BY RX/DR IN RCRD: CPT | Mod: CPTII,S$GLB,, | Performed by: FAMILY MEDICINE

## 2023-02-15 PROCEDURE — 99214 OFFICE O/P EST MOD 30 MIN: CPT | Mod: S$GLB,,, | Performed by: FAMILY MEDICINE

## 2023-02-15 PROCEDURE — 99999 PR PBB SHADOW E&M-EST. PATIENT-LVL V: CPT | Mod: PBBFAC,,, | Performed by: FAMILY MEDICINE

## 2023-02-15 PROCEDURE — 3288F FALL RISK ASSESSMENT DOCD: CPT | Mod: CPTII,S$GLB,, | Performed by: FAMILY MEDICINE

## 2023-02-15 PROCEDURE — 82306 VITAMIN D 25 HYDROXY: CPT | Performed by: FAMILY MEDICINE

## 2023-02-15 PROCEDURE — 99214 PR OFFICE/OUTPT VISIT, EST, LEVL IV, 30-39 MIN: ICD-10-PCS | Mod: S$GLB,,, | Performed by: FAMILY MEDICINE

## 2023-02-15 PROCEDURE — 1159F PR MEDICATION LIST DOCUMENTED IN MEDICAL RECORD: ICD-10-PCS | Mod: CPTII,S$GLB,, | Performed by: FAMILY MEDICINE

## 2023-02-15 PROCEDURE — 80061 LIPID PANEL: CPT | Performed by: FAMILY MEDICINE

## 2023-02-15 PROCEDURE — 1159F MED LIST DOCD IN RCRD: CPT | Mod: CPTII,S$GLB,, | Performed by: FAMILY MEDICINE

## 2023-02-15 PROCEDURE — 83735 ASSAY OF MAGNESIUM: CPT | Performed by: FAMILY MEDICINE

## 2023-02-15 PROCEDURE — 80053 COMPREHEN METABOLIC PANEL: CPT | Performed by: FAMILY MEDICINE

## 2023-02-15 PROCEDURE — 1160F PR REVIEW ALL MEDS BY PRESCRIBER/CLIN PHARMACIST DOCUMENTED: ICD-10-PCS | Mod: CPTII,S$GLB,, | Performed by: FAMILY MEDICINE

## 2023-02-15 PROCEDURE — 99999 PR PBB SHADOW E&M-EST. PATIENT-LVL V: ICD-10-PCS | Mod: PBBFAC,,, | Performed by: FAMILY MEDICINE

## 2023-02-15 PROCEDURE — 1101F PR PT FALLS ASSESS DOC 0-1 FALLS W/OUT INJ PAST YR: ICD-10-PCS | Mod: CPTII,S$GLB,, | Performed by: FAMILY MEDICINE

## 2023-02-16 DIAGNOSIS — N18.4 CHRONIC KIDNEY DISEASE, STAGE 4 (SEVERE): Primary | ICD-10-CM

## 2023-02-16 RX ORDER — IRBESARTAN 150 MG/1
150 TABLET ORAL NIGHTLY
Qty: 90 TABLET | Refills: 3 | Status: SHIPPED | OUTPATIENT
Start: 2023-02-16 | End: 2023-02-26

## 2023-02-24 ENCOUNTER — TELEPHONE (OUTPATIENT)
Dept: FAMILY MEDICINE | Facility: CLINIC | Age: 83
End: 2023-02-24
Payer: MEDICARE

## 2023-02-24 LAB — MAGNESIUM RBC-MCNC: 4.5 MG/DL (ref 4–6.4)

## 2023-02-24 NOTE — TELEPHONE ENCOUNTER
----- Message from Aby Perales MD sent at 2/16/2023  5:19 PM CST -----  Please notify patient that the kidney function went up from 1.8-2.5.  Please schedule the patient an appointment as soon as possible to see Dr. Brennan (nephrology).  I will stop of the night which is irbesartan HCTZ because of the fluid pill, and I will only start the patient on your start an instead.    Please tell the patient to take Lasix only once daily.  Will recheck the levels again in 1 week.  Please schedule the patient for blood work.  This is a nonfasting lab.  Thank you.

## 2023-02-25 ENCOUNTER — TELEPHONE (OUTPATIENT)
Dept: FAMILY MEDICINE | Facility: CLINIC | Age: 83
End: 2023-02-25
Payer: MEDICARE

## 2023-02-25 NOTE — PROGRESS NOTES
Assessment:       1. Hypomagnesemia    2. Secondary hyperparathyroidism    3. Paroxysmal atrial fibrillation    4. Tortuous aorta    5. Stage 4 chronic kidney disease    6. Class 2 severe obesity due to excess calories with serious comorbidity and body mass index (BMI) of 38.0 to 38.9 in adult    7. Decreased dorsalis pedis pulse    8. Pain of left lower extremity          Plan:       Hypomagnesemia:  Stable  -     Magnesium, RBC; Future; Expected date: 02/15/2023    Secondary hyperparathyroidism:  Stable    Paroxysmal atrial fibrillation:  Stable    Tortuous aorta:  Stable    Stage 4 chronic kidney disease:  Stable    Class 2 severe obesity due to excess calories with serious comorbidity and body mass index (BMI) of 38.0 to 38.9 in adult:  Stable    Decreased dorsalis pedis pulse:  New problem workup needed  -     US Lower Extrem Arteries Bilat with GIANNI (xpd); Future; Expected date: 02/15/2023    Pain of left lower extremity:  New problem workup needed  -     US Lower Extremity Veins Left; Future; Expected date: 02/15/2023         Will call the patient after we have the results of the test, healthy habits, avoid processed foods and processed starches.  Drink more water was recommended for the patient.  Avoid anti-inflammatories over-the-counter.  The patient's BMI has been recorded in the chart. The patient has been provided educational materials regarding the benefits of attaining and maintaining a normal weight. We will continue to address and follow this issue during follow up visits.   Patient agreed with assessment and plan. Patient verbalized understanding.     Subjective:       Patient ID: Selena Moulton is a 82 y.o. female.    Chief Complaint: Follow-up (6 month follow up )    HPI    Lower extremity pain:  The patient is complaining of pain on the left lower extremity, some purplish discoloration on the toes on bilateral feet especially the left lower extremity.  The patient denies any symptoms of  chest pain or worsening symptoms of shortness of breath.  Upon review of the last blood work, the patient also has hypomagnesemia.    Chronic kidney disease:  The patient has chronic kidney disease stage 4, currently seen the kidney specialist, the patient is been drinking water, denies any worsening fatigue symptoms.  Denies any blood in the urine.   She also has hyperparathyroidism.    Afib:  Currently seen the cardiologist, the patient also has tortuous aorta, the patient has been taking Eliquis, she also takes atorvastatin and take blood pressure medication as directed.  The patient denies any side effects of the medications.        Past medical history, past social history was reviewed and discussed with the patient.    Review of Systems   Constitutional:  Negative for activity change and appetite change.   HENT:  Negative for ear discharge.    Eyes:  Negative for discharge and itching.   Respiratory:  Negative for choking and chest tightness.    Cardiovascular:  Negative for palpitations and leg swelling.   Gastrointestinal:  Negative for abdominal distention and constipation.   Endocrine: Negative for cold intolerance and heat intolerance.   Genitourinary:  Negative for dysuria and flank pain.   Musculoskeletal:  Positive for arthralgias. Negative for back pain.   Skin:  Positive for color change. Negative for pallor.   Allergic/Immunologic: Negative for environmental allergies and food allergies.   Neurological:  Negative for dizziness and facial asymmetry.   Hematological:  Negative for adenopathy. Does not bruise/bleed easily.   Psychiatric/Behavioral:  Negative for agitation, confusion, decreased concentration and sleep disturbance.      Objective:      Physical Exam  Vitals and nursing note reviewed.   Constitutional:       General: She is not in acute distress.     Appearance: Normal appearance. She is well-developed. She is obese. She is not diaphoretic.   HENT:      Head: Normocephalic and atraumatic.       Right Ear: External ear normal.      Left Ear: External ear normal.      Nose: Nose normal.   Eyes:      General: No scleral icterus.        Right eye: No discharge.         Left eye: No discharge.      Conjunctiva/sclera: Conjunctivae normal.   Cardiovascular:      Rate and Rhythm: Normal rate and regular rhythm.      Heart sounds: Normal heart sounds.      Comments: Decreased peripheral pulse on the left lower extremity  Pulmonary:      Effort: Pulmonary effort is normal. No respiratory distress.      Breath sounds: Normal breath sounds. No wheezing.   Musculoskeletal:         General: No tenderness or deformity.      Cervical back: Neck supple.   Skin:     Coloration: Skin is not pale.      Findings: Bruising present. No erythema.   Neurological:      Mental Status: She is alert.      Cranial Nerves: No cranial nerve deficit.      Coordination: Coordination normal.   Psychiatric:         Behavior: Behavior normal.         Thought Content: Thought content normal.         Judgment: Judgment normal.

## 2023-02-25 NOTE — TELEPHONE ENCOUNTER
Called pt, notified of results per Dr. Perales , she verbally understood.     She is already an established pt of  so forwarding this to him.  Also she would like her new rx sent to the [facility] on [address]. Please advise

## 2023-02-25 NOTE — TELEPHONE ENCOUNTER
MD Angella Benitez Staff 7 minutes ago (12:54 PM)       Please notify patient that the kidney function went up from 1.8-2.5.  Please schedule the patient an appointment as soon as possible to see Dr. Brennan (nephrology).  I will stop of the combination of irbesartan HCTZ because of the fluid pill can cause more kidney problems, and I will only start the patient on only irbesartan instead.       Please tell the patient to take Lasix only once daily.  Will recheck the levels again in 1 week.  Please schedule the patient for blood work.  This is a nonfasting lab.  The results of the ultrasound did not show any clots.  Thank you.

## 2023-02-26 RX ORDER — IRBESARTAN 150 MG/1
150 TABLET ORAL NIGHTLY
Qty: 90 TABLET | Refills: 3 | Status: SHIPPED | OUTPATIENT
Start: 2023-02-26 | End: 2024-01-30

## 2023-02-27 ENCOUNTER — HOSPITAL ENCOUNTER (OUTPATIENT)
Dept: RADIOLOGY | Facility: HOSPITAL | Age: 83
Discharge: HOME | End: 2023-02-27
Attending: FAMILY MEDICINE
Payer: MEDICARE

## 2023-02-27 ENCOUNTER — TELEPHONE (OUTPATIENT)
Dept: FAMILY MEDICINE | Facility: CLINIC | Age: 83
End: 2023-02-27
Payer: MEDICARE

## 2023-02-27 PROCEDURE — 93922 US ARTERIAL LOWER EXTREMITY BILAT WITH ABI (XPD): ICD-10-PCS | Mod: 26,,, | Performed by: RADIOLOGY

## 2023-02-27 PROCEDURE — 93925 LOWER EXTREMITY STUDY: CPT | Mod: TC,PO

## 2023-02-27 PROCEDURE — 93925 LOWER EXTREMITY STUDY: CPT | Mod: 26,,, | Performed by: RADIOLOGY

## 2023-02-27 PROCEDURE — 93925 US ARTERIAL LOWER EXTREMITY BILAT WITH ABI (XPD): ICD-10-PCS | Mod: 26,,, | Performed by: RADIOLOGY

## 2023-02-27 PROCEDURE — 93922 UPR/L XTREMITY ART 2 LEVELS: CPT | Mod: 26,,, | Performed by: RADIOLOGY

## 2023-02-27 NOTE — TELEPHONE ENCOUNTER
Pt notified of results below. Voices understanding.       Can you please let the patient know that the circulation studies did not reveal any significant narrowing and  the brachial index were okay.  Thank you.

## 2023-03-03 ENCOUNTER — TELEPHONE (OUTPATIENT)
Dept: FAMILY MEDICINE | Facility: CLINIC | Age: 83
End: 2023-03-03
Payer: MEDICARE

## 2023-03-03 ENCOUNTER — OFFICE VISIT (OUTPATIENT)
Dept: FAMILY MEDICINE | Facility: CLINIC | Age: 83
End: 2023-03-03
Payer: MEDICARE

## 2023-03-03 ENCOUNTER — HOSPITAL ENCOUNTER (OUTPATIENT)
Dept: RADIOLOGY | Facility: HOSPITAL | Age: 83
Discharge: HOME | End: 2023-03-03
Attending: FAMILY MEDICINE
Payer: MEDICARE

## 2023-03-03 VITALS
OXYGEN SATURATION: 97 % | HEART RATE: 60 BPM | BODY MASS INDEX: 38.65 KG/M2 | WEIGHT: 246.25 LBS | HEIGHT: 67 IN | SYSTOLIC BLOOD PRESSURE: 130 MMHG | DIASTOLIC BLOOD PRESSURE: 60 MMHG

## 2023-03-03 DIAGNOSIS — M50.30 DEGENERATIVE DISC DISEASE, CERVICAL: Primary | ICD-10-CM

## 2023-03-03 DIAGNOSIS — M54.12 CERVICAL RADICULOPATHY: ICD-10-CM

## 2023-03-03 DIAGNOSIS — N18.4 CHRONIC KIDNEY DISEASE, STAGE 4 (SEVERE): ICD-10-CM

## 2023-03-03 DIAGNOSIS — M54.12 CERVICAL RADICULOPATHY: Primary | ICD-10-CM

## 2023-03-03 DIAGNOSIS — R09.89 BRUIT OF RIGHT CAROTID ARTERY: ICD-10-CM

## 2023-03-03 DIAGNOSIS — I10 PRIMARY HYPERTENSION: ICD-10-CM

## 2023-03-03 PROCEDURE — 99999 PR PBB SHADOW E&M-EST. PATIENT-LVL V: ICD-10-PCS | Mod: PBBFAC,,, | Performed by: FAMILY MEDICINE

## 2023-03-03 PROCEDURE — 3288F PR FALLS RISK ASSESSMENT DOCUMENTED: ICD-10-PCS | Mod: CPTII,S$GLB,, | Performed by: FAMILY MEDICINE

## 2023-03-03 PROCEDURE — 1101F PT FALLS ASSESS-DOCD LE1/YR: CPT | Mod: CPTII,S$GLB,, | Performed by: FAMILY MEDICINE

## 2023-03-03 PROCEDURE — 1125F PR PAIN SEVERITY QUANTIFIED, PAIN PRESENT: ICD-10-PCS | Mod: CPTII,S$GLB,, | Performed by: FAMILY MEDICINE

## 2023-03-03 PROCEDURE — 99214 PR OFFICE/OUTPT VISIT, EST, LEVL IV, 30-39 MIN: ICD-10-PCS | Mod: S$GLB,,, | Performed by: FAMILY MEDICINE

## 2023-03-03 PROCEDURE — 3078F DIAST BP <80 MM HG: CPT | Mod: CPTII,S$GLB,, | Performed by: FAMILY MEDICINE

## 2023-03-03 PROCEDURE — 3075F PR MOST RECENT SYSTOLIC BLOOD PRESS GE 130-139MM HG: ICD-10-PCS | Mod: CPTII,S$GLB,, | Performed by: FAMILY MEDICINE

## 2023-03-03 PROCEDURE — 3075F SYST BP GE 130 - 139MM HG: CPT | Mod: CPTII,S$GLB,, | Performed by: FAMILY MEDICINE

## 2023-03-03 PROCEDURE — 72040 X-RAY EXAM NECK SPINE 2-3 VW: CPT | Mod: TC,FY,PO

## 2023-03-03 PROCEDURE — 3288F FALL RISK ASSESSMENT DOCD: CPT | Mod: CPTII,S$GLB,, | Performed by: FAMILY MEDICINE

## 2023-03-03 PROCEDURE — 72040 XR CERVICAL SPINE AP LATERAL: ICD-10-PCS | Mod: 26,,, | Performed by: RADIOLOGY

## 2023-03-03 PROCEDURE — 99999 PR PBB SHADOW E&M-EST. PATIENT-LVL V: CPT | Mod: PBBFAC,,, | Performed by: FAMILY MEDICINE

## 2023-03-03 PROCEDURE — 1101F PR PT FALLS ASSESS DOC 0-1 FALLS W/OUT INJ PAST YR: ICD-10-PCS | Mod: CPTII,S$GLB,, | Performed by: FAMILY MEDICINE

## 2023-03-03 PROCEDURE — 1125F AMNT PAIN NOTED PAIN PRSNT: CPT | Mod: CPTII,S$GLB,, | Performed by: FAMILY MEDICINE

## 2023-03-03 PROCEDURE — 99214 OFFICE O/P EST MOD 30 MIN: CPT | Mod: S$GLB,,, | Performed by: FAMILY MEDICINE

## 2023-03-03 PROCEDURE — 72040 X-RAY EXAM NECK SPINE 2-3 VW: CPT | Mod: 26,,, | Performed by: RADIOLOGY

## 2023-03-03 PROCEDURE — 3078F PR MOST RECENT DIASTOLIC BLOOD PRESSURE < 80 MM HG: ICD-10-PCS | Mod: CPTII,S$GLB,, | Performed by: FAMILY MEDICINE

## 2023-03-03 RX ORDER — METHOCARBAMOL 750 MG/1
750 TABLET, FILM COATED ORAL NIGHTLY PRN
Qty: 10 TABLET | Refills: 0 | Status: SHIPPED | OUTPATIENT
Start: 2023-03-03 | End: 2023-03-13

## 2023-03-03 NOTE — TELEPHONE ENCOUNTER
Can you please let the patient know that the results of the x-rays is showing not too much change from previous but presence of disc space narrowing, arthritis, changes from the surgery, I will recommend that the patient go to see back the spine specialist, can you please schedule the patient, the orders are in the system.  Thank you.

## 2023-03-07 ENCOUNTER — OFFICE VISIT (OUTPATIENT)
Dept: NEPHROLOGY | Facility: CLINIC | Age: 83
End: 2023-03-07
Payer: MEDICARE

## 2023-03-07 VITALS
DIASTOLIC BLOOD PRESSURE: 56 MMHG | OXYGEN SATURATION: 98 % | SYSTOLIC BLOOD PRESSURE: 148 MMHG | WEIGHT: 246 LBS | HEART RATE: 52 BPM | BODY MASS INDEX: 38.61 KG/M2 | HEIGHT: 67 IN

## 2023-03-07 DIAGNOSIS — I10 PRIMARY HYPERTENSION: ICD-10-CM

## 2023-03-07 DIAGNOSIS — E66.01 SEVERE OBESITY: ICD-10-CM

## 2023-03-07 DIAGNOSIS — E83.42 HYPOMAGNESEMIA: ICD-10-CM

## 2023-03-07 DIAGNOSIS — N25.81 SECONDARY HYPERPARATHYROIDISM: ICD-10-CM

## 2023-03-07 DIAGNOSIS — N18.32 STAGE 3B CHRONIC KIDNEY DISEASE: Primary | ICD-10-CM

## 2023-03-07 DIAGNOSIS — N28.1 ACQUIRED CYST OF KIDNEY: ICD-10-CM

## 2023-03-07 PROCEDURE — 1160F RVW MEDS BY RX/DR IN RCRD: CPT | Mod: CPTII,S$GLB,, | Performed by: INTERNAL MEDICINE

## 2023-03-07 PROCEDURE — 3288F FALL RISK ASSESSMENT DOCD: CPT | Mod: CPTII,S$GLB,, | Performed by: INTERNAL MEDICINE

## 2023-03-07 PROCEDURE — 99999 PR PBB SHADOW E&M-EST. PATIENT-LVL III: ICD-10-PCS | Mod: PBBFAC,,, | Performed by: INTERNAL MEDICINE

## 2023-03-07 PROCEDURE — 1160F PR REVIEW ALL MEDS BY PRESCRIBER/CLIN PHARMACIST DOCUMENTED: ICD-10-PCS | Mod: CPTII,S$GLB,, | Performed by: INTERNAL MEDICINE

## 2023-03-07 PROCEDURE — 99214 PR OFFICE/OUTPT VISIT, EST, LEVL IV, 30-39 MIN: ICD-10-PCS | Mod: S$GLB,,, | Performed by: INTERNAL MEDICINE

## 2023-03-07 PROCEDURE — 1101F PR PT FALLS ASSESS DOC 0-1 FALLS W/OUT INJ PAST YR: ICD-10-PCS | Mod: CPTII,S$GLB,, | Performed by: INTERNAL MEDICINE

## 2023-03-07 PROCEDURE — 3078F PR MOST RECENT DIASTOLIC BLOOD PRESSURE < 80 MM HG: ICD-10-PCS | Mod: CPTII,S$GLB,, | Performed by: INTERNAL MEDICINE

## 2023-03-07 PROCEDURE — 1159F MED LIST DOCD IN RCRD: CPT | Mod: CPTII,S$GLB,, | Performed by: INTERNAL MEDICINE

## 2023-03-07 PROCEDURE — 1101F PT FALLS ASSESS-DOCD LE1/YR: CPT | Mod: CPTII,S$GLB,, | Performed by: INTERNAL MEDICINE

## 2023-03-07 PROCEDURE — 3077F SYST BP >= 140 MM HG: CPT | Mod: CPTII,S$GLB,, | Performed by: INTERNAL MEDICINE

## 2023-03-07 PROCEDURE — 3078F DIAST BP <80 MM HG: CPT | Mod: CPTII,S$GLB,, | Performed by: INTERNAL MEDICINE

## 2023-03-07 PROCEDURE — 3077F PR MOST RECENT SYSTOLIC BLOOD PRESSURE >= 140 MM HG: ICD-10-PCS | Mod: CPTII,S$GLB,, | Performed by: INTERNAL MEDICINE

## 2023-03-07 PROCEDURE — 99214 OFFICE O/P EST MOD 30 MIN: CPT | Mod: S$GLB,,, | Performed by: INTERNAL MEDICINE

## 2023-03-07 PROCEDURE — 1159F PR MEDICATION LIST DOCUMENTED IN MEDICAL RECORD: ICD-10-PCS | Mod: CPTII,S$GLB,, | Performed by: INTERNAL MEDICINE

## 2023-03-07 PROCEDURE — 99999 PR PBB SHADOW E&M-EST. PATIENT-LVL III: CPT | Mod: PBBFAC,,, | Performed by: INTERNAL MEDICINE

## 2023-03-07 PROCEDURE — 3288F PR FALLS RISK ASSESSMENT DOCUMENTED: ICD-10-PCS | Mod: CPTII,S$GLB,, | Performed by: INTERNAL MEDICINE

## 2023-03-07 NOTE — PROGRESS NOTES
"  Subjective:       Patient ID: Selean Moulton is a 82 y.o. White female who presents for return patient evaluation for chronic renal failure.      She has no uremic or urinary symptoms and is in her usual state of health except for some occasional urinary incontinence.  She had COVID in May 2022.  She was volume depleted in February.      Review of Systems   Constitutional:  Negative for appetite change, chills and fever.   HENT:  Negative for congestion and rhinorrhea.    Eyes:  Negative for visual disturbance.   Respiratory:  Negative for cough and shortness of breath.    Cardiovascular:  Negative for chest pain and leg swelling.   Gastrointestinal:  Negative for abdominal pain, diarrhea, nausea and vomiting.   Genitourinary:  Negative for difficulty urinating, dysuria and hematuria.   Musculoskeletal:  Positive for gait problem. Negative for arthralgias and myalgias.   Skin:  Negative for rash.   Neurological:  Negative for headaches.   Hematological:  Bruises/bleeds easily.   Psychiatric/Behavioral:  Negative for sleep disturbance.        The past medical, family and social histories were reviewed for this encounter.     BP (!) 148/56 (BP Location: Right arm, Patient Position: Sitting, BP Method: Large (Manual))   Pulse (!) 52   Ht 5' 7" (1.702 m)   Wt 111.6 kg (246 lb)   SpO2 98%   BMI 38.53 kg/m²     Objective:      Physical Exam  Vitals reviewed.   Constitutional:       General: She is not in acute distress.     Appearance: She is well-developed.   HENT:      Head: Normocephalic and atraumatic.   Eyes:      General: No scleral icterus.     Conjunctiva/sclera: Conjunctivae normal.   Neck:      Vascular: No JVD.   Cardiovascular:      Rate and Rhythm: Normal rate and regular rhythm.      Heart sounds: Normal heart sounds. No murmur heard.    No friction rub. No gallop.   Pulmonary:      Effort: Pulmonary effort is normal. No respiratory distress.      Breath sounds: Normal breath sounds. No " wheezing.   Abdominal:      General: Bowel sounds are normal. There is no distension.      Palpations: Abdomen is soft.      Tenderness: There is no abdominal tenderness.   Musculoskeletal:      Cervical back: Normal range of motion.      Right lower leg: No edema.      Left lower leg: No edema.   Skin:     General: Skin is warm and dry.      Findings: No rash.   Neurological:      Mental Status: She is alert and oriented to person, place, and time.   Psychiatric:         Mood and Affect: Mood normal.         Behavior: Behavior normal.       Assessment:       1. Stage 3b chronic kidney disease    2. Primary hypertension    3. Hypomagnesemia    4. Acquired cyst of kidney    5. Secondary hyperparathyroidism    6. Severe obesity        Plan:   Return to clinic in 6 months.  Labs for next visit include rp pth per standing orders   Baseline creatinine is 1.1-1.3 since 2018.  PTH is 318 with a calcium of 10.0.  Increase D3 2000 units daily.  Renal US shows R 10.7 cm L 9.8 cm.  Blood pressure is controlled on the current regimen.  Continue current medications as prescribed and reviewed.   She developed COVID since her last visit and I suspect she had an injury from that.   She is getting back to her baseline now.  Uric acid is controlled at 6.6.

## 2023-03-08 ENCOUNTER — TELEPHONE (OUTPATIENT)
Dept: FAMILY MEDICINE | Facility: CLINIC | Age: 83
End: 2023-03-08
Payer: MEDICARE

## 2023-03-08 ENCOUNTER — HOSPITAL ENCOUNTER (OUTPATIENT)
Dept: RADIOLOGY | Facility: HOSPITAL | Age: 83
Discharge: HOME | End: 2023-03-08
Attending: FAMILY MEDICINE
Payer: MEDICARE

## 2023-03-08 ENCOUNTER — OFFICE VISIT (OUTPATIENT)
Dept: PAIN MEDICINE | Facility: CLINIC | Age: 83
End: 2023-03-08
Payer: MEDICARE

## 2023-03-08 VITALS
HEART RATE: 45 BPM | BODY MASS INDEX: 39.13 KG/M2 | DIASTOLIC BLOOD PRESSURE: 52 MMHG | HEIGHT: 67 IN | WEIGHT: 249.31 LBS | SYSTOLIC BLOOD PRESSURE: 127 MMHG

## 2023-03-08 DIAGNOSIS — M54.12 CERVICAL RADICULOPATHY: ICD-10-CM

## 2023-03-08 DIAGNOSIS — R09.89 BRUIT OF RIGHT CAROTID ARTERY: ICD-10-CM

## 2023-03-08 DIAGNOSIS — M50.30 DEGENERATIVE DISC DISEASE, CERVICAL: ICD-10-CM

## 2023-03-08 PROCEDURE — 3074F PR MOST RECENT SYSTOLIC BLOOD PRESSURE < 130 MM HG: ICD-10-PCS | Mod: CPTII,S$GLB,, | Performed by: PHYSICIAN ASSISTANT

## 2023-03-08 PROCEDURE — 3078F PR MOST RECENT DIASTOLIC BLOOD PRESSURE < 80 MM HG: ICD-10-PCS | Mod: CPTII,S$GLB,, | Performed by: PHYSICIAN ASSISTANT

## 2023-03-08 PROCEDURE — 3078F DIAST BP <80 MM HG: CPT | Mod: CPTII,S$GLB,, | Performed by: PHYSICIAN ASSISTANT

## 2023-03-08 PROCEDURE — 3074F SYST BP LT 130 MM HG: CPT | Mod: CPTII,S$GLB,, | Performed by: PHYSICIAN ASSISTANT

## 2023-03-08 PROCEDURE — 3288F PR FALLS RISK ASSESSMENT DOCUMENTED: ICD-10-PCS | Mod: CPTII,S$GLB,, | Performed by: PHYSICIAN ASSISTANT

## 2023-03-08 PROCEDURE — 1125F AMNT PAIN NOTED PAIN PRSNT: CPT | Mod: CPTII,S$GLB,, | Performed by: PHYSICIAN ASSISTANT

## 2023-03-08 PROCEDURE — 93880 EXTRACRANIAL BILAT STUDY: CPT | Mod: 26,,, | Performed by: RADIOLOGY

## 2023-03-08 PROCEDURE — 93880 US CAROTID BILATERAL: ICD-10-PCS | Mod: 26,,, | Performed by: RADIOLOGY

## 2023-03-08 PROCEDURE — 99999 PR PBB SHADOW E&M-EST. PATIENT-LVL IV: ICD-10-PCS | Mod: PBBFAC,,, | Performed by: PHYSICIAN ASSISTANT

## 2023-03-08 PROCEDURE — 1101F PR PT FALLS ASSESS DOC 0-1 FALLS W/OUT INJ PAST YR: ICD-10-PCS | Mod: CPTII,S$GLB,, | Performed by: PHYSICIAN ASSISTANT

## 2023-03-08 PROCEDURE — 1125F PR PAIN SEVERITY QUANTIFIED, PAIN PRESENT: ICD-10-PCS | Mod: CPTII,S$GLB,, | Performed by: PHYSICIAN ASSISTANT

## 2023-03-08 PROCEDURE — 99999 PR PBB SHADOW E&M-EST. PATIENT-LVL IV: CPT | Mod: PBBFAC,,, | Performed by: PHYSICIAN ASSISTANT

## 2023-03-08 PROCEDURE — 93880 EXTRACRANIAL BILAT STUDY: CPT | Mod: TC,PO

## 2023-03-08 PROCEDURE — 1160F RVW MEDS BY RX/DR IN RCRD: CPT | Mod: CPTII,S$GLB,, | Performed by: PHYSICIAN ASSISTANT

## 2023-03-08 PROCEDURE — 99203 OFFICE O/P NEW LOW 30 MIN: CPT | Mod: S$GLB,,, | Performed by: PHYSICIAN ASSISTANT

## 2023-03-08 PROCEDURE — 1101F PT FALLS ASSESS-DOCD LE1/YR: CPT | Mod: CPTII,S$GLB,, | Performed by: PHYSICIAN ASSISTANT

## 2023-03-08 PROCEDURE — 1159F PR MEDICATION LIST DOCUMENTED IN MEDICAL RECORD: ICD-10-PCS | Mod: CPTII,S$GLB,, | Performed by: PHYSICIAN ASSISTANT

## 2023-03-08 PROCEDURE — 1159F MED LIST DOCD IN RCRD: CPT | Mod: CPTII,S$GLB,, | Performed by: PHYSICIAN ASSISTANT

## 2023-03-08 PROCEDURE — 1160F PR REVIEW ALL MEDS BY PRESCRIBER/CLIN PHARMACIST DOCUMENTED: ICD-10-PCS | Mod: CPTII,S$GLB,, | Performed by: PHYSICIAN ASSISTANT

## 2023-03-08 PROCEDURE — 3288F FALL RISK ASSESSMENT DOCD: CPT | Mod: CPTII,S$GLB,, | Performed by: PHYSICIAN ASSISTANT

## 2023-03-08 PROCEDURE — 99203 PR OFFICE/OUTPT VISIT, NEW, LEVL III, 30-44 MIN: ICD-10-PCS | Mod: S$GLB,,, | Performed by: PHYSICIAN ASSISTANT

## 2023-03-08 NOTE — TELEPHONE ENCOUNTER
Please notify patient that the results of the ultrasound showed presence of plaque formation in the arteries but is only mild, no significant narrowing on bilateral arteries.  The kidney function also improved from previous.  Thank you.

## 2023-03-08 NOTE — PROGRESS NOTES
Assessment:       1. Cervical radiculopathy    2. Bruit of right carotid artery    3. Primary hypertension       4.      Chronic kidney disease stage 4  Plan:       Cervical radiculopathy:  Worsening  -     X-Ray Cervical Spine AP And Lateral; Future; Expected date: 03/03/2023  -     methocarbamoL (ROBAXIN) 750 MG Tab; Take 1 tablet (750 mg total) by mouth nightly as needed (muscle spasms).  Dispense: 10 tablet; Refill: 0    Bruit of right carotid artery:  New problem workup needed  -     US Carotid Bilateral; Future; Expected date: 03/03/2023    Primary hypertension:  controlled    Chronic kidney disease stage 4:  Stable       The patient was advised to start taking Robaxin as needed, if the symptoms get worse, the patient notify us immediately, recommend the patient to see the spine specialist again, the patient declined.  Will call the patient after we have the results of the test.  The patient's BMI has been recorded in the chart. The patient has been provided educational materials regarding the benefits of attaining and maintaining a normal weight. We will continue to address and follow this issue during follow up visits.   Patient agreed with assessment and plan. Patient verbalized understanding.     Subjective:       Patient ID: Selena Moulton is a 82 y.o. female.    Chief Complaint: Neck Pain (Neck pain on the right side for 10 days ) and Headache    HPI    Neck pain:  The patient is complaining of pain on the right side of the neck for the last 10 days, the symptoms are getting worse.  The patient had a likely study years ago that showed presence of degenerative and arthritis on the neck, the patient was seen the spine specialist but she not see them for a long time.  She is also complaining of headaches.  The symptoms are not improving with over-the-counter medications like Tylenol.    Hypertension:  The patient is been taking lovastatin, amlodipine and atenolol, she denies any side effects of the  medication, the patient denies symptoms of chest pain shortness of breath at this office visit.    Chronic kidney disease stage 4:  Last GFR was 18.5, the patient is taking hydrochlorothiazide, the patient has an appointment for blood work to recheck kidney function today.    Past medical history, past social history was reviewed and discussed with the patient.    Review of Systems   Constitutional:  Negative for activity change, appetite change and chills.   HENT:  Negative for congestion and ear discharge.    Eyes:  Negative for discharge and itching.   Respiratory:  Negative for choking and chest tightness.    Cardiovascular:  Negative for chest pain, palpitations and leg swelling.   Gastrointestinal:  Negative for abdominal distention, abdominal pain and constipation.   Endocrine: Negative for cold intolerance and heat intolerance.   Genitourinary:  Negative for dysuria and flank pain.   Musculoskeletal:  Positive for neck pain and neck stiffness. Negative for arthralgias and back pain.   Skin:  Negative for pallor and rash.   Allergic/Immunologic: Negative for environmental allergies and food allergies.   Neurological:  Positive for headaches. Negative for dizziness and facial asymmetry.   Hematological:  Negative for adenopathy. Does not bruise/bleed easily.   Psychiatric/Behavioral:  Negative for agitation, confusion, decreased concentration and sleep disturbance.      Objective:      Physical Exam  Vitals and nursing note reviewed.   Constitutional:       General: She is in acute distress.      Appearance: Normal appearance. She is well-developed. She is obese. She is not diaphoretic.      Comments: The patient has limited ambulation   HENT:      Head: Normocephalic and atraumatic.      Right Ear: External ear normal.      Left Ear: External ear normal.      Nose: Nose normal.   Eyes:      General: No scleral icterus.        Right eye: No discharge.         Left eye: No discharge.      Conjunctiva/sclera:  Conjunctivae normal.      Pupils: Pupils are equal, round, and reactive to light.   Neck:      Vascular: Carotid bruit present.   Cardiovascular:      Rate and Rhythm: Regular rhythm.      Heart sounds: Normal heart sounds.   Pulmonary:      Effort: Pulmonary effort is normal. No respiratory distress.      Breath sounds: Normal breath sounds. No wheezing.   Musculoskeletal:         General: Tenderness (On the cervical spine, with presence of muscle spasms) present. No deformity.      Cervical back: Neck supple. Pain with movement, spinous process tenderness and muscular tenderness present. Decreased range of motion.   Skin:     Coloration: Skin is not pale.   Neurological:      Mental Status: She is alert.      Cranial Nerves: No cranial nerve deficit.      Coordination: Coordination normal.   Psychiatric:         Behavior: Behavior normal.         Thought Content: Thought content normal.         Judgment: Judgment normal.

## 2023-03-10 NOTE — PROGRESS NOTES
[facility] New Patient Evaluation      Referred by: Dr. Aby Perales    PCP:  Aby Perales MD    CC:   Chief Complaint   Patient presents with    Neck Pain     Pain is posterior right side of her neck.      No flowsheet data found.      HPI:   Selena Moulton is a 82 y.o. female with history of obesity, kidney disease, GERD, hypertension, atrial fibrillation presents with neck pain.  She underwent 2 spine surgeries resulting in C5-C7 fusion.  She was seen by me in 2018 for pain from the left shoulder to the hand. She states that she has been doing pretty well overall until recently. She has intermittent pain in the right side and posterior neck  ranging from uncomfortable to severe.  She denies any radicular arm pain at this time.  Pain is releived completely with placing pressure on the neck and laying flat typically within 30-60 minutes.       Past and current medications:  Antineuropathics:  NSAIDs:  Antidepressants:  Muscle relaxers:  robaxin  Opioids:  Antiplatelets/Anticoagulants:  Others: tylenol    Physical therapy/ Chiropractic care:  none    Pain Intervention History:  none    Past Spine Surgical History:  none      History:    Current Outpatient Medications:     acetaminophen (TYLENOL) 500 MG tablet, Take 500 mg by mouth every 8 (eight) hours as needed., Disp: , Rfl:     allopurinoL (ZYLOPRIM) 300 MG tablet, TAKE 1 TABLET(300 MG) BY MOUTH EVERY DAY, Disp: 90 tablet, Rfl: 3    amiodarone (PACERONE) 200 MG Tab, Take 1 tablet (200 mg total) by mouth once daily., Disp: 30 tablet, Rfl: 11    amLODIPine (NORVASC) 2.5 MG tablet, TAKE 1 TABLET(2.5 MG) BY MOUTH EVERY DAY, Disp: 90 tablet, Rfl: 3    apixaban (ELIQUIS) 5 mg Tab, Take 1 tablet (5 mg total) by mouth 2 (two) times daily., Disp: 90 tablet, Rfl: 3    atenoloL (TENORMIN) 25 MG tablet, Take 1 tablet (25 mg total) by mouth every other day., Disp: 15 tablet, Rfl: 11    atorvastatin (LIPITOR) 40 MG tablet, TAKE 1 TABLET(40 MG) BY MOUTH EVERY  EVENING, Disp: 90 tablet, Rfl: 3    fish oil-omega-3 fatty acids 300-1,000 mg capsule, Take 1 capsule by mouth once daily. Hold for 7 days before surgery, Disp: , Rfl:     furosemide (LASIX) 20 MG tablet, Take 1 tablet (20 mg total) by mouth 2 (two) times daily., Disp: 180 tablet, Rfl: 3    irbesartan (AVAPRO) 150 MG tablet, Take 1 tablet (150 mg total) by mouth every evening., Disp: 90 tablet, Rfl: 3    methocarbamoL (ROBAXIN) 750 MG Tab, Take 1 tablet (750 mg total) by mouth nightly as needed (muscle spasms)., Disp: 10 tablet, Rfl: 0    multivitamin capsule, Take 1 capsule by mouth once daily. Hold AM of surgery, Disp: , Rfl:     pantoprazole (PROTONIX) 20 MG tablet, TAKE 1 TABLET(20 MG) BY MOUTH EVERY DAY, Disp: 90 tablet, Rfl: 3    vitamin D (VITAMIN D3) 1000 units Tab, Take 1,000 Units by mouth once daily., Disp: , Rfl:     Past Medical History:   Diagnosis Date    Anticoagulant long-term use     Aortic stenosis     Arthritis     CKD (chronic kidney disease), stage III     Class 2 severe obesity due to excess calories with serious comorbidity and body mass index (BMI) of 39.0 to 39.9 in adult 09/13/2018    Digestive disorder     GERD (gastroesophageal reflux disease)     Gout     Hypercholesteremia     Hypertension     PAF (paroxysmal atrial fibrillation)     Personal history of COVID-19 05/2022    S/P TAVR (transcatheter aortic valve replacement)        Past Surgical History:   Procedure Laterality Date    AORTIC VALVE REPLACEMENT N/A 10/8/2019    Procedure: Replacement-valve-aortic;  Surgeon: Fidel Cardenas MD;  Location: [facility] CATH LAB;  Service: Cardiology;  Laterality: N/A;    BACK SURGERY      bone graft neck    CARDIAC VALVE SURGERY      CARPAL TUNNEL RELEASE Left 10/3/2018    Procedure: RELEASE, CARPAL TUNNEL  LEFT;  Surgeon: Meche Cárdenas MD;  Location: [facility];  Service: Neurosurgery;  Laterality: Left;    CARPAL TUNNEL RELEASE Right 1/17/2019    Procedure: RELEASE, CARPAL TUNNEL RIGHT;   "Surgeon: Meche Cárdenas MD;  Location: [facility] CSC;  Service: Neurosurgery;  Laterality: Right;    CATHETERIZATION OF BOTH LEFT AND RIGHT HEART N/A 7/30/2019    Procedure: CATHETERIZATION, HEART, BOTH LEFT AND RIGHT;  Surgeon: Terrie Alberto MD;  Location: [facility] CATH;  Service: Cardiology;  Laterality: N/A;    CERVICAL FUSION  1984    x2     CORONARY ANGIOGRAPHY N/A 7/30/2019    Procedure: ANGIOGRAM, CORONARY ARTERY;  Surgeon: Terrie Alberto MD;  Location: [facility] CATH;  Service: Cardiology;  Laterality: N/A;    HERNIA REPAIR      umbilical    HYSTERECTOMY      TONSILLECTOMY         Family History   Problem Relation Age of Onset    Cancer Mother     Stroke Mother     Hypertension Mother     Ovarian cancer Mother     No Known Problems Father        Social History     Socioeconomic History    Marital status:    Tobacco Use    Smoking status: Never    Smokeless tobacco: Never   Substance and Sexual Activity    Alcohol use: No    Drug use: No       Review of patient's allergies indicates:   Allergen Reactions    Opioids - morphine analogues      nause and fever   Other reaction(s): Other (See Comments)  nause and fever     Tetanus vaccines and toxoid Rash and Swelling    Flu virus vacc tvs 2015-16 (18 yr up) cell derived      Other reaction(s): Other (See Comments)    Influenza virus vaccines Nausea And Vomiting    Iodine Hives    Iodine and iodide containing products     Morpholine analogues Nausea And Vomiting    Penicillins Other (See Comments)     High fever as a child, also was allergy tested 1980    Latex, natural rubber Rash       Review of Systems:  Neck pain.  Balance of review of systems is negative.    Physical Exam:  Vitals:    03/08/23 1259   BP: (!) 127/52   Pulse: (!) 45   Weight: 113.1 kg (249 lb 5.4 oz)   Height: 5' 7" (1.702 m)   PainSc:   2   PainLoc: Neck     Body mass index is 39.05 kg/m².    Gen: NAD  Psych: mood appropriate for given condition  HEENT: eyes anicteric   CV: RRR, 2+ radial " pulse  HEENT: anicteric   Respiratory: non-labored, no signs of respiratory distress  Abd: non-distended  Skin: warm, dry and intact.  Gait: Able to heel walk, toe walk. No antalgic gait.     Coordination:   Romberg: negative  Finger to nose coordination: normal  Heel to shin coordination: normal  Tandem walking coordination: normal    Cervical spine:   ROM is full in flexion, extension and lateral rotation without increased pain.  Spurling's maneuver causes no neck pain to either side.  Myofascial exam: No Tenderness to palpation across cervical paraspinous region bilaterally.    Lumbar spine:   ROM is full with flexion extension and oblique extension with no increased pain.    Branden's test causes no increased pain on either side.    Supine straight leg raise is negative bilaterally.    Internal and external rotation of the hip causes no increased pain on either side.  Myofascial exam: No tenderness to palpation across lumbar paraspinous muscles. No tenderness to palpation over the bilateral greater trochanters and bilateral SI joint    Sensory:  Intact and symmetrical to light touch in C4-T1 dermatomes bilaterally. Intact and symmetrical to light touch in L1-S1 dermatomes bilaterally.    Motor:    Right Left   C4 Shoulder Abduction  5  5   C5 Elbow Flexion    5  5   C6 Wrist Extension  5  5   C7 Elbow Extension   5  5   C8/T1 Hand Intrinsics   5  5        Right Left   L2/3 Iliacus Hip flexion  5  5   L3/4 Qudratus Femoris Knee Extension  5  5   L4/5 Tib Anterior Ankle Dorsiflexion   5  5   L5/S1 Extensor Hallicus Longus Great toe extension  5  5   S1/S2 Gastroc/Soleus Plantar Flexion  5  5      Right Left   Triceps DTR 2+ 2+   Biceps DTR 2+ 2+   Brachioradialis DTR 2+ 2+   Patellar DTR 2+ 2+   Achilles DTR 2+ 2+   Cordon Absent  Absent   Clonus Absent Absent   Babinski Absent Absent     Imaging:    Xray cervical spine 3-3-23:  Postoperative changes of solid anterior interbody fusion at C5-C7.  Cerclage wires  about the C5-6 spinous processes.  Stable slight reversal of the normal cervical lordosis with minimal anterolisthesis of C3 on C4.  There is no fracture.  Intervertebral disc space narrowing with degenerative endplate change and anterior marginal osteophyte formation at C4-5 and C7-T1. Multilevel facet arthropathy.  The odontoid process appears intact.  The prevertebral soft tissues are normal.  The airway is patent.    Labs:  Lab Results   Component Value Date    HGBA1C 5.3 02/15/2023       Lab Results   Component Value Date    WBC 7.68 02/15/2023    HGB 13.1 02/15/2023    HCT 42.8 02/15/2023    MCV 96 02/15/2023     02/15/2023           Assessment:     Ms. Walters has known cervical spondylosis and prior cervical spine fusion.  Current pain can be facet mediated vs myofascial.  We discussed options of medications, PT, home exercise, or interventional procedures.  She will try home exercise and stretching at this time.  Instructions given.  Follow up if no improvement. Call if she wants to proceed with PT.    Follow Up: RTC as needed.    : Not applicable      Problem List Items Addressed This Visit    None  Visit Diagnoses       Degenerative disc disease, cervical        Cervical radiculopathy                            Lynnette Chavarria PA-C  [facility]      This note was completed with dictation software and grammatical errors may exist.

## 2023-03-27 ENCOUNTER — OFFICE VISIT (OUTPATIENT)
Dept: CARDIOLOGY | Facility: CLINIC | Age: 83
End: 2023-03-27
Payer: MEDICARE

## 2023-03-27 VITALS
HEIGHT: 67 IN | SYSTOLIC BLOOD PRESSURE: 99 MMHG | DIASTOLIC BLOOD PRESSURE: 57 MMHG | BODY MASS INDEX: 38.37 KG/M2 | HEART RATE: 51 BPM | WEIGHT: 244.5 LBS

## 2023-03-27 DIAGNOSIS — Z95.2 S/P TAVR (TRANSCATHETER AORTIC VALVE REPLACEMENT): ICD-10-CM

## 2023-03-27 DIAGNOSIS — I10 ESSENTIAL HYPERTENSION: Primary | ICD-10-CM

## 2023-03-27 DIAGNOSIS — I35.0 SEVERE AORTIC STENOSIS: ICD-10-CM

## 2023-03-27 DIAGNOSIS — E78.00 PURE HYPERCHOLESTEROLEMIA: ICD-10-CM

## 2023-03-27 DIAGNOSIS — I48.91 ATRIAL FIBRILLATION, UNSPECIFIED TYPE: ICD-10-CM

## 2023-03-27 DIAGNOSIS — R73.03 PRE-DIABETES: ICD-10-CM

## 2023-03-27 DIAGNOSIS — I48.0 PAROXYSMAL ATRIAL FIBRILLATION: ICD-10-CM

## 2023-03-27 DIAGNOSIS — N18.32 STAGE 3B CHRONIC KIDNEY DISEASE: ICD-10-CM

## 2023-03-27 PROCEDURE — 99214 OFFICE O/P EST MOD 30 MIN: CPT | Mod: S$GLB,,, | Performed by: INTERNAL MEDICINE

## 2023-03-27 PROCEDURE — 1101F PR PT FALLS ASSESS DOC 0-1 FALLS W/OUT INJ PAST YR: ICD-10-PCS | Mod: CPTII,S$GLB,, | Performed by: INTERNAL MEDICINE

## 2023-03-27 PROCEDURE — 99214 PR OFFICE/OUTPT VISIT, EST, LEVL IV, 30-39 MIN: ICD-10-PCS | Mod: S$GLB,,, | Performed by: INTERNAL MEDICINE

## 2023-03-27 PROCEDURE — 3078F DIAST BP <80 MM HG: CPT | Mod: CPTII,S$GLB,, | Performed by: INTERNAL MEDICINE

## 2023-03-27 PROCEDURE — 1126F PR PAIN SEVERITY QUANTIFIED, NO PAIN PRESENT: ICD-10-PCS | Mod: CPTII,S$GLB,, | Performed by: INTERNAL MEDICINE

## 2023-03-27 PROCEDURE — 1101F PT FALLS ASSESS-DOCD LE1/YR: CPT | Mod: CPTII,S$GLB,, | Performed by: INTERNAL MEDICINE

## 2023-03-27 PROCEDURE — 1160F RVW MEDS BY RX/DR IN RCRD: CPT | Mod: CPTII,S$GLB,, | Performed by: INTERNAL MEDICINE

## 2023-03-27 PROCEDURE — 1159F PR MEDICATION LIST DOCUMENTED IN MEDICAL RECORD: ICD-10-PCS | Mod: CPTII,S$GLB,, | Performed by: INTERNAL MEDICINE

## 2023-03-27 PROCEDURE — 3288F PR FALLS RISK ASSESSMENT DOCUMENTED: ICD-10-PCS | Mod: CPTII,S$GLB,, | Performed by: INTERNAL MEDICINE

## 2023-03-27 PROCEDURE — 99999 PR PBB SHADOW E&M-EST. PATIENT-LVL III: ICD-10-PCS | Mod: PBBFAC,,, | Performed by: INTERNAL MEDICINE

## 2023-03-27 PROCEDURE — 3288F FALL RISK ASSESSMENT DOCD: CPT | Mod: CPTII,S$GLB,, | Performed by: INTERNAL MEDICINE

## 2023-03-27 PROCEDURE — 99999 PR PBB SHADOW E&M-EST. PATIENT-LVL III: CPT | Mod: PBBFAC,,, | Performed by: INTERNAL MEDICINE

## 2023-03-27 PROCEDURE — 1126F AMNT PAIN NOTED NONE PRSNT: CPT | Mod: CPTII,S$GLB,, | Performed by: INTERNAL MEDICINE

## 2023-03-27 PROCEDURE — 1160F PR REVIEW ALL MEDS BY PRESCRIBER/CLIN PHARMACIST DOCUMENTED: ICD-10-PCS | Mod: CPTII,S$GLB,, | Performed by: INTERNAL MEDICINE

## 2023-03-27 PROCEDURE — 3078F PR MOST RECENT DIASTOLIC BLOOD PRESSURE < 80 MM HG: ICD-10-PCS | Mod: CPTII,S$GLB,, | Performed by: INTERNAL MEDICINE

## 2023-03-27 PROCEDURE — 1159F MED LIST DOCD IN RCRD: CPT | Mod: CPTII,S$GLB,, | Performed by: INTERNAL MEDICINE

## 2023-03-27 PROCEDURE — 3074F SYST BP LT 130 MM HG: CPT | Mod: CPTII,S$GLB,, | Performed by: INTERNAL MEDICINE

## 2023-03-27 PROCEDURE — 3074F PR MOST RECENT SYSTOLIC BLOOD PRESSURE < 130 MM HG: ICD-10-PCS | Mod: CPTII,S$GLB,, | Performed by: INTERNAL MEDICINE

## 2023-03-27 NOTE — PROGRESS NOTES
Subjective:    Patient ID:  Selena Moulton is a 82 y.o. female patient here for evaluation Follow-up      History of Present Illness:  Cardiology follow-up, valvular heart disease.  TAVR 2019.  History of paroxysmal atrial fibrillation with yuki/DC cardioversion 06/2022.  Remains on chronic oral anticoagulation, low-dose amiodarone as well as atenolol 25 mg daily other day.  Currently in normal sinus rhythm.  No symptomatology.  No angina.  No syncope/presyncope.  No shortness breath no PND orthopnea.  No edema.             Review of patient's allergies indicates:   Allergen Reactions    Opioids - morphine analogues      nause and fever   Other reaction(s): Other (See Comments)  nause and fever     Tetanus vaccines and toxoid Rash and Swelling    Flu virus vacc tvs 2015-16 (18 yr up) cell derived      Other reaction(s): Other (See Comments)    Influenza virus vaccines Nausea And Vomiting    Iodine Hives    Iodine and iodide containing products     Morpholine analogues Nausea And Vomiting    Penicillins Other (See Comments)     High fever as a child, also was allergy tested 1980    Latex, natural rubber Rash       Past Medical History:   Diagnosis Date    Anticoagulant long-term use     Aortic stenosis     Arthritis     CKD (chronic kidney disease), stage III     Class 2 severe obesity due to excess calories with serious comorbidity and body mass index (BMI) of 39.0 to 39.9 in adult 09/13/2018    Digestive disorder     GERD (gastroesophageal reflux disease)     Gout     Hypercholesteremia     Hypertension     PAF (paroxysmal atrial fibrillation)     Personal history of COVID-19 05/2022    S/P TAVR (transcatheter aortic valve replacement)      Past Surgical History:   Procedure Laterality Date    AORTIC VALVE REPLACEMENT N/A 10/8/2019    Procedure: Replacement-valve-aortic;  Surgeon: Fidel Cardenas MD;  Location: [facility] CATH LAB;  Service: Cardiology;  Laterality: N/A;    BACK SURGERY      bone graft neck     CARDIAC VALVE SURGERY      CARPAL TUNNEL RELEASE Left 10/3/2018    Procedure: RELEASE, CARPAL TUNNEL  LEFT;  Surgeon: Meche Cárdenas MD;  Location: [facility];  Service: Neurosurgery;  Laterality: Left;    CARPAL TUNNEL RELEASE Right 1/17/2019    Procedure: RELEASE, CARPAL TUNNEL RIGHT;  Surgeon: Meche Cárdenas MD;  Location: [facility];  Service: Neurosurgery;  Laterality: Right;    CATHETERIZATION OF BOTH LEFT AND RIGHT HEART N/A 7/30/2019    Procedure: CATHETERIZATION, HEART, BOTH LEFT AND RIGHT;  Surgeon: Terrie Alberto MD;  Location: [facility] CATH;  Service: Cardiology;  Laterality: N/A;    CERVICAL FUSION  1984    x2     CORONARY ANGIOGRAPHY N/A 7/30/2019    Procedure: ANGIOGRAM, CORONARY ARTERY;  Surgeon: Terrie Alberto MD;  Location: [facility] CATH;  Service: Cardiology;  Laterality: N/A;    HERNIA REPAIR      umbilical    HYSTERECTOMY      TONSILLECTOMY       Social History     Tobacco Use    Smoking status: Never    Smokeless tobacco: Never   Substance Use Topics    Alcohol use: No    Drug use: No        Review of Systems:    As noted in HPI in addition      REVIEW OF SYSTEMS  Review of Systems   Constitutional: Negative for decreased appetite, diaphoresis, night sweats, weight gain and weight loss.   HENT:  Negative for nosebleeds and odynophagia.    Eyes:  Negative for double vision and photophobia.   Cardiovascular:  Negative for chest pain, claudication, cyanosis, dyspnea on exertion, irregular heartbeat, leg swelling, near-syncope, orthopnea, palpitations, paroxysmal nocturnal dyspnea and syncope.   Respiratory:  Negative for cough, hemoptysis, shortness of breath and wheezing.    Hematologic/Lymphatic: Negative for adenopathy.   Skin:  Negative for flushing, skin cancer and suspicious lesions.   Musculoskeletal:  Negative for gout, myalgias and neck pain.   Gastrointestinal:  Negative for abdominal pain, heartburn, hematemesis and hematochezia.   Genitourinary:  Negative for bladder incontinence,  hesitancy and nocturia.   Neurological:  Negative for focal weakness, headaches, light-headedness and paresthesias.   Psychiatric/Behavioral:  Negative for memory loss and substance abuse.             Objective:        Vitals:    03/27/23 1108   BP: (!) 99/57   Pulse: (!) 51       Lab Results   Component Value Date    WBC 7.68 02/15/2023    HGB 13.1 02/15/2023    HCT 42.8 02/15/2023     02/15/2023    CHOL 177 02/15/2023    TRIG 93 02/15/2023    HDL 60 02/15/2023    ALT 21 02/15/2023    AST 22 02/15/2023     03/03/2023    K 4.2 03/03/2023     03/03/2023    CREATININE 1.7 (H) 03/03/2023    BUN 31 (H) 03/03/2023    CO2 29 03/03/2023    INR 0.9 10/08/2019    HGBA1C 5.3 02/15/2023        ECHOCARDIOGRAM RESULTS  Results for orders placed during the hospital encounter of 06/17/22    Transesophageal echo (MURRAY) with possible cardioversion    Interpretation Summary  · The left ventricle is normal in size with low normal systolic function.  · Normal right ventricular size with normal right ventricular systolic function.  · Mild left atrial enlargement.  · There is a transcutaneously-placed aortic bioprosthesis present. Prosthetic aortic valve is normal.  · Mild-to-moderate mitral regurgitation.  · Mild tricuspid regurgitation.  · The estimated ejection fraction is 50%.  · A 150 J synchronized cardioversion was unsuccessful without restoration of normal sinus rhythm.  · A 200 J synchronized cardioversion was successfully performed with restoration of normal sinus rhythm.        CURRENT/PREVIOUS VISIT EKG  Results for orders placed or performed in visit on 09/13/22   IN OFFICE EKG 12-LEAD (to [place])    Collection Time: 09/13/22 10:04 AM    Narrative    Test Reason : I48.0,    Vent. Rate : 046 BPM     Atrial Rate : 046 BPM     P-R Int : 232 ms          QRS Dur : 096 ms      QT Int : 500 ms       P-R-T Axes : 080 058 062 degrees     QTc Int : 437 ms    Sinus bradycardia with 1st degree A-V block  Abnormal  ECG  When compared with ECG of 09-AUG-2022 13:50,  Serial comparison not performed, all previous tracings are of poor data  quality    Confirmed by ENZO MCGOWAN MD ([id number]) on 9/14/2022 8:19:14 AM    Referred By: ABHISHEK PALACIO           Confirmed By:ENZO MCGOWAN MD     No valid procedures specified.   Results for orders placed during the hospital encounter of 08/09/22    Nuclear Stress - Cardiology Interpreted    Interpretation Summary    Normal myocardial perfusion scan. There is no evidence of myocardial ischemia or infarction.    The gated perfusion images showed an ejection fraction of 83% post stress.    LV cavity size is normal at rest and normal at stress.    The EKG portion of this study is abnormal but not diagnostic.    No valid procedures specified.    PHYSICAL EXAM  CONSTITUTIONAL: Well built, well nourished in no apparent distress  NECK: no carotid bruit, no JVD  LUNGS: CTA  CHEST WALL: no tenderness,  HEART: regular rate and rhythm, S1, S2 normal, no murmur, click, rub or gallop   ABDOMEN: soft, non-tender; bowel sounds normal; no masses,  no organomegaly  EXTREMITIES: Extremities normal, no edema, no calf tenderness noted  NEURO: AAO X 3    I HAVE REVIEWED :    The vital signs, nurses notes, and all the pertinent radiology and labs.         Current Outpatient Medications   Medication Instructions    acetaminophen (TYLENOL) 500 mg, Oral, Every 8 hours PRN    allopurinoL (ZYLOPRIM) 300 MG tablet TAKE 1 TABLET(300 MG) BY MOUTH EVERY DAY    amiodarone (PACERONE) 200 MG Tab TAKE 1 TABLET(200 MG) BY MOUTH EVERY DAY    amLODIPine (NORVASC) 2.5 MG tablet TAKE 1 TABLET(2.5 MG) BY MOUTH EVERY DAY    atenoloL (TENORMIN) 25 mg, Oral, Every other day    atorvastatin (LIPITOR) 40 MG tablet TAKE 1 TABLET(40 MG) BY MOUTH EVERY EVENING    ELIQUIS 5 mg, Oral, 2 times daily    fish oil-omega-3 fatty acids 300-1,000 mg capsule 1 capsule, Oral, Daily, Hold for 7 days before surgery    furosemide (LASIX) 20 mg, Oral, 2  times daily    irbesartan (AVAPRO) 150 mg, Oral, Nightly    multivitamin capsule 1 capsule, Oral, Daily, Hold AM of surgery    pantoprazole (PROTONIX) 20 MG tablet TAKE 1 TABLET(20 MG) BY MOUTH EVERY DAY    vitamin D (VITAMIN D3) 1,000 Units, Oral, Daily          Assessment:   Valvular heart disease, TAVR 2019.  Nonobstructive coronary artery disease.  Paroxysmal atrial fibrillation status post successful yuki/DC cardioversion 06/2022.  Hypertension.  Repeat blood pressure by me 132 /70.         Plan:   Meds reviewed and reconciled.  Recommend no changes.  Continue risk factor modification via weight loss diet exercise.  Continue and anticoagulation via Eliquis 5 mg b.i.d.    Six months.      No follow-ups on file.

## 2023-03-29 ENCOUNTER — PES CALL (OUTPATIENT)
Dept: ADMINISTRATIVE | Facility: CLINIC | Age: 83
End: 2023-03-29
Payer: MEDICARE

## 2023-04-06 DIAGNOSIS — I48.91 ATRIAL FIBRILLATION, UNSPECIFIED TYPE: ICD-10-CM

## 2023-04-06 RX ORDER — APIXABAN 5 MG/1
5 TABLET, FILM COATED ORAL 2 TIMES DAILY
Qty: 90 TABLET | Refills: 3 | Status: CANCELLED | OUTPATIENT
Start: 2023-04-06

## 2023-05-05 DIAGNOSIS — I10 ESSENTIAL HYPERTENSION: ICD-10-CM

## 2023-05-05 DIAGNOSIS — I48.91 ATRIAL FIBRILLATION, UNSPECIFIED TYPE: ICD-10-CM

## 2023-05-05 RX ORDER — FUROSEMIDE 20 MG/1
TABLET ORAL
Qty: 180 TABLET | Refills: 3 | Status: SHIPPED | OUTPATIENT
Start: 2023-05-05

## 2023-06-18 DIAGNOSIS — E78.49 OTHER HYPERLIPIDEMIA: ICD-10-CM

## 2023-06-18 NOTE — TELEPHONE ENCOUNTER
No care due was identified.  [facility] Embedded Care Due Messages. Reference number: [id number].   6/18/2023 8:09:35 AM CDT

## 2023-06-19 RX ORDER — AMLODIPINE BESYLATE 2.5 MG/1
TABLET ORAL
Qty: 90 TABLET | Refills: 2 | Status: SHIPPED | OUTPATIENT
Start: 2023-06-19

## 2023-06-19 RX ORDER — ATORVASTATIN CALCIUM 40 MG/1
TABLET, FILM COATED ORAL
Qty: 90 TABLET | Refills: 2 | Status: SHIPPED | OUTPATIENT
Start: 2023-06-19

## 2023-06-19 NOTE — TELEPHONE ENCOUNTER
Refill Decision Note      Refill Decision Note   Selena Moulton  is requesting a refill authorization.  Brief Assessment and Rationale for Refill:  Approve     Medication Therapy Plan:         Pharmacist review requested: Yes   Extended chart review required: Yes   Comments:     Note composed:1:28 PM 06/19/2023             Appointments     Last Visit   3/3/2023 Aby Perales MD   Next Visit   8/15/2023 Aby Perales MD

## 2023-06-19 NOTE — TELEPHONE ENCOUNTER
Refill Routing Note   Medication(s) are not appropriate for processing by [facility] for the following reason(s):      Drug-disease interaction    [facility] action(s):  Defer  Approve Care Due:  None identified        Pharmacist review requested: Yes     Appointments  past 12m or future 3m with PCP    Date Provider   Last Visit   3/3/2023 Aby Perales MD   Next Visit   8/15/2023 bAy Perales MD   ED visits in past 90 days: 0        Note composed:1:14 PM 06/19/2023

## 2023-06-21 ENCOUNTER — TELEPHONE (OUTPATIENT)
Dept: ADMINISTRATIVE | Facility: CLINIC | Age: 83
End: 2023-06-21
Payer: MEDICARE

## 2023-07-03 ENCOUNTER — PES CALL (OUTPATIENT)
Dept: ADMINISTRATIVE | Facility: CLINIC | Age: 83
End: 2023-07-03
Payer: MEDICARE

## 2023-08-02 ENCOUNTER — TELEPHONE (OUTPATIENT)
Dept: CARDIOLOGY | Facility: CLINIC | Age: 83
End: 2023-08-02
Payer: MEDICARE

## 2023-08-15 ENCOUNTER — OFFICE VISIT (OUTPATIENT)
Dept: FAMILY MEDICINE | Facility: CLINIC | Age: 83
End: 2023-08-15
Payer: MEDICARE

## 2023-08-15 ENCOUNTER — LAB VISIT (OUTPATIENT)
Dept: LAB | Facility: HOSPITAL | Age: 83
End: 2023-08-15
Attending: FAMILY MEDICINE
Payer: MEDICARE

## 2023-08-15 VITALS
BODY MASS INDEX: 40.66 KG/M2 | WEIGHT: 259.06 LBS | HEART RATE: 59 BPM | HEIGHT: 67 IN | DIASTOLIC BLOOD PRESSURE: 58 MMHG | SYSTOLIC BLOOD PRESSURE: 130 MMHG | OXYGEN SATURATION: 96 %

## 2023-08-15 DIAGNOSIS — R73.03 PREDIABETES: ICD-10-CM

## 2023-08-15 DIAGNOSIS — R06.02 SHORTNESS OF BREATH: ICD-10-CM

## 2023-08-15 DIAGNOSIS — I10 ESSENTIAL HYPERTENSION: Primary | ICD-10-CM

## 2023-08-15 DIAGNOSIS — M1A.0720 IDIOPATHIC CHRONIC GOUT OF LEFT FOOT WITHOUT TOPHUS: ICD-10-CM

## 2023-08-15 DIAGNOSIS — N18.4 STAGE 4 CHRONIC KIDNEY DISEASE: ICD-10-CM

## 2023-08-15 DIAGNOSIS — E78.49 OTHER HYPERLIPIDEMIA: ICD-10-CM

## 2023-08-15 LAB
ANION GAP SERPL CALC-SCNC: 11 MMOL/L (ref 8–16)
BASOPHILS # BLD AUTO: 0.03 K/UL (ref 0–0.2)
BASOPHILS NFR BLD: 0.4 % (ref 0–1.9)
BNP SERPL-MCNC: 248 PG/ML (ref 0–99)
BUN SERPL-MCNC: 21 MG/DL (ref 8–23)
CALCIUM SERPL-MCNC: 10.2 MG/DL (ref 8.7–10.5)
CHLORIDE SERPL-SCNC: 102 MMOL/L (ref 95–110)
CO2 SERPL-SCNC: 29 MMOL/L (ref 23–29)
CREAT SERPL-MCNC: 1.5 MG/DL (ref 0.5–1.4)
DIFFERENTIAL METHOD: ABNORMAL
EOSINOPHIL # BLD AUTO: 0.2 K/UL (ref 0–0.5)
EOSINOPHIL NFR BLD: 2.2 % (ref 0–8)
ERYTHROCYTE [DISTWIDTH] IN BLOOD BY AUTOMATED COUNT: 14.8 % (ref 11.5–14.5)
EST. GFR  (NO RACE VARIABLE): 33.9 ML/MIN/1.73 M^2
ESTIMATED AVG GLUCOSE: 111 MG/DL (ref 68–131)
GLUCOSE SERPL-MCNC: 86 MG/DL (ref 70–110)
HBA1C MFR BLD: 5.5 % (ref 4–5.6)
HCT VFR BLD AUTO: 40.7 % (ref 37–48.5)
HGB BLD-MCNC: 12.5 G/DL (ref 12–16)
IMM GRANULOCYTES # BLD AUTO: 0.02 K/UL (ref 0–0.04)
IMM GRANULOCYTES NFR BLD AUTO: 0.3 % (ref 0–0.5)
LYMPHOCYTES # BLD AUTO: 1.6 K/UL (ref 1–4.8)
LYMPHOCYTES NFR BLD: 22 % (ref 18–48)
MCH RBC QN AUTO: 28.3 PG (ref 27–31)
MCHC RBC AUTO-ENTMCNC: 30.7 G/DL (ref 32–36)
MCV RBC AUTO: 92 FL (ref 82–98)
MONOCYTES # BLD AUTO: 0.7 K/UL (ref 0.3–1)
MONOCYTES NFR BLD: 9.3 % (ref 4–15)
NEUTROPHILS # BLD AUTO: 4.9 K/UL (ref 1.8–7.7)
NEUTROPHILS NFR BLD: 65.8 % (ref 38–73)
NRBC BLD-RTO: 0 /100 WBC
PLATELET # BLD AUTO: 155 K/UL (ref 150–450)
PMV BLD AUTO: 10.5 FL (ref 9.2–12.9)
POTASSIUM SERPL-SCNC: 4.3 MMOL/L (ref 3.5–5.1)
RBC # BLD AUTO: 4.41 M/UL (ref 4–5.4)
SODIUM SERPL-SCNC: 142 MMOL/L (ref 136–145)
URATE SERPL-MCNC: 4.6 MG/DL (ref 2.4–5.7)
WBC # BLD AUTO: 7.44 K/UL (ref 3.9–12.7)

## 2023-08-15 PROCEDURE — 3075F SYST BP GE 130 - 139MM HG: CPT | Mod: CPTII,S$GLB,, | Performed by: FAMILY MEDICINE

## 2023-08-15 PROCEDURE — 99214 OFFICE O/P EST MOD 30 MIN: CPT | Mod: S$GLB,,, | Performed by: FAMILY MEDICINE

## 2023-08-15 PROCEDURE — 1159F PR MEDICATION LIST DOCUMENTED IN MEDICAL RECORD: ICD-10-PCS | Mod: CPTII,S$GLB,, | Performed by: FAMILY MEDICINE

## 2023-08-15 PROCEDURE — 99214 PR OFFICE/OUTPT VISIT, EST, LEVL IV, 30-39 MIN: ICD-10-PCS | Mod: S$GLB,,, | Performed by: FAMILY MEDICINE

## 2023-08-15 PROCEDURE — 84550 ASSAY OF BLOOD/URIC ACID: CPT | Performed by: FAMILY MEDICINE

## 2023-08-15 PROCEDURE — 83036 HEMOGLOBIN GLYCOSYLATED A1C: CPT | Performed by: FAMILY MEDICINE

## 2023-08-15 PROCEDURE — 99999 PR PBB SHADOW E&M-EST. PATIENT-LVL IV: CPT | Mod: PBBFAC,,, | Performed by: FAMILY MEDICINE

## 2023-08-15 PROCEDURE — 1159F MED LIST DOCD IN RCRD: CPT | Mod: CPTII,S$GLB,, | Performed by: FAMILY MEDICINE

## 2023-08-15 PROCEDURE — 3288F FALL RISK ASSESSMENT DOCD: CPT | Mod: CPTII,S$GLB,, | Performed by: FAMILY MEDICINE

## 2023-08-15 PROCEDURE — 1126F PR PAIN SEVERITY QUANTIFIED, NO PAIN PRESENT: ICD-10-PCS | Mod: CPTII,S$GLB,, | Performed by: FAMILY MEDICINE

## 2023-08-15 PROCEDURE — 85025 COMPLETE CBC W/AUTO DIFF WBC: CPT | Performed by: FAMILY MEDICINE

## 2023-08-15 PROCEDURE — 3288F PR FALLS RISK ASSESSMENT DOCUMENTED: ICD-10-PCS | Mod: CPTII,S$GLB,, | Performed by: FAMILY MEDICINE

## 2023-08-15 PROCEDURE — 99999 PR PBB SHADOW E&M-EST. PATIENT-LVL IV: ICD-10-PCS | Mod: PBBFAC,,, | Performed by: FAMILY MEDICINE

## 2023-08-15 PROCEDURE — 1101F PT FALLS ASSESS-DOCD LE1/YR: CPT | Mod: CPTII,S$GLB,, | Performed by: FAMILY MEDICINE

## 2023-08-15 PROCEDURE — 3078F PR MOST RECENT DIASTOLIC BLOOD PRESSURE < 80 MM HG: ICD-10-PCS | Mod: CPTII,S$GLB,, | Performed by: FAMILY MEDICINE

## 2023-08-15 PROCEDURE — 3075F PR MOST RECENT SYSTOLIC BLOOD PRESS GE 130-139MM HG: ICD-10-PCS | Mod: CPTII,S$GLB,, | Performed by: FAMILY MEDICINE

## 2023-08-15 PROCEDURE — 3078F DIAST BP <80 MM HG: CPT | Mod: CPTII,S$GLB,, | Performed by: FAMILY MEDICINE

## 2023-08-15 PROCEDURE — 80048 BASIC METABOLIC PNL TOTAL CA: CPT | Performed by: FAMILY MEDICINE

## 2023-08-15 PROCEDURE — 83880 ASSAY OF NATRIURETIC PEPTIDE: CPT | Performed by: FAMILY MEDICINE

## 2023-08-15 PROCEDURE — 1101F PR PT FALLS ASSESS DOC 0-1 FALLS W/OUT INJ PAST YR: ICD-10-PCS | Mod: CPTII,S$GLB,, | Performed by: FAMILY MEDICINE

## 2023-08-15 PROCEDURE — 1126F AMNT PAIN NOTED NONE PRSNT: CPT | Mod: CPTII,S$GLB,, | Performed by: FAMILY MEDICINE

## 2023-08-15 PROCEDURE — 36415 COLL VENOUS BLD VENIPUNCTURE: CPT | Mod: PO | Performed by: FAMILY MEDICINE

## 2023-08-15 RX ORDER — ALLOPURINOL 300 MG/1
300 TABLET ORAL DAILY
Qty: 90 TABLET | Refills: 3 | Status: SHIPPED | OUTPATIENT
Start: 2023-08-15 | End: 2023-08-28

## 2023-08-15 RX ORDER — IRBESARTAN AND HYDROCHLOROTHIAZIDE 150; 12.5 MG/1; MG/1
1 TABLET, FILM COATED ORAL
COMMUNITY
Start: 2023-04-25 | End: 2023-08-15 | Stop reason: DRUGHIGH

## 2023-08-15 NOTE — PROGRESS NOTES
Assessment:       1. Essential hypertension    2. Stage 4 chronic kidney disease    3. Other hyperlipidemia    4. Shortness of breath    5. Prediabetes    6. BMI 40.0-44.9, adult        Plan:       Essential hypertension:  Stable    Stage 4 chronic kidney disease:  Worsening  -     Basic Metabolic Panel; Future; Expected date: 08/15/2023  -     CBC Auto Differential; Future; Expected date: 08/15/2023  -     Uric Acid; Future; Expected date: 08/15/2023    Other hyperlipidemia:  Stable    Shortness of breath:  Improved  -     BNP; Future; Expected date: 08/15/2023    Prediabetes:  Improved  -     Hemoglobin A1C; Future; Expected date: 08/15/2023    BMI 40.0-44.9, adult: Worsening    Idiopathic chronic gout of left foot without tophus:  Stable    -     allopurinoL (ZYLOPRIM) 300 MG tablet; Take 1 tablet (300 mg total) by mouth once daily.  Dispense: 90 tablet; Refill: 3         Healthy habits, avoid any fried foods, processed foods, eat more veggies, more salads, drink more water.  Medications were refill.  The patient's BMI has been recorded in the chart. The patient has been provided educational materials regarding the benefits of attaining and maintaining a normal weight. We will continue to address and follow this issue during follow up visits.   Patient agreed with assessment and plan. Patient verbalized understanding.     Subjective:       Patient ID: Selena Moulton is a 83 y.o. female.    Chief Complaint: Follow-up (6 month follow up)    HPI    Hypertension:  The patient has been taking amlodipine and telmisartan, denies any side effects of the medication, the patient denies symptoms of chest pain and shortness of breath at this visit.     Hyperlipidemia:  The last cholesterol levels were therapeutic, the LDL levels were 98, total cholesterol 177.  The patient is currently taking atorvastatin 40 mg at bedtime and fish oil.    Shortness of breath:  The patient stated the shortness of breath symptoms are  improved, she also has prediabetes, the last hemoglobin A1c was 5.5. She will need a refill for the gout medication, she is not having any new gouty attack, she gain some weight, the patient stated that she has been eating more than usual because she feels depressed due to family problems.  She also has chronic kidney disease stage 4 and currently seen the kidney specialist.    Past medical history, past social history was reviewed and discussed with the patient.    Review of Systems   Constitutional:  Positive for unexpected weight change. Negative for activity change and appetite change.   HENT:  Negative for congestion and ear discharge.    Eyes:  Negative for discharge and itching.   Respiratory:  Positive for shortness of breath. Negative for choking and chest tightness.    Cardiovascular:  Negative for chest pain and palpitations.   Gastrointestinal:  Negative for abdominal distention, abdominal pain and constipation.   Endocrine: Negative for cold intolerance and heat intolerance.   Genitourinary:  Negative for dysuria and flank pain.   Musculoskeletal:  Negative for arthralgias and back pain.   Skin:  Negative for pallor and rash.   Allergic/Immunologic: Negative for environmental allergies and food allergies.   Neurological:  Negative for dizziness, facial asymmetry and headaches.   Hematological:  Negative for adenopathy. Does not bruise/bleed easily.   Psychiatric/Behavioral:  Negative for agitation and confusion.        Objective:      Physical Exam  Vitals and nursing note reviewed.   Constitutional:       General: She is not in acute distress.     Appearance: Normal appearance. She is well-developed. She is obese. She is not diaphoretic.      Comments: Limited ambulation patient is using a walker for mobilization   HENT:      Head: Normocephalic and atraumatic.      Right Ear: External ear normal.      Left Ear: External ear normal.      Nose: Nose normal.   Eyes:      General: No scleral icterus.         Right eye: No discharge.         Left eye: No discharge.      Conjunctiva/sclera: Conjunctivae normal.   Cardiovascular:      Rate and Rhythm: Normal rate and regular rhythm.      Heart sounds: Normal heart sounds.   Pulmonary:      Effort: Pulmonary effort is normal. No respiratory distress.      Breath sounds: Normal breath sounds. No wheezing.   Musculoskeletal:         General: No tenderness or deformity.      Cervical back: Neck supple.   Skin:     Coloration: Skin is not pale.      Findings: No erythema.      Comments: Varicosity veins of bilateral lower extremities.   Neurological:      Mental Status: She is alert.      Cranial Nerves: No cranial nerve deficit.   Psychiatric:         Behavior: Behavior normal.         Thought Content: Thought content normal.         Judgment: Judgment normal.

## 2023-08-16 ENCOUNTER — TELEPHONE (OUTPATIENT)
Dept: FAMILY MEDICINE | Facility: CLINIC | Age: 83
End: 2023-08-16
Payer: MEDICARE

## 2023-08-16 NOTE — TELEPHONE ENCOUNTER
"Can you please let the patient know:  "The results of the blood work is showing that the BNP levels are slightly up from previous, kidney function is better, uric acid levels are improved.  Glucose still very good.  Please recommend the patient to eat healthy, decrease salt intake, monitor weight, follow-up with the cardiologist as directed, if the patient continues to gain weight to let me know if she develops swelling on the lower extremities, will have to increase the Lasix to twice daily.  Thank you."  "

## 2023-08-17 NOTE — TELEPHONE ENCOUNTER
Tried calling pt but no answer.   Called pt's daughter, valente & gave her the results.   Went over the results with her & she verbalized understanding

## 2023-08-27 DIAGNOSIS — M1A.0720 IDIOPATHIC CHRONIC GOUT OF LEFT FOOT WITHOUT TOPHUS: ICD-10-CM

## 2023-08-27 NOTE — TELEPHONE ENCOUNTER
No care due was identified.  [facility] Embedded Care Due Messages. Reference number: [id number].   8/27/2023 3:44:41 AM CDT

## 2023-08-28 RX ORDER — ALLOPURINOL 300 MG/1
300 TABLET ORAL
Qty: 90 TABLET | Refills: 1 | Status: SHIPPED | OUTPATIENT
Start: 2023-08-28 | End: 2024-02-12

## 2023-08-28 RX ORDER — ATENOLOL 25 MG/1
TABLET ORAL
Qty: 15 TABLET | Refills: 11 | Status: SHIPPED | OUTPATIENT
Start: 2023-08-28 | End: 2024-01-30

## 2023-08-28 NOTE — TELEPHONE ENCOUNTER
Refill Routing Note   Medication(s) are not appropriate for processing by [facility] for the following reason(s):      Required labs abnormal    [facility] action(s):  Defer Care Due:  None identified            Pharmacist review requested: Yes     Appointments  past 12m or future 3m with PCP    Date Provider   Last Visit   8/15/2023 Aby Perales MD   Next Visit   2/15/2024 Aby Perales MD   ED visits in past 90 days: 0        Note composed:9:28 AM 08/28/2023

## 2023-08-28 NOTE — TELEPHONE ENCOUNTER
Refill Decision Note   Selena Moulton  is requesting a refill authorization.  Brief Assessment and Rationale for Refill:  Approve     Medication Therapy Plan:  renal fxn reviewed, dose ok; different pharmacy requesting refills      Pharmacist review requested: Yes   Extended chart review required: Yes   Comments:     Note composed:11:54 AM 08/28/2023             Appointments     Last Visit   8/15/2023 Aby Perales MD   Next Visit   2/15/2024 Aby Perales MD

## 2023-10-05 ENCOUNTER — OFFICE VISIT (OUTPATIENT)
Dept: CARDIOLOGY | Facility: CLINIC | Age: 83
End: 2023-10-05
Payer: MEDICARE

## 2023-10-05 VITALS
DIASTOLIC BLOOD PRESSURE: 65 MMHG | WEIGHT: 257.25 LBS | SYSTOLIC BLOOD PRESSURE: 116 MMHG | BODY MASS INDEX: 40.38 KG/M2 | HEART RATE: 62 BPM | HEIGHT: 67 IN

## 2023-10-05 DIAGNOSIS — N18.31 STAGE 3A CHRONIC KIDNEY DISEASE: ICD-10-CM

## 2023-10-05 DIAGNOSIS — J98.4 SCARRING OF LUNG: Primary | ICD-10-CM

## 2023-10-05 DIAGNOSIS — R06.02 SOB (SHORTNESS OF BREATH): ICD-10-CM

## 2023-10-05 DIAGNOSIS — Z79.02 LONG TERM (CURRENT) USE OF ANTITHROMBOTICS/ANTIPLATELETS: ICD-10-CM

## 2023-10-05 DIAGNOSIS — Z95.2 S/P TAVR (TRANSCATHETER AORTIC VALVE REPLACEMENT): ICD-10-CM

## 2023-10-05 DIAGNOSIS — I48.0 PAROXYSMAL ATRIAL FIBRILLATION: ICD-10-CM

## 2023-10-05 DIAGNOSIS — E78.00 PURE HYPERCHOLESTEROLEMIA: ICD-10-CM

## 2023-10-05 DIAGNOSIS — I10 ESSENTIAL HYPERTENSION: ICD-10-CM

## 2023-10-05 DIAGNOSIS — Z01.810 ENCOUNTER FOR PRE-OPERATIVE CARDIOVASCULAR CLEARANCE: ICD-10-CM

## 2023-10-05 PROCEDURE — 99214 PR OFFICE/OUTPT VISIT, EST, LEVL IV, 30-39 MIN: ICD-10-PCS | Mod: S$GLB,,, | Performed by: INTERNAL MEDICINE

## 2023-10-05 PROCEDURE — 3078F DIAST BP <80 MM HG: CPT | Mod: CPTII,S$GLB,, | Performed by: INTERNAL MEDICINE

## 2023-10-05 PROCEDURE — 3078F PR MOST RECENT DIASTOLIC BLOOD PRESSURE < 80 MM HG: ICD-10-PCS | Mod: CPTII,S$GLB,, | Performed by: INTERNAL MEDICINE

## 2023-10-05 PROCEDURE — 3074F PR MOST RECENT SYSTOLIC BLOOD PRESSURE < 130 MM HG: ICD-10-PCS | Mod: CPTII,S$GLB,, | Performed by: INTERNAL MEDICINE

## 2023-10-05 PROCEDURE — 1125F PR PAIN SEVERITY QUANTIFIED, PAIN PRESENT: ICD-10-PCS | Mod: CPTII,S$GLB,, | Performed by: INTERNAL MEDICINE

## 2023-10-05 PROCEDURE — 99214 OFFICE O/P EST MOD 30 MIN: CPT | Mod: S$GLB,,, | Performed by: INTERNAL MEDICINE

## 2023-10-05 PROCEDURE — 1159F MED LIST DOCD IN RCRD: CPT | Mod: CPTII,S$GLB,, | Performed by: INTERNAL MEDICINE

## 2023-10-05 PROCEDURE — 99999 PR PBB SHADOW E&M-EST. PATIENT-LVL III: ICD-10-PCS | Mod: PBBFAC,,, | Performed by: INTERNAL MEDICINE

## 2023-10-05 PROCEDURE — 3074F SYST BP LT 130 MM HG: CPT | Mod: CPTII,S$GLB,, | Performed by: INTERNAL MEDICINE

## 2023-10-05 PROCEDURE — 1159F PR MEDICATION LIST DOCUMENTED IN MEDICAL RECORD: ICD-10-PCS | Mod: CPTII,S$GLB,, | Performed by: INTERNAL MEDICINE

## 2023-10-05 PROCEDURE — 99999 PR PBB SHADOW E&M-EST. PATIENT-LVL III: CPT | Mod: PBBFAC,,, | Performed by: INTERNAL MEDICINE

## 2023-10-05 PROCEDURE — 1125F AMNT PAIN NOTED PAIN PRSNT: CPT | Mod: CPTII,S$GLB,, | Performed by: INTERNAL MEDICINE

## 2023-10-05 NOTE — PROGRESS NOTES
Subjective:    Patient ID:  Selena Moulton is a 83 y.o. female patient here for evaluation Hypertension (Sob today /)      History of Present Illness:  Follow-up.  Valvular heart issues, status post TAVR 2019, history of known nonobstructive coronary disease by left heart catheterization.  History of PAF status post yuki/DC cardioversion 06/2022.  Remains in sinus rhythm.  Stable SCHMIDT, no angina no PND orthopnea.  No edema.  No Problems with current medications.  No bleeding issues, remains on oral anticoagulation with Eliquis 5 b.i.d..             Review of patient's allergies indicates:   Allergen Reactions    Opioids - morphine analogues      nause and fever   Other reaction(s): Other (See Comments)  nause and fever     Tetanus vaccines and toxoid Rash and Swelling    Flu virus vacc tvs 2015-16 (18 yr up) cell derived      Other reaction(s): Other (See Comments)    Influenza virus vaccines Nausea And Vomiting    Iodine Hives    Iodine and iodide containing products     Morpholine analogues Nausea And Vomiting    Penicillins Other (See Comments)     High fever as a child, also was allergy tested 1980    Latex, natural rubber Rash       Past Medical History:   Diagnosis Date    Anticoagulant long-term use     Aortic stenosis     Arthritis     CKD (chronic kidney disease), stage III     Class 2 severe obesity due to excess calories with serious comorbidity and body mass index (BMI) of 39.0 to 39.9 in adult 09/13/2018    Digestive disorder     GERD (gastroesophageal reflux disease)     Gout     Hypercholesteremia     Hypertension     PAF (paroxysmal atrial fibrillation)     Personal history of COVID-19 05/2022    S/P TAVR (transcatheter aortic valve replacement)      Past Surgical History:   Procedure Laterality Date    AORTIC VALVE REPLACEMENT N/A 10/8/2019    Procedure: Replacement-valve-aortic;  Surgeon: Fidel Cardenas MD;  Location: [facility] CATH LAB;  Service: Cardiology;  Laterality: N/A;    BACK SURGERY       bone graft neck    CARDIAC VALVE SURGERY      CARPAL TUNNEL RELEASE Left 10/3/2018    Procedure: RELEASE, CARPAL TUNNEL  LEFT;  Surgeon: Meche Cárdenas MD;  Location: [facility];  Service: Neurosurgery;  Laterality: Left;    CARPAL TUNNEL RELEASE Right 1/17/2019    Procedure: RELEASE, CARPAL TUNNEL RIGHT;  Surgeon: Meche Cárdenas MD;  Location: [facility];  Service: Neurosurgery;  Laterality: Right;    CATHETERIZATION OF BOTH LEFT AND RIGHT HEART N/A 7/30/2019    Procedure: CATHETERIZATION, HEART, BOTH LEFT AND RIGHT;  Surgeon: Terrie Alberto MD;  Location: [facility] CATH;  Service: Cardiology;  Laterality: N/A;    CERVICAL FUSION  1984    x2     CORONARY ANGIOGRAPHY N/A 7/30/2019    Procedure: ANGIOGRAM, CORONARY ARTERY;  Surgeon: Terrie Alberto MD;  Location: [facility] CATH;  Service: Cardiology;  Laterality: N/A;    HERNIA REPAIR      umbilical    HYSTERECTOMY      TONSILLECTOMY       Social History     Tobacco Use    Smoking status: Never    Smokeless tobacco: Never   Substance Use Topics    Alcohol use: No    Drug use: No        Review of Systems:    As noted in HPI in addition      REVIEW OF SYSTEMS  Review of Systems   Constitutional: Negative for decreased appetite, diaphoresis, night sweats, weight gain and weight loss.   HENT:  Negative for nosebleeds and odynophagia.    Eyes:  Negative for double vision and photophobia.   Cardiovascular:  Positive for dyspnea on exertion. Negative for chest pain, claudication, cyanosis, irregular heartbeat, leg swelling, near-syncope, orthopnea, palpitations, paroxysmal nocturnal dyspnea and syncope.   Respiratory:  Positive for shortness of breath. Negative for cough, hemoptysis and wheezing.    Hematologic/Lymphatic: Negative for adenopathy.   Skin:  Negative for flushing, skin cancer and suspicious lesions.   Musculoskeletal:  Negative for gout, myalgias and neck pain.   Gastrointestinal:  Negative for abdominal pain, heartburn, hematemesis and hematochezia.    Genitourinary:  Negative for bladder incontinence, hesitancy and nocturia.   Neurological:  Negative for focal weakness, headaches, light-headedness and paresthesias.   Psychiatric/Behavioral:  Negative for memory loss and substance abuse.               Objective:        Vitals:    10/05/23 1307   BP: 116/65   Pulse: 62       Lab Results   Component Value Date    WBC 7.44 08/15/2023    HGB 12.5 08/15/2023    HCT 40.7 08/15/2023     08/15/2023    CHOL 177 02/15/2023    TRIG 93 02/15/2023    HDL 60 02/15/2023    ALT 21 02/15/2023    AST 22 02/15/2023     08/15/2023    K 4.3 08/15/2023     08/15/2023    CREATININE 1.5 (H) 08/15/2023    BUN 21 08/15/2023    CO2 29 08/15/2023    INR 0.9 10/08/2019    HGBA1C 5.5 08/15/2023        ECHOCARDIOGRAM RESULTS  Results for orders placed during the hospital encounter of 06/17/22    Transesophageal echo (MURRAY) with possible cardioversion    Interpretation Summary  · The left ventricle is normal in size with low normal systolic function.  · Normal right ventricular size with normal right ventricular systolic function.  · Mild left atrial enlargement.  · There is a transcutaneously-placed aortic bioprosthesis present. Prosthetic aortic valve is normal.  · Mild-to-moderate mitral regurgitation.  · Mild tricuspid regurgitation.  · The estimated ejection fraction is 50%.  · A 150 J synchronized cardioversion was unsuccessful without restoration of normal sinus rhythm.  · A 200 J synchronized cardioversion was successfully performed with restoration of normal sinus rhythm.        CURRENT/PREVIOUS VISIT EKG  Results for orders placed or performed in visit on 09/13/22   IN OFFICE EKG 12-LEAD (to [place])    Collection Time: 09/13/22 10:04 AM    Narrative    Test Reason : I48.0,    Vent. Rate : 046 BPM     Atrial Rate : 046 BPM     P-R Int : 232 ms          QRS Dur : 096 ms      QT Int : 500 ms       P-R-T Axes : 080 058 062 degrees     QTc Int : 437 ms    Sinus bradycardia  with 1st degree A-V block  Abnormal ECG  When compared with ECG of 09-AUG-2022 13:50,  Serial comparison not performed, all previous tracings are of poor data  quality    Confirmed by ENZO MCGOWAN MD ([id number]) on 9/14/2022 8:19:14 AM    Referred By: ABHISHEK PALACIO           Confirmed By:ENZO MCGOWAN MD     No valid procedures specified.   Results for orders placed during the hospital encounter of 08/09/22    Nuclear Stress - Cardiology Interpreted    Interpretation Summary    Normal myocardial perfusion scan. There is no evidence of myocardial ischemia or infarction.    The gated perfusion images showed an ejection fraction of 83% post stress.    LV cavity size is normal at rest and normal at stress.    The EKG portion of this study is abnormal but not diagnostic.    No valid procedures specified.    PHYSICAL EXAM  CONSTITUTIONAL: Well built, well nourished in no apparent distress  NECK: no carotid bruit, no JVD  LUNGS: CTA  CHEST WALL: no tenderness,  HEART: regular rate and rhythm, S1, S2 distant, soft 1/6 systolic murmur aortic area.  ABDOMEN: soft, non-tender; bowel sounds normal; no masses,  no organomegaly  EXTREMITIES: Extremities normal, no edema, no calf tenderness noted  NEURO: AAO X 3    I HAVE REVIEWED :    The vital signs, nurses notes, and all the pertinent radiology and labs.         Current Outpatient Medications   Medication Instructions    acetaminophen (TYLENOL) 500 mg, Oral, Every 8 hours PRN    allopurinoL (ZYLOPRIM) 300 mg, Oral    amiodarone (PACERONE) 200 MG Tab TAKE 1 TABLET(200 MG) BY MOUTH EVERY DAY    amLODIPine (NORVASC) 2.5 MG tablet TAKE 1 TABLET(2.5 MG) BY MOUTH EVERY DAY    apixaban (ELIQUIS) 5 mg, Oral, 2 times daily    atenoloL (TENORMIN) 25 MG tablet TAKE 1 TABLET(25 MG) BY MOUTH EVERY OTHER DAY    atorvastatin (LIPITOR) 40 MG tablet TAKE 1 TABLET(40 MG) BY MOUTH EVERY EVENING    fish oil-omega-3 fatty acids 300-1,000 mg capsule 1 capsule, Oral, Daily, Hold for 7 days before  surgery    furosemide (LASIX) 20 MG tablet TAKE 1 TABLET(20 MG) BY MOUTH TWICE DAILY    irbesartan (AVAPRO) 150 mg, Oral, Nightly    multivitamin capsule 1 capsule, Oral, Daily, Hold AM of surgery    pantoprazole (PROTONIX) 20 MG tablet TAKE 1 TABLET(20 MG) BY MOUTH EVERY DAY    vitamin D (VITAMIN D3) 1,000 Units, Oral, Daily          Assessment:   PAF, normal sinus rhythm on Eliquis 5 b.i.d., amiodarone 200 mg daily.  History of nonobstructive coronary artery disease, status post TAVR 2019, EF normal.  Hypertension, dyslipidemia.        Plan:   Cardiac exam review of systems stable.  Meds reviewed and reconciled.  Recommend no changes.  Labs follow-up primary care.  Six-month  Echo call results        No follow-ups on file.

## 2023-10-13 ENCOUNTER — CLINICAL SUPPORT (OUTPATIENT)
Dept: CARDIOLOGY | Facility: HOSPITAL | Age: 83
End: 2023-10-13
Attending: INTERNAL MEDICINE
Payer: MEDICARE

## 2023-10-13 VITALS — WEIGHT: 257 LBS | BODY MASS INDEX: 40.34 KG/M2 | HEIGHT: 67 IN

## 2023-10-13 DIAGNOSIS — I48.0 PAROXYSMAL ATRIAL FIBRILLATION: ICD-10-CM

## 2023-10-13 DIAGNOSIS — R06.02 SOB (SHORTNESS OF BREATH): ICD-10-CM

## 2023-10-13 DIAGNOSIS — Z95.2 S/P TAVR (TRANSCATHETER AORTIC VALVE REPLACEMENT): ICD-10-CM

## 2023-10-13 PROCEDURE — 93306 TTE W/DOPPLER COMPLETE: CPT | Mod: PO

## 2023-10-13 PROCEDURE — 93306 TTE W/DOPPLER COMPLETE: CPT | Mod: 26,,, | Performed by: INTERNAL MEDICINE

## 2023-10-13 PROCEDURE — 93306 ECHO (CUPID ONLY): ICD-10-PCS | Mod: 26,,, | Performed by: INTERNAL MEDICINE

## 2023-10-16 LAB
AV INDEX (PROSTH): 0.57
AV MEAN GRADIENT: 15 MMHG
AV PEAK GRADIENT: 28 MMHG
AV REGURGITATION PRESSURE HALF TIME: 323.98 MS
AV VALVE AREA BY VELOCITY RATIO: 1.39 CM²
AV VALVE AREA: 1.71 CM²
AV VELOCITY RATIO: 0.46
BSA FOR ECHO PROCEDURE: 2.35 M2
CV ECHO LV RWT: 0.48 CM
DOP CALC AO PEAK VEL: 2.65 M/S
DOP CALC AO VTI: 62.1 CM
DOP CALC LVOT AREA: 3 CM2
DOP CALC LVOT DIAMETER: 1.95 CM
DOP CALC LVOT PEAK VEL: 1.23 M/S
DOP CALC LVOT STROKE VOLUME: 105.97 CM3
DOP CALC MV VTI: 42.9 CM
DOP CALCLVOT PEAK VEL VTI: 35.5 CM
E WAVE DECELERATION TIME: 257.55 MSEC
E/A RATIO: 1.53
E/E' RATIO: 13 M/S
ECHO LV POSTERIOR WALL: 1.13 CM (ref 0.6–1.1)
FRACTIONAL SHORTENING: 37 % (ref 28–44)
INTERVENTRICULAR SEPTUM: 1.06 CM (ref 0.6–1.1)
LEFT INTERNAL DIMENSION IN SYSTOLE: 2.94 CM (ref 2.1–4)
LEFT VENTRICLE DIASTOLIC VOLUME INDEX: 45.44 ML/M2
LEFT VENTRICLE DIASTOLIC VOLUME: 102.23 ML
LEFT VENTRICLE MASS INDEX: 83 G/M2
LEFT VENTRICLE SYSTOLIC VOLUME INDEX: 14.8 ML/M2
LEFT VENTRICLE SYSTOLIC VOLUME: 33.38 ML
LEFT VENTRICULAR INTERNAL DIMENSION IN DIASTOLE: 4.7 CM (ref 3.5–6)
LEFT VENTRICULAR MASS: 186.35 G
LV LATERAL E/E' RATIO: 11.56 M/S
LV SEPTAL E/E' RATIO: 14.86 M/S
LVOT MG: 4.01 MMHG
LVOT MV: 0.96 CM/S
MV MEAN GRADIENT: 2 MMHG
MV PEAK A VEL: 0.68 M/S
MV PEAK E VEL: 1.04 M/S
MV PEAK GRADIENT: 6 MMHG
MV STENOSIS PRESSURE HALF TIME: 55.8 MS
MV VALVE AREA BY CONTINUITY EQUATION: 2.47 CM2
MV VALVE AREA P 1/2 METHOD: 3.94 CM2
PISA AR MAX VEL: 4.18 M/S
PISA TR MAX VEL: 2.77 M/S
RA PRESSURE ESTIMATED: 3 MMHG
RIGHT VENTRICULAR END-DIASTOLIC DIMENSION: 3.73 CM
RIGHT VENTRICULAR LENGTH IN DIASTOLE (APICAL 4-CHAMBER VIEW): 8.6 CM
RV MID DIAMA: 2.47 CM
RV TB RVSP: 6 MMHG
RV TISSUE DOPPLER FREE WALL SYSTOLIC VELOCITY 1 (APICAL 4 CHAMBER VIEW): 15.02 CM/S
TDI LATERAL: 0.09 M/S
TDI SEPTAL: 0.07 M/S
TDI: 0.08 M/S
TR MAX PG: 31 MMHG
TRICUSPID ANNULAR PLANE SYSTOLIC EXCURSION: 3.32 CM
TV REST PULMONARY ARTERY PRESSURE: 34 MMHG
Z-SCORE OF LEFT VENTRICULAR DIMENSION IN END DIASTOLE: -5.5
Z-SCORE OF LEFT VENTRICULAR DIMENSION IN END SYSTOLE: -4.09

## 2023-12-01 ENCOUNTER — OFFICE VISIT (OUTPATIENT)
Dept: URGENT CARE | Facility: CLINIC | Age: 83
End: 2023-12-01
Payer: MEDICARE

## 2023-12-01 VITALS
RESPIRATION RATE: 18 BRPM | WEIGHT: 257 LBS | OXYGEN SATURATION: 96 % | SYSTOLIC BLOOD PRESSURE: 127 MMHG | HEIGHT: 67 IN | BODY MASS INDEX: 40.34 KG/M2 | TEMPERATURE: 98 F | DIASTOLIC BLOOD PRESSURE: 61 MMHG | HEART RATE: 65 BPM

## 2023-12-01 DIAGNOSIS — R05.9 COUGH, UNSPECIFIED TYPE: Primary | ICD-10-CM

## 2023-12-01 DIAGNOSIS — R06.2 WHEEZING: ICD-10-CM

## 2023-12-01 DIAGNOSIS — Z20.828 EXPOSURE TO THE FLU: ICD-10-CM

## 2023-12-01 LAB
CTP QC/QA: YES
POC MOLECULAR INFLUENZA A AGN: NEGATIVE
POC MOLECULAR INFLUENZA B AGN: NEGATIVE

## 2023-12-01 PROCEDURE — 99214 OFFICE O/P EST MOD 30 MIN: CPT | Mod: S$GLB,,, | Performed by: FAMILY MEDICINE

## 2023-12-01 PROCEDURE — 87502 INFLUENZA DNA AMP PROBE: CPT | Mod: QW,S$GLB,, | Performed by: FAMILY MEDICINE

## 2023-12-01 PROCEDURE — 87502 POCT INFLUENZA A/B MOLECULAR: ICD-10-PCS | Mod: QW,S$GLB,, | Performed by: FAMILY MEDICINE

## 2023-12-01 PROCEDURE — 99214 PR OFFICE/OUTPT VISIT, EST, LEVL IV, 30-39 MIN: ICD-10-PCS | Mod: S$GLB,,, | Performed by: FAMILY MEDICINE

## 2023-12-01 RX ORDER — OSELTAMIVIR PHOSPHATE 75 MG/1
75 CAPSULE ORAL 2 TIMES DAILY
Qty: 10 CAPSULE | Refills: 0 | Status: SHIPPED | OUTPATIENT
Start: 2023-12-01 | End: 2023-12-06

## 2023-12-01 RX ORDER — ALBUTEROL SULFATE 90 UG/1
2 AEROSOL, METERED RESPIRATORY (INHALATION) EVERY 6 HOURS PRN
Qty: 18 G | Refills: 2 | Status: SHIPPED | OUTPATIENT
Start: 2023-12-01 | End: 2024-01-30 | Stop reason: SDUPTHER

## 2023-12-01 RX ORDER — BALOXAVIR MARBOXIL 80 MG/1
80 TABLET, FILM COATED ORAL ONCE
Qty: 1 TABLET | Refills: 0 | Status: SHIPPED | OUTPATIENT
Start: 2023-12-01 | End: 2023-12-01 | Stop reason: ALTCHOICE

## 2023-12-01 RX ORDER — ALBUTEROL SULFATE 0.83 MG/ML
SOLUTION RESPIRATORY (INHALATION)
Qty: 1 EACH | Refills: 2 | Status: SHIPPED | OUTPATIENT
Start: 2023-12-01 | End: 2024-01-30

## 2023-12-01 RX ORDER — PROMETHAZINE HYDROCHLORIDE 6.25 MG/5ML
SYRUP ORAL
Qty: 120 ML | Refills: 1 | Status: SHIPPED | OUTPATIENT
Start: 2023-12-01 | End: 2024-01-30

## 2023-12-01 NOTE — PROGRESS NOTES
"Subjective:      Patient ID: Selena Moulton is a 83 y.o. female.    Vitals:  height is 5' 7" (1.702 m) and weight is 116.6 kg (257 lb). Her oral temperature is 98.3 °F (36.8 °C). Her blood pressure is 127/61 and her pulse is 65. Her respiration is 18 and oxygen saturation is 96%.     Chief Complaint: Cough    Cough, fever, chest pressure, wheezing, headaches, bodyaches, chills, SOB onset 2 days ago. Exposure to flu. Treatment tylenol and dayquil/nyquil with no relief. 5/10 pain.    Cough  Associated symptoms include chills, a fever, headaches, nasal congestion, shortness of breath and wheezing. Nothing aggravates the symptoms. She has tried OTC cough suppressant for the symptoms.       Constitution: Positive for chills and fever.   Respiratory:  Positive for cough, shortness of breath and wheezing.    Neurological:  Positive for headaches.      Objective:     Physical Exam    Physical Exam  Vitals signs and nursing note reviewed.   Constitutional:       Appearance: Pt is well-developed. Alert, NAD.  Pt is cooperative.  Non-toxic appearance.  HENT:      Head: Normocephalic and atraumatic. .      Right Ear: External ear normal.      Left Ear: External ear normal.   Eyes:      General: Lids are normal.      Conjunctiva/sclera: Conjunctivae normal. Visual tracking is normal. Right eye exhibits no exudate. Left eye exhibits no exudate. No scleral icterus.     Pupils: Pupils are equal, round  Neck:      Musculoskeletal: range of motion without pain and neck supple.      Trachea: Trachea and phonation normal.   Throat: No apparent pharyngeal edema or swelling  Cardiovascular:      Rate and Rhythm: Normal Rhythm. Extremities well perfused.   Pulmonary:      Effort: Pulmonary effort is normal. No respiratory distress. NO stridor or difficulty breathing     Breath sounds: wheezing   Abdomen: NO obvious distention.  Musculoskeletal: Normal range of motion. No ambulation issues  Skin:     General: Skin is warm and dry. No " open wounds or abrasions. No petechiae No cyanosis  no jaundice not diaphoretic, not pale, not purpuric  Neurological:      Mental Status:Pt is alert and oriented to person, place, and time.   Psychiatric:         Speech: Speech normal.         Behavior: Behavior normal.         Thought Content: Thought content normal.         Judgment: Judgment normal.       Assessment:     1. Cough, unspecified type    2. Wheezing    3. Exposure to the flu        Plan:       Cough, unspecified type  -     POCT Influenza A/B MOLECULAR  -     HME - OTHER    Wheezing  -     HME - OTHER    Exposure to the flu    Other orders  -     baloxavir marboxiL (XOFLUZA) 80 mg tablet; Take 1 tablet (80 mg total) by mouth once. for 1 dose  Dispense: 1 tablet; Refill: 0  -     promethazine (PHENERGAN) 6.25 mg/5 mL syrup; 10mL at night as needed  Dispense: 120 mL; Refill: 1  -     albuterol (PROVENTIL/VENTOLIN HFA) 90 mcg/actuation inhaler; Inhale 2 puffs into the lungs every 6 (six) hours as needed for Wheezing. Rescue  Dispense: 18 g; Refill: 2  -     albuterol (PROVENTIL) 2.5 mg /3 mL (0.083 %) nebulizer solution; Take every 4-6 hours for SOB or wheezing. Dispense 1 box  Dispense: 1 each; Refill: 2

## 2024-01-23 ENCOUNTER — TELEPHONE (OUTPATIENT)
Dept: FAMILY MEDICINE | Facility: CLINIC | Age: 84
End: 2024-01-23
Payer: MEDICARE

## 2024-01-23 ENCOUNTER — TELEPHONE (OUTPATIENT)
Dept: CARDIOLOGY | Facility: CLINIC | Age: 84
End: 2024-01-23
Payer: MEDICARE

## 2024-01-23 NOTE — TELEPHONE ENCOUNTER
Spoke with Patient's daughter, Selena. Assisted in scheduling HFU and added patient to wait list per request.

## 2024-01-23 NOTE — TELEPHONE ENCOUNTER
----- Message from Ruth Birmingham sent at 1/23/2024  1:56 PM CST -----  Contact: patient daughter  Type:  Sooner Apoointment Request    Caller is requesting a sooner appointment.  Caller declined first available appointment listed below.  Caller will not accept being placed on the waitlist and is requesting a message be sent to doctor.    Name of Caller:Patient's daughter, Selena     When is the first available appointment?    Symptoms: hos f/u     Would the patient rather a call back or a response via [facility]? Call     Best Call Back Number: [phone]    Additional Information:

## 2024-01-23 NOTE — TELEPHONE ENCOUNTER
----- Message from Ruth Birmingham sent at 1/23/2024  1:54 PM CST -----  Contact: patient daughter  Type:  Sooner Apoointment Request    Caller is requesting a sooner appointment.  Caller declined first available appointment listed below.  Caller will not accept being placed on the waitlist and is requesting a message be sent to doctor.    Name of Caller:Patient's daughterSelena     When is the first available appointment?\    Symptoms: hos f/u     Would the patient rather a call back or a response via [facility]? Call     Best Call Back Number: [phone]    Additional Information:

## 2024-01-24 ENCOUNTER — TELEPHONE (OUTPATIENT)
Dept: FAMILY MEDICINE | Facility: CLINIC | Age: 84
End: 2024-01-24
Payer: MEDICARE

## 2024-01-24 ENCOUNTER — TELEPHONE (OUTPATIENT)
Dept: CARDIOLOGY | Facility: CLINIC | Age: 84
End: 2024-01-24
Payer: MEDICARE

## 2024-01-24 DIAGNOSIS — I10 ESSENTIAL HYPERTENSION: ICD-10-CM

## 2024-01-24 DIAGNOSIS — N18.4 STAGE 4 CHRONIC KIDNEY DISEASE: Primary | ICD-10-CM

## 2024-01-24 DIAGNOSIS — R53.1 WEAKNESS: ICD-10-CM

## 2024-01-24 NOTE — TELEPHONE ENCOUNTER
----- Message from Nicole North sent at 1/24/2024  1:49 PM CST -----  Type:  Needs Medical Advice    Who Called:  Pt's daughter Selena Muhammad    Would the patient rather a call back or a response via [facility]?  Call back    Best Call Back Number:  [phone]    Additional Information:  Selena is trying to get orders for home health to come out to help monitor pt. The cardiologist recommended to have pcp put in orders to have her monitored after hospital care. They are wanting her in therapy as well.   Please call back to advise. Thanks!

## 2024-01-24 NOTE — TELEPHONE ENCOUNTER
Spoke with daughter at length regarding patients reoccurring syncopal episodes. Patient was recently in [place] and was hospitalized with Pneumonia. Daughter drove patient back to [place] yesterday. Appointment made with Dr. Reinoso on 1/31/24. Advised daughter to reach out to PCP as well. Advised if patient experiences another episode to bring to ED. Also advised daughter to bring in all records and reports that were performed at hospital in [place]. Patient verbalized understanding.

## 2024-01-25 ENCOUNTER — TELEPHONE (OUTPATIENT)
Dept: FAMILY MEDICINE | Facility: CLINIC | Age: 84
End: 2024-01-25
Payer: MEDICARE

## 2024-01-25 NOTE — TELEPHONE ENCOUNTER
----- Message from Joellengiovanny Michelle sent at 1/25/2024 10:41 AM CST -----  Regarding: Pt Advice  Contact: pt daughter  Needs Medical Advice      Who Called: Selena        Would the patient rather a call back or a response via [facility]? Call back      Best Call Back Number:  [phone]        Additional Information: Sts wanting a call to discuss steps moving forward with home health. Was told that the orders were sent yesterday.   Please Advise - Thank you       
Spoke to pt daughter. She stated everything has been taken care of.   
ambulatory

## 2024-01-26 PROCEDURE — G0180 MD CERTIFICATION HHA PATIENT: HCPCS | Mod: ,,, | Performed by: FAMILY MEDICINE

## 2024-01-30 ENCOUNTER — TELEPHONE (OUTPATIENT)
Dept: OPTOMETRY | Facility: CLINIC | Age: 84
End: 2024-01-30
Payer: MEDICARE

## 2024-01-30 ENCOUNTER — HOSPITAL ENCOUNTER (OUTPATIENT)
Dept: RADIOLOGY | Facility: HOSPITAL | Age: 84
Discharge: HOME | End: 2024-01-30
Attending: PHYSICIAN ASSISTANT
Payer: MEDICARE

## 2024-01-30 ENCOUNTER — OFFICE VISIT (OUTPATIENT)
Dept: FAMILY MEDICINE | Facility: CLINIC | Age: 84
End: 2024-01-30
Payer: MEDICARE

## 2024-01-30 VITALS
WEIGHT: 257.06 LBS | DIASTOLIC BLOOD PRESSURE: 64 MMHG | OXYGEN SATURATION: 96 % | BODY MASS INDEX: 40.26 KG/M2 | HEART RATE: 72 BPM | RESPIRATION RATE: 18 BRPM | SYSTOLIC BLOOD PRESSURE: 118 MMHG

## 2024-01-30 DIAGNOSIS — Z09 HOSPITAL DISCHARGE FOLLOW-UP: Primary | ICD-10-CM

## 2024-01-30 DIAGNOSIS — Z87.01 HISTORY OF PNEUMONIA: ICD-10-CM

## 2024-01-30 DIAGNOSIS — S02.85XD CLOSED FRACTURE OF ORBIT WITH ROUTINE HEALING, SUBSEQUENT ENCOUNTER: ICD-10-CM

## 2024-01-30 DIAGNOSIS — Z95.2 S/P TAVR (TRANSCATHETER AORTIC VALVE REPLACEMENT): ICD-10-CM

## 2024-01-30 DIAGNOSIS — N18.4 STAGE 4 CHRONIC KIDNEY DISEASE: ICD-10-CM

## 2024-01-30 DIAGNOSIS — I48.0 PAROXYSMAL ATRIAL FIBRILLATION: ICD-10-CM

## 2024-01-30 DIAGNOSIS — R55 SYNCOPE AND COLLAPSE: ICD-10-CM

## 2024-01-30 DIAGNOSIS — S09.93XD FACIAL TRAUMA, SUBSEQUENT ENCOUNTER: ICD-10-CM

## 2024-01-30 DIAGNOSIS — Z79.02 LONG TERM (CURRENT) USE OF ANTITHROMBOTICS/ANTIPLATELETS: ICD-10-CM

## 2024-01-30 PROBLEM — R42 DIZZINESS: Status: RESOLVED | Noted: 2019-07-08 | Resolved: 2024-01-30

## 2024-01-30 PROBLEM — N18.30 STAGE 3 CHRONIC KIDNEY DISEASE: Status: RESOLVED | Noted: 2019-11-25 | Resolved: 2024-01-30

## 2024-01-30 PROBLEM — M54.2 NECK PAIN: Status: RESOLVED | Noted: 2019-08-06 | Resolved: 2024-01-30

## 2024-01-30 PROBLEM — U07.1 COVID: Status: RESOLVED | Noted: 2022-05-26 | Resolved: 2024-01-30

## 2024-01-30 PROCEDURE — 99999 PR PBB SHADOW E&M-EST. PATIENT-LVL IV: CPT | Mod: PBBFAC,,, | Performed by: PHYSICIAN ASSISTANT

## 2024-01-30 PROCEDURE — 71046 X-RAY EXAM CHEST 2 VIEWS: CPT | Mod: TC,FY,PO

## 2024-01-30 PROCEDURE — 71046 X-RAY EXAM CHEST 2 VIEWS: CPT | Mod: 26,,, | Performed by: RADIOLOGY

## 2024-01-30 PROCEDURE — 99214 OFFICE O/P EST MOD 30 MIN: CPT | Mod: S$GLB,,, | Performed by: PHYSICIAN ASSISTANT

## 2024-01-30 RX ORDER — ALBUTEROL SULFATE 90 UG/1
2 AEROSOL, METERED RESPIRATORY (INHALATION) EVERY 6 HOURS PRN
Qty: 18 G | Refills: 2 | Status: SHIPPED | OUTPATIENT
Start: 2024-01-30 | End: 2024-03-12

## 2024-01-30 RX ORDER — OXYCODONE AND ACETAMINOPHEN 5; 325 MG/1; MG/1
1 TABLET ORAL EVERY 8 HOURS PRN
Qty: 15 TABLET | Refills: 0 | Status: SHIPPED | OUTPATIENT
Start: 2024-01-30 | End: 2024-02-04

## 2024-01-30 NOTE — PROGRESS NOTES
Subjective     Patient ID: Selena Moulton is a 83 y.o. female.    Chief Complaint: Follow-up (Hospital f/u)      HPI      Pt is new to me, PCP Dr. Perales.     Pt is a 83 year old female with bilateral carpal tunnel syndrome, HTN, aortic stenosis with hx of TAVR, A-fib with long term use of anticoag, CKD, obesity, hyperparathyrdoisim, gout. She presents today for hospital follow up. Pt was in [place] visiting family two weeks ago when she had a syncopal episode. States that prior to this, she had three other episodes of syncope. With this fall, she hit her face on the floor. Went for eval at [facility] in [place]. States she was diagnosed with a nasal and right sided orbital fracture. States she had MURRAY to check on TAVR, no acute findings. Unsure if she got carotid u/s. While admitted, states she had problem with chest pain/tightness, but due to instability of BP, they did not do nuclear stress testing. Was discharged in stable condition--taken off of many cardiac/HTN meds. Pt also states she was sent home on doxy for suspected pneumonia and has since completed. Still reports mild wheezing and dry cough. No fever, chills.  Recommended follow up with PCP, cardio, and oculoplastics as outpatient. Reports continued facial pain since discharge. No rhinorrhea or otorrhea. Diplopia is slowly improving. States the plain oxycodone 10 made her feel sick. Requesting lower dose percocet for pain control. Has tolerated in the past. No syncopal episodes since discharge.      Review of Systems   All other systems reviewed and are negative.         Objective     Physical Exam  Constitutional:       General: She is not in acute distress.     Appearance: Normal appearance. She is obese. She is ill-appearing. She is not toxic-appearing or diaphoretic.   HENT:      Head:        Comments: Ecchymosis and edema in the area depicted. EOMs are intact at this time. Abduction of right eye is painful for patient      No  rhinorrhea, otorrhea or hemotypanum      Cardiovascular:      Heart sounds: Murmur (systolic) heard.   Pulmonary:      Effort: No respiratory distress.      Breath sounds: Wheezing (RLL) present.   Abdominal:      General: Abdomen is flat. Bowel sounds are normal.      Palpations: Abdomen is soft.   Neurological:      General: No focal deficit present.      Mental Status: She is alert and oriented to person, place, and time.      Comments: With walker   Psychiatric:         Mood and Affect: Mood normal.         Behavior: Behavior normal.         Thought Content: Thought content normal.         Judgment: Judgment normal.       1. Hospital discharge follow-up  -need records to determine extent of syncope workup completed  - CBC Auto Differential; Future  - Comprehensive Metabolic Panel; Future    2. Syncope and collapse    3. Facial trauma, subsequent encounter  - Ambulatory referral/consult to Ophthalmology (oculoplastics); Future    4. Closed fracture of orbit with routine healing, subsequent encounter  - oxyCODONE-acetaminophen (PERCOCET) 5-325 mg per tablet; Take 1 tablet by mouth every 8 (eight) hours as needed for Pain.  Dispense: 15 tablet; Refill: 0    5. History of pneumonia  - X-Ray Chest PA And Lateral; Future  - albuterol (PROVENTIL/VENTOLIN HFA) 90 mcg/actuation inhaler; Inhale 2 puffs into the lungs every 6 (six) hours as needed for Wheezing. Rescue  Dispense: 18 g; Refill: 2  -improving    6. S/P TAVR (transcatheter aortic valve replacement)  -f/u with cardiology as scheduled tomorrow  - B-TYPE NATRIURETIC PEPTIDE; Future    7. Paroxysmal atrial fibrillation  -currently NSR, anticoagulated    8. Long term (current) use of antithrombotics/antiplatelets    9. Stage 4 chronic kidney disease  -labs today    RTC/ER precautions given. F/U wit Dr. Perales and cardiology as scheduled    Elen Canales PA-C

## 2024-01-30 NOTE — TELEPHONE ENCOUNTER
----- Message from Diamone Speed sent at 1/30/2024  4:30 PM CST -----  Regarding: daughter valente  Type: Patient Call Back       Who called daughter        What is the request in detail: pt have a referral to be seen and needs a call back        Can the clinic reply by [facility]? Yes       Would the patient rather a call back or a response via [facility]? Call back       Best call back number:.[phone]

## 2024-01-31 ENCOUNTER — OFFICE VISIT (OUTPATIENT)
Dept: CARDIOLOGY | Facility: CLINIC | Age: 84
End: 2024-01-31
Payer: MEDICARE

## 2024-01-31 ENCOUNTER — TELEPHONE (OUTPATIENT)
Dept: FAMILY MEDICINE | Facility: CLINIC | Age: 84
End: 2024-01-31
Payer: MEDICARE

## 2024-01-31 ENCOUNTER — OFFICE VISIT (OUTPATIENT)
Dept: OPTOMETRY | Facility: CLINIC | Age: 84
End: 2024-01-31
Payer: MEDICARE

## 2024-01-31 VITALS
HEART RATE: 80 BPM | DIASTOLIC BLOOD PRESSURE: 71 MMHG | HEIGHT: 67 IN | SYSTOLIC BLOOD PRESSURE: 157 MMHG | WEIGHT: 257.5 LBS | BODY MASS INDEX: 40.42 KG/M2

## 2024-01-31 DIAGNOSIS — H25.13 NUCLEAR SCLEROSIS OF BOTH EYES: ICD-10-CM

## 2024-01-31 DIAGNOSIS — I35.0 SEVERE AORTIC STENOSIS: ICD-10-CM

## 2024-01-31 DIAGNOSIS — I10 ESSENTIAL HYPERTENSION: ICD-10-CM

## 2024-01-31 DIAGNOSIS — E66.01 CLASS 2 SEVERE OBESITY DUE TO EXCESS CALORIES WITH SERIOUS COMORBIDITY AND BODY MASS INDEX (BMI) OF 38.0 TO 38.9 IN ADULT: ICD-10-CM

## 2024-01-31 DIAGNOSIS — H35.3131 EARLY DRY STAGE NONEXUDATIVE AGE-RELATED MACULAR DEGENERATION OF BOTH EYES: ICD-10-CM

## 2024-01-31 DIAGNOSIS — I48.0 PAROXYSMAL ATRIAL FIBRILLATION: ICD-10-CM

## 2024-01-31 DIAGNOSIS — N18.4 STAGE 4 CHRONIC KIDNEY DISEASE: ICD-10-CM

## 2024-01-31 DIAGNOSIS — K21.9 GASTROESOPHAGEAL REFLUX DISEASE WITHOUT ESOPHAGITIS: ICD-10-CM

## 2024-01-31 DIAGNOSIS — R73.03 PRE-DIABETES: ICD-10-CM

## 2024-01-31 DIAGNOSIS — I48.19 PERSISTENT ATRIAL FIBRILLATION: ICD-10-CM

## 2024-01-31 DIAGNOSIS — Z01.810 ENCOUNTER FOR PRE-OPERATIVE CARDIOVASCULAR CLEARANCE: Primary | ICD-10-CM

## 2024-01-31 DIAGNOSIS — I35.0 NODULAR CALCIFIC AORTIC VALVE STENOSIS: ICD-10-CM

## 2024-01-31 DIAGNOSIS — E78.00 PURE HYPERCHOLESTEROLEMIA: ICD-10-CM

## 2024-01-31 DIAGNOSIS — S09.93XD FACIAL TRAUMA, SUBSEQUENT ENCOUNTER: Primary | ICD-10-CM

## 2024-01-31 DIAGNOSIS — Z95.2 S/P TAVR (TRANSCATHETER AORTIC VALVE REPLACEMENT): ICD-10-CM

## 2024-01-31 PROCEDURE — 92004 COMPRE OPH EXAM NEW PT 1/>: CPT | Mod: S$GLB,,, | Performed by: OPTOMETRIST

## 2024-01-31 PROCEDURE — 99214 OFFICE O/P EST MOD 30 MIN: CPT | Mod: S$GLB,,, | Performed by: INTERNAL MEDICINE

## 2024-01-31 PROCEDURE — 99999 PR PBB SHADOW E&M-EST. PATIENT-LVL III: CPT | Mod: PBBFAC,,, | Performed by: INTERNAL MEDICINE

## 2024-01-31 PROCEDURE — 99999 PR PBB SHADOW E&M-EST. PATIENT-LVL III: CPT | Mod: PBBFAC,,, | Performed by: OPTOMETRIST

## 2024-01-31 RX ORDER — NITROGLYCERIN 0.4 MG/1
TABLET SUBLINGUAL EVERY 5 MIN PRN
COMMUNITY
Start: 2024-01-22

## 2024-01-31 RX ORDER — PANTOPRAZOLE SODIUM 20 MG/1
TABLET, DELAYED RELEASE ORAL
Qty: 90 TABLET | Refills: 1 | Status: SHIPPED | OUTPATIENT
Start: 2024-01-31

## 2024-01-31 NOTE — TELEPHONE ENCOUNTER
Attempted to contact patient to reschedule 2/15/24 appointment, no answer, left voicemail, portal message sent.

## 2024-01-31 NOTE — PROGRESS NOTES
HPI     Eye Injury     Additional comments: [place]           Comments    DLE: x 20+ yrs    Pt states had fall on 1/15 and hit right side of face. Broke nose with a   fracture of the right orbit. Had some bleeding from the eye at first.   Vision has been double off and on since fall with blurred vision. No pain   today and no pain with movement.           Last edited by Quintana, Cheryle on 1/31/2024  8:23 AM.            Assessment /Plan     For exam results, see Encounter Report.    Facial trauma, subsequent encounter  -     Ambulatory referral/consult to Ophthalmology    Nuclear sclerosis of both eyes    Early dry stage nonexudative age-related macular degeneration of both eyes      No signs of RD or retinal heme with dilated fundus exam, diplopia has resolved, follow up with facial surgeon for eval to needing surgery for any bone breaks.  Educated pt on presence of cataracts and effects on vision. No surgery at this time. Recheck in one year.  3. Dry, will consider refer for eval if no better with refraction after bone eval

## 2024-01-31 NOTE — PROGRESS NOTES
Subjective:    Patient ID:  Selena Moulton is a 83 y.o. female patient here for evaluation No chief complaint on file.      History of Present Illness:  Cardiology follow-up.  Recent hospitalization for syncopal episode.  Prior to that patient had both recurrent syncope/presyncope symptomatology, never sought formal evaluation.  During her hospitalization discovered to have pneumonia, cardiology evaluation was apparently inconclusive.  Patient has history of TAVR 2019, normal EF, nonobstructive coronary disease by previous left heart catheterization.    Hypertension, dyslipidemia    Syncope is not preceded by prodrome symptomatology.             Review of patient's allergies indicates:   Allergen Reactions    Opioids - morphine analogues      nause and fever   Other reaction(s): Other (See Comments)  nause and fever     Tetanus vaccines and toxoid Rash and Swelling    Flu virus vacc tvs 2015-16 (18 yr up) cell derived      Other reaction(s): Other (See Comments)    Influenza virus vaccines Nausea And Vomiting    Iodine Hives    Iodine and iodide containing products     Morpholine analogues Nausea And Vomiting    Penicillins Other (See Comments)     High fever as a child, also was allergy tested 1980    Latex, natural rubber Rash       Past Medical History:   Diagnosis Date    Anticoagulant long-term use     Aortic stenosis     Arthritis     CKD (chronic kidney disease), stage III     Class 2 severe obesity due to excess calories with serious comorbidity and body mass index (BMI) of 39.0 to 39.9 in adult 09/13/2018    Digestive disorder     GERD (gastroesophageal reflux disease)     Gout     Hypercholesteremia     Hypertension     PAF (paroxysmal atrial fibrillation)     Personal history of COVID-19 05/2022    S/P TAVR (transcatheter aortic valve replacement)      Past Surgical History:   Procedure Laterality Date    AORTIC VALVE REPLACEMENT N/A 10/8/2019    Procedure: Replacement-valve-aortic;  Surgeon: Fidel  AUTUMN Cardenas MD;  Location: [facility] CATH LAB;  Service: Cardiology;  Laterality: N/A;    BACK SURGERY      bone graft neck    CARDIAC VALVE SURGERY      CARPAL TUNNEL RELEASE Left 10/3/2018    Procedure: RELEASE, CARPAL TUNNEL  LEFT;  Surgeon: Meche Cárdenas MD;  Location: [facility];  Service: Neurosurgery;  Laterality: Left;    CARPAL TUNNEL RELEASE Right 1/17/2019    Procedure: RELEASE, CARPAL TUNNEL RIGHT;  Surgeon: Meche Cárdenas MD;  Location: [facility];  Service: Neurosurgery;  Laterality: Right;    CATHETERIZATION OF BOTH LEFT AND RIGHT HEART N/A 7/30/2019    Procedure: CATHETERIZATION, HEART, BOTH LEFT AND RIGHT;  Surgeon: Terrie Alberto MD;  Location: [facility] CATH;  Service: Cardiology;  Laterality: N/A;    CERVICAL FUSION  1984    x2     CORONARY ANGIOGRAPHY N/A 7/30/2019    Procedure: ANGIOGRAM, CORONARY ARTERY;  Surgeon: Terrie Alberto MD;  Location: [facility] CATH;  Service: Cardiology;  Laterality: N/A;    HERNIA REPAIR      umbilical    HYSTERECTOMY      TONSILLECTOMY       Social History     Tobacco Use    Smoking status: Never    Smokeless tobacco: Never   Substance Use Topics    Alcohol use: No    Drug use: No        Review of Systems:    As noted in HPI in addition      REVIEW OF SYSTEMS  Review of Systems   Constitutional: Negative for decreased appetite, diaphoresis, night sweats, weight gain and weight loss.   HENT:  Negative for nosebleeds and odynophagia.    Eyes:  Negative for double vision and photophobia.   Cardiovascular:  Negative for chest pain, claudication, cyanosis, dyspnea on exertion, irregular heartbeat, leg swelling, near-syncope, orthopnea, palpitations, paroxysmal nocturnal dyspnea and syncope.   Respiratory:  Negative for cough, hemoptysis, shortness of breath and wheezing.    Hematologic/Lymphatic: Negative for adenopathy.   Skin:  Negative for flushing, skin cancer and suspicious lesions.   Musculoskeletal:  Negative for gout, myalgias and neck pain.   Gastrointestinal:   Negative for abdominal pain, heartburn, hematemesis and hematochezia.   Genitourinary:  Negative for bladder incontinence, hesitancy and nocturia.   Neurological:  Negative for focal weakness, headaches, light-headedness and paresthesias.   Psychiatric/Behavioral:  Negative for memory loss and substance abuse.               Objective:        Vitals:    01/31/24 1305   BP: (!) 157/71   Pulse: 80       Lab Results   Component Value Date    WBC 7.65 01/30/2024    HGB 12.0 01/30/2024    HCT 38.8 01/30/2024     01/30/2024    CHOL 177 02/15/2023    TRIG 93 02/15/2023    HDL 60 02/15/2023    ALT 20 01/30/2024    AST 22 01/30/2024     01/30/2024    K 4.3 01/30/2024     01/30/2024    CREATININE 1.5 (H) 01/30/2024    BUN 28 (H) 01/30/2024    CO2 27 01/30/2024    INR 0.9 10/08/2019    HGBA1C 5.5 08/15/2023        ECHOCARDIOGRAM RESULTS  Results for orders placed in visit on 10/13/23    Echo    Interpretation Summary    Left Ventricle: The left ventricle is normal in size. Mildly increased wall thickness. Normal wall motion. There is normal systolic function with a visually estimated ejection fraction of 55 - 60%. There is normal diastolic function.    Right Ventricle: Normal right ventricular cavity size. Wall thickness is normal. Right ventricle wall motion  is normal. Systolic function is normal.    Aortic Valve: There is a transcatheter valve replacement in the aortic position. It is reported to be a 23 mm Rodgers valve. There is mild aortic regurgitation with a posterolateral eccentrically directed jet.    Mitral Valve: There is moderate posterior leaflet sclerosis.    IVC/SVC: Normal venous pressure at 3 mmHg.        CURRENT/PREVIOUS VISIT EKG  Results for orders placed or performed in visit on 09/13/22   IN OFFICE EKG 12-LEAD (to [facility])    Collection Time: 09/13/22 10:04 AM    Narrative    Test Reason : I48.0,    Vent. Rate : 046 BPM     Atrial Rate : 046 BPM     P-R Int : 232 ms          QRS Dur : 096  ms      QT Int : 500 ms       P-R-T Axes : 080 058 062 degrees     QTc Int : 437 ms    Sinus bradycardia with 1st degree A-V block  Abnormal ECG  When compared with ECG of 09-AUG-2022 13:50,  Serial comparison not performed, all previous tracings are of poor data  quality    Confirmed by ENZO MCGOWAN MD ([id number]) on 9/14/2022 8:19:14 AM    Referred By: ABHISHEK PALACIO           Confirmed By:ENZO MCGOWAN MD     No valid procedures specified.   Results for orders placed during the hospital encounter of 08/09/22    Nuclear Stress - Cardiology Interpreted    Interpretation Summary    Normal myocardial perfusion scan. There is no evidence of myocardial ischemia or infarction.    The gated perfusion images showed an ejection fraction of 83% post stress.    LV cavity size is normal at rest and normal at stress.    The EKG portion of this study is abnormal but not diagnostic.    No valid procedures specified.    PHYSICAL EXAM  CONSTITUTIONAL: Well built, well nourished in no apparent distress  NECK: no carotid bruit, no JVD  LUNGS: CTA  CHEST WALL: no tenderness,  HEART: regular rate and rhythm, S1, S2 normal, 1/6 systolic murmur aortic area   ABDOMEN: soft, non-tender; bowel sounds normal; no masses,  no organomegaly  EXTREMITIES: Extremities normal, no edema, no calf tenderness noted  NEURO: AAO X 3    I HAVE REVIEWED :    The vital signs, nurses notes, and all the pertinent radiology and labs.         Current Outpatient Medications   Medication Instructions    acetaminophen (TYLENOL) 500 mg, Oral, Every 8 hours PRN    albuterol (PROVENTIL/VENTOLIN HFA) 90 mcg/actuation inhaler 2 puffs, Inhalation, Every 6 hours PRN, Rescue    allopurinoL (ZYLOPRIM) 300 mg, Oral    amLODIPine (NORVASC) 2.5 MG tablet TAKE 1 TABLET(2.5 MG) BY MOUTH EVERY DAY    apixaban (ELIQUIS) 5 mg, Oral, 2 times daily    atorvastatin (LIPITOR) 40 MG tablet TAKE 1 TABLET(40 MG) BY MOUTH EVERY EVENING    fish oil-omega-3 fatty acids 300-1,000 mg capsule  1 capsule, Oral, Daily, Hold for 7 days before surgery    furosemide (LASIX) 20 MG tablet TAKE 1 TABLET(20 MG) BY MOUTH TWICE DAILY    multivitamin capsule 1 capsule, Oral, Daily, Hold AM of surgery    nitroGLYCERIN (NITROSTAT) 0.4 MG SL tablet Sublingual, Every 5 min PRN    oxyCODONE-acetaminophen (PERCOCET) 5-325 mg per tablet 1 tablet, Oral, Every 8 hours PRN    pantoprazole (PROTONIX) 20 MG tablet TAKE 1 TABLET(20 MG) BY MOUTH EVERY DAY    vitamin D (VITAMIN D3) 1,000 Units, Oral, Daily          Assessment:   Syncope in the setting of known PAF, TAVR.  2019.  Recently discontinued beta-blocker, amiodarone.  Pre TAVR workup with nonobstructive CAD, normal EF.    Hypertension, dyslipidemia          Plan:   Continue Eliquis 5 b.i.d., continue restart beta-blocker amiodarone.  In office loop recorder. No follow-ups on file.

## 2024-02-01 ENCOUNTER — TELEPHONE (OUTPATIENT)
Dept: CARDIOLOGY | Facility: CLINIC | Age: 84
End: 2024-02-01
Payer: MEDICARE

## 2024-02-01 DIAGNOSIS — I48.19 PERSISTENT ATRIAL FIBRILLATION: ICD-10-CM

## 2024-02-01 DIAGNOSIS — R55 SYNCOPE AND COLLAPSE: Primary | ICD-10-CM

## 2024-02-01 NOTE — TELEPHONE ENCOUNTER
No care due was identified.  [facility] Embedded Care Due Messages. Reference number: [id number].   1/31/2024 7:47:54 PM CST

## 2024-02-01 NOTE — TELEPHONE ENCOUNTER
Refill Decision Note   Selena Moulton  is requesting a refill authorization.  Brief Assessment and Rationale for Refill:  Approve     Medication Therapy Plan:         Comments:     Note composed:11:02 PM 01/31/2024

## 2024-02-01 NOTE — TELEPHONE ENCOUNTER
Spoke with patients daughter and informed her of Loop appointment on 2/5/24 at 10 am. Stop Eliquis 1 day prior to procedure.

## 2024-02-05 ENCOUNTER — TELEPHONE (OUTPATIENT)
Dept: CARDIOLOGY | Facility: CLINIC | Age: 84
End: 2024-02-05
Payer: MEDICARE

## 2024-02-05 DIAGNOSIS — R55 SYNCOPE AND COLLAPSE: Primary | ICD-10-CM

## 2024-02-05 NOTE — TELEPHONE ENCOUNTER
Spoke with daughter and informed her procedure authorization is still pending. Appointment rescheduled and will follow up. Verbalized understanding.

## 2024-02-07 ENCOUNTER — PATIENT OUTREACH (OUTPATIENT)
Dept: ADMINISTRATIVE | Facility: HOSPITAL | Age: 84
End: 2024-02-07
Payer: MEDICARE

## 2024-02-07 ENCOUNTER — PATIENT MESSAGE (OUTPATIENT)
Dept: ADMINISTRATIVE | Facility: HOSPITAL | Age: 84
End: 2024-02-07
Payer: MEDICARE

## 2024-02-07 ENCOUNTER — TELEPHONE (OUTPATIENT)
Dept: CARDIOLOGY | Facility: CLINIC | Age: 84
End: 2024-02-07
Payer: MEDICARE

## 2024-02-07 DIAGNOSIS — I10 ESSENTIAL HYPERTENSION: Primary | ICD-10-CM

## 2024-02-07 NOTE — TELEPHONE ENCOUNTER
Spoke with daughter and informed her Loop has been approved and to arrive at 10:30 am on 2/8/24 and for patient to hold Eliquis today and tomorrow. Agreeable and verbalized understanding.

## 2024-02-07 NOTE — PROGRESS NOTES
Population Health Chart Review & Patient Outreach Details    Outreach Performed: YES Portal    Additional [facility] Health Notes:           Updates Requested / Reviewed:      Updated Care Coordination Note, Care Everywhere, , External Sources: [facility] and [facility], and Immunizations Reconciliation Completed or Queried: [place]         Health Maintenance Topics Overdue:    Health Maintenance Due   Topic Date Due    Shingles Vaccine (1 of 2) Never done    RSV Vaccine (Age 60+ and Pregnant patients) (1 - 1-dose 60+ series) Never done    Influenza Vaccine (1) 09/01/2023    COVID-19 Vaccine (4 - 2023-24 season) 09/01/2023    Lipid Panel  02/15/2024         Health Maintenance Topic(s) Outreach Outcomes & Actions Taken:    Lab(s) - Outreach Outcomes & Actions Taken  : Overdue Lab(s) Ordered

## 2024-02-08 ENCOUNTER — HOSPITAL ENCOUNTER (OUTPATIENT)
Dept: CARDIOLOGY | Facility: HOSPITAL | Age: 84
Discharge: HOME | End: 2024-02-08
Attending: INTERNAL MEDICINE
Payer: MEDICARE

## 2024-02-08 VITALS
DIASTOLIC BLOOD PRESSURE: 76 MMHG | RESPIRATION RATE: 20 BRPM | SYSTOLIC BLOOD PRESSURE: 154 MMHG | HEART RATE: 71 BPM | HEIGHT: 67 IN | WEIGHT: 257.94 LBS | BODY MASS INDEX: 40.48 KG/M2

## 2024-02-08 DIAGNOSIS — I48.19 PERSISTENT ATRIAL FIBRILLATION: ICD-10-CM

## 2024-02-08 DIAGNOSIS — R55 SYNCOPE AND COLLAPSE: ICD-10-CM

## 2024-02-08 PROCEDURE — C1764 EVENT RECORDER, CARDIAC: HCPCS | Mod: PO

## 2024-02-08 PROCEDURE — 33285 INSJ SUBQ CAR RHYTHM MNTR: CPT | Mod: PO

## 2024-02-08 PROCEDURE — 33285 INSJ SUBQ CAR RHYTHM MNTR: CPT | Mod: ,,, | Performed by: INTERNAL MEDICINE

## 2024-02-09 ENCOUNTER — TELEPHONE (OUTPATIENT)
Dept: OPHTHALMOLOGY | Facility: CLINIC | Age: 84
End: 2024-02-09
Payer: MEDICARE

## 2024-02-09 NOTE — TELEPHONE ENCOUNTER
Spoke with daughter to schedule appt with Dr. Kahn for blurred vision on 2/7/2024. Daughter states that she was going to call back to schedule appt if needed. States that her mom need to see a facial surgeon first.

## 2024-02-12 ENCOUNTER — OFFICE VISIT (OUTPATIENT)
Dept: FAMILY MEDICINE | Facility: CLINIC | Age: 84
End: 2024-02-12
Payer: MEDICARE

## 2024-02-12 VITALS
HEART RATE: 82 BPM | OXYGEN SATURATION: 96 % | WEIGHT: 262.56 LBS | TEMPERATURE: 98 F | BODY MASS INDEX: 41.21 KG/M2 | HEIGHT: 67 IN | SYSTOLIC BLOOD PRESSURE: 134 MMHG | DIASTOLIC BLOOD PRESSURE: 78 MMHG

## 2024-02-12 DIAGNOSIS — I48.0 PAROXYSMAL ATRIAL FIBRILLATION: ICD-10-CM

## 2024-02-12 DIAGNOSIS — N18.4 STAGE 4 CHRONIC KIDNEY DISEASE: ICD-10-CM

## 2024-02-12 DIAGNOSIS — I77.1 TORTUOUS AORTA: ICD-10-CM

## 2024-02-12 DIAGNOSIS — I10 ESSENTIAL HYPERTENSION: ICD-10-CM

## 2024-02-12 DIAGNOSIS — M1A.0720 IDIOPATHIC CHRONIC GOUT OF LEFT FOOT WITHOUT TOPHUS: ICD-10-CM

## 2024-02-12 DIAGNOSIS — N25.81 SECONDARY HYPERPARATHYROIDISM OF RENAL ORIGIN: ICD-10-CM

## 2024-02-12 DIAGNOSIS — S02.85XD CLOSED FRACTURE OF ORBIT WITH ROUTINE HEALING, SUBSEQUENT ENCOUNTER: Primary | ICD-10-CM

## 2024-02-12 PROCEDURE — 99999 PR PBB SHADOW E&M-EST. PATIENT-LVL IV: CPT | Mod: PBBFAC,,, | Performed by: NURSE PRACTITIONER

## 2024-02-12 PROCEDURE — 99214 OFFICE O/P EST MOD 30 MIN: CPT | Mod: S$GLB,,, | Performed by: NURSE PRACTITIONER

## 2024-02-12 RX ORDER — OXYCODONE AND ACETAMINOPHEN 5; 325 MG/1; MG/1
1 TABLET ORAL EVERY 8 HOURS PRN
Qty: 21 TABLET | Refills: 0 | Status: SHIPPED | OUTPATIENT
Start: 2024-02-12 | End: 2024-02-19

## 2024-02-12 RX ORDER — ALLOPURINOL 300 MG/1
300 TABLET ORAL
Qty: 90 TABLET | Refills: 1 | Status: SHIPPED | OUTPATIENT
Start: 2024-02-12
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
## 2024-02-12 NOTE — TELEPHONE ENCOUNTER
Refill Routing Note   Medication(s) are not appropriate for processing by [facility] for the following reason(s):        Outside of protocol    [facility] action(s):  Route               Appointments  past 12m or future 3m with PCP    Date Provider   Last Visit   8/15/2023 Aby Perales MD   Next Visit   2/20/2024 Aby Perales MD   ED visits in past 90 days: 0        Note composed:8:28 AM 02/12/2024

## 2024-02-12 NOTE — TELEPHONE ENCOUNTER
No care due was identified.  [facility] Embedded Care Due Messages. Reference number: [id number].   2/12/2024 3:43:12 AM CST

## 2024-02-15 ENCOUNTER — HOSPITAL ENCOUNTER (OUTPATIENT)
Dept: CARDIOLOGY | Facility: HOSPITAL | Age: 84
Discharge: HOME | End: 2024-02-15
Attending: INTERNAL MEDICINE
Payer: MEDICARE

## 2024-02-15 DIAGNOSIS — R55 SYNCOPE AND COLLAPSE: ICD-10-CM

## 2024-02-15 PROCEDURE — 93285 PRGRMG DEV EVAL SCRMS IP: CPT | Mod: 26,,, | Performed by: INTERNAL MEDICINE

## 2024-02-20 ENCOUNTER — OFFICE VISIT (OUTPATIENT)
Dept: FAMILY MEDICINE | Facility: CLINIC | Age: 84
End: 2024-02-20
Payer: MEDICARE

## 2024-02-20 ENCOUNTER — HOSPITAL ENCOUNTER (OUTPATIENT)
Dept: RADIOLOGY | Facility: HOSPITAL | Age: 84
Discharge: HOME | End: 2024-02-20
Attending: FAMILY MEDICINE
Payer: MEDICARE

## 2024-02-20 VITALS
TEMPERATURE: 98 F | BODY MASS INDEX: 40.76 KG/M2 | RESPIRATION RATE: 18 BRPM | DIASTOLIC BLOOD PRESSURE: 60 MMHG | SYSTOLIC BLOOD PRESSURE: 136 MMHG | HEIGHT: 67 IN | WEIGHT: 259.69 LBS | OXYGEN SATURATION: 97 % | HEART RATE: 83 BPM

## 2024-02-20 DIAGNOSIS — M79.89 LEG SWELLING: ICD-10-CM

## 2024-02-20 DIAGNOSIS — M54.9 MID BACK PAIN ON LEFT SIDE: ICD-10-CM

## 2024-02-20 DIAGNOSIS — H53.8 BLURRED VISION, BILATERAL: Primary | ICD-10-CM

## 2024-02-20 DIAGNOSIS — R42 DIZZINESS: ICD-10-CM

## 2024-02-20 DIAGNOSIS — M25.512 ACUTE PAIN OF LEFT SHOULDER: ICD-10-CM

## 2024-02-20 DIAGNOSIS — I10 ESSENTIAL HYPERTENSION: ICD-10-CM

## 2024-02-20 DIAGNOSIS — R73.03 PREDIABETES: ICD-10-CM

## 2024-02-20 PROCEDURE — 73030 X-RAY EXAM OF SHOULDER: CPT | Mod: 26,LT,, | Performed by: RADIOLOGY

## 2024-02-20 PROCEDURE — 99214 OFFICE O/P EST MOD 30 MIN: CPT | Mod: S$GLB,,, | Performed by: FAMILY MEDICINE

## 2024-02-20 PROCEDURE — 99999 PR PBB SHADOW E&M-EST. PATIENT-LVL V: CPT | Mod: PBBFAC,,, | Performed by: FAMILY MEDICINE

## 2024-02-20 PROCEDURE — 72070 X-RAY EXAM THORAC SPINE 2VWS: CPT | Mod: TC,FY,PO

## 2024-02-20 PROCEDURE — 72070 X-RAY EXAM THORAC SPINE 2VWS: CPT | Mod: 26,,, | Performed by: RADIOLOGY

## 2024-02-20 PROCEDURE — 73030 X-RAY EXAM OF SHOULDER: CPT | Mod: TC,FY,PO,LT

## 2024-02-20 RX ORDER — LIDOCAINE 50 MG/G
1 PATCH TOPICAL DAILY
Qty: 30 PATCH | Refills: 5 | Status: SHIPPED | OUTPATIENT
Start: 2024-02-20 | End: 2024-05-20 | Stop reason: ALTCHOICE
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
## 2024-02-23 ENCOUNTER — DOCUMENTATION ONLY (OUTPATIENT)
Dept: FAMILY MEDICINE | Facility: CLINIC | Age: 84
End: 2024-02-23
Payer: MEDICARE

## 2024-02-23 DIAGNOSIS — M25.512 CHRONIC LEFT SHOULDER PAIN: Primary | ICD-10-CM

## 2024-02-23 DIAGNOSIS — G89.29 CHRONIC LEFT SHOULDER PAIN: Primary | ICD-10-CM

## 2024-02-29 ENCOUNTER — EXTERNAL HOME HEALTH (OUTPATIENT)
Dept: HOME HEALTH SERVICES | Facility: HOSPITAL | Age: 84
End: 2024-02-29
Payer: MEDICARE

## 2024-02-29 ENCOUNTER — TELEPHONE (OUTPATIENT)
Dept: FAMILY MEDICINE | Facility: CLINIC | Age: 84
End: 2024-02-29
Payer: MEDICARE

## 2024-02-29 NOTE — TELEPHONE ENCOUNTER
""The results of the x-rays of the shoulder showed presence of high-riding humerus that could be related with rotator cuff tear.  There is also presence of spur formation on the joint.  I will recommend a consultation to see the orthopedic specialist, I will place a referral, please contact our office to schedule.  Thank you. "  "

## 2024-03-01 ENCOUNTER — OFFICE VISIT (OUTPATIENT)
Dept: ORTHOPEDICS | Facility: CLINIC | Age: 84
End: 2024-03-01
Payer: MEDICARE

## 2024-03-01 DIAGNOSIS — G89.29 CHRONIC LEFT SHOULDER PAIN: ICD-10-CM

## 2024-03-01 DIAGNOSIS — M25.512 CHRONIC LEFT SHOULDER PAIN: ICD-10-CM

## 2024-03-01 DIAGNOSIS — M25.812 IMPINGEMENT OF LEFT SHOULDER: Primary | ICD-10-CM

## 2024-03-01 DIAGNOSIS — M79.18 MYALGIA, SHOULDER REGION: ICD-10-CM

## 2024-03-01 PROCEDURE — 99999 PR PBB SHADOW E&M-EST. PATIENT-LVL III: CPT | Mod: PBBFAC,,, | Performed by: NURSE PRACTITIONER

## 2024-03-01 PROCEDURE — 99214 OFFICE O/P EST MOD 30 MIN: CPT | Mod: 25,S$GLB,, | Performed by: NURSE PRACTITIONER

## 2024-03-01 PROCEDURE — 20552 NJX 1/MLT TRIGGER POINT 1/2: CPT | Mod: 51,XS,S$GLB, | Performed by: NURSE PRACTITIONER

## 2024-03-01 PROCEDURE — 20610 DRAIN/INJ JOINT/BURSA W/O US: CPT | Mod: LT,S$GLB,, | Performed by: NURSE PRACTITIONER

## 2024-03-01 RX ORDER — DEXAMETHASONE SODIUM PHOSPHATE 4 MG/ML
4 INJECTION, SOLUTION INTRA-ARTICULAR; INTRALESIONAL; INTRAMUSCULAR; INTRAVENOUS; SOFT TISSUE
Status: DISCONTINUED | OUTPATIENT
Start: 2024-03-01 | End: 2024-03-01 | Stop reason: HOSPADM

## 2024-03-01 RX ORDER — TRIAMCINOLONE ACETONIDE 40 MG/ML
40 INJECTION, SUSPENSION INTRA-ARTICULAR; INTRAMUSCULAR
Status: DISCONTINUED | OUTPATIENT
Start: 2024-03-01 | End: 2024-03-01 | Stop reason: HOSPADM

## 2024-03-01 RX ADMIN — TRIAMCINOLONE ACETONIDE 40 MG: 40 INJECTION, SUSPENSION INTRA-ARTICULAR; INTRAMUSCULAR at 11:03

## 2024-03-01 RX ADMIN — DEXAMETHASONE SODIUM PHOSPHATE 4 MG: 4 INJECTION, SOLUTION INTRA-ARTICULAR; INTRALESIONAL; INTRAMUSCULAR; INTRAVENOUS; SOFT TISSUE at 11:03

## 2024-03-01 NOTE — PROGRESS NOTES
Chief Complaint   Patient presents with    Left Shoulder - Pain, Injury       HPI:    This is a 83 y.o. who presents today complaining of left shoulder pain for several months, but had a fall January 16 this year and pain has increased since then. States hurts to move it and hurts during sleep. Pain is aching, burning, sharp, dull, throbbing, and cutting. No numbness or tingling. Pain is with certain movements. She does have PT coming via home health and states she had tenderness to touch where PT touched her shoulder.      Past Medical History:   Diagnosis Date    Anticoagulant long-term use     Aortic stenosis     Arthritis     CKD (chronic kidney disease), stage III     Class 2 severe obesity due to excess calories with serious comorbidity and body mass index (BMI) of 39.0 to 39.9 in adult 09/13/2018    Digestive disorder     GERD (gastroesophageal reflux disease)     Gout     Hypercholesteremia     Hypertension     PAF (paroxysmal atrial fibrillation)     Personal history of COVID-19 05/2022    S/P TAVR (transcatheter aortic valve replacement)       Past Surgical History:   Procedure Laterality Date    AORTIC VALVE REPLACEMENT N/A 10/8/2019    Procedure: Replacement-valve-aortic;  Surgeon: Fidel Cardenas MD;  Location: [facility] CATH LAB;  Service: Cardiology;  Laterality: N/A;    BACK SURGERY      bone graft neck    CARDIAC VALVE SURGERY      CARPAL TUNNEL RELEASE Left 10/3/2018    Procedure: RELEASE, CARPAL TUNNEL  LEFT;  Surgeon: Meche Cárdenas MD;  Location: [facility];  Service: Neurosurgery;  Laterality: Left;    CARPAL TUNNEL RELEASE Right 1/17/2019    Procedure: RELEASE, CARPAL TUNNEL RIGHT;  Surgeon: Meche Cárdenas MD;  Location: [facility];  Service: Neurosurgery;  Laterality: Right;    CATHETERIZATION OF BOTH LEFT AND RIGHT HEART N/A 7/30/2019    Procedure: CATHETERIZATION, HEART, BOTH LEFT AND RIGHT;  Surgeon: Terrie Alberto MD;  Location: [facility] CATH;  Service: Cardiology;  Laterality: N/A;     CERVICAL FUSION  1984    x2     CORONARY ANGIOGRAPHY N/A 7/30/2019    Procedure: ANGIOGRAM, CORONARY ARTERY;  Surgeon: Terrie Alberto MD;  Location: [facility];  Service: Cardiology;  Laterality: N/A;    HERNIA REPAIR      umbilical    HYSTERECTOMY      TONSILLECTOMY        Current Outpatient Medications on File Prior to Visit   Medication Sig Dispense Refill    acetaminophen (TYLENOL) 500 MG tablet Take 500 mg by mouth every 8 (eight) hours as needed.      albuterol (PROVENTIL/VENTOLIN HFA) 90 mcg/actuation inhaler Inhale 2 puffs into the lungs every 6 (six) hours as needed for Wheezing. Rescue 18 g 2    allopurinoL (ZYLOPRIM) 300 MG tablet TAKE 1 TABLET(300 MG) BY MOUTH EVERY DAY 90 tablet 1    amLODIPine (NORVASC) 2.5 MG tablet TAKE 1 TABLET(2.5 MG) BY MOUTH EVERY DAY 90 tablet 2    apixaban (ELIQUIS) 5 mg Tab Take 1 tablet (5 mg total) by mouth 2 (two) times daily. 90 tablet 3    atorvastatin (LIPITOR) 40 MG tablet TAKE 1 TABLET(40 MG) BY MOUTH EVERY EVENING 90 tablet 2    fish oil-omega-3 fatty acids 300-1,000 mg capsule Take 1 capsule by mouth once daily. Hold for 7 days before surgery      furosemide (LASIX) 20 MG tablet TAKE 1 TABLET(20 MG) BY MOUTH TWICE DAILY 180 tablet 3    LIDOcaine (LIDODERM) 5 % Place 1 patch onto the skin once daily. Remove & Discard patch within 12 hours or as directed by MD 30 patch 5    multivitamin capsule Take 1 capsule by mouth once daily. Hold AM of surgery      nitroGLYCERIN (NITROSTAT) 0.4 MG SL tablet Place under the tongue every 5 (five) minutes as needed.      pantoprazole (PROTONIX) 20 MG tablet TAKE 1 TABLET(20 MG) BY MOUTH EVERY DAY 90 tablet 1    vitamin D (VITAMIN D3) 1000 units Tab Take 1,000 Units by mouth once daily.       No current facility-administered medications on file prior to visit.      Review of patient's allergies indicates:   Allergen Reactions    Opioids - morphine analogues      nause and fever   Other reaction(s): Other (See Comments)  nause and  fever     Tetanus vaccines and toxoid Rash and Swelling    Flu virus vacc tvs 2015-16 (18 yr up) cell derived      Other reaction(s): Other (See Comments)    Influenza virus vaccines Nausea And Vomiting    Iodine Hives    Iodine and iodide containing products     Morpholine analogues Nausea And Vomiting    Penicillins Other (See Comments)     High fever as a child, also was allergy tested 1980    Latex, natural rubber Rash      Family History not pertinent   Social History     Socioeconomic History    Marital status:    Tobacco Use    Smoking status: Never    Smokeless tobacco: Never   Substance and Sexual Activity    Alcohol use: No    Drug use: No         Review of Systems:   Constitutional:  Denies fever or chills    Eyes:  Denies change in visual acuity    HENT:  Denies nasal congestion or sore throat    Respiratory:  Denies cough or shortness of breath    Cardiovascular:  Denies chest pain or edema    GI:  Denies abdominal pain, nausea, vomiting, bloody stools or diarrhea    :  Denies dysuria    Integument:  Denies rash    Neurologic:  Denies headache, focal weakness or sensory changes    Endocrine:  Denies polyuria or polydipsia    Lymphatic:  Denies swollen glands    Psychiatric:  Denies depression or anxiety       Physical Exam:    Constitutional:  Well developed, well nourished, no acute distress, non-toxic appearance    Integument:  Well hydrated  Neurologic:  Alert & oriented x 3  Psychiatric:  Speech and behavior appropriate      Bilateral Shoulder Exam    right Shoulder Exam   Shoulder exam performed same as contralateral side and is normal.    left Shoulder Exam   Tenderness   Shoulder tenderness location: diffusely about shoulder.    Range of Motion   Forward Flexion: abnormal   External Rotation: abnormal     Muscle Strength   Supraspinatus: 4/5     Tests   Hawkin's test: positive  Impingement: positive    Other   Erythema: absent  Sensation: normal  Pulse: present        X-rays were  performed 10 days ago, personally reviewed by me and findings discussed with the patient.   3 views of the left shoulder show high riding humerus.             Assessment & plan    Impingement of left shoulder  -     Large Joint Aspiration/Injection: L subacromial bursa  -     triamcinolone acetonide injection 40 mg    Chronic left shoulder pain  -     Ambulatory referral/consult to Orthopedics    Myalgia, shoulder region  -     Trigger Point Injection  -     dexAMETHasone injection 4 mg      Pt is 83 and not interested in surgery. She wants to try injections first so we opted not to order MRI.   A trigger point injection was performed at the site of maximal tenderness using 0.25% plain bupivacaine and decadron. This was well tolerated, and followed by relief of pain.    Using an aseptic technique, I injected 5 cc of lidocaine 1% without and 1 cc of kenalog 40mg into the left shoulder. The patient tolerated this well. I will have them return to clinic as needed.        ,

## 2024-03-02 NOTE — PROCEDURES
Large Joint Aspiration/Injection: L subacromial bursa    Date/Time: 3/1/2024 11:00 AM    Performed by: Silvia Newsome FNP  Authorized by: Silvia Newsome FNP    Consent Done?:  Yes (Verbal)  Indications:  Pain  Timeout: prior to procedure the correct patient, procedure, and site was verified    Prep: patient was prepped and draped in usual sterile fashion      Local anesthesia used?: Yes    Local anesthetic:  Lidocaine 1% without epinephrine  Anesthetic total (ml):  5      Details:  Needle Size:  21 G  Ultrasonic Guidance for needle placement?: No    Approach:  Posterior  Location:  Shoulder  Site:  L subacromial bursa  Medications:  40 mg triamcinolone acetonide 40 mg/mL  Patient tolerance:  Patient tolerated the procedure well with no immediate complications  Trigger Point Injection    Performed by: Silvia Newsome FNP  Authorized by: Silvia Newsome FNP    Cervical Paraspinal:  Left  Thoracic Paraspinal:  Left    Consent Done?:  Yes (Verbal)    Pre-Procedure:   Indications:  Pain relief and diagnostic evaluation  Site marked: the procedure site was marked     Timeout: prior to procedure the correct patient, procedure, and site was verified      Local anesthesia used?: Yes    Local anesthetic:  Bupivacaine 0.25% without epinephrine  Anesthetic total (ml):  2    Medications: 4 mg dexAMETHasone 4 mg/mL

## 2024-03-10 ENCOUNTER — HOSPITAL ENCOUNTER (OUTPATIENT)
Dept: CARDIOLOGY | Facility: HOSPITAL | Age: 84
Discharge: HOME | End: 2024-03-10
Attending: INTERNAL MEDICINE

## 2024-03-10 ENCOUNTER — CLINICAL SUPPORT (OUTPATIENT)
Dept: CARDIOLOGY | Facility: HOSPITAL | Age: 84
End: 2024-03-10

## 2024-03-10 DIAGNOSIS — I49.8 OTHER SPECIFIED CARDIAC ARRHYTHMIAS: ICD-10-CM

## 2024-03-10 PROCEDURE — 93298 REM INTERROG DEV EVAL SCRMS: CPT | Mod: PO | Performed by: INTERNAL MEDICINE

## 2024-03-10 PROCEDURE — 93298 REM INTERROG DEV EVAL SCRMS: CPT | Mod: 26,,, | Performed by: INTERNAL MEDICINE

## 2024-03-12 ENCOUNTER — TELEPHONE (OUTPATIENT)
Dept: CARDIOLOGY | Facility: CLINIC | Age: 84
End: 2024-03-12
Payer: MEDICARE

## 2024-03-12 DIAGNOSIS — Z87.01 HISTORY OF PNEUMONIA: ICD-10-CM

## 2024-03-12 RX ORDER — ALBUTEROL SULFATE 90 UG/1
2 AEROSOL, METERED RESPIRATORY (INHALATION) EVERY 6 HOURS PRN
Qty: 54 G | Refills: 3 | Status: SHIPPED | OUTPATIENT
Start: 2024-03-12 | End: 2024-05-20 | Stop reason: ALTCHOICE

## 2024-03-12 NOTE — TELEPHONE ENCOUNTER
No care due was identified.  [facility] Embedded Care Due Messages. Reference number: [id number].   3/12/2024 1:13:07 PM CDT

## 2024-03-12 NOTE — TELEPHONE ENCOUNTER
Refill Routing Note   Medication(s) are not appropriate for processing by [facility] for the following reason(s):        Responsible provider unclear  No active prescription written by provider    [facility] action(s):  Defer             Appointments  past 12m or future 3m with PCP    Date Provider   Last Visit   2/20/2024 Aby Perales MD   Next Visit   5/20/2024 Aby Perales MD   ED visits in past 90 days: 0        Note composed:1:15 PM 03/12/2024

## 2024-03-12 NOTE — TELEPHONE ENCOUNTER
----- Message from Beatriz Del Cid sent at 3/12/2024  3:34 PM CDT -----  Selena Muhammad , the pts daughter is calling to inform the clinic that the packet of FMLA paperwork that she left yesterday with Dr Reinoso got some clarity. The only part that needs to be answered is Section B Question #10. Please call back to advise, thank you      [phone]

## 2024-03-13 ENCOUNTER — TELEPHONE (OUTPATIENT)
Dept: ORTHOPEDICS | Facility: CLINIC | Age: 84
End: 2024-03-13
Payer: MEDICARE

## 2024-03-13 ENCOUNTER — NURSE TRIAGE (OUTPATIENT)
Dept: ADMINISTRATIVE | Facility: CLINIC | Age: 84
End: 2024-03-13
Payer: MEDICARE

## 2024-03-13 NOTE — TELEPHONE ENCOUNTER
Called patient. Appointment made.    [facility] Pregnancy And Lactation Text: There is insufficient data to evaluate drug-associated risk for major birth defects, miscarriage, or other adverse maternal or fetal outcomes.  There is a pregnancy registry that monitors pregnancy outcomes in pregnant persons exposed to the medication during pregnancy.  It is unknown if this medication is excreted in breast milk.  Do not breastfeed during treatment and for about 4 weeks after the last dose.

## 2024-03-13 NOTE — TELEPHONE ENCOUNTER
----- Message from Jermain Wei MA sent at 3/13/2024  1:42 PM CDT -----  Contact: Pegg/Dtr  Patient is now in severe pain with her left shoulder.  What should they do?      Call back number is [phone]

## 2024-03-13 NOTE — TELEPHONE ENCOUNTER
Please advise, sending to ortho as well.           Called pt and spoke to daughter    Daughter stated she is at an appointment with  right now but left pt with her son . She informed him if she states to get pains again to take to Er and call her.     She states she is in serve pain where she got shots done in her shoulder and was causing pain in chest.    Informed if she states again to take to ED. Informed of women having different signs and symptoms of heart disease//attack as men.

## 2024-03-13 NOTE — TELEPHONE ENCOUNTER
Daughter calls, pt reports severe chest pains earlier today. Reports that pain was midsternal. Pt sent reading from loop recorder earlier. Daughter is back home now with mother, and pt reports chest pain is gone now and just has a little bit of pressure. Pt also has pain in left shoulder that is worsening. Hx of impingement and is getting shots for pain.     Dispo is to go to ED now. Daughter declined disposition and requesting to speak with cardio as they are following her with loop recorder for similar symptoms. Reiterated disposition and will send high priority message to provider.     Reason for Disposition   Pain also in shoulder(s) or arm(s) or jaw    Additional Information   Negative: SEVERE difficulty breathing (e.g., struggling for each breath, speaks in single words)   Negative: Difficult to awaken or acting confused (e.g., disoriented, slurred speech)   Negative: Shock suspected (e.g., cold/pale/clammy skin, too weak to stand, low BP, rapid pulse)   Negative: Passed out (i.e., lost consciousness, collapsed and was not responding)   Negative: Chest pain lasting longer than 5 minutes and over 44 years old   Negative: Chest pain lasting longer than 5 minutes, over 30 years old, and at least one cardiac risk factor (e.g., diabetes mellitus, high blood pressure, high cholesterol, smoker, or strong family history of heart disease)   Negative: Chest pain lasting longer than 5 minutes and history of heart disease (i.e., angina, heart attack, heart failure, bypass surgery, takes nitroglycerin)   Negative: Chest pain lasting longer than 5 minutes and pain is crushing, pressure-like, or heavy   Negative: Heart beating < 50 beats per minute OR > 140 beats per minute   Negative: Visible sweat on face or sweat dripping down face   Negative: Sounds like a life-threatening emergency to the triager   Negative: SEVERE chest pain    Protocols used: Chest Pain-A-[place]

## 2024-03-14 ENCOUNTER — OFFICE VISIT (OUTPATIENT)
Dept: ORTHOPEDICS | Facility: CLINIC | Age: 84
End: 2024-03-14
Payer: MEDICARE

## 2024-03-14 VITALS — BODY MASS INDEX: 39.24 KG/M2 | WEIGHT: 250 LBS | HEIGHT: 67 IN

## 2024-03-14 DIAGNOSIS — G89.29 CHRONIC LEFT SHOULDER PAIN: ICD-10-CM

## 2024-03-14 DIAGNOSIS — M25.512 ACUTE PAIN OF LEFT SHOULDER: Primary | ICD-10-CM

## 2024-03-14 DIAGNOSIS — M25.512 CHRONIC LEFT SHOULDER PAIN: ICD-10-CM

## 2024-03-14 PROCEDURE — 99214 OFFICE O/P EST MOD 30 MIN: CPT | Mod: S$GLB,,, | Performed by: NURSE PRACTITIONER

## 2024-03-14 PROCEDURE — 99999 PR PBB SHADOW E&M-EST. PATIENT-LVL III: CPT | Mod: PBBFAC,,, | Performed by: NURSE PRACTITIONER

## 2024-03-14 RX ORDER — METHYLPREDNISOLONE 4 MG/1
TABLET ORAL
Qty: 21 EACH | Refills: 0 | Status: SHIPPED | OUTPATIENT
Start: 2024-03-14 | End: 2024-04-04

## 2024-03-14 RX ORDER — OXYCODONE AND ACETAMINOPHEN 5; 325 MG/1; MG/1
1 TABLET ORAL EVERY 8 HOURS PRN
Qty: 21 TABLET | Refills: 0 | Status: SHIPPED | OUTPATIENT
Start: 2024-03-14 | End: 2024-03-21

## 2024-03-14 RX ORDER — METHOCARBAMOL 750 MG/1
750 TABLET, FILM COATED ORAL 3 TIMES DAILY PRN
Qty: 60 TABLET | Refills: 0 | Status: SHIPPED | OUTPATIENT
Start: 2024-03-14 | End: 2024-04-13

## 2024-03-14 RX ORDER — OXYCODONE AND ACETAMINOPHEN 5; 325 MG/1; MG/1
1 TABLET ORAL EVERY 4 HOURS PRN
COMMUNITY
End: 2024-03-14

## 2024-03-14 NOTE — TELEPHONE ENCOUNTER
Called pts daughter Selena. I informed her that the [facility] paper work has been completed. Pt daughter GILMER and will be coming tomorrow to pick it up.

## 2024-03-14 NOTE — PROGRESS NOTES
Chief Complaint   Patient presents with    Left Shoulder - Pain     Prev inj 3/1/24 , l shoulder 5:1       HPI:   This is a 83 y.o. who returns to clinic today in follow-up for left shoulder pain s/p injectin about 3 weeks ago. Started with pain to left shoulder yesterday again.  Pain radiating down arm and she reports pain is 10 out of 10. Wanting another injection. No numbness or tingling. No associated signs or symptoms.    Past Medical History:   Diagnosis Date    Anticoagulant long-term use     Aortic stenosis     Arthritis     CKD (chronic kidney disease), stage III     Class 2 severe obesity due to excess calories with serious comorbidity and body mass index (BMI) of 39.0 to 39.9 in adult 09/13/2018    Digestive disorder     GERD (gastroesophageal reflux disease)     Gout     Hypercholesteremia     Hypertension     PAF (paroxysmal atrial fibrillation)     Personal history of COVID-19 05/2022    S/P TAVR (transcatheter aortic valve replacement)      Past Surgical History:   Procedure Laterality Date    AORTIC VALVE REPLACEMENT N/A 10/8/2019    Procedure: Replacement-valve-aortic;  Surgeon: Fidel Cardenas MD;  Location: [facility] CATH LAB;  Service: Cardiology;  Laterality: N/A;    BACK SURGERY      bone graft neck    CARDIAC VALVE SURGERY      CARPAL TUNNEL RELEASE Left 10/3/2018    Procedure: RELEASE, CARPAL TUNNEL  LEFT;  Surgeon: Meche Cárdenas MD;  Location: [facility];  Service: Neurosurgery;  Laterality: Left;    CARPAL TUNNEL RELEASE Right 1/17/2019    Procedure: RELEASE, CARPAL TUNNEL RIGHT;  Surgeon: Meche Cárdenas MD;  Location: [facility];  Service: Neurosurgery;  Laterality: Right;    CATHETERIZATION OF BOTH LEFT AND RIGHT HEART N/A 7/30/2019    Procedure: CATHETERIZATION, HEART, BOTH LEFT AND RIGHT;  Surgeon: Terrie Alberto MD;  Location: [facility] CATH;  Service: Cardiology;  Laterality: N/A;    CERVICAL FUSION  1984    x2     CORONARY ANGIOGRAPHY N/A 7/30/2019    Procedure: ANGIOGRAM,  CORONARY ARTERY;  Surgeon: Terrie Alberto MD;  Location: [facility];  Service: Cardiology;  Laterality: N/A;    HERNIA REPAIR      umbilical    HYSTERECTOMY      TONSILLECTOMY       Current Outpatient Medications on File Prior to Visit   Medication Sig Dispense Refill    acetaminophen (TYLENOL) 500 MG tablet Take 500 mg by mouth every 8 (eight) hours as needed.      albuterol (PROVENTIL/VENTOLIN HFA) 90 mcg/actuation inhaler INHALE 2 PUFFS INTO THE LUNGS EVERY 6 HOURS AS NEEDED FOR WHEEZING 54 g 3    allopurinoL (ZYLOPRIM) 300 MG tablet TAKE 1 TABLET(300 MG) BY MOUTH EVERY DAY 90 tablet 1    amLODIPine (NORVASC) 2.5 MG tablet TAKE 1 TABLET(2.5 MG) BY MOUTH EVERY DAY 90 tablet 2    apixaban (ELIQUIS) 5 mg Tab Take 1 tablet (5 mg total) by mouth 2 (two) times daily. 90 tablet 3    atorvastatin (LIPITOR) 40 MG tablet TAKE 1 TABLET(40 MG) BY MOUTH EVERY EVENING 90 tablet 2    fish oil-omega-3 fatty acids 300-1,000 mg capsule Take 1 capsule by mouth once daily. Hold for 7 days before surgery      furosemide (LASIX) 20 MG tablet TAKE 1 TABLET(20 MG) BY MOUTH TWICE DAILY 180 tablet 3    LIDOcaine (LIDODERM) 5 % Place 1 patch onto the skin once daily. Remove & Discard patch within 12 hours or as directed by MD 30 patch 5    multivitamin capsule Take 1 capsule by mouth once daily. Hold AM of surgery      nitroGLYCERIN (NITROSTAT) 0.4 MG SL tablet Place under the tongue every 5 (five) minutes as needed.      pantoprazole (PROTONIX) 20 MG tablet TAKE 1 TABLET(20 MG) BY MOUTH EVERY DAY 90 tablet 1    vitamin D (VITAMIN D3) 1000 units Tab Take 1,000 Units by mouth once daily.      [DISCONTINUED] oxyCODONE-acetaminophen (PERCOCET) 5-325 mg per tablet Take 1 tablet by mouth every 4 (four) hours as needed for Pain.       No current facility-administered medications on file prior to visit.     Review of patient's allergies indicates:   Allergen Reactions    Opioids - morphine analogues      nause and fever   Other reaction(s): Other  (See Comments)  nause and fever     Tetanus vaccines and toxoid Rash and Swelling    Flu virus vacc tvs 2015-16 (18 yr up) cell derived      Other reaction(s): Other (See Comments)    Influenza virus vaccines Nausea And Vomiting    Iodides     Iodine Hives    Iodine and iodide containing products     Morpholine analogues Nausea And Vomiting    Penicillins Other (See Comments)     High fever as a child, also was allergy tested 1980    Tetanus and diphther. tox (pf)     Latex, natural rubber Rash     Family History   Problem Relation Age of Onset    Cancer Mother     Stroke Mother     Hypertension Mother     Ovarian cancer Mother     No Known Problems Father     Glaucoma Neg Hx     Macular degeneration Neg Hx      Social History     Socioeconomic History    Marital status:    Tobacco Use    Smoking status: Never    Smokeless tobacco: Never   Substance and Sexual Activity    Alcohol use: No    Drug use: No       Review of Systems:  Constitutional:  Denies fever or chills   Eyes:  Denies change in visual acuity   HENT:  Denies nasal congestion or sore throat   Respiratory:  Denies cough or shortness of breath   Cardiovascular:  Denies chest pain or edema   GI:  Denies abdominal pain, nausea, vomiting, bloody stools or diarrhea   :  Denies dysuria   Integument:  Denies rash   Neurologic:  Denies headache, focal weakness or sensory changes   Endocrine:  Denies polyuria or polydipsia   Lymphatic:  Denies swollen glands   Psychiatric:  Denies depression or anxiety     Physical Exam:   Constitutional:  Well developed, well nourished, no acute distress, non-toxic appearance   Integument:  Well hydrated, no rash   Lymphatic:  No lymphadenopathy noted   Neurologic:  Alert & oriented x 3,    Psychiatric:  Speech and behavior appropriate     Bilateral Shoulder Exam    left Shoulder Exam   Shoulder exam performed same as contralateral side and is normal.    left Shoulder Exam   Tenderness   Shoulder tenderness location:  diffusely about shoulder.    Range of Motion   Forward Flexion: abnormal   External Rotation: abnormal     Muscle Strength   Supraspinatus: 4/5     Tests   Hawkin's test: positive  Impingement: positive    Other   Erythema: absent  Sensation: normal  Pulse: present           Acute pain of left shoulder  -     oxyCODONE-acetaminophen (PERCOCET) 5-325 mg per tablet; Take 1 tablet by mouth every 8 (eight) hours as needed for Pain.  Dispense: 21 tablet; Refill: 0  -     methylPREDNISolone (MEDROL DOSEPACK) 4 mg tablet; use as directed  Dispense: 21 each; Refill: 0    Other orders  -     methocarbamoL (ROBAXIN) 750 MG Tab; Take 1 tablet (750 mg total) by mouth 3 (three) times daily as needed.  Dispense: 60 tablet; Refill: 0       Will consider iovera if pain continues.   Rtc in 2-3 weeks for trigger point injections.

## 2024-03-17 ENCOUNTER — DOCUMENT SCAN (OUTPATIENT)
Dept: HOME HEALTH SERVICES | Facility: HOSPITAL | Age: 84
End: 2024-03-17
Payer: MEDICARE

## 2024-04-03 DIAGNOSIS — I48.91 ATRIAL FIBRILLATION, UNSPECIFIED TYPE: ICD-10-CM

## 2024-04-03 RX ORDER — APIXABAN 5 MG/1
5 TABLET, FILM COATED ORAL 2 TIMES DAILY
Qty: 180 TABLET | Refills: 3 | Status: SHIPPED | OUTPATIENT
Start: 2024-04-03

## 2024-04-04 ENCOUNTER — OFFICE VISIT (OUTPATIENT)
Dept: ORTHOPEDICS | Facility: CLINIC | Age: 84
End: 2024-04-04
Payer: MEDICARE

## 2024-04-04 VITALS — HEIGHT: 67 IN | WEIGHT: 250 LBS | BODY MASS INDEX: 39.24 KG/M2

## 2024-04-04 DIAGNOSIS — M79.18 MYALGIA, SHOULDER REGION: Primary | ICD-10-CM

## 2024-04-04 PROCEDURE — 99999 PR PBB SHADOW E&M-EST. PATIENT-LVL III: CPT | Mod: PBBFAC,,, | Performed by: NURSE PRACTITIONER

## 2024-04-04 PROCEDURE — 1160F RVW MEDS BY RX/DR IN RCRD: CPT | Mod: CPTII,S$GLB,, | Performed by: NURSE PRACTITIONER

## 2024-04-04 PROCEDURE — 1101F PT FALLS ASSESS-DOCD LE1/YR: CPT | Mod: CPTII,S$GLB,, | Performed by: NURSE PRACTITIONER

## 2024-04-04 PROCEDURE — 1159F MED LIST DOCD IN RCRD: CPT | Mod: CPTII,S$GLB,, | Performed by: NURSE PRACTITIONER

## 2024-04-04 PROCEDURE — 99214 OFFICE O/P EST MOD 30 MIN: CPT | Mod: 25,S$GLB,, | Performed by: NURSE PRACTITIONER

## 2024-04-04 PROCEDURE — 20553 NJX 1/MLT TRIGGER POINTS 3/>: CPT | Mod: S$GLB,,, | Performed by: NURSE PRACTITIONER

## 2024-04-04 PROCEDURE — 3288F FALL RISK ASSESSMENT DOCD: CPT | Mod: CPTII,S$GLB,, | Performed by: NURSE PRACTITIONER

## 2024-04-04 PROCEDURE — 1125F AMNT PAIN NOTED PAIN PRSNT: CPT | Mod: CPTII,S$GLB,, | Performed by: NURSE PRACTITIONER

## 2024-04-04 RX ADMIN — DEXAMETHASONE SODIUM PHOSPHATE 4 MG: 4 INJECTION, SOLUTION INTRA-ARTICULAR; INTRALESIONAL; INTRAMUSCULAR; INTRAVENOUS; SOFT TISSUE at 09:04

## 2024-04-05 ENCOUNTER — TELEPHONE (OUTPATIENT)
Dept: FAMILY MEDICINE | Facility: CLINIC | Age: 84
End: 2024-04-05
Payer: MEDICARE

## 2024-04-10 ENCOUNTER — HOSPITAL ENCOUNTER (OUTPATIENT)
Dept: CARDIOLOGY | Facility: HOSPITAL | Age: 84
Discharge: HOME | End: 2024-04-10
Attending: INTERNAL MEDICINE

## 2024-04-10 ENCOUNTER — CLINICAL SUPPORT (OUTPATIENT)
Dept: CARDIOLOGY | Facility: HOSPITAL | Age: 84
End: 2024-04-10
Payer: MEDICARE

## 2024-04-10 DIAGNOSIS — I49.8 OTHER SPECIFIED CARDIAC ARRHYTHMIAS: ICD-10-CM

## 2024-04-10 PROCEDURE — 93298 REM INTERROG DEV EVAL SCRMS: CPT | Mod: PO | Performed by: INTERNAL MEDICINE

## 2024-04-10 PROCEDURE — 93298 REM INTERROG DEV EVAL SCRMS: CPT | Mod: 26,,, | Performed by: INTERNAL MEDICINE

## 2024-04-10 RX ORDER — DEXAMETHASONE SODIUM PHOSPHATE 4 MG/ML
4 INJECTION, SOLUTION INTRA-ARTICULAR; INTRALESIONAL; INTRAMUSCULAR; INTRAVENOUS; SOFT TISSUE
Status: DISCONTINUED | OUTPATIENT
Start: 2024-04-04 | End: 2024-04-10 | Stop reason: HOSPADM

## 2024-04-10 NOTE — PROGRESS NOTES
Chief Complaint   Patient presents with    Left Shoulder - Pain       HPI:   This is a 83 y.o. who returns to clinic today in follow-up for left shoulder pain for the past 4 weeks after no known injury or trauma. Pain is aching. No numbness or tingling. No associated signs or symptoms.    Past Medical History:   Diagnosis Date    Anticoagulant long-term use     Aortic stenosis     Arthritis     CKD (chronic kidney disease), stage III     Class 2 severe obesity due to excess calories with serious comorbidity and body mass index (BMI) of 39.0 to 39.9 in adult 09/13/2018    Digestive disorder     GERD (gastroesophageal reflux disease)     Gout     Hypercholesteremia     Hypertension     PAF (paroxysmal atrial fibrillation)     Personal history of COVID-19 05/2022    S/P TAVR (transcatheter aortic valve replacement)      Past Surgical History:   Procedure Laterality Date    AORTIC VALVE REPLACEMENT N/A 10/8/2019    Procedure: Replacement-valve-aortic;  Surgeon: Fidel Cardenas MD;  Location: [facility] CATH LAB;  Service: Cardiology;  Laterality: N/A;    BACK SURGERY      bone graft neck    CARDIAC VALVE SURGERY      CARPAL TUNNEL RELEASE Left 10/3/2018    Procedure: RELEASE, CARPAL TUNNEL  LEFT;  Surgeon: Meche Cárdenas MD;  Location: [facility];  Service: Neurosurgery;  Laterality: Left;    CARPAL TUNNEL RELEASE Right 1/17/2019    Procedure: RELEASE, CARPAL TUNNEL RIGHT;  Surgeon: Meche Cárdenas MD;  Location: [facility];  Service: Neurosurgery;  Laterality: Right;    CATHETERIZATION OF BOTH LEFT AND RIGHT HEART N/A 7/30/2019    Procedure: CATHETERIZATION, HEART, BOTH LEFT AND RIGHT;  Surgeon: Terrie Alberto MD;  Location: [facility] CATH;  Service: Cardiology;  Laterality: N/A;    CERVICAL FUSION  1984    x2     CORONARY ANGIOGRAPHY N/A 7/30/2019    Procedure: ANGIOGRAM, CORONARY ARTERY;  Surgeon: Terrie Alberto MD;  Location: [facility] CATH;  Service: Cardiology;  Laterality: N/A;    HERNIA REPAIR      umbilical     HYSTERECTOMY      TONSILLECTOMY       Current Outpatient Medications on File Prior to Visit   Medication Sig Dispense Refill    acetaminophen (TYLENOL) 500 MG tablet Take 500 mg by mouth every 8 (eight) hours as needed.      albuterol (PROVENTIL/VENTOLIN HFA) 90 mcg/actuation inhaler INHALE 2 PUFFS INTO THE LUNGS EVERY 6 HOURS AS NEEDED FOR WHEEZING 54 g 3    allopurinoL (ZYLOPRIM) 300 MG tablet TAKE 1 TABLET(300 MG) BY MOUTH EVERY DAY 90 tablet 1    amLODIPine (NORVASC) 2.5 MG tablet TAKE 1 TABLET(2.5 MG) BY MOUTH EVERY DAY 90 tablet 2    apixaban (ELIQUIS) 5 mg Tab TAKE 1 TABLET(5 MG) BY MOUTH TWICE DAILY 180 tablet 3    atorvastatin (LIPITOR) 40 MG tablet TAKE 1 TABLET(40 MG) BY MOUTH EVERY EVENING 90 tablet 2    fish oil-omega-3 fatty acids 300-1,000 mg capsule Take 1 capsule by mouth once daily. Hold for 7 days before surgery      furosemide (LASIX) 20 MG tablet TAKE 1 TABLET(20 MG) BY MOUTH TWICE DAILY 180 tablet 3    LIDOcaine (LIDODERM) 5 % Place 1 patch onto the skin once daily. Remove & Discard patch within 12 hours or as directed by MD 30 patch 5    methocarbamoL (ROBAXIN) 750 MG Tab Take 1 tablet (750 mg total) by mouth 3 (three) times daily as needed. 60 tablet 0    multivitamin capsule Take 1 capsule by mouth once daily. Hold AM of surgery      nitroGLYCERIN (NITROSTAT) 0.4 MG SL tablet Place under the tongue every 5 (five) minutes as needed.      pantoprazole (PROTONIX) 20 MG tablet TAKE 1 TABLET(20 MG) BY MOUTH EVERY DAY 90 tablet 1    vitamin D (VITAMIN D3) 1000 units Tab Take 1,000 Units by mouth once daily.       No current facility-administered medications on file prior to visit.     Review of patient's allergies indicates:   Allergen Reactions    Opioids - morphine analogues      nause and fever   Other reaction(s): Other (See Comments)  nause and fever     Tetanus vaccines and toxoid Rash and Swelling    Flu virus vacc tvs 2015-16 (18 yr up) cell derived      Other reaction(s): Other (See  Comments)    Influenza virus vaccines Nausea And Vomiting    Iodides     Iodine Hives    Iodine and iodide containing products     Morpholine analogues Nausea And Vomiting    Penicillins Other (See Comments)     High fever as a child, also was allergy tested 1980    Tetanus and diphther. tox (pf)     Latex, natural rubber Rash     Family History   Problem Relation Age of Onset    Cancer Mother     Stroke Mother     Hypertension Mother     Ovarian cancer Mother     No Known Problems Father     Glaucoma Neg Hx     Macular degeneration Neg Hx      Social History     Socioeconomic History    Marital status:    Tobacco Use    Smoking status: Never    Smokeless tobacco: Never   Substance and Sexual Activity    Alcohol use: No    Drug use: No       Review of Systems:  Constitutional:  Denies fever or chills   Eyes:  Denies change in visual acuity   HENT:  Denies nasal congestion or sore throat   Respiratory:  Denies cough or shortness of breath   Cardiovascular:  Denies chest pain or edema   GI:  Denies abdominal pain, nausea, vomiting, bloody stools or diarrhea   :  Denies dysuria   Integument:  Denies rash   Neurologic:  Denies headache, focal weakness or sensory changes   Endocrine:  Denies polyuria or polydipsia   Lymphatic:  Denies swollen glands   Psychiatric:  Denies depression or anxiety     Physical Exam:   Constitutional:  Well developed, well nourished, no acute distress, non-toxic appearance   Integument:  Well hydrated  Neurologic:  Alert & oriented x 3  Psychiatric:  Speech and behavior appropriate   Musculoskeletal: left shoulder/trapezius region pain with +TTP; no midline cervical tenderness; neurovascularly intact to LUE.           Assessment & plan    Myalgia, shoulder region  -     Trigger Point Injection  -     dexAMETHasone injection 4 mg    Trigger point injections x 3 was performed at the site of maximal tenderness using 0.25% plain bupivacaine and decadron. This was well tolerated, and  followed by relief of pain.    Rtc as needed.

## 2024-04-11 NOTE — PROCEDURES
Trigger Point Injection    Performed by: Silvia Newsome FNP  Authorized by: Silvia Newsome FNP    Rhomboid:  Left  Trapezus:  Left    Consent Done?:  Yes (Verbal)    Pre-Procedure:   Indications:  Pain relief and diagnostic evaluation  Site marked: the procedure site was marked     Timeout: prior to procedure the correct patient, procedure, and site was verified      Local anesthesia used?: Yes    Local anesthetic:  Bupivacaine 0.25% without epinephrine  Anesthetic total (ml):  2    Medications: 4 mg dexAMETHasone 4 mg/mL  Sternocleidomastoid:  Left

## 2024-04-12 ENCOUNTER — DOCUMENT SCAN (OUTPATIENT)
Dept: HOME HEALTH SERVICES | Facility: HOSPITAL | Age: 84
End: 2024-04-12
Payer: MEDICARE

## 2024-04-15 ENCOUNTER — OFFICE VISIT (OUTPATIENT)
Dept: CARDIOLOGY | Facility: CLINIC | Age: 84
End: 2024-04-15
Payer: MEDICARE

## 2024-04-15 ENCOUNTER — CLINICAL SUPPORT (OUTPATIENT)
Dept: PHYSICAL MEDICINE AND REHAB | Facility: CLINIC | Age: 84
End: 2024-04-15
Payer: MEDICARE

## 2024-04-15 VITALS — HEART RATE: 73 BPM | DIASTOLIC BLOOD PRESSURE: 68 MMHG | SYSTOLIC BLOOD PRESSURE: 168 MMHG

## 2024-04-15 VITALS
SYSTOLIC BLOOD PRESSURE: 154 MMHG | DIASTOLIC BLOOD PRESSURE: 73 MMHG | WEIGHT: 262.38 LBS | BODY MASS INDEX: 41.18 KG/M2 | HEART RATE: 74 BPM | HEIGHT: 67 IN

## 2024-04-15 DIAGNOSIS — I48.0 PAROXYSMAL ATRIAL FIBRILLATION: ICD-10-CM

## 2024-04-15 DIAGNOSIS — I77.1 TORTUOUS AORTA: ICD-10-CM

## 2024-04-15 DIAGNOSIS — M25.512 CHRONIC LEFT SHOULDER PAIN: ICD-10-CM

## 2024-04-15 DIAGNOSIS — I35.0 SEVERE AORTIC STENOSIS: ICD-10-CM

## 2024-04-15 DIAGNOSIS — E78.00 PURE HYPERCHOLESTEROLEMIA: ICD-10-CM

## 2024-04-15 DIAGNOSIS — Z79.02 LONG TERM (CURRENT) USE OF ANTITHROMBOTICS/ANTIPLATELETS: ICD-10-CM

## 2024-04-15 DIAGNOSIS — R06.02 SOB (SHORTNESS OF BREATH): Primary | ICD-10-CM

## 2024-04-15 DIAGNOSIS — I35.0 NODULAR CALCIFIC AORTIC VALVE STENOSIS: ICD-10-CM

## 2024-04-15 DIAGNOSIS — I10 ESSENTIAL HYPERTENSION: ICD-10-CM

## 2024-04-15 DIAGNOSIS — I48.19 PERSISTENT ATRIAL FIBRILLATION: ICD-10-CM

## 2024-04-15 DIAGNOSIS — G89.29 CHRONIC LEFT SHOULDER PAIN: ICD-10-CM

## 2024-04-15 DIAGNOSIS — Z95.2 S/P TAVR (TRANSCATHETER AORTIC VALVE REPLACEMENT): ICD-10-CM

## 2024-04-15 PROCEDURE — 99214 OFFICE O/P EST MOD 30 MIN: CPT | Mod: S$GLB,,, | Performed by: INTERNAL MEDICINE

## 2024-04-15 PROCEDURE — 1100F PTFALLS ASSESS-DOCD GE2>/YR: CPT | Mod: CPTII,S$GLB,, | Performed by: INTERNAL MEDICINE

## 2024-04-15 PROCEDURE — 99499 UNLISTED E&M SERVICE: CPT | Mod: S$GLB,,, | Performed by: PHYSICAL MEDICINE & REHABILITATION

## 2024-04-15 PROCEDURE — 99999 PR PBB SHADOW E&M-EST. PATIENT-LVL III: CPT | Mod: PBBFAC,,, | Performed by: INTERNAL MEDICINE

## 2024-04-15 PROCEDURE — 3077F SYST BP >= 140 MM HG: CPT | Mod: CPTII,S$GLB,, | Performed by: INTERNAL MEDICINE

## 2024-04-15 PROCEDURE — 3288F FALL RISK ASSESSMENT DOCD: CPT | Mod: CPTII,S$GLB,, | Performed by: INTERNAL MEDICINE

## 2024-04-15 PROCEDURE — 99999 PR PBB SHADOW E&M-EST. PATIENT-LVL II: CPT | Mod: PBBFAC,,, | Performed by: PHYSICAL MEDICINE & REHABILITATION

## 2024-04-15 PROCEDURE — 1160F RVW MEDS BY RX/DR IN RCRD: CPT | Mod: CPTII,S$GLB,, | Performed by: INTERNAL MEDICINE

## 2024-04-15 PROCEDURE — 1159F MED LIST DOCD IN RCRD: CPT | Mod: CPTII,S$GLB,, | Performed by: INTERNAL MEDICINE

## 2024-04-15 PROCEDURE — 3078F DIAST BP <80 MM HG: CPT | Mod: CPTII,S$GLB,, | Performed by: INTERNAL MEDICINE

## 2024-04-15 PROCEDURE — 1125F AMNT PAIN NOTED PAIN PRSNT: CPT | Mod: CPTII,S$GLB,, | Performed by: INTERNAL MEDICINE

## 2024-04-15 NOTE — PROGRESS NOTES
NO LEVEL OF SERVICE/NO CHARGE    Patient in no pain after recent trigger point injections. Not wishing to proceed with iovera to left shoulder today.     Will reassess in 1 month.

## 2024-04-15 NOTE — PROGRESS NOTES
Subjective:    Patient ID:  Selena Moulton is a 83 y.o. female patient here for evaluation Follow-up      History of Present Illness:  Allergy follow-up.  Valvular heart disease, TAVR 2019.  Normal EF.  Nonobstructive coronary disease by previous left heart catheterization.    Intermittent sweats, syncope/presyncope.  Patient is status post ILR.  Two episodes today, IFR recordings pending review.    Hypertension, dyslipidemia.               Review of patient's allergies indicates:   Allergen Reactions    Opioids - morphine analogues      nause and fever   Other reaction(s): Other (See Comments)  nause and fever     Tetanus vaccines and toxoid Rash and Swelling    Flu virus vacc tvs 2015-16 (18 yr up) cell derived      Other reaction(s): Other (See Comments)    Influenza virus vaccines Nausea And Vomiting    Iodides     Iodine Hives    Iodine and iodide containing products     Morpholine analogues Nausea And Vomiting    Penicillins Other (See Comments)     High fever as a child, also was allergy tested 1980    Tetanus and diphther. tox (pf)     Latex, natural rubber Rash       Past Medical History:   Diagnosis Date    Anticoagulant long-term use     Aortic stenosis     Arthritis     CKD (chronic kidney disease), stage III     Class 2 severe obesity due to excess calories with serious comorbidity and body mass index (BMI) of 39.0 to 39.9 in adult 09/13/2018    Digestive disorder     GERD (gastroesophageal reflux disease)     Gout     Hypercholesteremia     Hypertension     PAF (paroxysmal atrial fibrillation)     Personal history of COVID-19 05/2022    S/P TAVR (transcatheter aortic valve replacement)      Past Surgical History:   Procedure Laterality Date    AORTIC VALVE REPLACEMENT N/A 10/8/2019    Procedure: Replacement-valve-aortic;  Surgeon: Fidel Cardenas MD;  Location: [facility] CATH LAB;  Service: Cardiology;  Laterality: N/A;    BACK SURGERY      bone graft neck    CARDIAC VALVE SURGERY      [facility]  TUNNEL RELEASE Left 10/3/2018    Procedure: RELEASE, CARPAL TUNNEL  LEFT;  Surgeon: Meche Cárdenas MD;  Location: [facility];  Service: Neurosurgery;  Laterality: Left;    CARPAL TUNNEL RELEASE Right 1/17/2019    Procedure: RELEASE, CARPAL TUNNEL RIGHT;  Surgeon: Meche Cárdenas MD;  Location: [facility];  Service: Neurosurgery;  Laterality: Right;    CATHETERIZATION OF BOTH LEFT AND RIGHT HEART N/A 7/30/2019    Procedure: CATHETERIZATION, HEART, BOTH LEFT AND RIGHT;  Surgeon: Terrie Alberto MD;  Location: [facility] CATH;  Service: Cardiology;  Laterality: N/A;    CERVICAL FUSION  1984    x2     CORONARY ANGIOGRAPHY N/A 7/30/2019    Procedure: ANGIOGRAM, CORONARY ARTERY;  Surgeon: Terrie Alberto MD;  Location: [facility] CATH;  Service: Cardiology;  Laterality: N/A;    HERNIA REPAIR      umbilical    HYSTERECTOMY      TONSILLECTOMY       Social History     Tobacco Use    Smoking status: Never    Smokeless tobacco: Never   Substance Use Topics    Alcohol use: No    Drug use: No        Review of Systems:    As noted in HPI in addition      REVIEW OF SYSTEMS  Review of Systems   Constitutional: Negative for decreased appetite, diaphoresis, night sweats, weight gain and weight loss.   HENT:  Negative for nosebleeds and odynophagia.    Eyes:  Negative for double vision and photophobia.   Cardiovascular:  Negative for chest pain, claudication, cyanosis, dyspnea on exertion, irregular heartbeat, leg swelling, near-syncope, orthopnea, palpitations, paroxysmal nocturnal dyspnea and syncope.   Respiratory:  Negative for cough, hemoptysis, shortness of breath and wheezing.    Hematologic/Lymphatic: Negative for adenopathy.   Skin:  Negative for flushing, skin cancer and suspicious lesions.   Musculoskeletal:  Negative for gout, myalgias and neck pain.   Gastrointestinal:  Negative for abdominal pain, heartburn, hematemesis and hematochezia.   Genitourinary:  Negative for bladder incontinence, hesitancy and nocturia.    Neurological:  Negative for focal weakness, headaches, light-headedness and paresthesias.   Psychiatric/Behavioral:  Negative for memory loss and substance abuse.               Objective:        Vitals:    04/15/24 1341   BP: (!) 154/73   Pulse: 74       Lab Results   Component Value Date    WBC 7.65 01/30/2024    HGB 12.0 01/30/2024    HCT 38.8 01/30/2024     01/30/2024    CHOL 177 02/15/2023    TRIG 93 02/15/2023    HDL 60 02/15/2023    ALT 20 01/30/2024    AST 22 01/30/2024     01/30/2024    K 4.3 01/30/2024     01/30/2024    CREATININE 1.5 (H) 01/30/2024    BUN 28 (H) 01/30/2024    CO2 27 01/30/2024    INR 0.9 10/08/2019    HGBA1C 5.5 08/15/2023        ECHOCARDIOGRAM RESULTS  Results for orders placed in visit on 10/13/23    Echo    Interpretation Summary    Left Ventricle: The left ventricle is normal in size. Mildly increased wall thickness. Normal wall motion. There is normal systolic function with a visually estimated ejection fraction of 55 - 60%. There is normal diastolic function.    Right Ventricle: Normal right ventricular cavity size. Wall thickness is normal. Right ventricle wall motion  is normal. Systolic function is normal.    Aortic Valve: There is a transcatheter valve replacement in the aortic position. It is reported to be a 23 mm Rodgers valve. There is mild aortic regurgitation with a posterolateral eccentrically directed jet.    Mitral Valve: There is moderate posterior leaflet sclerosis.    IVC/SVC: Normal venous pressure at 3 mmHg.    Results for orders placed during the hospital encounter of 10/08/19    Cardiac catheterization    Conclusion  · Patient referred by Dr Alberto for severe AS, high surgical risk  · Successful aortic valve replacement with 23 mm Cierra S3 valve performed via transfemoral access.  · The post TAVR 2D echo showed no perivalvular leak.  · The AV per 2d-echo mean gradient is 5 mmHg the peak velocity is 1.5 m/s.  · Estimated blood loss: <50  mL    I certify that I was present for catheter insertion, catheter manipulation, angiography, and angiographic interpretation of this patient.    Procedure Log documented by Documenter: RT Carolina and verified by Fidel Esquivel MD.    Date: 10/8/2019  Time: 12:01 PM          CURRENT/PREVIOUS VISIT EKG  Results for orders placed or performed in visit on 09/13/22   IN OFFICE EKG 12-LEAD (to [place])    Collection Time: 09/13/22 10:04 AM    Narrative    Test Reason : I48.0,    Vent. Rate : 046 BPM     Atrial Rate : 046 BPM     P-R Int : 232 ms          QRS Dur : 096 ms      QT Int : 500 ms       P-R-T Axes : 080 058 062 degrees     QTc Int : 437 ms    Sinus bradycardia with 1st degree A-V block  Abnormal ECG  When compared with ECG of 09-AUG-2022 13:50,  Serial comparison not performed, all previous tracings are of poor data  quality    Confirmed by ENZO MCGOWAN MD ([id number]) on 9/14/2022 8:19:14 AM    Referred By: ABHISHEK PALACIO           Confirmed By:ENZO MCGOWAN MD     No valid procedures specified.   Results for orders placed during the hospital encounter of 08/09/22    Nuclear Stress - Cardiology Interpreted    Interpretation Summary    Normal myocardial perfusion scan. There is no evidence of myocardial ischemia or infarction.    The gated perfusion images showed an ejection fraction of 83% post stress.    LV cavity size is normal at rest and normal at stress.    The EKG portion of this study is abnormal but not diagnostic.    No valid procedures specified.    PHYSICAL EXAM  GENERAL: well built, well nourished, well-developed in no apparent distress alert and oriented.   HEENT: Normocephalic. Pupils normal and conjunctivae normal.  Mucous membranes normal, no cyanosis or icterus, trachea central,no pallor or icterus is noted..   NECK: No JVD. No bruit..   THYROID: Thyroid not enlarged. No nodules present..   CARDIAC:  Normal S1-S2.  No murmur rub click or gallop.  PMI nondisplaced.  CHEST ANATOMY: normal.    LUNGS: Clear to auscultation. No wheezing or rhonchi..   ABDOMEN: Soft no masses or organomegaly.  No abdomen pulsations or bruits.  Normal bowel sounds. No pulsations and no masses felt, No guarding or rebound.   URINARY: No waller catheter   EXTREMITIES: No cyanosis, clubbing or edema noted at this time., no calf tenderness bilaterally.   PERIPHERAL VASCULAR SYSTEM: Good palpable distal pulses.  2+ femoral, popliteal and pedal pulses.  No bruits    CENTRAL NERVOUS SYSTEM: No focal motor or sensory deficits noted.   SKIN: Skin without lesions, moist, well perfused.   MUSCLE STRENGTH & TONE: No noteable weakness, atrophy or abnormal movement    I HAVE REVIEWED :    The vital signs, nurses notes, and all the pertinent radiology and labs.         Current Outpatient Medications   Medication Instructions    acetaminophen (TYLENOL) 500 mg, Oral, Every 8 hours PRN    albuterol (PROVENTIL/VENTOLIN HFA) 90 mcg/actuation inhaler 2 puffs, Inhalation, Every 6 hours PRN    allopurinoL (ZYLOPRIM) 300 mg, Oral    amLODIPine (NORVASC) 2.5 MG tablet TAKE 1 TABLET(2.5 MG) BY MOUTH EVERY DAY    atorvastatin (LIPITOR) 40 MG tablet TAKE 1 TABLET(40 MG) BY MOUTH EVERY EVENING    ELIQUIS 5 mg, Oral, 2 times daily    fish oil-omega-3 fatty acids 300-1,000 mg capsule 1 capsule, Oral, Daily, Hold for 7 days before surgery    furosemide (LASIX) 20 MG tablet TAKE 1 TABLET(20 MG) BY MOUTH TWICE DAILY    LIDOcaine (LIDODERM) 5 % 1 patch, Transdermal, Daily, Remove & Discard patch within 12 hours or as directed by MD    multivitamin capsule 1 capsule, Oral, Daily, Hold AM of surgery    nitroGLYCERIN (NITROSTAT) 0.4 MG SL tablet Sublingual, Every 5 min PRN    pantoprazole (PROTONIX) 20 MG tablet TAKE 1 TABLET(20 MG) BY MOUTH EVERY DAY    vitamin D (VITAMIN D3) 1,000 Units, Oral, Daily          Assessment:   TAVR 2019.  History of PAF.  Recently discontinued amiodarone and beta-blocker  Pre TAVR workup with nonobstructive CAD.    Hypertension,  dyslipidemia.    Presence of ILR.    Chronic oral anticoagulation.    Plan:   ILR recordings reviewed, no significant Yves or tachyarrhythmias.  Recommended no change in current medications.  Continue to monitor.    Six-month          No follow-ups on file.

## 2024-04-27 DIAGNOSIS — E78.49 OTHER HYPERLIPIDEMIA: ICD-10-CM

## 2024-04-27 NOTE — TELEPHONE ENCOUNTER
No care due was identified.  [facility] Embedded Care Due Messages. Reference number: [id number].   4/27/2024 8:09:37 AM CDT

## 2024-04-29 RX ORDER — ATORVASTATIN CALCIUM 40 MG/1
TABLET, FILM COATED ORAL
Qty: 90 TABLET | Refills: 2 | Status: SHIPPED | OUTPATIENT
Start: 2024-04-29

## 2024-04-29 NOTE — TELEPHONE ENCOUNTER
Refill Routing Note   Medication(s) are not appropriate for processing by [facility] for the following reason(s):        Required labs outdated    [facility] action(s):  Defer               Appointments  past 12m or future 3m with PCP    Date Provider   Last Visit   2/20/2024 Aby Perales MD   Next Visit   5/20/2024 Aby Perales MD   ED visits in past 90 days: 0        Note composed:2:36 AM 04/29/2024

## 2024-05-07 LAB
OHS CV AF BURDEN PERCENT: < 1
OHS CV DC REMOTE DEVICE TYPE: NORMAL

## 2024-05-11 ENCOUNTER — HOSPITAL ENCOUNTER (OUTPATIENT)
Dept: CARDIOLOGY | Facility: HOSPITAL | Age: 84
Discharge: HOME | End: 2024-05-11
Attending: INTERNAL MEDICINE
Payer: MEDICARE

## 2024-05-11 DIAGNOSIS — I49.8 OTHER SPECIFIED CARDIAC ARRHYTHMIAS: ICD-10-CM

## 2024-05-11 PROCEDURE — 93298 REM INTERROG DEV EVAL SCRMS: CPT | Mod: PO | Performed by: INTERNAL MEDICINE

## 2024-05-11 PROCEDURE — 93298 REM INTERROG DEV EVAL SCRMS: CPT | Mod: 26,,, | Performed by: INTERNAL MEDICINE

## 2024-05-13 ENCOUNTER — LAB VISIT (OUTPATIENT)
Dept: LAB | Facility: HOSPITAL | Age: 84
End: 2024-05-13
Attending: FAMILY MEDICINE
Payer: MEDICARE

## 2024-05-13 DIAGNOSIS — R73.03 PREDIABETES: ICD-10-CM

## 2024-05-13 DIAGNOSIS — I10 ESSENTIAL HYPERTENSION: ICD-10-CM

## 2024-05-13 DIAGNOSIS — R42 DIZZINESS: ICD-10-CM

## 2024-05-13 LAB
ALBUMIN SERPL BCP-MCNC: 3.6 G/DL (ref 3.5–5.2)
ALP SERPL-CCNC: 117 U/L (ref 55–135)
ALT SERPL W/O P-5'-P-CCNC: 22 U/L (ref 10–44)
ANION GAP SERPL CALC-SCNC: 11 MMOL/L (ref 8–16)
AST SERPL-CCNC: 23 U/L (ref 10–40)
BASOPHILS # BLD AUTO: 0.07 K/UL (ref 0–0.2)
BASOPHILS NFR BLD: 1 % (ref 0–1.9)
BILIRUB SERPL-MCNC: 0.4 MG/DL (ref 0.1–1)
BUN SERPL-MCNC: 29 MG/DL (ref 8–23)
CALCIUM SERPL-MCNC: 10.3 MG/DL (ref 8.7–10.5)
CHLORIDE SERPL-SCNC: 105 MMOL/L (ref 95–110)
CHOLEST SERPL-MCNC: 161 MG/DL (ref 120–199)
CHOLEST/HDLC SERPL: 2.5 {RATIO} (ref 2–5)
CO2 SERPL-SCNC: 28 MMOL/L (ref 23–29)
CREAT SERPL-MCNC: 1.5 MG/DL (ref 0.5–1.4)
DIFFERENTIAL METHOD BLD: ABNORMAL
EOSINOPHIL # BLD AUTO: 0.2 K/UL (ref 0–0.5)
EOSINOPHIL NFR BLD: 2.4 % (ref 0–8)
ERYTHROCYTE [DISTWIDTH] IN BLOOD BY AUTOMATED COUNT: 15.1 % (ref 11.5–14.5)
EST. GFR  (NO RACE VARIABLE): 33.9 ML/MIN/1.73 M^2
ESTIMATED AVG GLUCOSE: 117 MG/DL (ref 68–131)
GLUCOSE SERPL-MCNC: 100 MG/DL (ref 70–110)
HBA1C MFR BLD: 5.7 % (ref 4–5.6)
HCT VFR BLD AUTO: 43.1 % (ref 37–48.5)
HDLC SERPL-MCNC: 64 MG/DL (ref 40–75)
HDLC SERPL: 39.8 % (ref 20–50)
HGB BLD-MCNC: 13.3 G/DL (ref 12–16)
IMM GRANULOCYTES # BLD AUTO: 0.02 K/UL (ref 0–0.04)
IMM GRANULOCYTES NFR BLD AUTO: 0.3 % (ref 0–0.5)
LDLC SERPL CALC-MCNC: 79 MG/DL (ref 63–159)
LYMPHOCYTES # BLD AUTO: 1.8 K/UL (ref 1–4.8)
LYMPHOCYTES NFR BLD: 27 % (ref 18–48)
MCH RBC QN AUTO: 28.4 PG (ref 27–31)
MCHC RBC AUTO-ENTMCNC: 30.9 G/DL (ref 32–36)
MCV RBC AUTO: 92 FL (ref 82–98)
MONOCYTES # BLD AUTO: 0.7 K/UL (ref 0.3–1)
MONOCYTES NFR BLD: 10.3 % (ref 4–15)
NEUTROPHILS # BLD AUTO: 4 K/UL (ref 1.8–7.7)
NEUTROPHILS NFR BLD: 59 % (ref 38–73)
NONHDLC SERPL-MCNC: 97 MG/DL
NRBC BLD-RTO: 0 /100 WBC
PLATELET # BLD AUTO: 182 K/UL (ref 150–450)
PMV BLD AUTO: 11.2 FL (ref 9.2–12.9)
POTASSIUM SERPL-SCNC: 4.2 MMOL/L (ref 3.5–5.1)
PROT SERPL-MCNC: 6.8 G/DL (ref 6–8.4)
RBC # BLD AUTO: 4.68 M/UL (ref 4–5.4)
SODIUM SERPL-SCNC: 144 MMOL/L (ref 136–145)
TRIGL SERPL-MCNC: 90 MG/DL (ref 30–150)
WBC # BLD AUTO: 6.71 K/UL (ref 3.9–12.7)

## 2024-05-13 PROCEDURE — 36415 COLL VENOUS BLD VENIPUNCTURE: CPT | Mod: PO | Performed by: FAMILY MEDICINE

## 2024-05-13 PROCEDURE — 80053 COMPREHEN METABOLIC PANEL: CPT | Performed by: FAMILY MEDICINE

## 2024-05-13 PROCEDURE — 85025 COMPLETE CBC W/AUTO DIFF WBC: CPT | Performed by: FAMILY MEDICINE

## 2024-05-13 PROCEDURE — 83036 HEMOGLOBIN GLYCOSYLATED A1C: CPT | Performed by: FAMILY MEDICINE

## 2024-05-13 PROCEDURE — 80061 LIPID PANEL: CPT | Performed by: FAMILY MEDICINE

## 2024-05-20 ENCOUNTER — HOSPITAL ENCOUNTER (OUTPATIENT)
Dept: RADIOLOGY | Facility: HOSPITAL | Age: 84
Discharge: HOME | End: 2024-05-20
Attending: FAMILY MEDICINE
Payer: MEDICARE

## 2024-05-20 ENCOUNTER — OFFICE VISIT (OUTPATIENT)
Dept: FAMILY MEDICINE | Facility: CLINIC | Age: 84
End: 2024-05-20
Payer: MEDICARE

## 2024-05-20 VITALS
DIASTOLIC BLOOD PRESSURE: 62 MMHG | HEIGHT: 67 IN | WEIGHT: 260.81 LBS | BODY MASS INDEX: 40.93 KG/M2 | SYSTOLIC BLOOD PRESSURE: 120 MMHG | HEART RATE: 83 BPM | OXYGEN SATURATION: 99 %

## 2024-05-20 DIAGNOSIS — I10 ESSENTIAL HYPERTENSION: Primary | ICD-10-CM

## 2024-05-20 DIAGNOSIS — R73.03 PREDIABETES: ICD-10-CM

## 2024-05-20 DIAGNOSIS — R11.0 NAUSEA: ICD-10-CM

## 2024-05-20 DIAGNOSIS — R10.11 RIGHT UPPER QUADRANT ABDOMINAL PAIN: ICD-10-CM

## 2024-05-20 DIAGNOSIS — R14.0 ABDOMINAL DISTENTION: ICD-10-CM

## 2024-05-20 DIAGNOSIS — T14.8XXA BRUISING: ICD-10-CM

## 2024-05-20 DIAGNOSIS — N18.32 STAGE 3B CHRONIC KIDNEY DISEASE: ICD-10-CM

## 2024-05-20 PROBLEM — D69.2 PURPURA, NONTHROMBOCYTOPENIC: Status: ACTIVE | Noted: 2024-05-20

## 2024-05-20 PROCEDURE — 3288F FALL RISK ASSESSMENT DOCD: CPT | Mod: CPTII,S$GLB,, | Performed by: FAMILY MEDICINE

## 2024-05-20 PROCEDURE — 1100F PTFALLS ASSESS-DOCD GE2>/YR: CPT | Mod: CPTII,S$GLB,, | Performed by: FAMILY MEDICINE

## 2024-05-20 PROCEDURE — 3074F SYST BP LT 130 MM HG: CPT | Mod: CPTII,S$GLB,, | Performed by: FAMILY MEDICINE

## 2024-05-20 PROCEDURE — 99999 PR PBB SHADOW E&M-EST. PATIENT-LVL IV: CPT | Mod: PBBFAC,,, | Performed by: FAMILY MEDICINE

## 2024-05-20 PROCEDURE — 3078F DIAST BP <80 MM HG: CPT | Mod: CPTII,S$GLB,, | Performed by: FAMILY MEDICINE

## 2024-05-20 PROCEDURE — 1159F MED LIST DOCD IN RCRD: CPT | Mod: CPTII,S$GLB,, | Performed by: FAMILY MEDICINE

## 2024-05-20 PROCEDURE — 74018 RADEX ABDOMEN 1 VIEW: CPT | Mod: TC,FY,PO

## 2024-05-20 PROCEDURE — 74018 RADEX ABDOMEN 1 VIEW: CPT | Mod: 26,,, | Performed by: RADIOLOGY

## 2024-05-20 PROCEDURE — 1160F RVW MEDS BY RX/DR IN RCRD: CPT | Mod: CPTII,S$GLB,, | Performed by: FAMILY MEDICINE

## 2024-05-20 PROCEDURE — 99214 OFFICE O/P EST MOD 30 MIN: CPT | Mod: S$GLB,,, | Performed by: FAMILY MEDICINE

## 2024-05-20 PROCEDURE — 1125F AMNT PAIN NOTED PAIN PRSNT: CPT | Mod: CPTII,S$GLB,, | Performed by: FAMILY MEDICINE

## 2024-05-20 NOTE — PROGRESS NOTES
Assessment:       1. Essential hypertension    2. Nausea    3. Abdominal distention    4. Right upper quadrant abdominal pain    5. Stage 3b chronic kidney disease    6. Bruising    7. Prediabetes        Assessment & Plan  Essential hypertension: stable    Nausea: new problem workup needed  -     US Abdomen Limited_Liver; Future; Expected date: 05/20/2024    Abdominal distention: new problem workup needed  -     X-Ray Abdomen AP 1 View; Future; Expected date: 05/20/2024    Right upper quadrant abdominal pain: new problem workup needed  -     US Abdomen Limited_Liver; Future; Expected date: 05/20/2024  The patient's nausea could be a result of gallbladder issues. A plain x-ray of the abdomen was ordered, along with an ultrasound to evaluate the liver and gallbladder. The patient was advised to take Eliquis with food.    Stage 3b chronic kidney disease:  The patient's kidney function has remained stable for the past 9 months, with a creatinine level of 1.5 and a GFR of 33.9. The patient was advised to increase her water intake and avoid anti-inflammatories such as Aleve, ibuprofen, and Motrin.    Bruising: worsening  The patient was informed that the bruise is likely a result of Eliquis. She was advised to apply an ice pack to the bruise.    Prediabetes: worsening  The patient's A1c levels have shown a slight increase from 5.5 to 5.7. The patient was counseled to reduce her sugar intake and to avoid high fructose corn syrup and highly processed foods.          5. Cataract surgery.  The patient's cataract surgery has been scheduled for 07/22/2024.     The patient's BMI has been recorded in the chart. The patient has been provided educational materials regarding the benefits of attaining and maintaining a normal weight. We will continue to address and follow this issue during follow up visits.   Patient agreed with assessment and plan. Patient verbalized understanding.     Subjective:       Patient ID: Selena TIFFANY De Jesus  Saige is a 83 y.o. female.    Chief Complaint: Follow-up (3 month follow up )    HPI  History of Present Illness  The patient presents for evaluation of multiple medical concerns. She is accompanied by her daughter.    The patient reports an overall improvement in her condition, with the exception of occasional morning nausea. She denies any associated dizziness. The nausea subsides postprandially, typically consisting of crackers. She negates any urinary symptoms, including dysuria or urinary frequency, indicative of a urinary tract infection. Her medical history includes gallbladder removal and acid reflux, for which she takes pantoprazole, which induces nausea. She denies any current nausea. She also denies any constipation.    The patient's daughter reports that the patient's blood pressure is typically lower than usual. The patient's edema is well-managed with Lasix 20 mg.    Supplemental Information  She does not use Lidoderm patches anymore. The last injection from Dr. Newsome stopped the pain. She takes vitamin D. She has not used the Ventolin inhaler in a long time. She takes allopurinol for uric acid. She takes Eliquis twice daily with food in the morning. She takes Tylenol as needed. Nitrostat as needed for chest pain. She takes PreserVision twice a day for the bleeding of the retina. Dr. Dias is the ophthalmologist and  she has a retina specialist. She uses synthetic eyedrops 3 times a day. She is doing injections in the left eye. She has a macular degeneration in her left eye. They feel the bleed was caused by a fall in her retina. She has one where it is supposed to be as smooth and one where there is scar tissue in the eye where there was a previous bleed. . They are doing pills twice a day and the shots every 6 to 8 weeks. They are going to do cataract surgery on the one that she injured with the orbital break and the chin and nasal in 08/2024. They need to get her eye stable before they can look  at the cataract and doing the cataract surgery. She has a bruise on her leg. It was small and kept getting bigger and sore.      Past medical history, past social history was reviewed and discussed with the patient.    Review of Systems   Respiratory:  Negative for apnea and chest tightness.    Cardiovascular:  Negative for chest pain and leg swelling.   Gastrointestinal:  Positive for nausea.   Musculoskeletal:  Positive for arthralgias, back pain and gait problem.       Objective:      Physical Exam  Vitals and nursing note reviewed.   Constitutional:       General: She is not in acute distress.     Appearance: Normal appearance. She is well-developed. She is obese. She is not diaphoretic.      Comments:  The patient is using a walker for mobilization, the patient has limited ambulation.   HENT:      Head: Normocephalic and atraumatic.      Right Ear: External ear normal.      Left Ear: External ear normal.      Nose: Nose normal.   Eyes:      General: No scleral icterus.        Right eye: No discharge.         Left eye: No discharge.      Conjunctiva/sclera: Conjunctivae normal.   Cardiovascular:      Rate and Rhythm: Normal rate and regular rhythm.      Heart sounds: Murmur heard.   Pulmonary:      Effort: Pulmonary effort is normal. No respiratory distress.      Breath sounds: Normal breath sounds. No wheezing.   Abdominal:      General: Abdomen is flat. Bowel sounds are normal. There is distension.      Tenderness: There is abdominal tenderness (right upper quadrant, epigastrium).   Musculoskeletal:         General: No tenderness or deformity.      Cervical back: Neck supple.   Skin:     Coloration: Skin is not pale.      Findings: Bruising present.   Neurological:      Mental Status: She is alert.   Psychiatric:         Behavior: Behavior normal.         Thought Content: Thought content normal.         Judgment: Judgment normal.         Physical Exam  Vital Signs  The patient's weight is 260 pounds.      Results  Laboratory Studies  Total cholesterol 161, HDL 64, LDL normal. A1c 5.7. Blood count 13.3. Creatinine 1.5, GFR 33.9.     This note was generated with the assistance of ambient listening technology. Verbal consent was obtained by the patient and accompanying visitor(s) for the recording of patient appointment to facilitate this note. I attest to having reviewed and edited the generated note for accuracy, though some syntax or spelling errors may persist. Please contact the author of this note for any clarification.

## 2024-05-21 ENCOUNTER — CLINICAL SUPPORT (OUTPATIENT)
Dept: CARDIOLOGY | Facility: HOSPITAL | Age: 84
End: 2024-05-21

## 2024-05-21 ENCOUNTER — HOSPITAL ENCOUNTER (OUTPATIENT)
Dept: CARDIOLOGY | Facility: HOSPITAL | Age: 84
Discharge: HOME | End: 2024-05-21
Attending: INTERNAL MEDICINE
Payer: MEDICARE

## 2024-05-21 DIAGNOSIS — M89.9 BONE LESION: Primary | ICD-10-CM

## 2024-05-21 DIAGNOSIS — I49.8 OTHER SPECIFIED CARDIAC ARRHYTHMIAS: ICD-10-CM

## 2024-05-27 ENCOUNTER — HOSPITAL ENCOUNTER (OUTPATIENT)
Dept: RADIOLOGY | Facility: HOSPITAL | Age: 84
Discharge: HOME | End: 2024-05-27
Attending: FAMILY MEDICINE
Payer: MEDICARE

## 2024-05-27 DIAGNOSIS — R11.0 NAUSEA: ICD-10-CM

## 2024-05-27 DIAGNOSIS — R10.11 RIGHT UPPER QUADRANT ABDOMINAL PAIN: ICD-10-CM

## 2024-05-27 PROCEDURE — 76705 ECHO EXAM OF ABDOMEN: CPT | Mod: TC,PO

## 2024-05-27 PROCEDURE — 76705 ECHO EXAM OF ABDOMEN: CPT | Mod: 26,,, | Performed by: STUDENT IN AN ORGANIZED HEALTH CARE EDUCATION/TRAINING PROGRAM

## 2024-05-28 ENCOUNTER — HOSPITAL ENCOUNTER (OUTPATIENT)
Dept: CARDIOLOGY | Facility: HOSPITAL | Age: 84
Discharge: HOME | End: 2024-05-28
Attending: INTERNAL MEDICINE
Payer: MEDICARE

## 2024-05-29 ENCOUNTER — TELEPHONE (OUTPATIENT)
Dept: CARDIOLOGY | Facility: HOSPITAL | Age: 84
End: 2024-05-29
Payer: MEDICARE

## 2024-05-29 NOTE — TELEPHONE ENCOUNTER
Notification received from home monitor:  10 Tachy episodes, the longest duration was 3 minutes and 34 seconds on 05/27/2024 with an avg rate of 154 bpm. EGM displays a narrow complex tachycardia.     Spoke with patient's daughter, states she was upset because her son in law was losing a lot of blood and in distress requiring to go to ED    Noted multiple Tachy events 5/27 x10, -162  Longest single event 3min 34sec         Stable Vrate histogram

## 2024-05-31 DIAGNOSIS — K80.71 CALCULUS OF GALLBLADDER AND BILE DUCT WITH OBSTRUCTION WITHOUT CHOLECYSTITIS: Primary | ICD-10-CM

## 2024-05-31 DIAGNOSIS — R10.11 RIGHT UPPER QUADRANT PAIN: ICD-10-CM

## 2024-06-04 ENCOUNTER — OFFICE VISIT (OUTPATIENT)
Dept: SURGERY | Facility: CLINIC | Age: 84
End: 2024-06-04
Payer: MEDICARE

## 2024-06-04 VITALS
DIASTOLIC BLOOD PRESSURE: 60 MMHG | BODY MASS INDEX: 41.62 KG/M2 | HEIGHT: 67 IN | RESPIRATION RATE: 20 BRPM | HEART RATE: 73 BPM | TEMPERATURE: 97 F | SYSTOLIC BLOOD PRESSURE: 138 MMHG | WEIGHT: 265.19 LBS | OXYGEN SATURATION: 97 %

## 2024-06-04 DIAGNOSIS — K80.71 CALCULUS OF GALLBLADDER AND BILE DUCT WITH OBSTRUCTION WITHOUT CHOLECYSTITIS: ICD-10-CM

## 2024-06-04 DIAGNOSIS — R10.11 RIGHT UPPER QUADRANT PAIN: ICD-10-CM

## 2024-06-04 PROCEDURE — 1100F PTFALLS ASSESS-DOCD GE2>/YR: CPT | Mod: CPTII,S$GLB,, | Performed by: STUDENT IN AN ORGANIZED HEALTH CARE EDUCATION/TRAINING PROGRAM

## 2024-06-04 PROCEDURE — 1159F MED LIST DOCD IN RCRD: CPT | Mod: CPTII,S$GLB,, | Performed by: STUDENT IN AN ORGANIZED HEALTH CARE EDUCATION/TRAINING PROGRAM

## 2024-06-04 PROCEDURE — 3078F DIAST BP <80 MM HG: CPT | Mod: CPTII,S$GLB,, | Performed by: STUDENT IN AN ORGANIZED HEALTH CARE EDUCATION/TRAINING PROGRAM

## 2024-06-04 PROCEDURE — 3075F SYST BP GE 130 - 139MM HG: CPT | Mod: CPTII,S$GLB,, | Performed by: STUDENT IN AN ORGANIZED HEALTH CARE EDUCATION/TRAINING PROGRAM

## 2024-06-04 PROCEDURE — 1126F AMNT PAIN NOTED NONE PRSNT: CPT | Mod: CPTII,S$GLB,, | Performed by: STUDENT IN AN ORGANIZED HEALTH CARE EDUCATION/TRAINING PROGRAM

## 2024-06-04 PROCEDURE — 3288F FALL RISK ASSESSMENT DOCD: CPT | Mod: CPTII,S$GLB,, | Performed by: STUDENT IN AN ORGANIZED HEALTH CARE EDUCATION/TRAINING PROGRAM

## 2024-06-04 PROCEDURE — 99204 OFFICE O/P NEW MOD 45 MIN: CPT | Mod: S$GLB,,, | Performed by: STUDENT IN AN ORGANIZED HEALTH CARE EDUCATION/TRAINING PROGRAM

## 2024-06-04 PROCEDURE — 99999 PR PBB SHADOW E&M-EST. PATIENT-LVL IV: CPT | Mod: PBBFAC,,, | Performed by: STUDENT IN AN ORGANIZED HEALTH CARE EDUCATION/TRAINING PROGRAM

## 2024-06-04 NOTE — PROGRESS NOTES
History & Physical    Subjective     History of Present Illness:  Patient is a 83 y.o. female presents with left back and flank pain.  She has had a couple of episodes in the last few days.  Currently asymptomatic.  During evaluation with her PCP was noted that she had right upper quadrant abdominal pain with deep palpation which prompted an ultrasound.  The ultrasound showed a calcified gallbladder and likely stones.  Patient denies postprandial right upper quadrant abdominal pain.  She has a history of aortic valve replacement with likely a poor seen valve.  She is anticoagulated for AFib.  She has had frequent syncopal episodes with falls.  Patient is minimally ambulatory.    Chief Complaint   Patient presents with    Consult     Calculus of GB       Review of patient's allergies indicates:   Allergen Reactions    Opioids - morphine analogues      nause and fever   Other reaction(s): Other (See Comments)  nause and fever     Tetanus vaccines and toxoid Rash and Swelling    Adhesive Rash    Flu virus vacc tvs 2015-16 (18 yr up) cell derived      Other reaction(s): Other (See Comments)    Influenza virus vaccines Nausea And Vomiting    Iodides     Iodine Hives    Iodine and iodide containing products     Morpholine analogues Nausea And Vomiting    Penicillins Other (See Comments)     High fever as a child, also was allergy tested 1980    Tetanus and diphther. tox (pf)     Latex, natural rubber Rash       Current Outpatient Medications   Medication Sig Dispense Refill    allopurinoL (ZYLOPRIM) 300 MG tablet TAKE 1 TABLET(300 MG) BY MOUTH EVERY DAY 90 tablet 1    amLODIPine (NORVASC) 2.5 MG tablet TAKE 1 TABLET(2.5 MG) BY MOUTH EVERY DAY 90 tablet 2    apixaban (ELIQUIS) 5 mg Tab TAKE 1 TABLET(5 MG) BY MOUTH TWICE DAILY 180 tablet 3    atorvastatin (LIPITOR) 40 MG tablet TAKE 1 TABLET(40 MG) BY MOUTH EVERY EVENING 90 tablet 2    fish oil-omega-3 fatty acids 300-1,000 mg capsule Take 1 capsule by mouth once daily.  Hold for 7 days before surgery      furosemide (LASIX) 20 MG tablet TAKE 1 TABLET(20 MG) BY MOUTH TWICE DAILY 180 tablet 3    multivitamin capsule Take 1 capsule by mouth once daily. Hold AM of surgery      pantoprazole (PROTONIX) 20 MG tablet TAKE 1 TABLET(20 MG) BY MOUTH EVERY DAY 90 tablet 1    vit A/vit C/vit E/zinc/copper (PRESERVISION AREDS ORAL) Take 1 tablet by mouth 2 (two) times a day.      vitamin D (VITAMIN D3) 1000 units Tab Take 1,000 Units by mouth once daily.      acetaminophen (TYLENOL) 500 MG tablet Take 500 mg by mouth every 8 (eight) hours as needed. (Patient not taking: Reported on 6/4/2024)      nitroGLYCERIN (NITROSTAT) 0.4 MG SL tablet Place under the tongue every 5 (five) minutes as needed. (Patient not taking: Reported on 6/4/2024)       No current facility-administered medications for this visit.       Past Medical History:   Diagnosis Date    Anticoagulant long-term use     Aortic stenosis     Arthritis     CKD (chronic kidney disease), stage III     Class 2 severe obesity due to excess calories with serious comorbidity and body mass index (BMI) of 39.0 to 39.9 in adult 09/13/2018    Digestive disorder     GERD (gastroesophageal reflux disease)     Gout     Hypercholesteremia     Hypertension     PAF (paroxysmal atrial fibrillation)     Personal history of COVID-19 05/2022    S/P TAVR (transcatheter aortic valve replacement)      Past Surgical History:   Procedure Laterality Date    AORTIC VALVE REPLACEMENT N/A 10/8/2019    Procedure: Replacement-valve-aortic;  Surgeon: Fidel Cardenas MD;  Location: [facility] CATH LAB;  Service: Cardiology;  Laterality: N/A;    BACK SURGERY      bone graft neck    CARDIAC VALVE SURGERY      CARPAL TUNNEL RELEASE Left 10/3/2018    Procedure: RELEASE, CARPAL TUNNEL  LEFT;  Surgeon: Meche Cárdenas MD;  Location: [facility];  Service: Neurosurgery;  Laterality: Left;    CARPAL TUNNEL RELEASE Right 1/17/2019    Procedure: RELEASE, CARPAL TUNNEL RIGHT;   "Surgeon: Meche Cárdenas MD;  Location: [facility] CSC;  Service: Neurosurgery;  Laterality: Right;    CATHETERIZATION OF BOTH LEFT AND RIGHT HEART N/A 7/30/2019    Procedure: CATHETERIZATION, HEART, BOTH LEFT AND RIGHT;  Surgeon: Terrie Alberto MD;  Location: [facility] CATH;  Service: Cardiology;  Laterality: N/A;    CERVICAL FUSION  1984    x2     CORONARY ANGIOGRAPHY N/A 7/30/2019    Procedure: ANGIOGRAM, CORONARY ARTERY;  Surgeon: Terrie Alberto MD;  Location: [facility] CATH;  Service: Cardiology;  Laterality: N/A;    HERNIA REPAIR      umbilical    HYSTERECTOMY      TONSILLECTOMY       Family History   Problem Relation Name Age of Onset    Cancer Mother      Stroke Mother      Hypertension Mother      Ovarian cancer Mother      No Known Problems Father      Glaucoma Neg Hx      Macular degeneration Neg Hx       Social History     Tobacco Use    Smoking status: Never    Smokeless tobacco: Never   Substance Use Topics    Alcohol use: No    Drug use: No        Review of Systems:  Review of Systems   Constitutional:  Negative for fever.   HENT: Negative.     Eyes: Negative.    Respiratory: Negative.     Cardiovascular: Negative.    Gastrointestinal: Negative.    Endocrine: Negative.    Genitourinary: Negative.    Musculoskeletal:  Positive for back pain.   Skin: Negative.    Allergic/Immunologic: Negative.    Neurological: Negative.    Hematological: Negative.    Psychiatric/Behavioral: Negative.            Objective     Vital Signs (Most Recent)  Temp: 97 °F (36.1 °C) (06/04/24 0819)  Pulse: 73 (06/04/24 0819)  Resp: 20 (06/04/24 0819)  BP: 138/60 (06/04/24 0819)  SpO2: 97 % (06/04/24 0819)  5' 7" (1.702 m)  120.3 kg (265 lb 3.4 oz)     Physical Exam:  Physical Exam  Constitutional:       General: She is not in acute distress.     Appearance: Normal appearance. She is obese. She is not ill-appearing, toxic-appearing or diaphoretic.   HENT:      Head: Normocephalic.      Nose: Nose normal.   Eyes:      Conjunctiva/sclera: " Conjunctivae normal.   Cardiovascular:      Rate and Rhythm: Normal rate and regular rhythm.   Pulmonary:      Effort: Pulmonary effort is normal.   Abdominal:      Palpations: Abdomen is soft.   Musculoskeletal:         General: Normal range of motion.      Cervical back: Normal range of motion.   Skin:     General: Skin is warm.   Neurological:      General: No focal deficit present.      Mental Status: She is alert.   Psychiatric:         Mood and Affect: Mood normal.         Laboratory  Labs reviewed, LFTs are normal.  Mild elevation in creatinine consistent with CKD    Diagnostic Results:  Old CT scan was reviewed.  The gallbladder is calcified.  This was compared to her recent ultrasound which shows similar findings including stones.       Assessment and Plan   83-year-old female with poor seeing gallbladder and likely stones.  Imaging seems to be relatively stable between 2019 and today.  She is complaining of left-sided back pain which I do not believe is related to her gallbladder.  Patient also has multiple medical risk factors for surgery including aortic stenosis treated with a replacement valve, AFib on anticoagulation with syncopal episodes.    PLAN:  Unlikely that cholecystectomy will do anything to change her symptoms.  We discussed the possibility of CT imaging for further evaluation which can be completed at the discretion of her PCP if symptoms return.  Patient would be a poor operative candidate it would need to be cleared by Cardiology prior to any elective operative intervention.

## 2024-06-07 ENCOUNTER — TELEPHONE (OUTPATIENT)
Dept: FAMILY MEDICINE | Facility: CLINIC | Age: 84
End: 2024-06-07
Payer: MEDICARE

## 2024-06-07 DIAGNOSIS — I10 ESSENTIAL HYPERTENSION: ICD-10-CM

## 2024-06-07 DIAGNOSIS — I48.91 ATRIAL FIBRILLATION, UNSPECIFIED TYPE: ICD-10-CM

## 2024-06-07 NOTE — TELEPHONE ENCOUNTER
Called to schedule [facility] to return call to [phone]

## 2024-06-10 RX ORDER — FUROSEMIDE 20 MG/1
TABLET ORAL
Qty: 180 TABLET | Refills: 3 | Status: SHIPPED | OUTPATIENT
Start: 2024-06-10

## 2024-06-11 ENCOUNTER — HOSPITAL ENCOUNTER (OUTPATIENT)
Dept: CARDIOLOGY | Facility: HOSPITAL | Age: 84
Discharge: HOME | End: 2024-06-11
Attending: INTERNAL MEDICINE
Payer: MEDICARE

## 2024-06-11 PROCEDURE — 93298 REM INTERROG DEV EVAL SCRMS: CPT | Mod: 26,,, | Performed by: INTERNAL MEDICINE

## 2024-06-11 PROCEDURE — 93298 REM INTERROG DEV EVAL SCRMS: CPT | Mod: PO | Performed by: INTERNAL MEDICINE

## 2024-07-12 ENCOUNTER — HOSPITAL ENCOUNTER (OUTPATIENT)
Dept: CARDIOLOGY | Facility: HOSPITAL | Age: 84
Discharge: HOME | End: 2024-07-12
Attending: INTERNAL MEDICINE
Payer: MEDICARE

## 2024-07-12 ENCOUNTER — CLINICAL SUPPORT (OUTPATIENT)
Dept: CARDIOLOGY | Facility: HOSPITAL | Age: 84
End: 2024-07-12

## 2024-07-12 DIAGNOSIS — I49.8 OTHER SPECIFIED CARDIAC ARRHYTHMIAS: ICD-10-CM

## 2024-07-12 PROCEDURE — 93298 REM INTERROG DEV EVAL SCRMS: CPT | Mod: PO | Performed by: INTERNAL MEDICINE

## 2024-07-12 PROCEDURE — 93298 REM INTERROG DEV EVAL SCRMS: CPT | Mod: 26,,, | Performed by: INTERNAL MEDICINE

## 2024-07-14 ENCOUNTER — HOSPITAL ENCOUNTER (OUTPATIENT)
Dept: CARDIOLOGY | Facility: HOSPITAL | Age: 84
Discharge: HOME | End: 2024-07-14
Attending: INTERNAL MEDICINE
Payer: MEDICARE

## 2024-07-15 ENCOUNTER — TELEPHONE (OUTPATIENT)
Dept: CARDIOLOGY | Facility: HOSPITAL | Age: 84
End: 2024-07-15
Payer: MEDICARE

## 2024-07-16 ENCOUNTER — TELEPHONE (OUTPATIENT)
Dept: CARDIOLOGY | Facility: HOSPITAL | Age: 84
End: 2024-07-16
Payer: MEDICARE

## 2024-07-16 NOTE — TELEPHONE ENCOUNTER
AF/AF w/ RVR noted during ILR device remote check:    Overall burden: 0.4 % (7/10- 7/14) 99.9% since 7/15    Max duration seen: 30 hrs. 52 mins. Mean V rate 143 bpm On 7/14        Per trend appears ongoing since 7/13 @ 23:31              Most recent episode: ongoing              Ventricular rates:  Needs improvement        V rates not in AF:                Anticoagulation status:  Eliquis    Meds:  Eliquis 5 mg bid  Amlodipine 2.5 mg             Patient symptoms: She report she has had some pressure on her chest, has been tired and some shortness of breath on exertion.     Discussed ED precautions    Scheduled appt. With AUTUMN POTTER on 7/17 @ 3:30

## 2024-07-17 ENCOUNTER — OFFICE VISIT (OUTPATIENT)
Dept: CARDIOLOGY | Facility: CLINIC | Age: 84
End: 2024-07-17
Payer: MEDICARE

## 2024-07-17 ENCOUNTER — HOSPITAL ENCOUNTER (OUTPATIENT)
Dept: RADIOLOGY | Facility: HOSPITAL | Age: 84
Discharge: HOME | End: 2024-07-17
Payer: MEDICARE

## 2024-07-17 VITALS
HEART RATE: 58 BPM | BODY MASS INDEX: 41.07 KG/M2 | WEIGHT: 261.69 LBS | DIASTOLIC BLOOD PRESSURE: 72 MMHG | SYSTOLIC BLOOD PRESSURE: 100 MMHG | HEIGHT: 67 IN

## 2024-07-17 DIAGNOSIS — I48.19 PERSISTENT ATRIAL FIBRILLATION: ICD-10-CM

## 2024-07-17 DIAGNOSIS — R07.89 ATYPICAL CHEST PAIN: Primary | ICD-10-CM

## 2024-07-17 DIAGNOSIS — R06.02 SHORTNESS OF BREATH: ICD-10-CM

## 2024-07-17 DIAGNOSIS — I25.10 CORONARY ARTERY DISEASE INVOLVING NATIVE CORONARY ARTERY OF NATIVE HEART WITHOUT ANGINA PECTORIS: ICD-10-CM

## 2024-07-17 DIAGNOSIS — I35.0 SEVERE AORTIC STENOSIS: ICD-10-CM

## 2024-07-17 DIAGNOSIS — Z95.2 S/P TAVR (TRANSCATHETER AORTIC VALVE REPLACEMENT): ICD-10-CM

## 2024-07-17 DIAGNOSIS — E78.00 PURE HYPERCHOLESTEROLEMIA: ICD-10-CM

## 2024-07-17 DIAGNOSIS — I10 ESSENTIAL HYPERTENSION: ICD-10-CM

## 2024-07-17 PROCEDURE — 71046 X-RAY EXAM CHEST 2 VIEWS: CPT | Mod: 26,,, | Performed by: RADIOLOGY

## 2024-07-17 PROCEDURE — 71046 X-RAY EXAM CHEST 2 VIEWS: CPT | Mod: TC,FY,PO

## 2024-07-17 PROCEDURE — 1100F PTFALLS ASSESS-DOCD GE2>/YR: CPT | Mod: CPTII,S$GLB,,

## 2024-07-17 PROCEDURE — 3074F SYST BP LT 130 MM HG: CPT | Mod: CPTII,S$GLB,,

## 2024-07-17 PROCEDURE — 93005 ELECTROCARDIOGRAM TRACING: CPT | Mod: PO

## 2024-07-17 PROCEDURE — 93010 ELECTROCARDIOGRAM REPORT: CPT | Mod: S$GLB,,, | Performed by: INTERNAL MEDICINE

## 2024-07-17 PROCEDURE — 1126F AMNT PAIN NOTED NONE PRSNT: CPT | Mod: CPTII,S$GLB,,

## 2024-07-17 PROCEDURE — 3288F FALL RISK ASSESSMENT DOCD: CPT | Mod: CPTII,S$GLB,,

## 2024-07-17 PROCEDURE — 3078F DIAST BP <80 MM HG: CPT | Mod: CPTII,S$GLB,,

## 2024-07-17 PROCEDURE — 99999 PR PBB SHADOW E&M-EST. PATIENT-LVL IV: CPT | Mod: PBBFAC,,,

## 2024-07-17 PROCEDURE — 1159F MED LIST DOCD IN RCRD: CPT | Mod: CPTII,S$GLB,,

## 2024-07-17 PROCEDURE — 99215 OFFICE O/P EST HI 40 MIN: CPT | Mod: S$GLB,,,

## 2024-07-17 NOTE — PATIENT INSTRUCTIONS
Increase lasix to 40 mg daily for 5 days. Return to 20 mg daily after then.   Blood work in 1 week   Echocardiogram as schedule  Establish with Dr. Johnson     Cardioversion    Procedure date: 7/19/24  Procedure time: 1:00 pm  Arrival time: 11:00 am    Arrive for your procedure at:  [facility]      FASTING:  You MAY NOT have anything to eat or drink AFTER MIDNIGHT.  If your procedure is scheduled in the afternoon, you may have a LIGHT BREAKFAST BEFORE 6:00 A.M.  For example: Two slices of toast; black coffee or black tea.    MEDICATIONS:  You may take your regular morning medications with a small sip of water.     Hold or adjust the following:  Fluid pills.  Diabetes medications.  Please hold GLP-1 Drugs such as Semiglutide, Ozempic, Wegovy, and Monjaro 1 week prior to procedure.      Continue: Coumadin, Plavix, Effient, Aspirin, Anti-coagulants, Blood thinners    Please refer to pre-op instructions received from [facility].

## 2024-07-19 ENCOUNTER — PATIENT MESSAGE (OUTPATIENT)
Dept: CARDIOLOGY | Facility: CLINIC | Age: 84
End: 2024-07-19

## 2024-07-19 LAB
OHS QRS DURATION: 94 MS
OHS QTC CALCULATION: 482 MS

## 2024-07-20 ENCOUNTER — HOSPITAL ENCOUNTER (OUTPATIENT)
Dept: CARDIOLOGY | Facility: HOSPITAL | Age: 84
Discharge: HOME | End: 2024-07-20
Attending: INTERNAL MEDICINE
Payer: MEDICARE

## 2024-07-22 ENCOUNTER — OFFICE VISIT (OUTPATIENT)
Dept: FAMILY MEDICINE | Facility: CLINIC | Age: 84
End: 2024-07-22
Payer: MEDICARE

## 2024-07-22 VITALS
SYSTOLIC BLOOD PRESSURE: 136 MMHG | HEART RATE: 80 BPM | BODY MASS INDEX: 40.88 KG/M2 | DIASTOLIC BLOOD PRESSURE: 82 MMHG | OXYGEN SATURATION: 97 % | WEIGHT: 261 LBS

## 2024-07-22 DIAGNOSIS — I10 ESSENTIAL HYPERTENSION: ICD-10-CM

## 2024-07-22 DIAGNOSIS — E78.00 PURE HYPERCHOLESTEROLEMIA: ICD-10-CM

## 2024-07-22 DIAGNOSIS — Z01.818 PRE-OP EVALUATION: ICD-10-CM

## 2024-07-22 DIAGNOSIS — Z95.2 S/P TAVR (TRANSCATHETER AORTIC VALVE REPLACEMENT): Primary | ICD-10-CM

## 2024-07-22 PROCEDURE — 99214 OFFICE O/P EST MOD 30 MIN: CPT | Mod: S$GLB,,,

## 2024-07-22 PROCEDURE — 3288F FALL RISK ASSESSMENT DOCD: CPT | Mod: CPTII,S$GLB,,

## 2024-07-22 PROCEDURE — 99999 PR PBB SHADOW E&M-EST. PATIENT-LVL III: CPT | Mod: PBBFAC,,,

## 2024-07-22 PROCEDURE — 3075F SYST BP GE 130 - 139MM HG: CPT | Mod: CPTII,S$GLB,,

## 2024-07-22 PROCEDURE — 1101F PT FALLS ASSESS-DOCD LE1/YR: CPT | Mod: CPTII,S$GLB,,

## 2024-07-22 PROCEDURE — 3079F DIAST BP 80-89 MM HG: CPT | Mod: CPTII,S$GLB,,

## 2024-07-22 PROCEDURE — 1159F MED LIST DOCD IN RCRD: CPT | Mod: CPTII,S$GLB,,

## 2024-07-22 NOTE — PROGRESS NOTES
Patient ID: Selena Moulton is a 83 y.o. female.      Chief Complaint: Pre-op Exam (Surg date: 8/13)      Selena Moulton is in the office for pre op evaluation for cataract surgery on 8/13/24. She was recently cardiovert last week for A fib. Echo completed however results are still pending. She reports doing well since her cardioversion. Follows with cardiology.         Past Medical History:   Diagnosis Date    Anticoagulant long-term use     Aortic stenosis     Arthritis     Cataracts, bilateral     CKD (chronic kidney disease), stage III     Class 2 severe obesity due to excess calories with serious comorbidity and body mass index (BMI) of 39.0 to 39.9 in adult 09/13/2018    Digestive disorder     GERD (gastroesophageal reflux disease)     Gout     Hypercholesteremia     Hypertension     Implantable loop recorder present     PAF (paroxysmal atrial fibrillation)     Personal history of COVID-19 05/2022    S/P TAVR (transcatheter aortic valve replacement)                 Current Outpatient Medications:     allopurinoL (ZYLOPRIM) 300 MG tablet, TAKE 1 TABLET(300 MG) BY MOUTH EVERY DAY, Disp: 90 tablet, Rfl: 1    amLODIPine (NORVASC) 2.5 MG tablet, TAKE 1 TABLET(2.5 MG) BY MOUTH EVERY DAY, Disp: 90 tablet, Rfl: 2    apixaban (ELIQUIS) 5 mg Tab, TAKE 1 TABLET(5 MG) BY MOUTH TWICE DAILY, Disp: 180 tablet, Rfl: 3    atorvastatin (LIPITOR) 40 MG tablet, TAKE 1 TABLET(40 MG) BY MOUTH EVERY EVENING, Disp: 90 tablet, Rfl: 2    fish oil-omega-3 fatty acids 300-1,000 mg capsule, Take 1 capsule by mouth once daily. Hold for 7 days before surgery, Disp: , Rfl:     furosemide (LASIX) 20 MG tablet, TAKE 1 TABLET(20 MG) BY MOUTH TWICE DAILY, Disp: 180 tablet, Rfl: 3    multivitamin capsule, Take 1 capsule by mouth once daily. Hold AM of surgery, Disp: , Rfl:     nitroGLYCERIN (NITROSTAT) 0.4 MG SL tablet, Place under the tongue every 5 (five) minutes as needed., Disp: , Rfl:     pantoprazole (PROTONIX) 20 MG tablet,  TAKE 1 TABLET(20 MG) BY MOUTH EVERY DAY, Disp: 90 tablet, Rfl: 1    vit A/vit C/vit E/zinc/copper (PRESERVISION AREDS ORAL), Take 1 tablet by mouth 2 (two) times a day., Disp: , Rfl:     vitamin D (VITAMIN D3) 1000 units Tab, Take 1,000 Units by mouth once daily., Disp: , Rfl:     acetaminophen (TYLENOL) 500 MG tablet, Take 500 mg by mouth every 8 (eight) hours as needed. (Patient not taking: Reported on 6/4/2024), Disp: , Rfl:     The ASCVD Risk score (Naya SHEIKH, et al., 2019) failed to calculate for the following reasons:    The 2019 ASCVD risk score is only valid for ages 40 to 79     Wt Readings from Last 3 Encounters:   07/22/24 118.4 kg (261 lb)   07/18/24 118.4 kg (261 lb)   07/17/24 118.7 kg (261 lb 11 oz)     Temp Readings from Last 3 Encounters:   06/04/24 97 °F (36.1 °C) (Temporal)   02/20/24 98 °F (36.7 °C) (Oral)   02/12/24 98.1 °F (36.7 °C) (Oral)     BP Readings from Last 3 Encounters:   07/22/24 136/82   07/19/24 (!) 150/86   07/17/24 100/72     Pulse Readings from Last 3 Encounters:   07/22/24 80   07/19/24 74   07/17/24 (!) 58     Resp Readings from Last 3 Encounters:   07/19/24 18   06/04/24 20   02/20/24 18     PF Readings from Last 3 Encounters:   No data found for PF     SpO2 Readings from Last 3 Encounters:   07/22/24 97%   07/19/24 96%   06/04/24 97%        Lab Results   Component Value Date    HGBA1C 5.7 (H) 05/13/2024    HGBA1C 5.5 08/15/2023    HGBA1C 5.3 02/15/2023     Lab Results   Component Value Date    LDLCALC 79.0 05/13/2024    CREATININE 1.6 (H) 07/17/2024       Review of Systems   Constitutional:  Negative for fever.   Respiratory:  Negative for shortness of breath.    Cardiovascular:  Negative for chest pain, palpitations and leg swelling.   Gastrointestinal:  Negative for nausea and vomiting.           Objective:      Physical Exam  Constitutional:       General: She is not in acute distress.     Appearance: She is well-developed.   HENT:      Head: Normocephalic and atraumatic.       Right Ear: External ear normal.      Left Ear: External ear normal.   Eyes:      Conjunctiva/sclera: Conjunctivae normal.      Pupils: Pupils are equal, round, and reactive to light.   Neck:      Thyroid: No thyromegaly.   Cardiovascular:      Rate and Rhythm: Normal rate and regular rhythm.      Pulses: Normal pulses.      Heart sounds: Normal heart sounds, S1 normal and S2 normal.   Pulmonary:      Effort: Pulmonary effort is normal. No respiratory distress.      Breath sounds: Normal breath sounds.   Chest:      Chest wall: No tenderness.   Musculoskeletal:         General: No swelling or tenderness. Normal range of motion.      Cervical back: Normal range of motion and neck supple.   Skin:     General: Skin is warm and dry.      Coloration: Skin is not jaundiced or pale.   Neurological:      General: No focal deficit present.      Mental Status: She is alert and oriented to person, place, and time.      Cranial Nerves: No cranial nerve deficit.   Psychiatric:         Mood and Affect: Mood normal.         Behavior: Behavior normal.             Screening recommendations appropriate to age and health status were reviewed.    S/P TAVR (transcatheter aortic valve replacement)   Follows with cardiology    Essential hypertension   Stable with current medications    Pure hypercholesterolemia   Continue with cholesterol medication    Pre-op evaluation   Labs done last week reviewed. EKG from last week reviewed as well. NSR seen after cardioversion.       RCRI risk factors include: no known RCRI risk factors. As such, per RCRI the risk of cardiac death, nonfatal myocardial infarction, or nonfatal cardiac arrest is 1.0% and the risk of myocardial infarction, pulmonary edema, ventricular fibrillation, primary cardiac arrest, or complete heart block is 1.3%.  Overall this patient can be considered intermediate risk for this low risk procedure. No further cardiac testing is recommended at this time.     Patient denies  any symptoms (as per HPI) concerning for undiagnosed lung disease including ISMAEL. Would not recommend obtaining chest X-ray, sleep study, or PFTs at this time. Patient is a non-smoker. We discussed the benefits of early mobilization and deep breathing after surgery.  She does not sleep with CPAP machine.     Screened patient for alcohol misuse, use of illicit drugs, and personal or family history of anesthetic complications or bleeding diathesis and no substantial concerns were identified.     All current medications were reviewed and at this time no changes to medications are recommended prior to surgery. Stop Eliquis 3 days prior to surgery.     I recommend use of standard pre-op and post-op precautions for this patient. In my opinion, she is medically optimized for this procedure, and can proceed without further evaluation.

## 2024-07-28 DIAGNOSIS — K21.9 GASTROESOPHAGEAL REFLUX DISEASE WITHOUT ESOPHAGITIS: ICD-10-CM

## 2024-07-28 NOTE — TELEPHONE ENCOUNTER
No care due was identified.  [facility] Embedded Care Due Messages. Reference number: [id number].   7/28/2024 3:46:42 AM CDT

## 2024-07-29 RX ORDER — PANTOPRAZOLE SODIUM 20 MG/1
TABLET, DELAYED RELEASE ORAL
Qty: 90 TABLET | Refills: 3 | Status: SHIPPED | OUTPATIENT
Start: 2024-07-29

## 2024-07-29 NOTE — TELEPHONE ENCOUNTER
Refill Decision Note   Selena Moulton  is requesting a refill authorization.  Brief Assessment and Rationale for Refill:  Approve     Medication Therapy Plan:         Comments:     Note composed:1:20 PM 07/29/2024

## 2024-08-12 ENCOUNTER — HOSPITAL ENCOUNTER (OUTPATIENT)
Dept: CARDIOLOGY | Facility: HOSPITAL | Age: 84
Discharge: HOME | End: 2024-08-12
Attending: INTERNAL MEDICINE
Payer: MEDICARE

## 2024-08-12 ENCOUNTER — CLINICAL SUPPORT (OUTPATIENT)
Dept: CARDIOLOGY | Facility: HOSPITAL | Age: 84
End: 2024-08-12

## 2024-08-12 DIAGNOSIS — M1A.0720 IDIOPATHIC CHRONIC GOUT OF LEFT FOOT WITHOUT TOPHUS: ICD-10-CM

## 2024-08-12 DIAGNOSIS — I49.8 OTHER SPECIFIED CARDIAC ARRHYTHMIAS: ICD-10-CM

## 2024-08-12 PROCEDURE — 93298 REM INTERROG DEV EVAL SCRMS: CPT | Mod: 26,,, | Performed by: INTERNAL MEDICINE

## 2024-08-12 PROCEDURE — 93298 REM INTERROG DEV EVAL SCRMS: CPT | Mod: PO | Performed by: INTERNAL MEDICINE

## 2024-08-12 RX ORDER — AMLODIPINE BESYLATE 2.5 MG/1
TABLET ORAL
Qty: 90 TABLET | Refills: 3 | Status: SHIPPED | OUTPATIENT
Start: 2024-08-12

## 2024-08-12 RX ORDER — ALLOPURINOL 300 MG/1
300 TABLET ORAL DAILY
Qty: 90 TABLET | Refills: 0 | Status: SHIPPED | OUTPATIENT
Start: 2024-08-12

## 2024-08-12 NOTE — TELEPHONE ENCOUNTER
No care due was identified.  [facility] Embedded Care Due Messages. Reference number: [id number].   8/12/2024 3:43:05 AM CDT   none

## 2024-08-12 NOTE — TELEPHONE ENCOUNTER
Refill Routing Note   Medication(s) are not appropriate for processing by [facility] for the following reason(s):        Required labs outdated: Uric acid    [facility] action(s):  Defer     Requires labs : Yes    Medication Therapy Plan:         Appointments  past 12m or future 3m with PCP    Date Provider   Last Visit   5/20/2024 Aby Perales MD   Next Visit   Visit date not found Aby Perales MD   ED visits in past 90 days: 0        Note composed:2:51 PM 08/12/2024

## 2024-08-12 NOTE — TELEPHONE ENCOUNTER
No care due was identified.  [facility] Embedded Care Due Messages. Reference number: [id number].   8/12/2024 2:48:40 PM CDT

## 2024-08-12 NOTE — TELEPHONE ENCOUNTER
Selena Moulton  is requesting a refill authorization.  Brief Assessment and Rationale for Refill:  Approve     Medication Therapy Plan:         Comments:     Note composed:5:33 AM 08/12/2024

## 2024-08-13 ENCOUNTER — TELEPHONE (OUTPATIENT)
Dept: CARDIOLOGY | Facility: HOSPITAL | Age: 84
End: 2024-08-13
Payer: MEDICARE

## 2024-08-13 NOTE — TELEPHONE ENCOUNTER
Atrial fibrillation with RVR noted during device remote check:  MDT ILR   Post cardioversion 7/19/24    AF 8/3/24 10hr 16mins MVR 154bpm        Ventricular rates:   Controlled    Needs improvement    Anticoagulation status:  Eliquis 5mg BID  Amlodipine 2.5mg daily  Lasix 20mg BID      Patient symptoms: patient states she was a family party and overexerted herself and on her feet all day

## 2024-08-19 ENCOUNTER — OFFICE VISIT (OUTPATIENT)
Dept: CARDIOLOGY | Facility: CLINIC | Age: 84
End: 2024-08-19
Payer: MEDICARE

## 2024-08-19 VITALS
DIASTOLIC BLOOD PRESSURE: 74 MMHG | HEIGHT: 67 IN | WEIGHT: 265.63 LBS | HEART RATE: 79 BPM | BODY MASS INDEX: 41.69 KG/M2 | SYSTOLIC BLOOD PRESSURE: 132 MMHG

## 2024-08-19 DIAGNOSIS — E78.00 PURE HYPERCHOLESTEROLEMIA: ICD-10-CM

## 2024-08-19 DIAGNOSIS — I25.110 ATHEROSCLEROSIS OF NATIVE CORONARY ARTERY OF NATIVE HEART WITH UNSTABLE ANGINA PECTORIS: ICD-10-CM

## 2024-08-19 DIAGNOSIS — I48.19 PERSISTENT ATRIAL FIBRILLATION: Primary | ICD-10-CM

## 2024-08-19 DIAGNOSIS — Z95.2 S/P TAVR (TRANSCATHETER AORTIC VALVE REPLACEMENT): ICD-10-CM

## 2024-08-19 DIAGNOSIS — I10 ESSENTIAL HYPERTENSION: ICD-10-CM

## 2024-08-19 DIAGNOSIS — I35.0 SEVERE AORTIC STENOSIS: ICD-10-CM

## 2024-08-19 PROBLEM — I48.0 PAROXYSMAL ATRIAL FIBRILLATION: Status: RESOLVED | Noted: 2022-02-11 | Resolved: 2024-08-19

## 2024-08-19 PROCEDURE — 93010 ELECTROCARDIOGRAM REPORT: CPT | Mod: S$GLB,,, | Performed by: INTERNAL MEDICINE

## 2024-08-19 PROCEDURE — 99999 PR PBB SHADOW E&M-EST. PATIENT-LVL III: CPT | Mod: PBBFAC,,,

## 2024-08-19 PROCEDURE — 3288F FALL RISK ASSESSMENT DOCD: CPT | Mod: CPTII,S$GLB,,

## 2024-08-19 PROCEDURE — 1159F MED LIST DOCD IN RCRD: CPT | Mod: CPTII,S$GLB,,

## 2024-08-19 PROCEDURE — 1126F AMNT PAIN NOTED NONE PRSNT: CPT | Mod: CPTII,S$GLB,,

## 2024-08-19 PROCEDURE — 93005 ELECTROCARDIOGRAM TRACING: CPT | Mod: PO

## 2024-08-19 PROCEDURE — 99214 OFFICE O/P EST MOD 30 MIN: CPT | Mod: S$GLB,,,

## 2024-08-19 PROCEDURE — 1100F PTFALLS ASSESS-DOCD GE2>/YR: CPT | Mod: CPTII,S$GLB,,

## 2024-08-19 NOTE — PROGRESS NOTES
[facility]  Date: 8/19/24    Patient: Selena Moulton, 1940, [id number]  Primary Care Provider: Aby Perales MD     Chief Complaint: Atrial fibrillation     Subjective:       Selena Moulton is a 84 y.o. female who presents for evaluation of atrial fibrillation. They follow with Dr. Reinoso.    CBC, CMP, lipid panel stable. At last visit, patient with symptomatic AF with RVR for which MURRAY/DCCV was scheduled. Successful cardioversion with Dr. Reinoso was performed on 7/19/2024.    Since then, patient doing well. Chest tightness has resolved and shortness of breath is back to baseline. Denies chest pain, palpitations, dizziness, syncope, orthopnea, PND, edema.    Focused Past History includes:  Persistent atrial fibrillation s/p DCCV 3/2022, 6/2022, 7/2024  ILR check in 4/10/2024: 0% AF  ILR check in 7/2024: 0.4% AF from 7/10-7/14, 99.9% AF since 7/15 with mean  bpm  Coronary artery disease  [facility] 7/2019: nonobstructive CAD  Nuclear stress 8/2022: no ischemia or infarct   Aortic stenosis s/p TAVR 10/2019  TTE 10/2023: LVEF 55-60%, 23 mm Rodgers aortic valve in position, mild AR, PASP 34.   Hypertension  Hyperlipidemia  CKD stage 4    Current Outpatient Medications   Medication Sig    acetaminophen (TYLENOL) 500 MG tablet Take 500 mg by mouth every 8 (eight) hours as needed.    allopurinoL (ZYLOPRIM) 300 MG tablet Take 1 tablet (300 mg total) by mouth once daily.    amLODIPine (NORVASC) 2.5 MG tablet TAKE 1 TABLET(2.5 MG) BY MOUTH EVERY DAY    apixaban (ELIQUIS) 5 mg Tab TAKE 1 TABLET(5 MG) BY MOUTH TWICE DAILY    atorvastatin (LIPITOR) 40 MG tablet TAKE 1 TABLET(40 MG) BY MOUTH EVERY EVENING    fish oil-omega-3 fatty acids 300-1,000 mg capsule Take 1 capsule by mouth once daily. Hold for 7 days before surgery    furosemide (LASIX) 20 MG tablet TAKE 1 TABLET(20 MG) BY MOUTH TWICE DAILY    multivitamin capsule Take 1 capsule by mouth once daily. Hold AM of surgery     nitroGLYCERIN (NITROSTAT) 0.4 MG SL tablet Place under the tongue every 5 (five) minutes as needed.    pantoprazole (PROTONIX) 20 MG tablet TAKE 1 TABLET(20 MG) BY MOUTH EVERY DAY    vit A/vit C/vit E/zinc/copper (PRESERVISION AREDS ORAL) Take 1 tablet by mouth 2 (two) times a day.    vitamin D (VITAMIN D3) 1000 units Tab Take 1,000 Units by mouth once daily.     No current facility-administered medications for this visit.            Objective       Review of Systems  Constitutional: negative for fevers, night sweats, and weight loss  Eyes: negative for visual disturbance, diplopia  Respiratory: negative for cough, hemoptysis, sputum, and wheezing  Cardiovascular: see HPI  Gastrointestinal: negative for abdominal pain, bright red blood per rectum, change in bowel habits, dysphagia, melena, and reflux symptoms  Genitourinary:negative for dysuria, frequency, and hematuria  Hematologic/lymphatic: negative for bleeding, easy bruising, and lymphadenopathy  Musculoskeletal:negative for arthralgias, back pain, and myalgias  Neurological: negative for gait problems, paresthesia, speech problems, vertigo, and weakness  Behavioral/Psych: negative for excessive alcohol consumption, illegal drug usage, and sleep disturbance    -------------------------------------    Anticoagulant long-term use    Aortic stenosis    Arthritis    Cataracts, bilateral    CKD (chronic kidney disease), stage III    Class 2 severe obesity due to excess calories with serious comorbidity and body mass index (BMI) of 39.0 to 39.9 in adult    Digestive disorder    GERD (gastroesophageal reflux disease)    Gout    Hypercholesteremia    Hypertension    Implantable loop recorder present    PAF (paroxysmal atrial fibrillation)    Personal history of COVID-19    S/P TAVR (transcatheter aortic valve replacement)     ----------------------------    Aortic valve replacement    Procedure: Replacement-valve-aortic;  Surgeon: Fidel Cardenas MD;  Location:  "[facility] CATH LAB;  Service: Cardiology;  Laterality: N/A;    Back surgery    bone graft neck    Cardiac valve surgery    Carpal tunnel release    Procedure: RELEASE, CARPAL TUNNEL  LEFT;  Surgeon: Meche Cárdenas MD;  Location: [facility];  Service: Neurosurgery;  Laterality: Left;    Carpal tunnel release    Procedure: RELEASE, CARPAL TUNNEL RIGHT;  Surgeon: Meche Cárdenas MD;  Location: [facility];  Service: Neurosurgery;  Laterality: Right;    Catheterization of both left and right heart    Procedure: CATHETERIZATION, HEART, BOTH LEFT AND RIGHT;  Surgeon: Terrie Alberto MD;  Location: [facility] CATH;  Service: Cardiology;  Laterality: N/A;    Cervical fusion    x2     Coronary angiography    Procedure: ANGIOGRAM, CORONARY ARTERY;  Surgeon: Terrie Alberto MD;  Location: [facility] CATH;  Service: Cardiology;  Laterality: N/A;    Hernia repair    umbilical    Hysterectomy    Tonsillectomy    Transesophageal echocardiogram with possible cardioversion (yuki w/ poss cardioversion)    Procedure: TRANSESOPHAGEAL ECHOCARDIOGRAM WITH POSSIBLE CARDIOVERSION (YUKI W/ POSS CARDIOVERSION);  Surgeon: Armando Reinoso MD;  Location: [facility];  Service: Cardiology;  Laterality: N/A;        Family History   Problem Relation Name Age of Onset    Cancer Mother      Stroke Mother      Hypertension Mother      Ovarian cancer Mother      No Known Problems Father      Glaucoma Neg Hx      Macular degeneration Neg Hx       Social History     Tobacco Use    Smoking status: Never    Smokeless tobacco: Never   Substance Use Topics    Alcohol use: No    Drug use: No       Physical Exam  /74 (BP Location: Left forearm, Patient Position: Sitting, BP Method: Medium (Automatic))   Pulse 79   Ht 5' 7" (1.702 m)   Wt 120.5 kg (265 lb 10.5 oz)   BMI 41.61 kg/m²   Body surface area is 2.39 meters squared.  Body mass index is 41.61 kg/m².    General appearance: alert, appears stated age, cooperative, and no distress  Head: Normocephalic, " "without obvious abnormality, atraumatic  Neck: no carotid bruit, no JVD, and supple, symmetrical, trachea midline  Lungs: clear to auscultation bilaterally  Heart: regular rate and rhythm; S1, S2 normal, no click, rub or gallop; murmur present  Abdomen: soft, non-tender, no distended  Extremities: extremities atraumatic, no pitting edema  Skin: warm, no cyanosis, no pathologic ecchymosis in exposed portions  Neurologic: Grossly normal. A&O x3      Lab Review   Lab Results   Component Value Date    WBC 8.70 07/17/2024    HGB 14.1 07/17/2024    HCT 44.5 07/17/2024    MCV 89 07/17/2024     07/17/2024         BMP  Lab Results   Component Value Date     07/17/2024    K 3.6 07/17/2024     07/17/2024    CO2 27 07/17/2024    BUN 25 (H) 07/17/2024    CREATININE 1.6 (H) 07/17/2024    CALCIUM 10.7 (H) 07/17/2024    ANIONGAP 13 07/17/2024    ESTGFRAFRICA 31.7 (A) 06/21/2022    EGFRNONAA 27.5 (A) 06/21/2022       Lab Results   Component Value Date    LABPROT 9.7 10/08/2019    ALBUMIN 3.8 07/17/2024       Lab Results   Component Value Date    ALT 18 07/17/2024    AST 21 07/17/2024    ALKPHOS 133 07/17/2024    BILITOT 0.8 07/17/2024       No results found for: "TSH"    Lab Results   Component Value Date    CHOL 161 05/13/2024    CHOL 177 02/15/2023    CHOL 170 08/03/2022     Lab Results   Component Value Date    HDL 64 05/13/2024    HDL 60 02/15/2023    HDL 56 08/03/2022     Lab Results   Component Value Date    LDLCALC 79.0 05/13/2024    LDLCALC 98.4 02/15/2023    LDLCALC 85.4 08/03/2022     Lab Results   Component Value Date    TRIG 90 05/13/2024    TRIG 93 02/15/2023    TRIG 143 08/03/2022     Lab Results   Component Value Date    CHOLHDL 39.8 05/13/2024    CHOLHDL 33.9 02/15/2023    CHOLHDL 32.9 08/03/2022        EKG 7/17/24: atrial fibrillation 93 bpm, QRS 94    EKG 8/19/24: NSR 82 bpm, ,         Assessment & Plan:     This is a 84 y.o. female with persistent AF, AS s/p TAVR, HTN, HLD. " Underwent successful MURRAY/DCCV for treatment of symptomatic AF with RVR on 7/19/24. Since then, patient is much improved.     1. Persistent atrial fibrillation s/p DCCV x2 in 2022, 7/2024  - EKG today NSR   - Continue eliquis 5 mg BID  - Referral to Dr. Johnson for possible ablation    2. Coronary artery disease   - No anginal equivalents  - Continue lipitor 40 mg qd    3. Severe aortic stenosis s/p TAVR 2019  - TTE 10/2023: 23 mm Rodgers aortic valve in position, mild AR  - Asymptomatic    4. Essential hypertension  - Well controlled  - Continue amlodipine 2.5 mg qd   - Continue lasix 20 mg qd     5. Pure hypercholesterolemia  - Continue lipitor 40 mg qd        Emphasized the importance of modifying lifestyle related risk factors including exercise, diet most resembling a Mediterranean diet.    Please follow up in as scheduled.      Dominique Dolese, PA-C [facility]  Office: [phone]

## 2024-08-20 LAB
OHS QRS DURATION: 100 MS
OHS QTC CALCULATION: 467 MS

## 2024-08-23 NOTE — Clinical Note
Dr. Hernandez - please review her chart and imaging.  She has had C5/6, C6/7 cervical fusion.  She has a left C6 greater than C7 radiculopathy without significant foraminal narrowing on MRI.  She responded well with oral steroids.  Could you consider C5/6 and/ or C6/7 left derek?  Katherine has held a spot for her on 8/2 if you do.  Thanks - Lynnette Chavarria
show

## 2024-09-05 ENCOUNTER — HOSPITAL ENCOUNTER (OUTPATIENT)
Dept: CARDIOLOGY | Facility: HOSPITAL | Age: 84
Discharge: HOME | End: 2024-09-05
Attending: INTERNAL MEDICINE
Payer: MEDICARE

## 2024-09-05 ENCOUNTER — TELEPHONE (OUTPATIENT)
Dept: CARDIOLOGY | Facility: HOSPITAL | Age: 84
End: 2024-09-05
Payer: MEDICARE

## 2024-09-05 NOTE — TELEPHONE ENCOUNTER
S/p DCCV 7/19/24    MDT ILR       Recurring AF noted on MDT ILR remote check:    Overall burden: 6.4% since 8/13    Max duration seen: persistent since 9/3                        Most recent episode: ongoing      Ventricular rates during AF:    V rates not in AF:            Anticoagulation status:  Eliquis    Meds:  Norvasc 2.5 mg   Eliquis 5 mg bid      Patient symptoms: fatigue and  shortness of breath on exertion        Message sent to Dr. Reinoso for review    Consult with Dr. Johnson 10/7

## 2024-09-05 NOTE — TELEPHONE ENCOUNTER
Left message requesting a return call to discuss ILR transmission. MDT . Recurrence of AF s/p DCCV 7/19/24 and persistent since 9/3

## 2024-09-07 ENCOUNTER — HOSPITAL ENCOUNTER (OUTPATIENT)
Dept: CARDIOLOGY | Facility: HOSPITAL | Age: 84
Discharge: HOME | End: 2024-09-07
Attending: INTERNAL MEDICINE
Payer: MEDICARE

## 2024-09-08 ENCOUNTER — HOSPITAL ENCOUNTER (OUTPATIENT)
Dept: CARDIOLOGY | Facility: HOSPITAL | Age: 84
Discharge: HOME | End: 2024-09-08
Attending: INTERNAL MEDICINE
Payer: MEDICARE

## 2024-09-09 DIAGNOSIS — Z95.2 S/P TAVR (TRANSCATHETER AORTIC VALVE REPLACEMENT): ICD-10-CM

## 2024-09-09 DIAGNOSIS — I35.0 SEVERE AORTIC STENOSIS: ICD-10-CM

## 2024-09-09 DIAGNOSIS — R06.02 SOB (SHORTNESS OF BREATH): Primary | ICD-10-CM

## 2024-09-09 DIAGNOSIS — I77.1 TORTUOUS AORTA: ICD-10-CM

## 2024-09-09 DIAGNOSIS — I10 ESSENTIAL HYPERTENSION: ICD-10-CM

## 2024-09-09 DIAGNOSIS — I48.19 PERSISTENT ATRIAL FIBRILLATION: ICD-10-CM

## 2024-09-09 RX ORDER — METOPROLOL SUCCINATE 25 MG/1
25 TABLET, EXTENDED RELEASE ORAL NIGHTLY
Qty: 30 TABLET | Refills: 11 | Status: SHIPPED | OUTPATIENT
Start: 2024-09-09 | End: 2025-09-09

## 2024-09-09 NOTE — TELEPHONE ENCOUNTER
Please advise: pt in a fib (see msg from [facility] from 9/5) daughter says pt is slowing down, becoming immobile, is very short of breath, asking what she should do

## 2024-09-09 NOTE — TELEPHONE ENCOUNTER
Spoke to daughter and advised per Brittany (Stop the amlodipine add on toprol 25 mg nightly to control a fib. Schedule an echo please for her to come and get.          Echo scheduled for tomorrow

## 2024-09-09 NOTE — TELEPHONE ENCOUNTER
----- Message from Arianne Diego sent at 9/9/2024  2:53 PM CDT -----  Regarding: Returning phone call  Contact: pt's daughter Selena  Type:  Patient Returning Call    Who Called:pt's daughter Selena    Who Left Message for Patient:yoselin    Does the patient know what this is regarding?: back in afib    Would the patient rather a call back or a response via [employer]? Call back    Best Call Back Number:[phone] for daughter

## 2024-09-10 ENCOUNTER — HOSPITAL ENCOUNTER (OUTPATIENT)
Dept: CARDIOLOGY | Facility: HOSPITAL | Age: 84
Discharge: HOME | End: 2024-09-10
Attending: INTERNAL MEDICINE
Payer: MEDICARE

## 2024-09-10 ENCOUNTER — HOSPITAL ENCOUNTER (OUTPATIENT)
Dept: CARDIOLOGY | Facility: HOSPITAL | Age: 84
Discharge: HOME | End: 2024-09-10
Payer: MEDICARE

## 2024-09-10 VITALS — BODY MASS INDEX: 41.59 KG/M2 | WEIGHT: 265 LBS | HEIGHT: 67 IN

## 2024-09-10 DIAGNOSIS — I35.0 SEVERE AORTIC STENOSIS: ICD-10-CM

## 2024-09-10 DIAGNOSIS — I77.1 TORTUOUS AORTA: ICD-10-CM

## 2024-09-10 DIAGNOSIS — I48.19 PERSISTENT ATRIAL FIBRILLATION: ICD-10-CM

## 2024-09-10 DIAGNOSIS — Z95.2 S/P TAVR (TRANSCATHETER AORTIC VALVE REPLACEMENT): ICD-10-CM

## 2024-09-10 DIAGNOSIS — R06.02 SOB (SHORTNESS OF BREATH): ICD-10-CM

## 2024-09-10 LAB
ASCENDING AORTA: 2.94 CM
AV INDEX (PROSTH): 0.6
AV MEAN GRADIENT: 11 MMHG
AV PEAK GRADIENT: 17 MMHG
AV VALVE AREA BY VELOCITY RATIO: 2.2 CM²
AV VALVE AREA: 2.02 CM²
AV VELOCITY RATIO: 0.66
BSA FOR ECHO PROCEDURE: 2.38 M2
CV ECHO LV RWT: 0.45 CM
DOP CALC AO PEAK VEL: 2.09 M/S
DOP CALC AO VTI: 46.2 CM
DOP CALC LVOT AREA: 3.4 CM2
DOP CALC LVOT DIAMETER: 2.07 CM
DOP CALC LVOT PEAK VEL: 1.37 M/S
DOP CALC LVOT STROKE VOLUME: 93.51 CM3
DOP CALCLVOT PEAK VEL VTI: 27.8 CM
E WAVE DECELERATION TIME: 226.43 MSEC
E/A RATIO: 4
E/E' RATIO: 16.53 M/S
ECHO LV POSTERIOR WALL: 1.11 CM (ref 0.6–1.1)
FRACTIONAL SHORTENING: 31 % (ref 28–44)
INTERVENTRICULAR SEPTUM: 0.89 CM (ref 0.6–1.1)
IVRT: 68.51 MSEC
LEFT ATRIUM AREA SYSTOLIC (APICAL 2 CHAMBER): 24.09 CM2
LEFT ATRIUM AREA SYSTOLIC (APICAL 4 CHAMBER): 22.12 CM2
LEFT ATRIUM SIZE: 4.25 CM
LEFT ATRIUM VOLUME INDEX MOD: 31.5 ML/M2
LEFT ATRIUM VOLUME MOD: 71.83 CM3
LEFT INTERNAL DIMENSION IN SYSTOLE: 3.37 CM (ref 2.1–4)
LEFT VENTRICLE DIASTOLIC VOLUME INDEX: 49.47 ML/M2
LEFT VENTRICLE DIASTOLIC VOLUME: 112.8 ML
LEFT VENTRICLE END SYSTOLIC VOLUME APICAL 2 CHAMBER: 78.06 ML
LEFT VENTRICLE END SYSTOLIC VOLUME APICAL 4 CHAMBER: 65.37 ML
LEFT VENTRICLE MASS INDEX: 77 G/M2
LEFT VENTRICLE SYSTOLIC VOLUME INDEX: 20.3 ML/M2
LEFT VENTRICLE SYSTOLIC VOLUME: 46.31 ML
LEFT VENTRICULAR INTERNAL DIMENSION IN DIASTOLE: 4.9 CM (ref 3.5–6)
LEFT VENTRICULAR MASS: 176.04 G
LV LATERAL E/E' RATIO: 13.78 M/S
LV SEPTAL E/E' RATIO: 20.67 M/S
LVED V (TEICH): 112.8 ML
LVES V (TEICH): 46.31 ML
LVOT MG: 3.42 MMHG
LVOT MV: 0.84 CM/S
MV PEAK A VEL: 0.31 M/S
MV PEAK E VEL: 1.24 M/S
MV STENOSIS PRESSURE HALF TIME: 65.67 MS
MV VALVE AREA P 1/2 METHOD: 3.35 CM2
OHS CV RV/LV RATIO: 0.77 CM
PISA TR MAX VEL: 2.4 M/S
RA PRESSURE ESTIMATED: 3 MMHG
RIGHT VENTRICLE DIASTOLIC LENGTH: 5.4 CM
RIGHT VENTRICLE DIASTOLIC MID DIMENSION: 2 CM
RIGHT VENTRICULAR END-DIASTOLIC DIMENSION: 3.76 CM
RIGHT VENTRICULAR LENGTH IN DIASTOLE (APICAL 4-CHAMBER VIEW): 5.35 CM
RV MID DIAMA: 2.02 CM
RV TB RVSP: 5 MMHG
RV TISSUE DOPPLER FREE WALL SYSTOLIC VELOCITY 1 (APICAL 4 CHAMBER VIEW): 9.91 CM/S
SINUS: 3.38 CM
STJ: 2.7 CM
TDI LATERAL: 0.09 M/S
TDI SEPTAL: 0.06 M/S
TDI: 0.08 M/S
TR MAX PG: 23 MMHG
TRICUSPID ANNULAR PLANE SYSTOLIC EXCURSION: 1.57 CM
TV REST PULMONARY ARTERY PRESSURE: 26 MMHG
Z-SCORE OF LEFT VENTRICULAR DIMENSION IN END DIASTOLE: -5.61
Z-SCORE OF LEFT VENTRICULAR DIMENSION IN END SYSTOLE: -3.42

## 2024-09-10 PROCEDURE — 93306 TTE W/DOPPLER COMPLETE: CPT | Mod: PO

## 2024-09-10 PROCEDURE — 93306 TTE W/DOPPLER COMPLETE: CPT | Mod: 26,,, | Performed by: INTERNAL MEDICINE

## 2024-09-12 ENCOUNTER — CLINICAL SUPPORT (OUTPATIENT)
Dept: CARDIOLOGY | Facility: HOSPITAL | Age: 84
End: 2024-09-12

## 2024-09-12 ENCOUNTER — HOSPITAL ENCOUNTER (OUTPATIENT)
Dept: CARDIOLOGY | Facility: HOSPITAL | Age: 84
Discharge: HOME | End: 2024-09-12
Attending: INTERNAL MEDICINE
Payer: MEDICARE

## 2024-09-12 DIAGNOSIS — I49.8 OTHER SPECIFIED CARDIAC ARRHYTHMIAS: ICD-10-CM

## 2024-09-12 PROCEDURE — 93298 REM INTERROG DEV EVAL SCRMS: CPT | Mod: PO | Performed by: INTERNAL MEDICINE

## 2024-09-12 PROCEDURE — 93298 REM INTERROG DEV EVAL SCRMS: CPT | Mod: 26,,, | Performed by: INTERNAL MEDICINE

## 2024-09-14 LAB
OHS CV AF BURDEN PERCENT: 100
OHS CV AF BURDEN PERCENT: 19.2
OHS CV AF BURDEN PERCENT: 91.7
OHS CV AF BURDEN PERCENT: 98.5
OHS CV AF BURDEN PERCENT: 99.5
OHS CV AF BURDEN PERCENT: 99.9
OHS CV AF BURDEN PERCENT: 99.9
OHS CV AF BURDEN PERCENT: < 1
OHS CV DC REMOTE DEVICE TYPE: NORMAL
OHS CV ICD SHOCK: NO

## 2024-09-17 ENCOUNTER — HOSPITAL ENCOUNTER (OUTPATIENT)
Dept: CARDIOLOGY | Facility: HOSPITAL | Age: 84
Discharge: HOME | End: 2024-09-17
Attending: INTERNAL MEDICINE
Payer: MEDICARE

## 2024-09-17 ENCOUNTER — TELEPHONE (OUTPATIENT)
Dept: CARDIOLOGY | Facility: HOSPITAL | Age: 84
End: 2024-09-17
Payer: MEDICARE

## 2024-09-17 ENCOUNTER — CLINICAL SUPPORT (OUTPATIENT)
Dept: CARDIOLOGY | Facility: HOSPITAL | Age: 84
End: 2024-09-17

## 2024-09-17 DIAGNOSIS — I49.8 OTHER SPECIFIED CARDIAC ARRHYTHMIAS: ICD-10-CM

## 2024-09-17 NOTE — TELEPHONE ENCOUNTER
Call placed to patient to review Symptom x2  Spoke to daughter  States can'y continue with symptoms associated with AF, very SOB with activity, no energy, tired, huting, hard time breathing  States she does not ED, requesting a sooner appt with EP  Rescheduled 9/26 at 10:00          Improved overall Vrates

## 2024-09-25 NOTE — H&P (VIEW-ONLY)
SUBJECTIVE:    Patient ID:  Selena Moulton is a 84 y.o. 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 Reinoso  Diagnosed with AF 2022 > underwent DCCV 3/2022 then again 6/2022. Was started on amiodarone which was d/c'd after syncopal event.   Did well until recently with increased AF burden  Underwent MURRAY/DCCV 7/2024 > symptoms of SOB/CP resolved with NSR.    TTE EF 55-60%. DARIUS 31.5    Relevant labs: cr 1.6, TSH none on file  Relevant EP Rx: Toprol 25 QD, Eliquis 5 mg BID    In clinic today:  SOB and fatigue with AF  ILR shows persistent AF since 9/3. Well controlled V rates.    I personally reviewed the ECG/telemetry strip today. My interpretation is AF with controlled V rates    Past Medical History:   Diagnosis Date    Anticoagulant long-term use     Aortic stenosis     Arthritis     Cataracts, bilateral     CKD (chronic kidney disease), stage III     Class 2 severe obesity due to excess calories with serious comorbidity and body mass index (BMI) of 39.0 to 39.9 in adult 09/13/2018    Digestive disorder     GERD (gastroesophageal reflux disease)     Gout     Hypercholesteremia     Hypertension     Implantable loop recorder present     PAF (paroxysmal atrial fibrillation)     Personal history of COVID-19 05/2022    S/P TAVR (transcatheter aortic valve replacement)        Past Surgical History:   Procedure Laterality Date    AORTIC VALVE REPLACEMENT N/A 10/8/2019    Procedure: Replacement-valve-aortic;  Surgeon: Fidel Cardenas MD;  Location: [facility] CATH LAB;  Service: Cardiology;  Laterality: N/A;    BACK SURGERY      bone graft neck    CARDIAC VALVE SURGERY      CARPAL TUNNEL RELEASE Left 10/3/2018    Procedure: RELEASE, CARPAL TUNNEL  LEFT;  Surgeon: Meche Cárdenas MD;  Location: [facility];  Service: Neurosurgery;  Laterality: Left;    CARPAL  TUNNEL RELEASE Right 1/17/2019    Procedure: RELEASE, CARPAL TUNNEL RIGHT;  Surgeon: Meche Cárdenas MD;  Location: [facility];  Service: Neurosurgery;  Laterality: Right;    CATHETERIZATION OF BOTH LEFT AND RIGHT HEART N/A 7/30/2019    Procedure: CATHETERIZATION, HEART, BOTH LEFT AND RIGHT;  Surgeon: Terrie Alberto MD;  Location: [facility] CATH;  Service: Cardiology;  Laterality: N/A;    CERVICAL FUSION  1984    x2     CORONARY ANGIOGRAPHY N/A 7/30/2019    Procedure: ANGIOGRAM, CORONARY ARTERY;  Surgeon: Terrie Alberto MD;  Location: [facility] CATH;  Service: Cardiology;  Laterality: N/A;    HERNIA REPAIR      umbilical    HYSTERECTOMY      TONSILLECTOMY      TRANSESOPHAGEAL ECHOCARDIOGRAM WITH POSSIBLE CARDIOVERSION (MURRAY W/ POSS CARDIOVERSION) N/A 7/19/2024    Procedure: TRANSESOPHAGEAL ECHOCARDIOGRAM WITH POSSIBLE CARDIOVERSION (MURRAY W/ POSS CARDIOVERSION);  Surgeon: Armando Reinoso MD;  Location: [facility];  Service: Cardiology;  Laterality: N/A;       Family History   Problem Relation Name Age of Onset    Cancer Mother      Stroke Mother      Hypertension Mother      Ovarian cancer Mother      No Known Problems Father      Glaucoma Neg Hx      Macular degeneration Neg Hx         Social History     Socioeconomic History    Marital status:    Tobacco Use    Smoking status: Never    Smokeless tobacco: Never   Substance and Sexual Activity    Alcohol use: No    Drug use: No       Review of patient's allergies indicates:   Allergen Reactions    Opioids - morphine analogues      nause and fever   Other reaction(s): Other (See Comments)  nause and fever     Tetanus vaccines and toxoid Rash and Swelling    Adhesive Rash    Flu virus vacc tvs 2015-16 (18 yr up) cell derived      Other reaction(s): Other (See Comments)    Influenza virus vaccines Nausea And Vomiting    Iodides     Iodine Hives    Iodine and iodide containing products     Morpholine analogues Nausea And Vomiting    Penicillins Other (See Comments)      High fever as a child, also was allergy tested 1980    Tetanus and diphther. tox (pf)     Latex, natural rubber Rash       Review of Systems   Constitutional: Positive for malaise/fatigue. Negative for chills and fever.   HENT:  Negative for congestion and hearing loss.    Eyes:  Negative for blurred vision and double vision.   Cardiovascular:         See HPI   Respiratory:  Positive for shortness of breath. Negative for cough and hemoptysis.    Endocrine: Negative for cold intolerance and heat intolerance.   Musculoskeletal:  Negative for joint pain and joint swelling.   Gastrointestinal:  Negative for abdominal pain, nausea and vomiting.   Genitourinary:  Negative for dysuria and hematuria.   Neurological:  Negative for focal weakness and headaches.   Psychiatric/Behavioral:  Negative for altered mental status.    All other systems reviewed and are negative.           OBJECTIVE:     Vitals:    09/26/24 0946   BP: (!) 134/57   Pulse: 83         BP Readings from Last 5 Encounters:   08/19/24 132/74   07/22/24 136/82   07/19/24 (!) 150/86   07/17/24 100/72   06/04/24 138/60        Physical Exam  Vitals and nursing note reviewed.   Constitutional:       General: She is not in acute distress.     Appearance: She is well-developed. She is obese. She is not diaphoretic.   HENT:      Head: Normocephalic and atraumatic.   Eyes:      General: No scleral icterus.        Right eye: No discharge.         Left eye: No discharge.   Cardiovascular:      Rate and Rhythm: Normal rate. Rhythm irregular. No extrasystoles are present.     Pulses: Normal pulses and intact distal pulses.           Radial pulses are 2+ on the right side and 2+ on the left side.      Heart sounds: Normal heart sounds, S1 normal and S2 normal. Heart sounds not distant. No opening snap. No murmur heard.     No friction rub. No gallop. No S3 or S4 sounds.   Pulmonary:      Effort: Pulmonary effort is normal. No respiratory distress.      Breath sounds: No  wheezing or rales.   Abdominal:      General: Bowel sounds are normal. There is no distension.      Palpations: Abdomen is soft.      Tenderness: There is no abdominal tenderness.   Musculoskeletal:         General: No deformity. Normal range of motion.      Cervical back: Normal range of motion and neck supple.   Skin:     General: Skin is warm and dry.      Findings: No erythema or rash.   Neurological:      Mental Status: She is alert.             Current Outpatient Medications   Medication Instructions    acetaminophen (TYLENOL) 500 mg, Oral, Every 8 hours PRN    allopurinoL (ZYLOPRIM) 300 mg, Oral, Daily    atorvastatin (LIPITOR) 40 MG tablet TAKE 1 TABLET(40 MG) BY MOUTH EVERY EVENING    ELIQUIS 5 mg, Oral, 2 times daily    fish oil-omega-3 fatty acids 300-1,000 mg capsule 1 capsule, Oral, Daily, Hold for 7 days before surgery    furosemide (LASIX) 20 MG tablet TAKE 1 TABLET(20 MG) BY MOUTH TWICE DAILY    metoprolol succinate (TOPROL-XL) 25 mg, Oral, Nightly    multivitamin capsule 1 capsule, Oral, Daily, Hold AM of surgery    nitroGLYCERIN (NITROSTAT) 0.4 MG SL tablet Sublingual, Every 5 min PRN    pantoprazole (PROTONIX) 20 MG tablet TAKE 1 TABLET(20 MG) BY MOUTH EVERY DAY    vit A/vit C/vit E/zinc/copper (PRESERVISION AREDS ORAL) 1 tablet, Oral, 2 times daily    vitamin D (VITAMIN D3) 1,000 Units, Oral, Daily       Lipid Panel:   Lab Results   Component Value Date    CHOL 161 05/13/2024    HDL 64 05/13/2024    LDLCALC 79.0 05/13/2024    TRIG 90 05/13/2024    CHOLHDL 39.8 05/13/2024         The ASCVD Risk score (Naya DK, et al., 2019) failed to calculate for the following reasons:    The 2019 ASCVD risk score is only valid for ages 40 to 79    Most Recent EKG Results  Results for orders placed or performed in visit on 08/19/24   IN OFFICE EKG 12-LEAD (to [place])    Collection Time: 08/19/24  3:06 PM   Result Value Ref Range    QRS Duration 100 ms    [facility] QTC Calculation 467 ms    Narrative    Test Reason :  I48.19,    Vent. Rate : 082 BPM     Atrial Rate : 082 BPM     P-R Int : 192 ms          QRS Dur : 100 ms      QT Int : 400 ms       P-R-T Axes : 086 066 076 degrees     QTc Int : 467 ms    Normal sinus rhythm  Normal ECG  When compared with ECG of 19-JUL-2024 13:23,  No significant change was found  Confirmed by Terrie Alberto MD ([id number]) on 8/20/2024 12:07:15 PM    Referred By: RY MAK           Confirmed By:Terrie Alberto MD       Most Recent Echocardiogram Results  Results for orders placed during the hospital encounter of 09/10/24    Echo    Interpretation Summary    Left Ventricle: The left ventricle is normal in size. There is normal systolic function with a visually estimated ejection fraction of 55 - 60%. There is normal diastolic function.    Right Ventricle: Normal right ventricular cavity size. Systolic function is normal.    Aortic Valve: There is a bioprosthetic valve in the aortic position. It is reported to be a 23 mm . Aortic valve peak velocity is 2.09 m/s. Mean gradient is 11 mmHg. The dimensionless index is 0.60.    Mitral Valve: There is mild regurgitation.    Pulmonary Artery: The estimated pulmonary artery systolic pressure is 26 mmHg.    IVC/SVC: Normal venous pressure at 3 mmHg.      Most Recent Nuclear Stress Test Results  Results for orders placed during the hospital encounter of 08/09/22    Nuclear Stress - Cardiology Interpreted    Interpretation Summary    Normal myocardial perfusion scan. There is no evidence of myocardial ischemia or infarction.    The gated perfusion images showed an ejection fraction of 83% post stress.    LV cavity size is normal at rest and normal at stress.    The EKG portion of this study is abnormal but not diagnostic.      Most Recent Cardiac PET Stress Test Results  No results found for this or any previous visit.      Most Recent Cardiovascular Angiogram results  Results for orders placed during the hospital encounter of 10/08/19    Cardiac  catheterization    Conclusion  · Patient referred by Dr Alberto for severe AS, high surgical risk  · Successful aortic valve replacement with 23 mm Cierra S3 valve performed via transfemoral access.  · The post TAVR 2D echo showed no perivalvular leak.  · The AV per 2d-echo mean gradient is 5 mmHg the peak velocity is 1.5 m/s.  · Estimated blood loss: <50 mL    I certify that I was present for catheter insertion, catheter manipulation, angiography, and angiographic interpretation of this patient.    Procedure Log documented by Documenter: RT Carolina and verified by Fidel Esquivel MD.    Date: 10/8/2019  Time: 12:01 PM      Other Most Recent Cardiology Results  Results for orders placed in visit on 09/12/24    Cardiac device check - Remote alert        All pertinent data including labs, imaging, EKGs, and studies listed above were reviewed.  All EKG tracing in [facility] were personally interpreted by this provider.    ASSESSMENT:     1. Persistent atrial fibrillation        2. S/P TAVR (transcatheter aortic valve replacement)        3. Severe aortic stenosis        4. Essential hypertension        5. Stage 4 chronic kidney disease          PLAN FOR TREATMENT OF ABOVE DIAGNOSES:     Unable to afford Multaq. Will start sotalol following CV.  MURRAY/DCCV next week on AAD therapy  I would like to see weight loss and ISMAEL evaluation prior to undergoing ablation. Discussed the rate of success is markedly reduced without these being controlled first  Sleep medicine referral  Continue Eliquis 5 mg BID  Continue Toprol XL 25 mg daily  Continue Lipitor 40 mg daily    -No absolute contraindications of esophageal stricture, tumor, perforation, laceration,or diverticulum and/or active GI bleed  -The risks, benefits & alternatives of the procedure were explained to the patient.   -The risks of transesophageal echo include but are not limited to:  Dental trauma, esophageal trauma/perforation, bleeding, laryngospasm/brochospasm,  aspiration, sore throat/hoarseness, & dislodgement of the endotracheal tube/nasogastric tube (where applicable).    - The risks of cardioversion including tachy-/bradyarrhythmias, CVA, skin burns  -The risks of ANES monitored sedation include hypotension, respiratory depression, arrhythmias, bronchospasm, & death.    -Informed consent was obtained. The patient is agreeable to proceed with the procedure and all questions and concerns addressed.     F/u in 3 months    Wander Johnson MD  Cardiac Electrophysiology

## 2024-09-25 NOTE — PROGRESS NOTES
SUBJECTIVE:    Patient ID:  Selena Moulton is a 84 y.o. 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 Reinoso  Diagnosed with AF 2022 > underwent DCCV 3/2022 then again 6/2022. Was started on amiodarone which was d/c'd after syncopal event.   Did well until recently with increased AF burden  Underwent MURRAY/DCCV 7/2024 > symptoms of SOB/CP resolved with NSR.    TTE EF 55-60%. DARIUS 31.5    Relevant labs: cr 1.6, TSH none on file  Relevant EP Rx: Toprol 25 QD, Eliquis 5 mg BID    In clinic today:  SOB and fatigue with AF  ILR shows persistent AF since 9/3. Well controlled V rates.    I personally reviewed the ECG/telemetry strip today. My interpretation is AF with controlled V rates    Past Medical History:   Diagnosis Date    Anticoagulant long-term use     Aortic stenosis     Arthritis     Cataracts, bilateral     CKD (chronic kidney disease), stage III     Class 2 severe obesity due to excess calories with serious comorbidity and body mass index (BMI) of 39.0 to 39.9 in adult 09/13/2018    Digestive disorder     GERD (gastroesophageal reflux disease)     Gout     Hypercholesteremia     Hypertension     Implantable loop recorder present     PAF (paroxysmal atrial fibrillation)     Personal history of COVID-19 05/2022    S/P TAVR (transcatheter aortic valve replacement)        Past Surgical History:   Procedure Laterality Date    AORTIC VALVE REPLACEMENT N/A 10/8/2019    Procedure: Replacement-valve-aortic;  Surgeon: Fidel Cardenas MD;  Location: [facility] CATH LAB;  Service: Cardiology;  Laterality: N/A;    BACK SURGERY      bone graft neck    CARDIAC VALVE SURGERY      CARPAL TUNNEL RELEASE Left 10/3/2018    Procedure: RELEASE, CARPAL TUNNEL  LEFT;  Surgeon: Meche Cárdenas MD;  Location: [facility];  Service: Neurosurgery;  Laterality: Left;    CARPAL  TUNNEL RELEASE Right 1/17/2019    Procedure: RELEASE, CARPAL TUNNEL RIGHT;  Surgeon: Meche Cárdenas MD;  Location: [facility];  Service: Neurosurgery;  Laterality: Right;    CATHETERIZATION OF BOTH LEFT AND RIGHT HEART N/A 7/30/2019    Procedure: CATHETERIZATION, HEART, BOTH LEFT AND RIGHT;  Surgeon: Terrie Alberto MD;  Location: [facility] CATH;  Service: Cardiology;  Laterality: N/A;    CERVICAL FUSION  1984    x2     CORONARY ANGIOGRAPHY N/A 7/30/2019    Procedure: ANGIOGRAM, CORONARY ARTERY;  Surgeon: Terrie Alberto MD;  Location: [facility] CATH;  Service: Cardiology;  Laterality: N/A;    HERNIA REPAIR      umbilical    HYSTERECTOMY      TONSILLECTOMY      TRANSESOPHAGEAL ECHOCARDIOGRAM WITH POSSIBLE CARDIOVERSION (MURRAY W/ POSS CARDIOVERSION) N/A 7/19/2024    Procedure: TRANSESOPHAGEAL ECHOCARDIOGRAM WITH POSSIBLE CARDIOVERSION (MURRAY W/ POSS CARDIOVERSION);  Surgeon: Armando Reinoso MD;  Location: [facility];  Service: Cardiology;  Laterality: N/A;       Family History   Problem Relation Name Age of Onset    Cancer Mother      Stroke Mother      Hypertension Mother      Ovarian cancer Mother      No Known Problems Father      Glaucoma Neg Hx      Macular degeneration Neg Hx         Social History     Socioeconomic History    Marital status:    Tobacco Use    Smoking status: Never    Smokeless tobacco: Never   Substance and Sexual Activity    Alcohol use: No    Drug use: No       Review of patient's allergies indicates:   Allergen Reactions    Opioids - morphine analogues      nause and fever   Other reaction(s): Other (See Comments)  nause and fever     Tetanus vaccines and toxoid Rash and Swelling    Adhesive Rash    Flu virus vacc tvs 2015-16 (18 yr up) cell derived      Other reaction(s): Other (See Comments)    Influenza virus vaccines Nausea And Vomiting    Iodides     Iodine Hives    Iodine and iodide containing products     Morpholine analogues Nausea And Vomiting    Penicillins Other (See Comments)      High fever as a child, also was allergy tested 1980    Tetanus and diphther. tox (pf)     Latex, natural rubber Rash       Review of Systems   Constitutional: Positive for malaise/fatigue. Negative for chills and fever.   HENT:  Negative for congestion and hearing loss.    Eyes:  Negative for blurred vision and double vision.   Cardiovascular:         See HPI   Respiratory:  Positive for shortness of breath. Negative for cough and hemoptysis.    Endocrine: Negative for cold intolerance and heat intolerance.   Musculoskeletal:  Negative for joint pain and joint swelling.   Gastrointestinal:  Negative for abdominal pain, nausea and vomiting.   Genitourinary:  Negative for dysuria and hematuria.   Neurological:  Negative for focal weakness and headaches.   Psychiatric/Behavioral:  Negative for altered mental status.    All other systems reviewed and are negative.           OBJECTIVE:     Vitals:    09/26/24 0946   BP: (!) 134/57   Pulse: 83         BP Readings from Last 5 Encounters:   08/19/24 132/74   07/22/24 136/82   07/19/24 (!) 150/86   07/17/24 100/72   06/04/24 138/60        Physical Exam  Vitals and nursing note reviewed.   Constitutional:       General: She is not in acute distress.     Appearance: She is well-developed. She is obese. She is not diaphoretic.   HENT:      Head: Normocephalic and atraumatic.   Eyes:      General: No scleral icterus.        Right eye: No discharge.         Left eye: No discharge.   Cardiovascular:      Rate and Rhythm: Normal rate. Rhythm irregular. No extrasystoles are present.     Pulses: Normal pulses and intact distal pulses.           Radial pulses are 2+ on the right side and 2+ on the left side.      Heart sounds: Normal heart sounds, S1 normal and S2 normal. Heart sounds not distant. No opening snap. No murmur heard.     No friction rub. No gallop. No S3 or S4 sounds.   Pulmonary:      Effort: Pulmonary effort is normal. No respiratory distress.      Breath sounds: No  wheezing or rales.   Abdominal:      General: Bowel sounds are normal. There is no distension.      Palpations: Abdomen is soft.      Tenderness: There is no abdominal tenderness.   Musculoskeletal:         General: No deformity. Normal range of motion.      Cervical back: Normal range of motion and neck supple.   Skin:     General: Skin is warm and dry.      Findings: No erythema or rash.   Neurological:      Mental Status: She is alert.             Current Outpatient Medications   Medication Instructions    acetaminophen (TYLENOL) 500 mg, Oral, Every 8 hours PRN    allopurinoL (ZYLOPRIM) 300 mg, Oral, Daily    atorvastatin (LIPITOR) 40 MG tablet TAKE 1 TABLET(40 MG) BY MOUTH EVERY EVENING    ELIQUIS 5 mg, Oral, 2 times daily    fish oil-omega-3 fatty acids 300-1,000 mg capsule 1 capsule, Oral, Daily, Hold for 7 days before surgery    furosemide (LASIX) 20 MG tablet TAKE 1 TABLET(20 MG) BY MOUTH TWICE DAILY    metoprolol succinate (TOPROL-XL) 25 mg, Oral, Nightly    multivitamin capsule 1 capsule, Oral, Daily, Hold AM of surgery    nitroGLYCERIN (NITROSTAT) 0.4 MG SL tablet Sublingual, Every 5 min PRN    pantoprazole (PROTONIX) 20 MG tablet TAKE 1 TABLET(20 MG) BY MOUTH EVERY DAY    vit A/vit C/vit E/zinc/copper (PRESERVISION AREDS ORAL) 1 tablet, Oral, 2 times daily    vitamin D (VITAMIN D3) 1,000 Units, Oral, Daily       Lipid Panel:   Lab Results   Component Value Date    CHOL 161 05/13/2024    HDL 64 05/13/2024    LDLCALC 79.0 05/13/2024    TRIG 90 05/13/2024    CHOLHDL 39.8 05/13/2024         The ASCVD Risk score (Naya DK, et al., 2019) failed to calculate for the following reasons:    The 2019 ASCVD risk score is only valid for ages 40 to 79    Most Recent EKG Results  Results for orders placed or performed in visit on 08/19/24   IN OFFICE EKG 12-LEAD (to [place])    Collection Time: 08/19/24  3:06 PM   Result Value Ref Range    QRS Duration 100 ms    OHS QTC Calculation 467 ms    Narrative    Test Reason :  I48.19,    Vent. Rate : 082 BPM     Atrial Rate : 082 BPM     P-R Int : 192 ms          QRS Dur : 100 ms      QT Int : 400 ms       P-R-T Axes : 086 066 076 degrees     QTc Int : 467 ms    Normal sinus rhythm  Normal ECG  When compared with ECG of 19-JUL-2024 13:23,  No significant change was found  Confirmed by Terrie Alberto MD ([id number]) on 8/20/2024 12:07:15 PM    Referred By: RY MAK           Confirmed By:Terrie Alberto MD       Most Recent Echocardiogram Results  Results for orders placed during the hospital encounter of 09/10/24    Echo    Interpretation Summary    Left Ventricle: The left ventricle is normal in size. There is normal systolic function with a visually estimated ejection fraction of 55 - 60%. There is normal diastolic function.    Right Ventricle: Normal right ventricular cavity size. Systolic function is normal.    Aortic Valve: There is a bioprosthetic valve in the aortic position. It is reported to be a 23 mm . Aortic valve peak velocity is 2.09 m/s. Mean gradient is 11 mmHg. The dimensionless index is 0.60.    Mitral Valve: There is mild regurgitation.    Pulmonary Artery: The estimated pulmonary artery systolic pressure is 26 mmHg.    IVC/SVC: Normal venous pressure at 3 mmHg.      Most Recent Nuclear Stress Test Results  Results for orders placed during the hospital encounter of 08/09/22    Nuclear Stress - Cardiology Interpreted    Interpretation Summary    Normal myocardial perfusion scan. There is no evidence of myocardial ischemia or infarction.    The gated perfusion images showed an ejection fraction of 83% post stress.    LV cavity size is normal at rest and normal at stress.    The EKG portion of this study is abnormal but not diagnostic.      Most Recent Cardiac PET Stress Test Results  No results found for this or any previous visit.      Most Recent Cardiovascular Angiogram results  Results for orders placed during the hospital encounter of 10/08/19    Cardiac  catheterization    Conclusion  · Patient referred by Dr Alberto for severe AS, high surgical risk  · Successful aortic valve replacement with 23 mm Cierra S3 valve performed via transfemoral access.  · The post TAVR 2D echo showed no perivalvular leak.  · The AV per 2d-echo mean gradient is 5 mmHg the peak velocity is 1.5 m/s.  · Estimated blood loss: <50 mL    I certify that I was present for catheter insertion, catheter manipulation, angiography, and angiographic interpretation of this patient.    Procedure Log documented by Documenter: RT Carolina and verified by Fidel Esquivel MD.    Date: 10/8/2019  Time: 12:01 PM      Other Most Recent Cardiology Results  Results for orders placed in visit on 09/12/24    Cardiac device check - Remote alert        All pertinent data including labs, imaging, EKGs, and studies listed above were reviewed.  All EKG tracing in [facility] were personally interpreted by this provider.    ASSESSMENT:     1. Persistent atrial fibrillation        2. S/P TAVR (transcatheter aortic valve replacement)        3. Severe aortic stenosis        4. Essential hypertension        5. Stage 4 chronic kidney disease          PLAN FOR TREATMENT OF ABOVE DIAGNOSES:     Unable to afford Multaq. Will start sotalol following CV.  MURRAY/DCCV next week on AAD therapy  I would like to see weight loss and ISMAEL evaluation prior to undergoing ablation. Discussed the rate of success is markedly reduced without these being controlled first  Sleep medicine referral  Continue Eliquis 5 mg BID  Continue Toprol XL 25 mg daily  Continue Lipitor 40 mg daily    -No absolute contraindications of esophageal stricture, tumor, perforation, laceration,or diverticulum and/or active GI bleed  -The risks, benefits & alternatives of the procedure were explained to the patient.   -The risks of transesophageal echo include but are not limited to:  Dental trauma, esophageal trauma/perforation, bleeding, laryngospasm/brochospasm,  aspiration, sore throat/hoarseness, & dislodgement of the endotracheal tube/nasogastric tube (where applicable).    - The risks of cardioversion including tachy-/bradyarrhythmias, CVA, skin burns  -The risks of ANES monitored sedation include hypotension, respiratory depression, arrhythmias, bronchospasm, & death.    -Informed consent was obtained. The patient is agreeable to proceed with the procedure and all questions and concerns addressed.     F/u in 3 months    Wander Johnson MD  Cardiac Electrophysiology

## 2024-09-26 ENCOUNTER — OFFICE VISIT (OUTPATIENT)
Dept: CARDIOLOGY | Facility: CLINIC | Age: 84
End: 2024-09-26
Payer: MEDICARE

## 2024-09-26 ENCOUNTER — TELEPHONE (OUTPATIENT)
Dept: CARDIOLOGY | Facility: CLINIC | Age: 84
End: 2024-09-26
Payer: MEDICARE

## 2024-09-26 VITALS
WEIGHT: 269.63 LBS | DIASTOLIC BLOOD PRESSURE: 57 MMHG | HEIGHT: 67 IN | SYSTOLIC BLOOD PRESSURE: 134 MMHG | HEART RATE: 83 BPM | BODY MASS INDEX: 42.32 KG/M2

## 2024-09-26 DIAGNOSIS — I48.19 PERSISTENT ATRIAL FIBRILLATION: Primary | ICD-10-CM

## 2024-09-26 DIAGNOSIS — I10 ESSENTIAL HYPERTENSION: ICD-10-CM

## 2024-09-26 DIAGNOSIS — Z95.2 S/P TAVR (TRANSCATHETER AORTIC VALVE REPLACEMENT): ICD-10-CM

## 2024-09-26 DIAGNOSIS — I35.0 SEVERE AORTIC STENOSIS: ICD-10-CM

## 2024-09-26 DIAGNOSIS — N18.4 STAGE 4 CHRONIC KIDNEY DISEASE: ICD-10-CM

## 2024-09-26 LAB
OHS QRS DURATION: 96 MS
OHS QTC CALCULATION: 460 MS

## 2024-09-26 PROCEDURE — 99999 PR PBB SHADOW E&M-EST. PATIENT-LVL IV: CPT | Mod: PBBFAC,,, | Performed by: INTERNAL MEDICINE

## 2024-09-26 PROCEDURE — 93010 ELECTROCARDIOGRAM REPORT: CPT | Mod: S$GLB,,, | Performed by: INTERNAL MEDICINE

## 2024-09-26 PROCEDURE — 93005 ELECTROCARDIOGRAM TRACING: CPT | Mod: PO

## 2024-09-26 RX ORDER — SODIUM CHLORIDE 9 MG/ML
INJECTION, SOLUTION INTRAVENOUS CONTINUOUS
OUTPATIENT
Start: 2024-09-26

## 2024-09-26 RX ORDER — AMIODARONE HYDROCHLORIDE 200 MG/1
200 TABLET ORAL 2 TIMES DAILY
Qty: 180 TABLET | Refills: 3 | Status: SHIPPED | OUTPATIENT
Start: 2024-09-26 | End: 2025-09-26

## 2024-09-26 RX ORDER — MUPIROCIN 20 MG/G
OINTMENT TOPICAL
OUTPATIENT
Start: 2024-09-26

## 2024-09-26 NOTE — PATIENT INSTRUCTIONS
MURRAY/Cardioversion    Arrive for your procedure at: Tentative-- [facility] on October 1st at 1pm (Approx arrival for 11:30am)      FASTING:  You MAY NOT have anything to eat or drink AFTER MIDNIGHT.  If your procedure is scheduled in the afternoon, you may have a LIGHT BREAKFAST BEFORE 6:00 A.M.  For example: Two slices of toast; black coffee or black tea.    MEDICATIONS:  You may take your regular morning medications with a small sip of water.     Hold or adjust the following:  Fluid pills. Hold Lasix morning of.   Diabetes medications.  Please hold GLP-1 Drugs such as Semiglutide, Ozempic, Wegovy, and Monjaro 1 week prior to procedure.      Continue: Coumadin, Plavix, Effient, Aspirin, Anti-coagulants, Blood thinners/Eliquis    Please refer to pre-op instructions received from [facility].

## 2024-09-26 NOTE — TELEPHONE ENCOUNTER
----- Message from Toya Choi sent at 9/26/2024 11:18 AM CDT -----  Contact: daughter/Selena  Type: Needs Medical Advice  Who Called:  the patient     Pharmacy name and phone #:       [id number] - [place] [address] [address] [facility] [address]  [address] [place] [id number]  Phone: [phone] Fax: [phone]    Best Call Back Number: [phone]  Additional Information: dronedarone (MULTAQ) 400 mg Tab is too expensive is there an alternative that can be called in? Pt was told to start taking this med today. Please call

## 2024-10-10 ENCOUNTER — ANESTHESIA EVENT (OUTPATIENT)
Dept: ENDOSCOPY | Facility: HOSPITAL | Age: 84
End: 2024-10-10
Payer: MEDICARE

## 2024-10-10 RX ORDER — GLUCAGON 1 MG
1 KIT INJECTION
OUTPATIENT
Start: 2024-10-10

## 2024-10-10 RX ORDER — FENTANYL CITRATE 50 UG/ML
25 INJECTION, SOLUTION INTRAMUSCULAR; INTRAVENOUS EVERY 5 MIN PRN
OUTPATIENT
Start: 2024-10-10 | End: 2024-10-10

## 2024-10-11 ENCOUNTER — HOSPITAL ENCOUNTER (OUTPATIENT)
Dept: CARDIOLOGY | Facility: HOSPITAL | Age: 84
Discharge: HOME | End: 2024-10-11
Admitting: INTERNAL MEDICINE
Payer: MEDICARE

## 2024-10-11 ENCOUNTER — HOSPITAL ENCOUNTER (OUTPATIENT)
Facility: HOSPITAL | Age: 84
Discharge: HOME | End: 2024-10-11
Attending: INTERNAL MEDICINE | Admitting: INTERNAL MEDICINE
Payer: MEDICARE

## 2024-10-11 ENCOUNTER — ANESTHESIA (OUTPATIENT)
Dept: ENDOSCOPY | Facility: HOSPITAL | Age: 84
End: 2024-10-11
Payer: MEDICARE

## 2024-10-11 VITALS
BODY MASS INDEX: 42.22 KG/M2 | TEMPERATURE: 98 F | OXYGEN SATURATION: 97 % | DIASTOLIC BLOOD PRESSURE: 71 MMHG | WEIGHT: 269 LBS | RESPIRATION RATE: 18 BRPM | SYSTOLIC BLOOD PRESSURE: 134 MMHG | HEIGHT: 67 IN | HEART RATE: 74 BPM

## 2024-10-11 DIAGNOSIS — I35.0 SEVERE AORTIC STENOSIS: ICD-10-CM

## 2024-10-11 DIAGNOSIS — I48.19 PERSISTENT ATRIAL FIBRILLATION: Primary | ICD-10-CM

## 2024-10-11 DIAGNOSIS — N18.4 STAGE 4 CHRONIC KIDNEY DISEASE: ICD-10-CM

## 2024-10-11 DIAGNOSIS — I10 HTN (HYPERTENSION): ICD-10-CM

## 2024-10-11 DIAGNOSIS — I48.19 PERSISTENT ATRIAL FIBRILLATION: ICD-10-CM

## 2024-10-11 DIAGNOSIS — I10 ESSENTIAL HYPERTENSION: ICD-10-CM

## 2024-10-11 DIAGNOSIS — Z95.2 S/P TAVR (TRANSCATHETER AORTIC VALVE REPLACEMENT): ICD-10-CM

## 2024-10-11 LAB
OHS QRS DURATION: 102 MS
OHS QRS DURATION: 102 MS
OHS QTC CALCULATION: 495 MS
OHS QTC CALCULATION: 503 MS

## 2024-10-11 PROCEDURE — 93320 DOPPLER ECHO COMPLETE: CPT | Mod: PO

## 2024-10-11 PROCEDURE — D9220A PRA ANESTHESIA: Mod: ANES,,, | Performed by: ANESTHESIOLOGY

## 2024-10-11 PROCEDURE — 93005 ELECTROCARDIOGRAM TRACING: CPT | Mod: PO

## 2024-10-11 PROCEDURE — 63600175 PHARM REV CODE 636 W HCPCS: Mod: PO | Performed by: NURSE ANESTHETIST, CERTIFIED REGISTERED

## 2024-10-11 PROCEDURE — 93312 ECHO TRANSESOPHAGEAL: CPT | Mod: PO

## 2024-10-11 PROCEDURE — 37000009 HC ANESTHESIA EA ADD 15 MINS: Mod: PO | Performed by: INTERNAL MEDICINE

## 2024-10-11 PROCEDURE — D9220A PRA ANESTHESIA: Mod: CRNA,,, | Performed by: NURSE ANESTHETIST, CERTIFIED REGISTERED

## 2024-10-11 PROCEDURE — 93325 DOPPLER ECHO COLOR FLOW MAPG: CPT | Mod: PO

## 2024-10-11 PROCEDURE — 93312 ECHO TRANSESOPHAGEAL: CPT | Mod: 26,,, | Performed by: INTERNAL MEDICINE

## 2024-10-11 PROCEDURE — 93325 DOPPLER ECHO COLOR FLOW MAPG: CPT | Mod: 26,,, | Performed by: INTERNAL MEDICINE

## 2024-10-11 PROCEDURE — 93320 DOPPLER ECHO COMPLETE: CPT | Mod: 26,,, | Performed by: INTERNAL MEDICINE

## 2024-10-11 PROCEDURE — 93010 ELECTROCARDIOGRAM REPORT: CPT | Mod: ,,, | Performed by: INTERNAL MEDICINE

## 2024-10-11 PROCEDURE — 37000008 HC ANESTHESIA 1ST 15 MINUTES: Mod: PO | Performed by: INTERNAL MEDICINE

## 2024-10-11 RX ORDER — LIDOCAINE HYDROCHLORIDE 20 MG/ML
INJECTION INTRAVENOUS
Status: DISCONTINUED | OUTPATIENT
Start: 2024-10-11 | End: 2024-10-11

## 2024-10-11 RX ORDER — LIDOCAINE HYDROCHLORIDE 10 MG/ML
1 INJECTION, SOLUTION EPIDURAL; INFILTRATION; INTRACAUDAL; PERINEURAL ONCE
Status: DISCONTINUED | OUTPATIENT
Start: 2024-10-11 | End: 2024-10-11 | Stop reason: HOSPADM

## 2024-10-11 RX ORDER — SODIUM CHLORIDE 9 MG/ML
INJECTION, SOLUTION INTRAVENOUS CONTINUOUS
Status: DISCONTINUED | OUTPATIENT
Start: 2024-10-11 | End: 2024-10-11 | Stop reason: HOSPADM

## 2024-10-11 RX ORDER — MUPIROCIN 20 MG/G
OINTMENT TOPICAL
Status: DISCONTINUED | OUTPATIENT
Start: 2024-10-11 | End: 2024-10-11 | Stop reason: HOSPADM

## 2024-10-11 RX ORDER — SODIUM CHLORIDE, SODIUM LACTATE, POTASSIUM CHLORIDE, CALCIUM CHLORIDE 600; 310; 30; 20 MG/100ML; MG/100ML; MG/100ML; MG/100ML
INJECTION, SOLUTION INTRAVENOUS CONTINUOUS
Status: DISCONTINUED | OUTPATIENT
Start: 2024-10-11 | End: 2024-10-11 | Stop reason: HOSPADM

## 2024-10-11 RX ORDER — PROPOFOL 10 MG/ML
VIAL (ML) INTRAVENOUS
Status: DISCONTINUED | OUTPATIENT
Start: 2024-10-11 | End: 2024-10-11

## 2024-10-11 RX ADMIN — PROPOFOL 40 MG: 10 INJECTION, EMULSION INTRAVENOUS at 01:10

## 2024-10-11 RX ADMIN — LIDOCAINE HYDROCHLORIDE 100 MG: 20 INJECTION INTRAVENOUS at 01:10

## 2024-10-11 RX ADMIN — PROPOFOL 50 MG: 10 INJECTION, EMULSION INTRAVENOUS at 01:10

## 2024-10-11 RX ADMIN — PROPOFOL 20 MG: 10 INJECTION, EMULSION INTRAVENOUS at 01:10

## 2024-10-11 NOTE — DISCHARGE INSTRUCTIONS
MURRAY / Cardioversion    DO:  Resume all medications today unless otherwise instructed.    DO NOT:  Drive for 24 hours.  Operate heavy machinery.  Sign legal papers.  Make major decisions.  Drink alcohol or smoke for 24 hours.    WHAT TO EXPECT:  Sore throat. This can be soothed with ice chips or lozenges.    Your physician will contact you with results and further instruct you on how you should follow-up.    Contact [phone] for questions.

## 2024-10-11 NOTE — ANESTHESIA PREPROCEDURE EVALUATION
10/11/2024  Selena Moulton is a 84 y.o., female.      Pre-op Assessment          Review of Systems  Cardiovascular:     Hypertension                     Shortness of Breath                    Hypertension     Atrial Fibrillation     Pulmonary:      Shortness of breath                  Renal/:  Chronic Renal Disease        Kidney Function/Disease             Hepatic/GI:     GERD      Gerd          Neurological:    Neuromuscular Disease,                                 Neuromuscular Disease       Physical Exam  General: Well nourished        Anesthesia Plan  Type of Anesthesia, risks & benefits discussed:    Anesthesia Type: Gen Natural Airway, MAC  Intra-op Monitoring Plan: Standard ASA Monitors  Induction:  IV  Informed Consent: Informed consent signed with the Patient and all parties understand the risks and agree with anesthesia plan.  All questions answered.   ASA Score: 3  Day of Surgery Review of History & Physical: H&P Update referred to the surgeon/provider.    Ready For Surgery From Anesthesia Perspective.     .

## 2024-10-11 NOTE — DISCHARGE SUMMARY
[facility]  Cardiology  Discharge Summary      Patient Name: Selena Moulton  MRN: [id number]  Admission Date: 10/11/2024  Hospital Length of Stay: 0 days  Discharge Date and Time: 10/11/2024  2:17 PM  Attending Physician: No att. providers found  Discharging Provider: Moody Johnson MD  Primary Care Physician: Aby Perales MD    HPI: patient presented for MURRAY/DCCV.     Procedure(s) (LRB):  Transesophageal echo (MURRAY) intra-procedure log documentation (N/A)  Cardioversion or Defibrillation (N/A)     Indwelling Lines/Drains at time of discharge:  Lines/Drains/Airways       None                   Hospital Course: s/p MURRAY/DCCV without complications. Continue amiodarone 200 mg BID until 10/24 then go to 200 mg daily. Discussed that sleep evaluation and weight loss would be ideal prior to undergoing PVI.     Consults:     Significant Diagnostic Studies: Labs: All labs within the past 24 hours have been reviewed    Pending Diagnostic Studies:       None            There are no hospital problems to display for this patient.      Discharged Condition: stable    Follow Up:    Patient Instructions:   No discharge procedures on file.  Medications:  Reconciled Home Medications:      Medication List        ASK your doctor about these medications      acetaminophen 500 MG tablet  Commonly known as: TYLENOL  Take 500 mg by mouth every 8 (eight) hours as needed.     allopurinoL 300 MG tablet  Commonly known as: ZYLOPRIM  Take 1 tablet (300 mg total) by mouth once daily.     amiodarone 200 MG Tab  Commonly known as: PACERONE  Take 1 tablet (200 mg total) by mouth 2 (two) times daily.     atorvastatin 40 MG tablet  Commonly known as: LIPITOR  TAKE 1 TABLET(40 MG) BY MOUTH EVERY EVENING     * ELIQUIS 5 mg Tab  Generic drug: apixaban  TAKE 1 TABLET(5 MG) BY MOUTH TWICE DAILY     * ELIQUIS 5 mg Tab  Generic drug: apixaban  Take 1 tablet (5 mg total) by mouth once. for 1 dose     fish oil-omega-3 fatty acids 300-1,000 mg  capsule  Take 1 capsule by mouth once daily. Hold for 7 days before surgery     furosemide 20 MG tablet  Commonly known as: LASIX  TAKE 1 TABLET(20 MG) BY MOUTH TWICE DAILY     metoprolol succinate 25 MG 24 hr tablet  Commonly known as: TOPROL-XL  Take 1 tablet (25 mg total) by mouth every evening.     multivitamin capsule  Take 1 capsule by mouth once daily. Hold AM of surgery     nitroGLYCERIN 0.4 MG SL tablet  Commonly known as: NITROSTAT  Place under the tongue every 5 (five) minutes as needed.     pantoprazole 20 MG tablet  Commonly known as: PROTONIX  TAKE 1 TABLET(20 MG) BY MOUTH EVERY DAY     PRESERVISION AREDS ORAL  Take 1 tablet by mouth 2 (two) times a day.     vitamin D 1000 units Tab  Commonly known as: VITAMIN D3  Take 1,000 Units by mouth once daily.           * This list has 2 medication(s) that are the same as other medications prescribed for you. Read the directions carefully, and ask your doctor or other care provider to review them with you.                  Time spent on the discharge of patient: 30 minutes    Moody Johnson MD  Cardiology  [place] [facility]

## 2024-10-11 NOTE — TRANSFER OF CARE
"Anesthesia Transfer of Care Note    Patient: Selena Moulton    Procedure(s) Performed: Procedure(s) (LRB):  Transesophageal echo (MURRAY) intra-procedure log documentation (N/A)  Cardioversion or Defibrillation (N/A)    Patient location: PACU    Anesthesia Type: general    Transport from OR: Transported from OR on room air with adequate spontaneous ventilation    Post pain: adequate analgesia    Post assessment: no apparent anesthetic complications and tolerated procedure well    Post vital signs: stable    Level of consciousness: awake    Nausea/Vomiting: no nausea/vomiting    Complications: none    Transfer of care protocol was followed      Last vitals: Visit Vitals  BP (!) 159/68 (BP Location: Right arm, Patient Position: Lying)   Pulse 74   Temp 36.8 °C (98.3 °F) (Skin)   Resp 18   Ht 5' 7" (1.702 m)   Wt 122 kg (269 lb)   SpO2 97%   Breastfeeding No   BMI 42.13 kg/m²     "

## 2024-10-12 ENCOUNTER — CLINICAL SUPPORT (OUTPATIENT)
Dept: CARDIOLOGY | Facility: HOSPITAL | Age: 84
End: 2024-10-12

## 2024-10-12 DIAGNOSIS — I49.8 OTHER SPECIFIED CARDIAC ARRHYTHMIAS: ICD-10-CM

## 2024-10-13 ENCOUNTER — HOSPITAL ENCOUNTER (OUTPATIENT)
Dept: CARDIOLOGY | Facility: HOSPITAL | Age: 84
Discharge: HOME | End: 2024-10-13
Attending: INTERNAL MEDICINE
Payer: MEDICARE

## 2024-10-13 PROCEDURE — 93298 REM INTERROG DEV EVAL SCRMS: CPT | Mod: PO | Performed by: INTERNAL MEDICINE

## 2024-10-13 PROCEDURE — 93298 REM INTERROG DEV EVAL SCRMS: CPT | Mod: 26,,, | Performed by: INTERNAL MEDICINE

## 2024-10-14 NOTE — ANESTHESIA POSTPROCEDURE EVALUATION
Anesthesia Post Evaluation    Patient: Selena Moulton    Procedure(s) Performed: Procedure(s) (LRB):  Transesophageal echo (MURRAY) intra-procedure log documentation (N/A)  Cardioversion or Defibrillation (N/A)    Final Anesthesia Type: general      Patient location during evaluation: PACU  Patient participation: Yes- Able to Participate  Level of consciousness: awake and alert and oriented  Post-procedure vital signs: reviewed and stable  Pain management: adequate  Airway patency: patent    PONV status at discharge: No PONV  Anesthetic complications: no      Cardiovascular status: blood pressure returned to baseline and stable  Respiratory status: unassisted and spontaneous ventilation  Hydration status: euvolemic  Follow-up not needed.              Vitals Value Taken Time   /71 10/11/24 1340   Temp      10/14/24 0708   Pulse   10/14/24 0708   Resp   10/14/24 0708   SpO2   10/14/24 0708         Event Time   Out of Recovery 14:10:00         Pain/Amado Score: No data recorded

## 2024-10-21 ENCOUNTER — TELEPHONE (OUTPATIENT)
Dept: CARDIOLOGY | Facility: HOSPITAL | Age: 84
End: 2024-10-21
Payer: MEDICARE

## 2024-10-21 NOTE — TELEPHONE ENCOUNTER
Received call in regards to patient feeling weak since cadioversion, 10/11, and was concerned that she went back into Afib    No AF noted on MDT ILR  States she will take BP and update in message   Current Cardiac medications:  Amiodarone 200mg BID, tomorrow will begin 1xdaily  Toprol 25mg evening  Eliquis 5mg BID    F/u in clinic 11/25/24 with Dr. Reinoso  F/u in clinic 12/16/24 with Dr. Johnson

## 2024-11-09 DIAGNOSIS — M1A.0720 IDIOPATHIC CHRONIC GOUT OF LEFT FOOT WITHOUT TOPHUS: ICD-10-CM

## 2024-11-09 NOTE — TELEPHONE ENCOUNTER
No care due was identified.  [facility] Embedded Care Due Messages. Reference number: [id number].   11/09/2024 3:43:42 AM CST

## 2024-11-09 NOTE — TELEPHONE ENCOUNTER
Refill Routing Note   Medication(s) are not appropriate for processing by [facility] for the following reason(s):        Required labs abnormal  Required labs outdated    [facility] action(s):  Defer               Appointments  past 12m or future 3m with PCP    Date Provider   Last Visit   5/20/2024 Aby Perales MD   Next Visit   Visit date not found Aby Perales MD   ED visits in past 90 days: 0        Note composed:12:23 PM 11/09/2024

## 2024-11-10 RX ORDER — ALLOPURINOL 300 MG/1
300 TABLET ORAL DAILY
Qty: 90 TABLET | Refills: 0 | Status: SHIPPED | OUTPATIENT
Start: 2024-11-10

## 2024-11-13 ENCOUNTER — CLINICAL SUPPORT (OUTPATIENT)
Dept: CARDIOLOGY | Facility: HOSPITAL | Age: 84
End: 2024-11-13

## 2024-11-13 ENCOUNTER — HOSPITAL ENCOUNTER (OUTPATIENT)
Dept: CARDIOLOGY | Facility: HOSPITAL | Age: 84
Discharge: HOME | End: 2024-11-13
Attending: INTERNAL MEDICINE
Payer: MEDICARE

## 2024-11-13 DIAGNOSIS — I49.8 OTHER SPECIFIED CARDIAC ARRHYTHMIAS: ICD-10-CM

## 2024-11-25 ENCOUNTER — OFFICE VISIT (OUTPATIENT)
Dept: CARDIOLOGY | Facility: CLINIC | Age: 84
End: 2024-11-25
Payer: MEDICARE

## 2024-11-25 VITALS
BODY MASS INDEX: 42 KG/M2 | HEART RATE: 58 BPM | WEIGHT: 267.63 LBS | DIASTOLIC BLOOD PRESSURE: 72 MMHG | SYSTOLIC BLOOD PRESSURE: 139 MMHG | HEIGHT: 67 IN

## 2024-11-25 DIAGNOSIS — I10 ESSENTIAL HYPERTENSION: Primary | ICD-10-CM

## 2024-11-25 DIAGNOSIS — Z79.02 LONG TERM (CURRENT) USE OF ANTITHROMBOTICS/ANTIPLATELETS: ICD-10-CM

## 2024-11-25 DIAGNOSIS — E78.00 PURE HYPERCHOLESTEROLEMIA: ICD-10-CM

## 2024-11-25 DIAGNOSIS — I35.0 NODULAR CALCIFIC AORTIC VALVE STENOSIS: ICD-10-CM

## 2024-11-25 DIAGNOSIS — I35.0 SEVERE AORTIC STENOSIS: ICD-10-CM

## 2024-11-25 DIAGNOSIS — I77.1 TORTUOUS AORTA: ICD-10-CM

## 2024-11-25 DIAGNOSIS — E66.812 CLASS 2 SEVERE OBESITY DUE TO EXCESS CALORIES WITH SERIOUS COMORBIDITY AND BODY MASS INDEX (BMI) OF 38.0 TO 38.9 IN ADULT: ICD-10-CM

## 2024-11-25 DIAGNOSIS — Z95.2 S/P TAVR (TRANSCATHETER AORTIC VALVE REPLACEMENT): ICD-10-CM

## 2024-11-25 DIAGNOSIS — Z01.810 ENCOUNTER FOR PRE-OPERATIVE CARDIOVASCULAR CLEARANCE: ICD-10-CM

## 2024-11-25 DIAGNOSIS — E66.01 CLASS 2 SEVERE OBESITY DUE TO EXCESS CALORIES WITH SERIOUS COMORBIDITY AND BODY MASS INDEX (BMI) OF 38.0 TO 38.9 IN ADULT: ICD-10-CM

## 2024-11-25 DIAGNOSIS — I48.19 PERSISTENT ATRIAL FIBRILLATION: ICD-10-CM

## 2024-11-25 PROCEDURE — 1100F PTFALLS ASSESS-DOCD GE2>/YR: CPT | Mod: CPTII,S$GLB,, | Performed by: INTERNAL MEDICINE

## 2024-11-25 PROCEDURE — 93010 ELECTROCARDIOGRAM REPORT: CPT | Mod: S$GLB,,, | Performed by: INTERNAL MEDICINE

## 2024-11-25 PROCEDURE — 93005 ELECTROCARDIOGRAM TRACING: CPT | Mod: PO

## 2024-11-25 PROCEDURE — 3288F FALL RISK ASSESSMENT DOCD: CPT | Mod: CPTII,S$GLB,, | Performed by: INTERNAL MEDICINE

## 2024-11-25 PROCEDURE — 1160F RVW MEDS BY RX/DR IN RCRD: CPT | Mod: CPTII,S$GLB,, | Performed by: INTERNAL MEDICINE

## 2024-11-25 PROCEDURE — 1159F MED LIST DOCD IN RCRD: CPT | Mod: CPTII,S$GLB,, | Performed by: INTERNAL MEDICINE

## 2024-11-25 PROCEDURE — 99215 OFFICE O/P EST HI 40 MIN: CPT | Mod: S$GLB,,, | Performed by: INTERNAL MEDICINE

## 2024-11-25 PROCEDURE — 99999 PR PBB SHADOW E&M-EST. PATIENT-LVL III: CPT | Mod: PBBFAC,,, | Performed by: INTERNAL MEDICINE

## 2024-11-25 PROCEDURE — 1126F AMNT PAIN NOTED NONE PRSNT: CPT | Mod: CPTII,S$GLB,, | Performed by: INTERNAL MEDICINE

## 2024-11-25 NOTE — PROGRESS NOTES
Subjective:    Patient ID:  Selena Moulton is a 84 y.o. female patient here for evaluation Establish Care      History of Present Illness:  Cardiology follow-up visit.  Known valvular heart disease status post TAVR 20 19.  Paroxysmal atrial fibrillation, recent visits to EP with resultant yuki/DC cardioversion normal sinus rhythm 10/2024.    Chest pain, intermittent for 1 year with gradual worsening.  Last episode about 2 weeks ago.  Patent described as heaviness, usually with exertional activity, relieved with nitro.  Last nuclear study -20 22.  Left heart catheterization in 2019 nonobstructive CAD.    Hypertension, dyslipidemia.  Denies diabetes mellitus.  No tobacco use.               Review of patient's allergies indicates:   Allergen Reactions    Opioids - morphine analogues      nause and fever   Other reaction(s): Other (See Comments)  nause and fever     Tetanus vaccines and toxoid Rash and Swelling    Adhesive Rash    Flu virus vacc tvs 2015-16 (18 yr up) cell derived      Other reaction(s): Other (See Comments)    Influenza virus vaccines Nausea And Vomiting    Iodides     Iodine Hives    Iodine and iodide containing products     Morpholine analogues Nausea And Vomiting    Penicillins Other (See Comments)     High fever as a child, also was allergy tested 1980    Tetanus and diphther. tox (pf)     Latex, natural rubber Rash       Past Medical History:   Diagnosis Date    Anticoagulant long-term use     Aortic stenosis     Arthritis     Cataracts, bilateral     CKD (chronic kidney disease), stage III     Class 2 severe obesity due to excess calories with serious comorbidity and body mass index (BMI) of 39.0 to 39.9 in adult 09/13/2018    Digestive disorder     GERD (gastroesophageal reflux disease)     Gout     Hypercholesteremia     Hypertension     Implantable loop recorder present     PAF (paroxysmal atrial fibrillation)     Personal history of COVID-19 05/2022    S/P TAVR (transcatheter aortic  valve replacement)      Past Surgical History:   Procedure Laterality Date    AORTIC VALVE REPLACEMENT N/A 10/8/2019    Procedure: Replacement-valve-aortic;  Surgeon: Fidel Cardenas MD;  Location: [facility] CATH LAB;  Service: Cardiology;  Laterality: N/A;    BACK SURGERY      bone graft neck    CARDIAC VALVE SURGERY      CARPAL TUNNEL RELEASE Left 10/3/2018    Procedure: RELEASE, CARPAL TUNNEL  LEFT;  Surgeon: Meche Cárdenas MD;  Location: [facility];  Service: Neurosurgery;  Laterality: Left;    CARPAL TUNNEL RELEASE Right 1/17/2019    Procedure: RELEASE, CARPAL TUNNEL RIGHT;  Surgeon: Meche Cárdenas MD;  Location: [facility];  Service: Neurosurgery;  Laterality: Right;    CATHETERIZATION OF BOTH LEFT AND RIGHT HEART N/A 7/30/2019    Procedure: CATHETERIZATION, HEART, BOTH LEFT AND RIGHT;  Surgeon: Terrie Alberto MD;  Location: [facility] CATH;  Service: Cardiology;  Laterality: N/A;    CERVICAL FUSION  1984    x2     CORONARY ANGIOGRAPHY N/A 7/30/2019    Procedure: ANGIOGRAM, CORONARY ARTERY;  Surgeon: Terrie Alberto MD;  Location: [facility] CATH;  Service: Cardiology;  Laterality: N/A;    ECHOCARDIOGRAM,TRANSESOPHAGEAL N/A 10/11/2024    Procedure: Transesophageal echo (MURRAY) intra-procedure log documentation;  Surgeon: Moody Johnson MD;  Location: [facility];  Service: Cardiology;  Laterality: N/A;    HERNIA REPAIR      umbilical    HYSTERECTOMY      TONSILLECTOMY      TRANSESOPHAGEAL ECHOCARDIOGRAM WITH POSSIBLE CARDIOVERSION (MURRAY W/ POSS CARDIOVERSION) N/A 7/19/2024    Procedure: TRANSESOPHAGEAL ECHOCARDIOGRAM WITH POSSIBLE CARDIOVERSION (MURRAY W/ POSS CARDIOVERSION);  Surgeon: Armando Reinoso MD;  Location: [facility];  Service: Cardiology;  Laterality: N/A;    TREATMENT OF CARDIAC ARRHYTHMIA N/A 10/11/2024    Procedure: Cardioversion or Defibrillation;  Surgeon: Moody Johnson MD;  Location: [facility];  Service: Cardiology;  Laterality: N/A;     Social History     Tobacco Use    Smoking status: Never     Smokeless tobacco: Never   Substance Use Topics    Alcohol use: No    Drug use: No        Review of Systems:    As noted in HPI in addition      REVIEW OF SYSTEMS  Review of Systems   Constitutional: Negative for decreased appetite, diaphoresis, night sweats, weight gain and weight loss.   HENT:  Negative for nosebleeds and odynophagia.    Eyes:  Negative for double vision and photophobia.   Cardiovascular:  Negative for chest pain, claudication, cyanosis, dyspnea on exertion, irregular heartbeat, leg swelling, near-syncope, orthopnea, palpitations, paroxysmal nocturnal dyspnea and syncope.   Respiratory:  Negative for cough, hemoptysis, shortness of breath and wheezing.    Hematologic/Lymphatic: Negative for adenopathy.   Skin:  Negative for flushing, skin cancer and suspicious lesions.   Musculoskeletal:  Negative for gout, myalgias and neck pain.   Gastrointestinal:  Negative for abdominal pain, heartburn, hematemesis and hematochezia.   Genitourinary:  Negative for bladder incontinence, hesitancy and nocturia.   Neurological:  Negative for focal weakness, headaches, light-headedness and paresthesias.   Psychiatric/Behavioral:  Negative for memory loss and substance abuse.               Objective:        Vitals:    11/25/24 0926   BP: 139/72   Pulse: (!) 58       Lab Results   Component Value Date    WBC 8.70 07/17/2024    HGB 14.1 07/17/2024    HCT 44.5 07/17/2024     07/17/2024    CHOL 161 05/13/2024    TRIG 90 05/13/2024    HDL 64 05/13/2024    ALT 18 07/17/2024    AST 21 07/17/2024     07/17/2024    K 3.6 07/17/2024     07/17/2024    CREATININE 1.6 (H) 07/17/2024    BUN 25 (H) 07/17/2024    CO2 27 07/17/2024    INR 0.9 10/08/2019    HGBA1C 5.7 (H) 05/13/2024        ECHOCARDIOGRAM RESULTS  Results for orders placed during the hospital encounter of 10/11/24    Transesophageal echo (MURRAY) with possible cardioversion    Interpretation Summary    Left Ventricle: There is normal systolic  function.    Right Ventricle: Normal right ventricular cavity size. Systolic function is normal.    Left Atrium: Left atrium is dilated. The left atrial appendage appears normal. The left atrial appendage has a windsock morphology. Appendage velocity is reduced at less than 40 cm/sec. There is no thrombus in the left atrial appendage.    Aortic Valve: There is a transcatheter valve replacement in the aortic position.    Mitral Valve: There is mild regurgitation.    Successful cardioversion (200J) to normal sinus rhythm    Results for orders placed during the hospital encounter of 10/08/19    Cardiac catheterization    Conclusion  · Patient referred by Dr Alberto for severe AS, high surgical risk  · Successful aortic valve replacement with 23 mm Cierra S3 valve performed via transfemoral access.  · The post TAVR 2D echo showed no perivalvular leak.  · The AV per 2d-echo mean gradient is 5 mmHg the peak velocity is 1.5 m/s.  · Estimated blood loss: <50 mL    I certify that I was present for catheter insertion, catheter manipulation, angiography, and angiographic interpretation of this patient.    Procedure Log documented by Documenter: RT Carolina and verified by Fidel Esquivel MD.    Date: 10/8/2019  Time: 12:01 PM          CURRENT/PREVIOUS VISIT EKG  Results for orders placed or performed in visit on 10/11/24   EKG 12-lead    Collection Time: 10/11/24 12:54 PM   Result Value Ref Range    QRS Duration 102 ms    [facility]HS QTC Calculation 503 ms    Narrative    Test Reason :     Vent. Rate : 063 BPM     Atrial Rate : 063 BPM     P-R Int : 254 ms          QRS Dur : 102 ms      QT Int : 492 ms       P-R-T Axes : 071 035 065 degrees     QTc Int : 503 ms    Sinus rhythm with 1st degree A-V block  Prolonged QT  Abnormal ECG  When compared with ECG of 11-OCT-2024 10:43,  Sinus rhythm has replaced Atrial fibrillation  Confirmed by Moody oJhnson MD ([id number]) on 10/11/2024 2:18:57 PM    Referred By: RY MAK            Confirmed By:Moody Johnson MD     No valid procedures specified.   Results for orders placed during the hospital encounter of 08/09/22    Nuclear Stress - Cardiology Interpreted    Interpretation Summary    Normal myocardial perfusion scan. There is no evidence of myocardial ischemia or infarction.    The gated perfusion images showed an ejection fraction of 83% post stress.    LV cavity size is normal at rest and normal at stress.    The EKG portion of this study is abnormal but not diagnostic.    No valid procedures specified.    PHYSICAL EXAM  GENERAL: well built, well nourished, well-developed in no apparent distress alert and oriented.   HEENT: Normocephalic. Pupils normal and conjunctivae normal.  Mucous membranes normal, no cyanosis or icterus, trachea central,no pallor or icterus is noted..   NECK: No JVD. No bruit..   THYROID: Thyroid not enlarged. No nodules present..   CARDIAC:  Normal S1-S2.  No murmur rub click or gallop.  PMI nondisplaced.    LUNGS: Clear to auscultation. No wheezing or rhonchi..   ABDOMEN: Soft no masses or organomegaly.  No abdomen pulsations or bruits.  Normal bowel sounds. No pulsations and no masses felt, No guarding or rebound.   URINARY: No waller catheter   EXTREMITIES: No cyanosis, clubbing or edema noted at this time., no calf tenderness bilaterally.   PERIPHERAL VASCULAR SYSTEM: Good palpable distal pulses.  2+ femoral, popliteal and pedal pulses.  No bruits    CENTRAL NERVOUS SYSTEM: No focal motor or sensory deficits noted.   SKIN: Skin without lesions, moist, well perfused.   MUSCLE STRENGTH & TONE: No noteable weakness, atrophy or abnormal movement    I HAVE REVIEWED :    The vital signs, nurses notes, and all the pertinent radiology and labs.         Current Outpatient Medications   Medication Instructions    acetaminophen (TYLENOL) 500 mg, Every 8 hours PRN    allopurinoL (ZYLOPRIM) 300 mg, Oral, Daily    amiodarone (PACERONE) 200 mg, Oral, 2 times daily    atorvastatin  (LIPITOR) 40 MG tablet TAKE 1 TABLET(40 MG) BY MOUTH EVERY EVENING    ELIQUIS 5 mg, Oral, 2 times daily    fish oil-omega-3 fatty acids 300-1,000 mg capsule 1 capsule, Daily    furosemide (LASIX) 20 MG tablet TAKE 1 TABLET(20 MG) BY MOUTH TWICE DAILY    metoprolol succinate (TOPROL-XL) 25 mg, Oral, Nightly    multivitamin capsule 1 capsule, Daily    nitroGLYCERIN (NITROSTAT) 0.4 MG SL tablet Every 5 min PRN    pantoprazole (PROTONIX) 20 MG tablet TAKE 1 TABLET(20 MG) BY MOUTH EVERY DAY    vit A/vit C/vit E/zinc/copper (PRESERVISION AREDS ORAL) 1 tablet, 2 times daily    vitamin D (VITAMIN D3) 1,000 Units, Daily          Assessment:   Chest pain, suspicious for angina.    Valvular heart disease, TAVR 2019, left heart catheterization at that time with nonobstructive CAD.  EF normal.    Recent yuki/DC cardioversion normal sinus rhythm, PAF, ILR present.  Sees EP.  Echo/YUKI 10/24 with preserved EF TAVR valve stable.  Remains on amiodarone 200 mg daily, Eliquis 5 b.i.d..    Hypertension, dyslipidemia    Stage 4 chronic kidney disease.  Last GFR 33.9.  GFR    Plan:   Cardiac PET scan return results          No follow-ups on file.

## 2024-11-27 LAB
OHS QRS DURATION: 92 MS
OHS QTC CALCULATION: 467 MS

## 2024-12-14 ENCOUNTER — CLINICAL SUPPORT (OUTPATIENT)
Dept: CARDIOLOGY | Facility: HOSPITAL | Age: 84
End: 2024-12-14

## 2024-12-14 ENCOUNTER — HOSPITAL ENCOUNTER (OUTPATIENT)
Dept: CARDIOLOGY | Facility: HOSPITAL | Age: 84
Discharge: HOME | End: 2024-12-14
Attending: INTERNAL MEDICINE
Payer: MEDICARE

## 2024-12-14 DIAGNOSIS — I49.8 OTHER SPECIFIED CARDIAC ARRHYTHMIAS: ICD-10-CM

## 2024-12-14 PROBLEM — I25.10 CAD (CORONARY ARTERY DISEASE): Status: ACTIVE | Noted: 2024-12-14

## 2024-12-14 NOTE — PROGRESS NOTES
SUBJECTIVE:    Patient ID:  Selena Moulton is a 84 y.o. female who presents for follow of atrial fibrillation.     Medical history:  Persistent atrial fibrillation  Severe AS s/p TAVR 10/2019  ILR in situ (implant 2/2024)  Nonobstructive CAD  Severe obesity  CKD IIIB  HTN  HLD     Cardiologist: Dr. Reinoso    Diagnosed with AF 2022 > underwent DCCV 3/2022 then again 6/2022. Was started on amiodarone which was d/c'd after syncopal event.   Did well until recently with increased AF burden  Underwent MURRAY/DCCV 7/2024 > symptoms of SOB/CP resolved with NSR.     TTE EF 55-60%. DARIUS 31.5     Relevant labs: cr 1.6, TSH none on file  Relevant EP Rx: Toprol 25 QD, Eliquis 5 mg BID    9/2024:  SOB and fatigue with AF  ILR shows persistent AF since 9/3. Well controlled V rates.    12/14/2024  S/p MURRAY/DCCV 10/11/24. Continued on amiodarone load.  She is currently on 200 mg daily.  She has not had any AF recurrence since cardioversion.  Chest pain being evaluated with cardiac PET stress which is scheduled for the end of the month.   Tolerating oral anticoagulation.  Does report having another fall after bending over.  Referral sent to sleep medicine at last visit however the patient has not been contacted.    ILR shows 0% AF burden.  No AF since cardioversion.    I personally reviewed the ECG/telemetry strip today. My interpretation is normal sinus rhythm with normal intervals.   milliseconds.    Past Medical History:   Diagnosis Date    Anticoagulant long-term use     Aortic stenosis     Arthritis     Cataracts, bilateral     CKD (chronic kidney disease), stage III     Class 2 severe obesity due to excess calories with serious comorbidity and body mass index (BMI) of 39.0 to 39.9 in adult 09/13/2018    Digestive disorder     GERD (gastroesophageal reflux disease)     Gout     Hypercholesteremia     Hypertension     Implantable loop recorder present     PAF (paroxysmal atrial fibrillation)     Personal history of  COVID-19 05/2022    S/P TAVR (transcatheter aortic valve replacement)        Past Surgical History:   Procedure Laterality Date    AORTIC VALVE REPLACEMENT N/A 10/8/2019    Procedure: Replacement-valve-aortic;  Surgeon: Fidel Cardenas MD;  Location: [facility] CATH LAB;  Service: Cardiology;  Laterality: N/A;    BACK SURGERY      bone graft neck    CARDIAC VALVE SURGERY      CARPAL TUNNEL RELEASE Left 10/3/2018    Procedure: RELEASE, CARPAL TUNNEL  LEFT;  Surgeon: Meche Cárdenas MD;  Location: [facility];  Service: Neurosurgery;  Laterality: Left;    CARPAL TUNNEL RELEASE Right 1/17/2019    Procedure: RELEASE, CARPAL TUNNEL RIGHT;  Surgeon: Meche Cárdenas MD;  Location: [facility];  Service: Neurosurgery;  Laterality: Right;    CATHETERIZATION OF BOTH LEFT AND RIGHT HEART N/A 7/30/2019    Procedure: CATHETERIZATION, HEART, BOTH LEFT AND RIGHT;  Surgeon: Terrie Alberto MD;  Location: [facility] CATH;  Service: Cardiology;  Laterality: N/A;    CERVICAL FUSION  1984    x2     CORONARY ANGIOGRAPHY N/A 7/30/2019    Procedure: ANGIOGRAM, CORONARY ARTERY;  Surgeon: Terrie Alberto MD;  Location: [facility] CATH;  Service: Cardiology;  Laterality: N/A;    ECHOCARDIOGRAM,TRANSESOPHAGEAL N/A 10/11/2024    Procedure: Transesophageal echo (MURRAY) intra-procedure log documentation;  Surgeon: Moody Johnson MD;  Location: [facility];  Service: Cardiology;  Laterality: N/A;    HERNIA REPAIR      umbilical    HYSTERECTOMY      TONSILLECTOMY      TRANSESOPHAGEAL ECHOCARDIOGRAM WITH POSSIBLE CARDIOVERSION (MURRAY W/ POSS CARDIOVERSION) N/A 7/19/2024    Procedure: TRANSESOPHAGEAL ECHOCARDIOGRAM WITH POSSIBLE CARDIOVERSION (MURRAY W/ POSS CARDIOVERSION);  Surgeon: Armando Reinoso MD;  Location: [facility];  Service: Cardiology;  Laterality: N/A;    TREATMENT OF CARDIAC ARRHYTHMIA N/A 10/11/2024    Procedure: Cardioversion or Defibrillation;  Surgeon: Moody Johnson MD;  Location: [facility];  Service: Cardiology;  Laterality: N/A;        Family History   Problem Relation Name Age of Onset    Cancer Mother      Stroke Mother      Hypertension Mother      Ovarian cancer Mother      No Known Problems Father      Glaucoma Neg Hx      Macular degeneration Neg Hx         Social History     Socioeconomic History    Marital status:    Tobacco Use    Smoking status: Never    Smokeless tobacco: Never   Substance and Sexual Activity    Alcohol use: No    Drug use: No       Review of patient's allergies indicates:   Allergen Reactions    Opioids - morphine analogues      nause and fever   Other reaction(s): Other (See Comments)  nause and fever     Tetanus vaccines and toxoid Rash and Swelling    Adhesive Rash    Flu virus vacc tvs 2015-16 (18 yr up) cell derived      Other reaction(s): Other (See Comments)    Influenza virus vaccines Nausea And Vomiting    Iodides     Iodine Hives    Iodine and iodide containing products     Morpholine analogues Nausea And Vomiting    Penicillins Other (See Comments)     High fever as a child, also was allergy tested 1980    Tetanus and diphther. tox (pf)     Latex, natural rubber Rash       Review of Systems   Constitutional: Negative for chills and fever.   HENT:  Negative for congestion and hearing loss.    Eyes:  Negative for blurred vision and double vision.   Cardiovascular:         See HPI   Respiratory:  Negative for cough, hemoptysis and shortness of breath.    Endocrine: Negative for cold intolerance and heat intolerance.   Musculoskeletal:  Negative for joint pain and joint swelling.   Gastrointestinal:  Negative for abdominal pain, nausea and vomiting.   Genitourinary:  Negative for dysuria and hematuria.   Neurological:  Negative for focal weakness and headaches.   Psychiatric/Behavioral:  Negative for altered mental status.    All other systems reviewed and are negative.         OBJECTIVE:     Vitals:    12/16/24 1325   BP: (!) 155/70   Pulse: 61         BP Readings from Last 5 Encounters:   11/25/24  139/72   10/11/24 134/71   09/26/24 (!) 134/57   08/19/24 132/74   07/22/24 136/82        Physical Exam  Vitals and nursing note reviewed.   Constitutional:       General: She is not in acute distress.     Appearance: She is well-developed. She is obese. She is not diaphoretic.   HENT:      Head: Normocephalic and atraumatic.   Eyes:      General: No scleral icterus.        Right eye: No discharge.         Left eye: No discharge.   Cardiovascular:      Rate and Rhythm: Normal rate and regular rhythm. No extrasystoles are present.     Pulses: Normal pulses and intact distal pulses.           Radial pulses are 2+ on the right side and 2+ on the left side.      Heart sounds: Normal heart sounds, S1 normal and S2 normal. Heart sounds not distant. No opening snap. No murmur heard.     No friction rub. No gallop. No S3 or S4 sounds.   Pulmonary:      Effort: Pulmonary effort is normal. No respiratory distress.      Breath sounds: No wheezing or rales.   Abdominal:      General: Bowel sounds are normal. There is no distension.      Palpations: Abdomen is soft.      Tenderness: There is no abdominal tenderness.   Musculoskeletal:         General: No deformity. Normal range of motion.      Cervical back: Normal range of motion and neck supple.   Skin:     General: Skin is warm and dry.      Findings: No erythema or rash.   Neurological:      Mental Status: She is alert.             Current Outpatient Medications   Medication Instructions    acetaminophen (TYLENOL) 500 mg, Every 8 hours PRN    allopurinoL (ZYLOPRIM) 300 mg, Oral, Daily    amiodarone (PACERONE) 200 mg, Oral, 2 times daily    atorvastatin (LIPITOR) 40 MG tablet TAKE 1 TABLET(40 MG) BY MOUTH EVERY EVENING    ELIQUIS 5 mg, Oral, 2 times daily    fish oil-omega-3 fatty acids 300-1,000 mg capsule 1 capsule, Daily    furosemide (LASIX) 20 MG tablet TAKE 1 TABLET(20 MG) BY MOUTH TWICE DAILY    metoprolol succinate (TOPROL-XL) 25 mg, Oral, Nightly    multivitamin  capsule 1 capsule, Daily    nitroGLYCERIN (NITROSTAT) 0.4 MG SL tablet Every 5 min PRN    pantoprazole (PROTONIX) 20 MG tablet TAKE 1 TABLET(20 MG) BY MOUTH EVERY DAY    vit A/vit C/vit E/zinc/copper (PRESERVISION AREDS ORAL) 1 tablet, 2 times daily    vitamin D (VITAMIN D3) 1,000 Units, Daily       Lipid Panel:   Lab Results   Component Value Date    CHOL 161 05/13/2024    HDL 64 05/13/2024    LDLCALC 79.0 05/13/2024    TRIG 90 05/13/2024    CHOLHDL 39.8 05/13/2024       The ASCVD Risk score (Naya DK, et al., 2019) failed to calculate for the following reasons:    The 2019 ASCVD risk score is only valid for ages 40 to 79    Most Recent EKG Results  Results for orders placed or performed in visit on 11/25/24   IN OFFICE EKG 12-LEAD (to [place])    Collection Time: 11/25/24  9:19 AM   Result Value Ref Range    QRS Duration 92 ms    [facility] QTC Calculation 467 ms    Narrative    Test Reason : I10,    Vent. Rate :  58 BPM     Atrial Rate :  58 BPM     P-R Int : 192 ms          QRS Dur :  92 ms      QT Int : 476 ms       P-R-T Axes :  74  46  70 degrees    QTcB Int : 467 ms    Sinus bradycardia  Low voltage QRS  Borderline Abnormal ECG  No previous ECGs available  Confirmed by Terrie Alberto ([id number]) on 11/27/2024 9:48:24 AM    Referred By: AAAREFERRAL SELF           Confirmed By: Terrie Alberto       Most Recent Echocardiogram Results  Results for orders placed during the hospital encounter of 10/11/24    Transesophageal echo (MURRAY) with possible cardioversion    Interpretation Summary    Left Ventricle: There is normal systolic function.    Right Ventricle: Normal right ventricular cavity size. Systolic function is normal.    Left Atrium: Left atrium is dilated. The left atrial appendage appears normal. The left atrial appendage has a windsock morphology. Appendage velocity is reduced at less than 40 cm/sec. There is no thrombus in the left atrial appendage.    Aortic Valve: There is a transcatheter valve replacement in the aortic  position.    Mitral Valve: There is mild regurgitation.    Successful cardioversion (200J) to normal sinus rhythm      Most Recent Nuclear Stress Test Results  Results for orders placed during the hospital encounter of 08/09/22    Nuclear Stress - Cardiology Interpreted    Interpretation Summary    Normal myocardial perfusion scan. There is no evidence of myocardial ischemia or infarction.    The gated perfusion images showed an ejection fraction of 83% post stress.    LV cavity size is normal at rest and normal at stress.    The EKG portion of this study is abnormal but not diagnostic.      Most Recent Cardiac PET Stress Test Results  No results found for this or any previous visit.      Most Recent Cardiovascular Angiogram results  Results for orders placed during the hospital encounter of 10/08/19    Cardiac catheterization    Conclusion  · Patient referred by Dr Alberto for severe AS, high surgical risk  · Successful aortic valve replacement with 23 mm Cierra S3 valve performed via transfemoral access.  · The post TAVR 2D echo showed no perivalvular leak.  · The AV per 2d-echo mean gradient is 5 mmHg the peak velocity is 1.5 m/s.  · Estimated blood loss: <50 mL    I certify that I was present for catheter insertion, catheter manipulation, angiography, and angiographic interpretation of this patient.    Procedure Log documented by Documenter: RT Carolina and verified by Fidel Esquivel MD.    Date: 10/8/2019  Time: 12:01 PM      Other Most Recent Cardiology Results  Results for orders placed during the hospital encounter of 10/11/24    Cardiac monitoring strips        All pertinent data including labs, imaging, EKGs, and studies listed above were reviewed.  All of the patients ECG tracings since most recent visit were personally interpreted by this provider    ASSESSMENT:   Selena Moulton is a 84 y.o. female who presents for follow of persistent atrial fibrillation. Undergoing ischemic evaluation for  subacute worsening of chest pain. Nonobstructive CAD [facility] 2019.  She is currently maintaining sinus rhythm after her most recent cardioversion.  Would like for her ischemic evaluation to be completed prior to further changes in AF management.  We briefly discuss the possibility for CVA prophylaxis with a Watchman device given frequent falls.  We also discussed that if she has symptomatic breakthrough AF with amiodarone the next step would be a pulmonary vein isolation.  We had a long discussion regarding the importance of weight loss and ISMAEL evaluation.    Atrial fibrillation summary:  Stage: persistent  Echocardiogram:   LVEF normal  DARIUS 32 mL/m2  CVA prophylaxis: Eliquis 5 mg BID (CHADS-VASc 5)  AVN blocking agents: Toprol 25 QD  Antiarrhythmics: amiodarone 200 QD  Previous ablation?: no     1. Persistent atrial fibrillation        2. S/P TAVR (transcatheter aortic valve replacement)        3. Severe aortic stenosis        4. Pure hypercholesterolemia        5. Essential hypertension        6. Coronary artery disease, unspecified vessel or lesion type, unspecified whether angina present, unspecified whether native or transplanted heart          PLAN FOR TREATMENT OF ABOVE DIAGNOSES:     Follow-up cardiac PET stress results   Continue amiodarone 200 mg daily.  We will obtain a TSH and LFTs today.  Continue Toprol 25 mg q.h.s..  Continue Eliquis 5 mg b.i.d.   Continue Lasix 20 mg b.i.d.  Referral to sleep Medicine for ISMAEL evaluation  We discussed the importance of weight loss, ISMAEL evaluation, and blood pressure control and how it relates to AF recurrence    F/u 3 months (after ischemic evaluation)    Wander Johnson MD  Cardiac Electrophysiology    This note was partially generated using voice recognition and generative artificial intelligence software. While every effort has been made to ensure its accuracy, transcription errors may exist. Please reach out to me with any questions or if clarification is needed.

## 2024-12-16 ENCOUNTER — OFFICE VISIT (OUTPATIENT)
Dept: CARDIOLOGY | Facility: CLINIC | Age: 84
End: 2024-12-16
Payer: MEDICARE

## 2024-12-16 VITALS
SYSTOLIC BLOOD PRESSURE: 155 MMHG | WEIGHT: 269.19 LBS | HEART RATE: 61 BPM | HEIGHT: 67 IN | DIASTOLIC BLOOD PRESSURE: 70 MMHG | BODY MASS INDEX: 42.25 KG/M2

## 2024-12-16 DIAGNOSIS — I10 ESSENTIAL HYPERTENSION: ICD-10-CM

## 2024-12-16 DIAGNOSIS — I25.10 CORONARY ARTERY DISEASE, UNSPECIFIED VESSEL OR LESION TYPE, UNSPECIFIED WHETHER ANGINA PRESENT, UNSPECIFIED WHETHER NATIVE OR TRANSPLANTED HEART: ICD-10-CM

## 2024-12-16 DIAGNOSIS — I48.19 PERSISTENT ATRIAL FIBRILLATION: Primary | ICD-10-CM

## 2024-12-16 DIAGNOSIS — E78.00 PURE HYPERCHOLESTEROLEMIA: ICD-10-CM

## 2024-12-16 DIAGNOSIS — Z95.2 S/P TAVR (TRANSCATHETER AORTIC VALVE REPLACEMENT): ICD-10-CM

## 2024-12-16 DIAGNOSIS — I35.0 SEVERE AORTIC STENOSIS: ICD-10-CM

## 2024-12-16 PROCEDURE — 3288F FALL RISK ASSESSMENT DOCD: CPT | Mod: CPTII,S$GLB,, | Performed by: INTERNAL MEDICINE

## 2024-12-16 PROCEDURE — 1159F MED LIST DOCD IN RCRD: CPT | Mod: CPTII,S$GLB,, | Performed by: INTERNAL MEDICINE

## 2024-12-16 PROCEDURE — 1100F PTFALLS ASSESS-DOCD GE2>/YR: CPT | Mod: CPTII,S$GLB,, | Performed by: INTERNAL MEDICINE

## 2024-12-16 PROCEDURE — 99214 OFFICE O/P EST MOD 30 MIN: CPT | Mod: S$GLB,,, | Performed by: INTERNAL MEDICINE

## 2024-12-16 PROCEDURE — 99999 PR PBB SHADOW E&M-EST. PATIENT-LVL III: CPT | Mod: PBBFAC,,, | Performed by: INTERNAL MEDICINE

## 2024-12-16 PROCEDURE — 1126F AMNT PAIN NOTED NONE PRSNT: CPT | Mod: CPTII,S$GLB,, | Performed by: INTERNAL MEDICINE

## 2024-12-16 PROCEDURE — 1160F RVW MEDS BY RX/DR IN RCRD: CPT | Mod: CPTII,S$GLB,, | Performed by: INTERNAL MEDICINE

## 2025-01-13 ENCOUNTER — HOSPITAL ENCOUNTER (OUTPATIENT)
Dept: CARDIOLOGY | Facility: HOSPITAL | Age: 85
Discharge: HOME | End: 2025-01-13
Attending: INTERNAL MEDICINE
Payer: MEDICARE

## 2025-01-13 VITALS
BODY MASS INDEX: 42.22 KG/M2 | HEART RATE: 62 BPM | DIASTOLIC BLOOD PRESSURE: 55 MMHG | RESPIRATION RATE: 16 BRPM | HEIGHT: 67 IN | SYSTOLIC BLOOD PRESSURE: 128 MMHG | WEIGHT: 269 LBS

## 2025-01-13 DIAGNOSIS — I77.1 TORTUOUS AORTA: ICD-10-CM

## 2025-01-13 DIAGNOSIS — Z01.810 ENCOUNTER FOR PRE-OPERATIVE CARDIOVASCULAR CLEARANCE: ICD-10-CM

## 2025-01-13 DIAGNOSIS — I48.19 PERSISTENT ATRIAL FIBRILLATION: ICD-10-CM

## 2025-01-13 DIAGNOSIS — Z95.2 S/P TAVR (TRANSCATHETER AORTIC VALVE REPLACEMENT): ICD-10-CM

## 2025-01-13 DIAGNOSIS — I10 ESSENTIAL HYPERTENSION: ICD-10-CM

## 2025-01-13 DIAGNOSIS — Z79.02 LONG TERM (CURRENT) USE OF ANTITHROMBOTICS/ANTIPLATELETS: ICD-10-CM

## 2025-01-13 DIAGNOSIS — I35.0 SEVERE AORTIC STENOSIS: ICD-10-CM

## 2025-01-13 DIAGNOSIS — I35.0 NODULAR CALCIFIC AORTIC VALVE STENOSIS: ICD-10-CM

## 2025-01-13 DIAGNOSIS — E78.00 PURE HYPERCHOLESTEROLEMIA: ICD-10-CM

## 2025-01-13 LAB
CFR FLOW - ANTERIOR: 1.98
CFR FLOW - INFERIOR: 1.79
CFR FLOW - LATERAL: 1.89
CFR FLOW - MAX: 2.47
CFR FLOW - MIN: 1.5
CFR FLOW - SEPTAL: 2.22
CFR FLOW - WHOLE HEART: 1.97
CV STRESS BASE HR: 61 BPM
DIASTOLIC BLOOD PRESSURE: 78 MMHG
EJECTION FRACTION- HIGH: 59 %
END DIASTOLIC INDEX-HIGH: 155 ML/M2
END DIASTOLIC INDEX-LOW: 91 ML/M2
END SYSTOLIC INDEX-HIGH: 78 ML/M2
END SYSTOLIC INDEX-LOW: 40 ML/M2
NUC REST DIASTOLIC VOLUME INDEX: 120
NUC REST EJECTION FRACTION: 67
NUC REST SYSTOLIC VOLUME INDEX: 40
NUC STRESS DIASTOLIC VOLUME INDEX: 122
NUC STRESS EJECTION FRACTION: 70 %
NUC STRESS SYSTOLIC VOLUME INDEX: 37
OHS CV CPX 1 MINUTE RECOVERY HEART RATE: 66 BPM
OHS CV CPX 85 PERCENT MAX PREDICTED HEART RATE MALE: 114
OHS CV CPX MAX PREDICTED HEART RATE: 134
OHS CV CPX PATIENT IS FEMALE: 1
OHS CV CPX PATIENT IS MALE: 0
OHS CV CPX PEAK DIASTOLIC BLOOD PRESSURE: 62 MMHG
OHS CV CPX PEAK HEAR RATE: 58 BPM
OHS CV CPX PEAK RATE PRESSURE PRODUCT: 8758
OHS CV CPX PEAK SYSTOLIC BLOOD PRESSURE: 151 MMHG
OHS CV CPX PERCENT MAX PREDICTED HEART RATE ACHIEVED: 45
OHS CV CPX RATE PRESSURE PRODUCT PRESENTING: 9455
OHS CV INITIAL DOSE: 36.9 MCG/KG/MIN
OHS CV MODERATELY REDUCED FLOW CAPACITY: 0 %
OHS CV MYOCARDIAL STEAL: 0 %
OHS CV NO ISCHEMIA MILDLY REDUCED FLOW CAPACTY: 53 %
OHS CV NO ISCHEMIA MINIMALLY REDUCED FLOW CAPACITY: 38 %
OHS CV NON-TRANSMURAL MYOCARDIAL INFARCTION SINGLE CONTINUOUS REGION: 0 %
OHS CV NORMAL FLOW CAPACITY COMPARABLE TO HEALTHY YOUNG VOLUNTEERS: 9 %
OHS CV PEAK DOSE: 36.9 MCG/KG/MIN
OHS CV PET ID: 7989
OHS CV PRE-DOMINANTLY MYOCARDIAL SCAR: 0 %
OHS CV SEVERELY REDUCED FLOW CAPACITY LARGEST SINGLE CONTINUOUS REGION: 0 %
OHS CV SEVERELY REDUCED FLOW CAPACITY: 0 %
OHS CV TOTAL EXAM DLP: 468.96 MGY-CM
REST FLOW - ANTERIOR: 0.99 CC/MIN/G
REST FLOW - INFERIOR: 0.89 CC/MIN/G
REST FLOW - LATERAL: 1.06 CC/MIN/G
REST FLOW - MAX: 1.32 CC/MIN/G
REST FLOW - MIN: 0.61 CC/MIN/G
REST FLOW - SEPTAL: 0.81 CC/MIN/G
REST FLOW - WHOLE HEART: 0.94 CC/MIN/G
RETIRED EF AND QEF - SEE NOTES: 47 %
STRESS FLOW - ANTERIOR: 1.95 CC/MIN/G
STRESS FLOW - INFERIOR: 1.59 CC/MIN/G
STRESS FLOW - LATERAL: 1.99 CC/MIN/G
STRESS FLOW - MAX: 2.44 CC/MIN/G
STRESS FLOW - MIN: 1.03 CC/MIN/G
STRESS FLOW - SEPTAL: 1.79 CC/MIN/G
STRESS FLOW - WHOLE HEART: 1.83 CC/MIN/G
SYSTOLIC BLOOD PRESSURE: 155 MMHG

## 2025-01-13 PROCEDURE — 78434 AQMBF PET REST & RX STRESS: CPT | Mod: 26,,, | Performed by: INTERNAL MEDICINE

## 2025-01-13 PROCEDURE — A9555 RB82 RUBIDIUM: HCPCS | Performed by: INTERNAL MEDICINE

## 2025-01-13 PROCEDURE — 93016 CV STRESS TEST SUPVJ ONLY: CPT | Mod: ,,, | Performed by: INTERNAL MEDICINE

## 2025-01-13 PROCEDURE — 93017 CV STRESS TEST TRACING ONLY: CPT

## 2025-01-13 PROCEDURE — 93018 CV STRESS TEST I&R ONLY: CPT | Mod: ,,, | Performed by: INTERNAL MEDICINE

## 2025-01-13 PROCEDURE — 78431 MYOCRD IMG PET RST&STRS CT: CPT | Mod: 26,,, | Performed by: INTERNAL MEDICINE

## 2025-01-13 PROCEDURE — 63600175 PHARM REV CODE 636 W HCPCS: Performed by: INTERNAL MEDICINE

## 2025-01-13 RX ORDER — AMINOPHYLLINE 25 MG/ML
75 INJECTION, SOLUTION INTRAVENOUS
Status: COMPLETED | OUTPATIENT
Start: 2025-01-13 | End: 2025-01-13

## 2025-01-13 RX ORDER — REGADENOSON 0.08 MG/ML
0.4 INJECTION, SOLUTION INTRAVENOUS
Status: COMPLETED | OUTPATIENT
Start: 2025-01-13 | End: 2025-01-13

## 2025-01-13 RX ADMIN — REGADENOSON 0.4 MG: 0.08 INJECTION, SOLUTION INTRAVENOUS at 12:01

## 2025-01-13 RX ADMIN — AMINOPHYLLINE 75 MG: 25 INJECTION, SOLUTION INTRAVENOUS at 12:01

## 2025-01-13 RX ADMIN — RUBIDIUM CHLORIDE RB-82 36.9 MILLICURIE: 150 INJECTION, SOLUTION INTRAVENOUS at 12:01

## 2025-01-14 ENCOUNTER — CLINICAL SUPPORT (OUTPATIENT)
Dept: CARDIOLOGY | Facility: HOSPITAL | Age: 85
End: 2025-01-14

## 2025-01-14 ENCOUNTER — HOSPITAL ENCOUNTER (OUTPATIENT)
Dept: CARDIOLOGY | Facility: HOSPITAL | Age: 85
Discharge: HOME | End: 2025-01-14
Attending: INTERNAL MEDICINE
Payer: MEDICARE

## 2025-01-14 DIAGNOSIS — I49.8 OTHER SPECIFIED CARDIAC ARRHYTHMIAS: ICD-10-CM

## 2025-01-14 PROCEDURE — 93298 REM INTERROG DEV EVAL SCRMS: CPT | Mod: PO | Performed by: INTERNAL MEDICINE

## 2025-01-14 PROCEDURE — 93298 REM INTERROG DEV EVAL SCRMS: CPT | Mod: 26,,, | Performed by: INTERNAL MEDICINE

## 2025-01-20 ENCOUNTER — HOSPITAL ENCOUNTER (OUTPATIENT)
Dept: CARDIOLOGY | Facility: HOSPITAL | Age: 85
Discharge: HOME | End: 2025-01-20
Attending: INTERNAL MEDICINE
Payer: MEDICARE

## 2025-01-20 ENCOUNTER — CLINICAL SUPPORT (OUTPATIENT)
Dept: CARDIOLOGY | Facility: HOSPITAL | Age: 85
End: 2025-01-20

## 2025-01-20 DIAGNOSIS — I49.8 OTHER SPECIFIED CARDIAC ARRHYTHMIAS: ICD-10-CM

## 2025-01-21 ENCOUNTER — CLINICAL SUPPORT (OUTPATIENT)
Dept: CARDIOLOGY | Facility: HOSPITAL | Age: 85
End: 2025-01-21

## 2025-01-21 DIAGNOSIS — I49.8 OTHER SPECIFIED CARDIAC ARRHYTHMIAS: ICD-10-CM

## 2025-01-22 ENCOUNTER — HOSPITAL ENCOUNTER (OUTPATIENT)
Dept: CARDIOLOGY | Facility: HOSPITAL | Age: 85
Discharge: HOME | End: 2025-01-22
Attending: INTERNAL MEDICINE
Payer: MEDICARE

## 2025-01-23 ENCOUNTER — HOSPITAL ENCOUNTER (OUTPATIENT)
Dept: CARDIOLOGY | Facility: HOSPITAL | Age: 85
Discharge: HOME | End: 2025-01-23
Attending: INTERNAL MEDICINE
Payer: MEDICARE

## 2025-01-23 ENCOUNTER — CLINICAL SUPPORT (OUTPATIENT)
Dept: CARDIOLOGY | Facility: HOSPITAL | Age: 85
End: 2025-01-23

## 2025-01-23 DIAGNOSIS — I49.8 OTHER SPECIFIED CARDIAC ARRHYTHMIAS: ICD-10-CM

## 2025-01-24 ENCOUNTER — TELEPHONE (OUTPATIENT)
Dept: CARDIOLOGY | Facility: HOSPITAL | Age: 85
End: 2025-01-24
Payer: MEDICARE

## 2025-01-24 NOTE — TELEPHONE ENCOUNTER
Spoke with patient's daughter    Atrial fibrillation, post DCCV, noted device remote check:  MDT ILR 2/8/24  DCCV 10/11/24    Overall burden: 65.9% 1/17-1/24    Max duration seen: appears persistent since 1/19/24, longest single 1/19/24 48hrs MVR 89bpm          Ventricular rates:   Controlled        Anticoagulation status:  Eliquis 5mg BID  Amiodarone 200mg daily, within last week decreased to 1xdaily  TOPROL-XL 25mg evening      Patient symptoms: daughter states she has been extremely tired

## 2025-01-25 ENCOUNTER — HOSPITAL ENCOUNTER (OUTPATIENT)
Dept: CARDIOLOGY | Facility: HOSPITAL | Age: 85
Discharge: HOME | End: 2025-01-25
Attending: INTERNAL MEDICINE
Payer: MEDICARE

## 2025-01-25 ENCOUNTER — CLINICAL SUPPORT (OUTPATIENT)
Dept: CARDIOLOGY | Facility: HOSPITAL | Age: 85
End: 2025-01-25

## 2025-01-25 DIAGNOSIS — I49.8 OTHER SPECIFIED CARDIAC ARRHYTHMIAS: ICD-10-CM

## 2025-01-27 ENCOUNTER — OFFICE VISIT (OUTPATIENT)
Dept: CARDIOLOGY | Facility: CLINIC | Age: 85
End: 2025-01-27
Attending: INTERNAL MEDICINE
Payer: MEDICARE

## 2025-01-27 VITALS
SYSTOLIC BLOOD PRESSURE: 130 MMHG | DIASTOLIC BLOOD PRESSURE: 60 MMHG | BODY MASS INDEX: 41.28 KG/M2 | HEART RATE: 84 BPM | HEIGHT: 67 IN | WEIGHT: 263 LBS

## 2025-01-27 DIAGNOSIS — I35.0 NODULAR CALCIFIC AORTIC VALVE STENOSIS: ICD-10-CM

## 2025-01-27 DIAGNOSIS — E78.00 PURE HYPERCHOLESTEROLEMIA: ICD-10-CM

## 2025-01-27 DIAGNOSIS — E66.812 CLASS 2 SEVERE OBESITY DUE TO EXCESS CALORIES WITH SERIOUS COMORBIDITY AND BODY MASS INDEX (BMI) OF 38.0 TO 38.9 IN ADULT: ICD-10-CM

## 2025-01-27 DIAGNOSIS — N18.4 STAGE 4 CHRONIC KIDNEY DISEASE: ICD-10-CM

## 2025-01-27 DIAGNOSIS — E66.01 CLASS 2 SEVERE OBESITY DUE TO EXCESS CALORIES WITH SERIOUS COMORBIDITY AND BODY MASS INDEX (BMI) OF 38.0 TO 38.9 IN ADULT: ICD-10-CM

## 2025-01-27 DIAGNOSIS — Z95.2 S/P TAVR (TRANSCATHETER AORTIC VALVE REPLACEMENT): ICD-10-CM

## 2025-01-27 DIAGNOSIS — I25.10 CORONARY ARTERY DISEASE, UNSPECIFIED VESSEL OR LESION TYPE, UNSPECIFIED WHETHER ANGINA PRESENT, UNSPECIFIED WHETHER NATIVE OR TRANSPLANTED HEART: Primary | ICD-10-CM

## 2025-01-27 DIAGNOSIS — I48.19 PERSISTENT ATRIAL FIBRILLATION: ICD-10-CM

## 2025-01-27 DIAGNOSIS — Z79.02 LONG TERM (CURRENT) USE OF ANTITHROMBOTICS/ANTIPLATELETS: ICD-10-CM

## 2025-01-27 DIAGNOSIS — I10 ESSENTIAL HYPERTENSION: ICD-10-CM

## 2025-01-27 PROCEDURE — 99214 OFFICE O/P EST MOD 30 MIN: CPT | Mod: S$GLB,,, | Performed by: INTERNAL MEDICINE

## 2025-01-27 PROCEDURE — 99999 PR PBB SHADOW E&M-EST. PATIENT-LVL III: CPT | Mod: PBBFAC,,, | Performed by: INTERNAL MEDICINE

## 2025-01-27 PROCEDURE — 1101F PT FALLS ASSESS-DOCD LE1/YR: CPT | Mod: CPTII,S$GLB,, | Performed by: INTERNAL MEDICINE

## 2025-01-27 PROCEDURE — 1159F MED LIST DOCD IN RCRD: CPT | Mod: CPTII,S$GLB,, | Performed by: INTERNAL MEDICINE

## 2025-01-27 PROCEDURE — 3288F FALL RISK ASSESSMENT DOCD: CPT | Mod: CPTII,S$GLB,, | Performed by: INTERNAL MEDICINE

## 2025-01-27 PROCEDURE — 1126F AMNT PAIN NOTED NONE PRSNT: CPT | Mod: CPTII,S$GLB,, | Performed by: INTERNAL MEDICINE

## 2025-01-27 PROCEDURE — 1160F RVW MEDS BY RX/DR IN RCRD: CPT | Mod: CPTII,S$GLB,, | Performed by: INTERNAL MEDICINE

## 2025-01-27 NOTE — PROGRESS NOTES
Subjective:    Patient ID:  Selena Moulton is a 84 y.o. female patient here for evaluation Follow-up (A fib [facility] called to say she was back in a fib)      History of Present Illness:  Follow up visit.  Known valvular heart disease status post TAVR 2019.  Pre TAVR workup nonobstructive CAD.  Now with recurrent atrial fibrillation despite cardioversion on several occasions.  Remains on chronic oral anticoagulation.    Recent ischemic workup via regadenoson PET scan negative for ischemic heart disease.  EF normal.    Follows with the PE, last visit suggested consideration for pulmonary vein isolation, possible Watchman due to fall risk     Patient is symptomatic with SCHMIDT.  Heart rate currently controlled at rest.        Review of patient's allergies indicates:   Allergen Reactions    Opioids - morphine analogues      nause and fever   Other reaction(s): Other (See Comments)  nause and fever     Tetanus vaccines and toxoid Rash and Swelling    Adhesive Rash    Flu virus vacc tvs 2015-16 (18 yr up) cell derived      Other reaction(s): Other (See Comments)    Influenza virus vaccines Nausea And Vomiting    Iodides     Iodine Hives    Iodine and iodide containing products     Morpholine analogues Nausea And Vomiting    Penicillins Other (See Comments)     High fever as a child, also was allergy tested 1980    Tetanus and diphther. tox (pf)     Latex, natural rubber Rash       Past Medical History:   Diagnosis Date    Anticoagulant long-term use     Aortic stenosis     Arthritis     Cataracts, bilateral     CKD (chronic kidney disease), stage III     Class 2 severe obesity due to excess calories with serious comorbidity and body mass index (BMI) of 39.0 to 39.9 in adult 09/13/2018    Digestive disorder     GERD (gastroesophageal reflux disease)     Gout     Hypercholesteremia     Hypertension     Implantable loop recorder present     PAF (paroxysmal atrial fibrillation)     Personal history of COVID-19 05/2022     S/P TAVR (transcatheter aortic valve replacement)      Past Surgical History:   Procedure Laterality Date    AORTIC VALVE REPLACEMENT N/A 10/8/2019    Procedure: Replacement-valve-aortic;  Surgeon: Fidel Cardenas MD;  Location: [facility] CATH LAB;  Service: Cardiology;  Laterality: N/A;    BACK SURGERY      bone graft neck    CARDIAC VALVE SURGERY      CARPAL TUNNEL RELEASE Left 10/3/2018    Procedure: RELEASE, CARPAL TUNNEL  LEFT;  Surgeon: Meche Cárdenas MD;  Location: [facility];  Service: Neurosurgery;  Laterality: Left;    CARPAL TUNNEL RELEASE Right 1/17/2019    Procedure: RELEASE, CARPAL TUNNEL RIGHT;  Surgeon: Meche Cárdenas MD;  Location: [facility];  Service: Neurosurgery;  Laterality: Right;    CATHETERIZATION OF BOTH LEFT AND RIGHT HEART N/A 7/30/2019    Procedure: CATHETERIZATION, HEART, BOTH LEFT AND RIGHT;  Surgeon: Terrie Alberto MD;  Location: [facility] CATH;  Service: Cardiology;  Laterality: N/A;    CERVICAL FUSION  1984    x2     CORONARY ANGIOGRAPHY N/A 7/30/2019    Procedure: ANGIOGRAM, CORONARY ARTERY;  Surgeon: Terrie Alberto MD;  Location: [facility] CATH;  Service: Cardiology;  Laterality: N/A;    ECHOCARDIOGRAM,TRANSESOPHAGEAL N/A 10/11/2024    Procedure: Transesophageal echo (MURRAY) intra-procedure log documentation;  Surgeon: Moody Johnson MD;  Location: [facility];  Service: Cardiology;  Laterality: N/A;    HERNIA REPAIR      umbilical    HYSTERECTOMY      TONSILLECTOMY      TRANSESOPHAGEAL ECHOCARDIOGRAM WITH POSSIBLE CARDIOVERSION (MURRAY W/ POSS CARDIOVERSION) N/A 7/19/2024    Procedure: TRANSESOPHAGEAL ECHOCARDIOGRAM WITH POSSIBLE CARDIOVERSION (MURRAY W/ POSS CARDIOVERSION);  Surgeon: Armando Reinoso MD;  [facility];  Service: Cardiology;  Laterality: N/A;    TREATMENT OF CARDIAC ARRHYTHMIA N/A 10/11/2024    Procedure: Cardioversion or Defibrillation;  Surgeon: Moody Johnson MD;  Location: [facility];  Service: Cardiology;  Laterality: N/A;     Social History     Tobacco  Use    Smoking status: Never    Smokeless tobacco: Never   Substance Use Topics    Alcohol use: No    Drug use: No        Review of Systems:    As noted in HPI in addition      REVIEW OF SYSTEMS  Review of Systems   Constitutional: Negative for decreased appetite, diaphoresis, night sweats, weight gain and weight loss.   HENT:  Negative for nosebleeds and odynophagia.    Eyes:  Negative for double vision and photophobia.   Cardiovascular:  Positive for dyspnea on exertion. Negative for chest pain, claudication, cyanosis, irregular heartbeat, leg swelling, near-syncope, orthopnea, palpitations, paroxysmal nocturnal dyspnea and syncope.   Respiratory:  Positive for shortness of breath. Negative for cough, hemoptysis and wheezing.    Hematologic/Lymphatic: Negative for adenopathy.   Skin:  Negative for flushing, skin cancer and suspicious lesions.   Musculoskeletal:  Negative for gout, myalgias and neck pain.   Gastrointestinal:  Negative for abdominal pain, heartburn, hematemesis and hematochezia.   Genitourinary:  Negative for bladder incontinence, hesitancy and nocturia.   Neurological:  Negative for focal weakness, headaches, light-headedness and paresthesias.   Psychiatric/Behavioral:  Negative for memory loss and substance abuse.               Objective:        Vitals:    01/27/25 1429   BP: 130/60   Pulse: 84       Lab Results   Component Value Date    WBC 8.70 07/17/2024    HGB 14.1 07/17/2024    HCT 44.5 07/17/2024     07/17/2024    CHOL 161 05/13/2024    TRIG 90 05/13/2024    HDL 64 05/13/2024    ALT 18 07/17/2024    AST 21 07/17/2024     07/17/2024    K 3.6 07/17/2024     07/17/2024    CREATININE 1.6 (H) 07/17/2024    BUN 25 (H) 07/17/2024    CO2 27 07/17/2024    INR 0.9 10/08/2019    HGBA1C 5.7 (H) 05/13/2024        ECHOCARDIOGRAM RESULTS  Results for orders placed during the hospital encounter of 10/11/24    Transesophageal echo (MURRAY) with possible cardioversion    Interpretation  Summary    Left Ventricle: There is normal systolic function.    Right Ventricle: Normal right ventricular cavity size. Systolic function is normal.    Left Atrium: Left atrium is dilated. The left atrial appendage appears normal. The left atrial appendage has a windsock morphology. Appendage velocity is reduced at less than 40 cm/sec. There is no thrombus in the left atrial appendage.    Aortic Valve: There is a transcatheter valve replacement in the aortic position.    Mitral Valve: There is mild regurgitation.    Successful cardioversion (200J) to normal sinus rhythm    Results for orders placed during the hospital encounter of 10/08/19    Cardiac catheterization    Conclusion  · Patient referred by Dr Alberto for severe AS, high surgical risk  · Successful aortic valve replacement with 23 mm Cierra S3 valve performed via transfemoral access.  · The post TAVR 2D echo showed no perivalvular leak.  · The AV per 2d-echo mean gradient is 5 mmHg the peak velocity is 1.5 m/s.  · Estimated blood loss: <50 mL    I certify that I was present for catheter insertion, catheter manipulation, angiography, and angiographic interpretation of this patient.    Procedure Log documented by Documenter: RT Carolina and verified by Fidel Esquivel MD.    Date: 10/8/2019  Time: 12:01 PM          CURRENT/PREVIOUS VISIT EKG  Results for orders placed or performed in visit on 11/25/24   IN OFFICE EKG 12-LEAD (to [place])    Collection Time: 11/25/24  9:19 AM   Result Value Ref Range    QRS Duration 92 ms    [facility] QTC Calculation 467 ms    Narrative    Test Reason : I10,    Vent. Rate :  58 BPM     Atrial Rate :  58 BPM     P-R Int : 192 ms          QRS Dur :  92 ms      QT Int : 476 ms       P-R-T Axes :  74  46  70 degrees    QTcB Int : 467 ms    Sinus bradycardia  Low voltage QRS  Borderline Abnormal ECG  No previous ECGs available  Confirmed by Terrie Alberto ([id number]) on 11/27/2024 9:48:24 AM    Referred By: RADHA PRINGLE            Confirmed By: Terrie Alberto     No valid procedures specified.   Results for orders placed during the hospital encounter of 08/09/22    Nuclear Stress - Cardiology Interpreted    Interpretation Summary    Normal myocardial perfusion scan. There is no evidence of myocardial ischemia or infarction.    The gated perfusion images showed an ejection fraction of 83% post stress.    LV cavity size is normal at rest and normal at stress.    The EKG portion of this study is abnormal but not diagnostic.    No valid procedures specified.    PHYSICAL EXAM  GENERAL: well built, well nourished, well-developed in no apparent distress alert and oriented.   HEENT: Normocephalic. Pupils normal and conjunctivae normal.  Mucous membranes normal, no cyanosis or icterus, trachea central,no pallor or icterus is noted..   NECK: No JVD. No bruit..   THYROID: Thyroid not enlarged. No nodules present..   CARDIAC:  Irregular irregular rhythm.  Variable intensity S1-S2.  Soft 1/6 systolic murmur left sternal border    LUNGS: Clear to auscultation. No wheezing or rhonchi..   ABDOMEN: Soft no masses or organomegaly.  No abdomen pulsations or bruits.  Normal bowel sounds. No pulsations and no masses felt, No guarding or rebound.   URINARY: No waller catheter   EXTREMITIES: No cyanosis, clubbing or edema noted at this time., no calf tenderness bilaterally.   PERIPHERAL VASCULAR SYSTEM: Good palpable distal pulses.  2+ femoral, popliteal and pedal pulses.  No bruits    CENTRAL NERVOUS SYSTEM: No focal motor or sensory deficits noted.   SKIN: Skin without lesions, moist, well perfused.   MUSCLE STRENGTH & TONE: No noteable weakness, atrophy or abnormal movement    I HAVE REVIEWED :    The vital signs, nurses notes, and all the pertinent radiology and labs.         Current Outpatient Medications   Medication Instructions    acetaminophen (TYLENOL) 500 mg, Every 8 hours PRN    allopurinoL (ZYLOPRIM) 300 mg, Oral, Daily    amiodarone (PACERONE) 200 mg,  Oral, 2 times daily    atorvastatin (LIPITOR) 40 MG tablet TAKE 1 TABLET(40 MG) BY MOUTH EVERY EVENING    ELIQUIS 5 mg, Oral, 2 times daily    fish oil-omega-3 fatty acids 300-1,000 mg capsule 1 capsule, Daily    furosemide (LASIX) 20 MG tablet TAKE 1 TABLET(20 MG) BY MOUTH TWICE DAILY    metoprolol succinate (TOPROL-XL) 25 mg, Oral, Nightly    multivitamin capsule 1 capsule, Daily    nitroGLYCERIN (NITROSTAT) 0.4 MG SL tablet Every 5 min PRN    pantoprazole (PROTONIX) 20 MG tablet TAKE 1 TABLET(20 MG) BY MOUTH EVERY DAY    vit A/vit C/vit E/zinc/copper (PRESERVISION AREDS ORAL) 1 tablet, 2 times daily    vitamin D (VITAMIN D3) 1,000 Units, Daily          Assessment:   PAF, now persistent atrial fibrillation despite multiple past DC/yuki cardioversion.    TAVR history 2019.  Workup for ischemic heart disease via nuclear PET perfusion imaging negative for ischemia 12/2024.    Hypertension, dyspnea        Plan:     Continue present medications.  Refer back to EP for consideration of pulmonary vein isolation possible Watchman implant    Meds reviewed and reconciled recommend no changes continue Eliquis 5 b.i.d.    Follow up in about 3 months (around 4/27/2025).

## 2025-02-03 ENCOUNTER — LAB VISIT (OUTPATIENT)
Dept: LAB | Facility: HOSPITAL | Age: 85
End: 2025-02-03
Attending: INTERNAL MEDICINE
Payer: MEDICARE

## 2025-02-03 DIAGNOSIS — I48.19 PERSISTENT ATRIAL FIBRILLATION: ICD-10-CM

## 2025-02-03 LAB
ALBUMIN SERPL BCP-MCNC: 3.5 G/DL (ref 3.5–5.2)
ALP SERPL-CCNC: 122 U/L (ref 40–150)
ALT SERPL W/O P-5'-P-CCNC: 16 U/L (ref 10–44)
ANION GAP SERPL CALC-SCNC: 12 MMOL/L (ref 8–16)
AST SERPL-CCNC: 18 U/L (ref 10–40)
BILIRUB SERPL-MCNC: 0.5 MG/DL (ref 0.1–1)
BUN SERPL-MCNC: 27 MG/DL (ref 8–23)
CALCIUM SERPL-MCNC: 10.1 MG/DL (ref 8.7–10.5)
CHLORIDE SERPL-SCNC: 105 MMOL/L (ref 95–110)
CO2 SERPL-SCNC: 27 MMOL/L (ref 23–29)
CREAT SERPL-MCNC: 1.3 MG/DL (ref 0.5–1.4)
EST. GFR  (NO RACE VARIABLE): 40 ML/MIN/1.73 M^2
GLUCOSE SERPL-MCNC: 101 MG/DL (ref 70–110)
POTASSIUM SERPL-SCNC: 4.7 MMOL/L (ref 3.5–5.1)
PROT SERPL-MCNC: 7.3 G/DL (ref 6–8.4)
SODIUM SERPL-SCNC: 144 MMOL/L (ref 136–145)
TSH SERPL DL<=0.005 MIU/L-ACNC: 2.57 UIU/ML (ref 0.4–4)

## 2025-02-03 PROCEDURE — 84443 ASSAY THYROID STIM HORMONE: CPT | Performed by: INTERNAL MEDICINE

## 2025-02-03 PROCEDURE — 80053 COMPREHEN METABOLIC PANEL: CPT | Performed by: INTERNAL MEDICINE

## 2025-02-03 PROCEDURE — 36415 COLL VENOUS BLD VENIPUNCTURE: CPT | Mod: PO | Performed by: INTERNAL MEDICINE

## 2025-02-11 DIAGNOSIS — M1A.0720 IDIOPATHIC CHRONIC GOUT OF LEFT FOOT WITHOUT TOPHUS: ICD-10-CM

## 2025-02-11 RX ORDER — ALLOPURINOL 300 MG/1
TABLET ORAL
Qty: 90 TABLET | Refills: 0 | Status: SHIPPED | OUTPATIENT
Start: 2025-02-11

## 2025-02-11 NOTE — TELEPHONE ENCOUNTER
No care due was identified.  [facility] Embedded Care Due Messages. Reference number: [id number].   2/11/2025 3:46:31 AM CST

## 2025-02-11 NOTE — TELEPHONE ENCOUNTER
Refill Routing Note   Medication(s) are not appropriate for processing by [facility] for the following reason(s):        Required labs outdated    [facility] action(s):  Defer               Appointments  past 12m or future 3m with PCP    Date Provider   Last Visit   5/20/2024 Aby Perales MD   Next Visit   Visit date not found Aby Perales MD   ED visits in past 90 days: 0        Note composed:7:46 AM 02/11/2025

## 2025-02-14 ENCOUNTER — CLINICAL SUPPORT (OUTPATIENT)
Dept: CARDIOLOGY | Facility: HOSPITAL | Age: 85
End: 2025-02-14
Payer: MEDICARE

## 2025-02-14 ENCOUNTER — HOSPITAL ENCOUNTER (OUTPATIENT)
Dept: CARDIOLOGY | Facility: HOSPITAL | Age: 85
Discharge: HOME | End: 2025-02-14
Attending: INTERNAL MEDICINE
Payer: MEDICARE

## 2025-02-14 DIAGNOSIS — I49.8 OTHER SPECIFIED CARDIAC ARRHYTHMIAS: ICD-10-CM

## 2025-02-14 PROCEDURE — 93298 REM INTERROG DEV EVAL SCRMS: CPT | Mod: 26,,, | Performed by: INTERNAL MEDICINE

## 2025-02-14 PROCEDURE — 93298 REM INTERROG DEV EVAL SCRMS: CPT | Mod: PO | Performed by: INTERNAL MEDICINE

## 2025-02-16 ENCOUNTER — CLINICAL SUPPORT (OUTPATIENT)
Dept: CARDIOLOGY | Facility: HOSPITAL | Age: 85
End: 2025-02-16
Payer: MEDICARE

## 2025-02-16 ENCOUNTER — HOSPITAL ENCOUNTER (OUTPATIENT)
Dept: CARDIOLOGY | Facility: HOSPITAL | Age: 85
Discharge: HOME | End: 2025-02-16
Attending: INTERNAL MEDICINE
Payer: MEDICARE

## 2025-02-16 DIAGNOSIS — I49.8 OTHER SPECIFIED CARDIAC ARRHYTHMIAS: ICD-10-CM

## 2025-02-17 ENCOUNTER — OFFICE VISIT (OUTPATIENT)
Dept: CARDIOLOGY | Facility: CLINIC | Age: 85
End: 2025-02-17
Payer: MEDICARE

## 2025-02-17 VITALS
DIASTOLIC BLOOD PRESSURE: 85 MMHG | SYSTOLIC BLOOD PRESSURE: 139 MMHG | HEIGHT: 67 IN | HEART RATE: 81 BPM | WEIGHT: 265.88 LBS | BODY MASS INDEX: 41.73 KG/M2

## 2025-02-17 DIAGNOSIS — I25.10 CORONARY ARTERY DISEASE, UNSPECIFIED VESSEL OR LESION TYPE, UNSPECIFIED WHETHER ANGINA PRESENT, UNSPECIFIED WHETHER NATIVE OR TRANSPLANTED HEART: ICD-10-CM

## 2025-02-17 DIAGNOSIS — R53.83 FATIGUE, UNSPECIFIED TYPE: ICD-10-CM

## 2025-02-17 DIAGNOSIS — Z95.3 S/P TAVR (TRANSCATHETER AORTIC VALVE REPLACEMENT): ICD-10-CM

## 2025-02-17 DIAGNOSIS — I48.19 PERSISTENT ATRIAL FIBRILLATION: Primary | ICD-10-CM

## 2025-02-17 DIAGNOSIS — I35.0 NODULAR CALCIFIC AORTIC VALVE STENOSIS: ICD-10-CM

## 2025-02-17 DIAGNOSIS — F51.03 PARADOXICAL INSOMNIA: ICD-10-CM

## 2025-02-17 DIAGNOSIS — Z01.818 PRE-OP EVALUATION: ICD-10-CM

## 2025-02-17 DIAGNOSIS — E78.00 PURE HYPERCHOLESTEROLEMIA: ICD-10-CM

## 2025-02-17 DIAGNOSIS — I10 ESSENTIAL HYPERTENSION: ICD-10-CM

## 2025-02-17 DIAGNOSIS — R06.83 SNORING: ICD-10-CM

## 2025-02-17 DIAGNOSIS — I35.0 SEVERE AORTIC STENOSIS: ICD-10-CM

## 2025-02-17 DIAGNOSIS — I70.0 AORTIC ATHEROSCLEROSIS: ICD-10-CM

## 2025-02-17 LAB
OHS QRS DURATION: 106 MS
OHS QTC CALCULATION: 504 MS

## 2025-02-17 PROCEDURE — 93005 ELECTROCARDIOGRAM TRACING: CPT | Mod: PO

## 2025-02-17 RX ORDER — SODIUM CHLORIDE 9 MG/ML
INJECTION, SOLUTION INTRAVENOUS CONTINUOUS
OUTPATIENT
Start: 2025-02-17

## 2025-02-17 RX ORDER — MUPIROCIN 20 MG/G
OINTMENT TOPICAL
OUTPATIENT
Start: 2025-02-17

## 2025-02-17 NOTE — PATIENT INSTRUCTIONS
Cardioversion    Arrive for your procedure at:  [facility], check in at building number 2, 1st floor  on Feb, Monday 24th at 12:30p (arrival for 11am)      FASTING:  You MAY NOT have anything to eat AFTER MIDNIGHT. YOU MAY continue with CLEAR LIQUIDS ONLY (water, clear juice, sprite/7up, Gatorade)  up to 2 hours before your procedure.  If your procedure is scheduled in the afternoon, you may have a LIGHT BREAKFAST BEFORE 6:00 A.M.  For example: Two slices of toast; black coffee or black tea.    MEDICATIONS:  You may take your regular morning medications with a small sip of water.      Hold or adjust the following:                -Fluid pills. Hold morning of procedure FUROSEMIDE    Continue: Eliquis PLEASE TAKE MORNING OF THE PROCEDURE    Please refer to pre-op instructions received from the surgery center. YOU WILL NEED SOMEONE TO DRIVE YOU HOME.         Afib/Aflutter Ablation    Arrive for your procedure at [facility] on April 8th at 1pm (arrival for 11am)    You will receive a phone call from [facility] Pre-Op Department with further instructions and exact arrival time prior to your scheduled procedure.    Fasting: You MAY NOT have anything to eat or drink AFTER MIDNIGHT the day of your procedure. If your procedure is scheduled in the afternoon you may have a LIGHT BREAKFAST (plain toast, black coffee or tea).    Medications:    Please STOP antiarrythmic's 5 days prior to procedure:  Last dose on March 25th Amiodarone, Last dose of Metoprolol on April 2nd     DO NOT take antihypertensives the morning of the procedure.    Continue blood thinners  (Eliquis) until the morning of the procedure: Last dose on April 7th PM dose    You MAY CONTINUE ASPIRIN, PLAVIX, BRILINTA, and EFFIENT.    You will need to arrive approximately 1.5 hours prior to your procedure at the hospital. Please arrange to have transportation home from the hospital. You WILL NOT BE ABLE TO DRIVE YOURSELF HOME.      You can expect to be at the hospital anywhere from 5-8 hours.

## 2025-02-17 NOTE — PROGRESS NOTES
SUBJECTIVE:    Patient ID:  Selena Moulton is a 84 y.o. female who presents for follow of atrial fibrillation    Medical history:  Persistent atrial fibrillation  Severe AS s/p TAVR 10/2019  ILR in situ (implant 2/2024)  Nonobstructive CAD  Aortic atherosclerosis  Severe obesity  CKD IIIB  HTN  HLD     Cardiologist: Dr. Reinoso     Diagnosed with AF 2022 > underwent DCCV 3/2022 then again 6/2022. Was started on amiodarone which was d/c'd after syncopal event.   Did well until recently with increased AF burden  Underwent MURRAY/DCCV 7/2024 > symptoms of SOB/CP resolved with NSR.     TTE EF 55-60%. DARIUS 31.5     Relevant labs: cr 1.6, TSH 2.6  Relevant EP Rx: Toprol 25 QD, Eliquis 5 mg BID    9/2024:  SOB and fatigue with AF  ILR shows persistent AF since 9/3. Well controlled V rates.     12/14/2024  S/p MURRAY/DCCV 10/11/24. Continued on amiodarone load.  She is currently on 200 mg daily.  She has not had any AF recurrence since cardioversion.  Chest pain being evaluated with cardiac PET stress which is scheduled for the end of the month.   Tolerating oral anticoagulation.  Does report having another fall after bending over.  Referral sent to sleep medicine at last visit however the patient has not been contacted.    02/17/2025  Recurrent AFib on 01/19/25.  The patient has had significant fatigue and shortness of breath since recurrent atrial fibrillation.  Has not been contacted by Pulmonary  TFT and LFTs okay on amiodarone  Cardiac PET stress 01/13/2025: No ischemia or infarction.  Calcifications in the LAD territory and mild diffuse aortic calcifications in the ascending and descending aorta.  Reports 100% compliance with Eliquis twice per day  Does report issues with balance.  No recent falls.    STOP-BANG score =5 or more (high risk)  Snore loudly? yes  Often fatigue during daytime? yes  Observed apnea? no  HTN? yes  BMI (>35 kg/m2)? yes  Age >50 years? yes  Neck circumference >40 cm? yes  Male? no    I  personally reviewed the ECG/telemetry strip today. My interpretation is atrial fibrillation with controlled ventricular rate 73 beats per minute.   milliseconds.    Past Medical History:   Diagnosis Date    Anticoagulant long-term use     Aortic stenosis     Arthritis     Cataracts, bilateral     CKD (chronic kidney disease), stage III     Class 2 severe obesity due to excess calories with serious comorbidity and body mass index (BMI) of 39.0 to 39.9 in adult 09/13/2018    Digestive disorder     GERD (gastroesophageal reflux disease)     Gout     Hypercholesteremia     Hypertension     Implantable loop recorder present     PAF (paroxysmal atrial fibrillation)     Personal history of COVID-19 05/2022    S/P TAVR (transcatheter aortic valve replacement)        Past Surgical History:   Procedure Laterality Date    AORTIC VALVE REPLACEMENT N/A 10/8/2019    Procedure: Replacement-valve-aortic;  Surgeon: Fidel Cardenas MD;  Location: [facility] CATH LAB;  Service: Cardiology;  Laterality: N/A;    BACK SURGERY      bone graft neck    CARDIAC VALVE SURGERY      CARPAL TUNNEL RELEASE Left 10/3/2018    Procedure: RELEASE, CARPAL TUNNEL  LEFT;  Surgeon: Meche Cárdenas MD;  Location: [facility];  Service: Neurosurgery;  Laterality: Left;    CARPAL TUNNEL RELEASE Right 1/17/2019    Procedure: RELEASE, CARPAL TUNNEL RIGHT;  Surgeon: Meche Cárdenas MD;  Location: [facility];  Service: Neurosurgery;  Laterality: Right;    CATHETERIZATION OF BOTH LEFT AND RIGHT HEART N/A 7/30/2019    Procedure: CATHETERIZATION, HEART, BOTH LEFT AND RIGHT;  Surgeon: Terrie Alberto MD;  Location: [facility] CATH;  Service: Cardiology;  Laterality: N/A;    CERVICAL FUSION  1984    x2     CORONARY ANGIOGRAPHY N/A 7/30/2019    Procedure: ANGIOGRAM, CORONARY ARTERY;  Surgeon: Terrie Alberto MD;  Location: [facility] CATH;  Service: Cardiology;  Laterality: N/A;    ECHOCARDIOGRAM,TRANSESOPHAGEAL N/A 10/11/2024    Procedure: Transesophageal echo (MURRAY)  intra-procedure log documentation;  Surgeon: Moody Johnson MD;  Location: [facility] ENDO;  Service: Cardiology;  Laterality: N/A;    HERNIA REPAIR      umbilical    HYSTERECTOMY      TONSILLECTOMY      TRANSESOPHAGEAL ECHOCARDIOGRAM WITH POSSIBLE CARDIOVERSION (MURRAY W/ POSS CARDIOVERSION) N/A 7/19/2024    Procedure: TRANSESOPHAGEAL ECHOCARDIOGRAM WITH POSSIBLE CARDIOVERSION (MURRAY W/ POSS CARDIOVERSION);  Surgeon: Armando Reinoso MD;  Location: [facility];  Service: Cardiology;  Laterality: N/A;    TREATMENT OF CARDIAC ARRHYTHMIA N/A 10/11/2024    Procedure: Cardioversion or Defibrillation;  Surgeon: Moody Johnson MD;  Location: [facility] ENDO;  Service: Cardiology;  Laterality: N/A;       Family History   Problem Relation Name Age of Onset    Cancer Mother      Stroke Mother      Hypertension Mother      Ovarian cancer Mother      No Known Problems Father      Glaucoma Neg Hx      Macular degeneration Neg Hx         Social History     Socioeconomic History    Marital status:    Tobacco Use    Smoking status: Never    Smokeless tobacco: Never   Substance and Sexual Activity    Alcohol use: No    Drug use: No       Review of patient's allergies indicates:   Allergen Reactions    Opioids - morphine analogues      nause and fever   Other reaction(s): Other (See Comments)  nause and fever     Tetanus vaccines and toxoid Rash and Swelling    Adhesive Rash    Flu virus vacc tvs 2015-16 (18 yr up) cell derived      Other reaction(s): Other (See Comments)    Influenza virus vaccines Nausea And Vomiting    Iodides     Iodine Hives    Iodine and iodide containing products     Morpholine analogues Nausea And Vomiting    Penicillins Other (See Comments)     High fever as a child, also was allergy tested 1980    Tetanus and diphther. tox (pf)     Latex, natural rubber Rash       Review of Systems   All other systems reviewed and are negative.         OBJECTIVE:     Vitals:    02/17/25 1412   BP: 139/85   Pulse: 81         BP Readings from Last 5 Encounters:   01/27/25 130/60   01/13/25 (!) 128/55   12/16/24 (!) 155/70   11/25/24 139/72   10/11/24 134/71        Physical Exam  Vitals and nursing note reviewed.   Constitutional:       General: She is not in acute distress.     Appearance: She is well-developed. She is obese. She is not diaphoretic.   HENT:      Head: Normocephalic and atraumatic.   Eyes:      General: No scleral icterus.        Right eye: No discharge.         Left eye: No discharge.   Cardiovascular:      Rate and Rhythm: Normal rate. Rhythm irregular. No extrasystoles are present.     Pulses: Normal pulses and intact distal pulses.           Radial pulses are 2+ on the right side and 2+ on the left side.      Heart sounds: Normal heart sounds, S1 normal and S2 normal. Heart sounds not distant. No opening snap. No murmur heard.     No friction rub. No gallop. No S3 or S4 sounds.   Pulmonary:      Effort: Pulmonary effort is normal. No respiratory distress.      Breath sounds: No wheezing or rales.   Abdominal:      General: Bowel sounds are normal. There is no distension.      Palpations: Abdomen is soft.      Tenderness: There is no abdominal tenderness.   Musculoskeletal:         General: No deformity. Normal range of motion.      Cervical back: Normal range of motion and neck supple.   Skin:     General: Skin is warm and dry.      Findings: No erythema or rash.   Neurological:      Mental Status: She is alert.             Current Outpatient Medications   Medication Instructions    acetaminophen (TYLENOL) 500 mg, Every 8 hours PRN    allopurinoL (ZYLOPRIM) 300 MG tablet TAKE 1 TABLET(300 MG) BY MOUTH DAILY    amiodarone (PACERONE) 200 mg, Oral, 2 times daily    atorvastatin (LIPITOR) 40 MG tablet TAKE 1 TABLET(40 MG) BY MOUTH EVERY EVENING    ELIQUIS 5 mg, Oral, 2 times daily    fish oil-omega-3 fatty acids 300-1,000 mg capsule 1 capsule, Daily    furosemide (LASIX) 20 MG tablet TAKE 1 TABLET(20 MG) BY MOUTH  TWICE DAILY    metoprolol succinate (TOPROL-XL) 25 mg, Oral, Nightly    multivitamin capsule 1 capsule, Daily    nitroGLYCERIN (NITROSTAT) 0.4 MG SL tablet Every 5 min PRN    pantoprazole (PROTONIX) 20 MG tablet TAKE 1 TABLET(20 MG) BY MOUTH EVERY DAY    vit A/vit C/vit E/zinc/copper (PRESERVISION AREDS ORAL) 1 tablet, 2 times daily    vitamin D (VITAMIN D3) 1,000 Units, Daily       Lipid Panel:   Lab Results   Component Value Date    CHOL 161 05/13/2024    HDL 64 05/13/2024    LDLCALC 79.0 05/13/2024    TRIG 90 05/13/2024    CHOLHDL 39.8 05/13/2024       The ASCVD Risk score (Naya DK, et al., 2019) failed to calculate for the following reasons:    The 2019 ASCVD risk score is only valid for ages 40 to 79    Most Recent EKG Results  Results for orders placed or performed in visit on 11/25/24   IN OFFICE EKG 12-LEAD (to [place])    Collection Time: 11/25/24  9:19 AM   Result Value Ref Range    QRS Duration 92 ms    [facility] QTC Calculation 467 ms    Narrative    Test Reason : I10,    Vent. Rate :  58 BPM     Atrial Rate :  58 BPM     P-R Int : 192 ms          QRS Dur :  92 ms      QT Int : 476 ms       P-R-T Axes :  74  46  70 degrees    QTcB Int : 467 ms    Sinus bradycardia  Low voltage QRS  Borderline Abnormal ECG  No previous ECGs available  Confirmed by Terrie Alberto ([id number]) on 11/27/2024 9:48:24 AM    Referred By: AAAREFERRAL SELF           Confirmed By: Terrie Alberto       Most Recent Echocardiogram Results  Results for orders placed during the hospital encounter of 10/11/24    Transesophageal echo (MURRAY) with possible cardioversion    Interpretation Summary    Left Ventricle: There is normal systolic function.    Right Ventricle: Normal right ventricular cavity size. Systolic function is normal.    Left Atrium: Left atrium is dilated. The left atrial appendage appears normal. The left atrial appendage has a windsock morphology. Appendage velocity is reduced at less than 40 cm/sec. There is no thrombus in the left atrial  appendage.    Aortic Valve: There is a transcatheter valve replacement in the aortic position.    Mitral Valve: There is mild regurgitation.    Successful cardioversion (200J) to normal sinus rhythm      Most Recent Nuclear Stress Test Results  Results for orders placed during the hospital encounter of 08/09/22    Nuclear Stress - Cardiology Interpreted    Interpretation Summary    Normal myocardial perfusion scan. There is no evidence of myocardial ischemia or infarction.    The gated perfusion images showed an ejection fraction of 83% post stress.    LV cavity size is normal at rest and normal at stress.    The EKG portion of this study is abnormal but not diagnostic.      Most Recent Cardiac PET Stress Test Results  Results for orders placed during the hospital encounter of 01/13/25    Cardiac PET Scan Stress    Interpretation Summary    The myocardial perfusion images show no evidence of ischemia or scar.    The whole heart absolute myocardial perfusion values were normal at rest, low normal during stress and CFR is mildly reduced.    CT attenuation images demonstrate mild diffuse coronary calcifications in the LAD territory and mild diffuse aortic calcifications in the ascending aorta and descending aorta.    The gated perfusion images showed an ejection fraction of 67% at rest and 70% during stress. A normal ejection fraction is greater than 47%.    There is normal wall motion at rest and normal wall motion during stress.    The study's ECG is negative for ischemia.    There is no prior study available for comparison.      Most Recent Cardiovascular Angiogram results  Results for orders placed during the hospital encounter of 10/08/19    Cardiac catheterization    Conclusion  · Patient referred by Dr Alberto for severe AS, high surgical risk  · Successful aortic valve replacement with 23 mm Cierra S3 valve performed via transfemoral access.  · The post TAVR 2D echo showed no perivalvular leak.  · The AV per  "2d-echo mean gradient is 5 mmHg the peak velocity is 1.5 m/s.  · Estimated blood loss: <50 mL    I certify that I was present for catheter insertion, catheter manipulation, angiography, and angiographic interpretation of this patient.    Procedure Log documented by Documenter: RT Carolina and verified by Fidel Esquivel MD.    Date: 10/8/2019  Time: 12:01 PM      Other Most Recent Cardiology Results  Results for orders placed during the hospital encounter of 10/11/24    Cardiac monitoring strips        All pertinent data including labs, imaging, EKGs, and studies listed above were reviewed.  All of the patients ECG tracings since most recent visit were personally interpreted by this provider    ASSESSMENT:   Selena Moulton is a 84 y.o. female who presents for follow of symptomatic persistent atrial fibrillation.  She has failed amiodarone therapy.  EF normal 9/24.  Has had worsening shortness of breath and fatigue since AF recurrence in January.  Cardiac PET stress negative recently.    I discussed the pathophysiology and clinical importance of atrial fibrillation.We discussed the 3 pillars for AF management including stroke risk, optimization, and symptom management (rate vs. rhythm control). We discussed that their is no "cure" for atrial fibrillation. We discussed that AF management is a multifaceted approach, and that management of comorbidities and lifestyle optimization is paramount to longterm success of maintaining sinus rhythm. I explained the clinical assessment for needing stroke prophylaxis with oral anticoagulation. The patient's [id number] score is 4 which is associated with a 4.8% annual risk of stroke. Indefinite anticoagulation is indicated. I reiterated the need to present to the ED for any head trauma for CT scanning.     I also discussed treatment strategies of rate versus rhythm control, and the goal to reduce symptomatic arrhythmic episodes by pharmacologic and/or procedural " methods. We specifically discussed a trial of anti-arrhythmic drug therapy versus catheter ablation, as well as the pros/cons of each strategy.  I spent about a half hour discussing the nature of  pulse field PVI/CTI  including transseptal puncture. We discussed risks and benefits at length. Our discussion included, but was not limited to, vascular injury [hematoma, pseudoaneurysm, AV fistula, retroperitoneal bleeding], bleeding, cardiac perforation and tamponade which may require pericardiocentesis and/or cardiac surgery, valvular injury, embolism (PE, CVA), AE fistula, pulmonary vein stenosis, phrenic nerve injury leading temporary or permanent diaphragm paralysis, dislodgement of CIED leads (if applicable), other organic injury including the possible need for surgery or pacemaker implantation, infection, stroke, MI, or death. The patient is understanding of these risks. All questions answered.    The risks, benefits and alternatives of cardioversion (DCCV) were explained to the patient, patient's family and/or surrogate decision maker.   The risks include (but are not limited to) trauma to mouth, tachy-/bradyarrhythmias, skin burns/pain, CVA, MI, and death.  Informed consent was obtained. The patient is agreeable to proceed with the procedure and all questions and concerns addressed.     After our discussion, the patient would like to undergo an ablation.  Since she is significantly symptomatic, we will plan for cardioversion in the meantime.  Patient reports 100% compliance with Eliquis b.i.d..  No history of stroke.      1. Persistent atrial fibrillation  IN OFFICE EKG 12-LEAD (to [place])    Echo    Case Request Endoscopy: Cardioversion or Defibrillation    Vital signs    Cleanse with Chlorhexidine (CHG)    Diet NPO    Chlorohexidine Gluconate Bath    Full code    Place in Outpatient    Place sequential compression device    EKG 12-lead    Cardioversion    Start one 18 gauge saline lock    Call EP Lab ([id number])  when patient is ready    Void on call to EP Lab    Height and weight    Vital signs    Chlorhexidine (CHG) 2% Wipes    Skin prep    Diet NPO Except for: Other (Comment) (Including all medications unless otherwise specified)    Type & Screen    EKG 12-lead    Case Request-Cath Lab: Ablation, Atrial Fibrillation, Study possible ablation, CTI, Transesophageal echo (MURRAY) intra-procedure log documentation    Vital signs    Chlorhexidine (CHG) 2% Wipes    Skin prep    Type & Screen    Case Request-Cath Lab: Ablation, Atrial Fibrillation, Study possible ablation, CTI, Transesophageal echo (MURRAY) intra-procedure log documentation      2. Aortic atherosclerosis        3. Coronary artery disease, unspecified vessel or lesion type, unspecified whether angina present, unspecified whether native or transplanted heart  Echo      4. S/P TAVR (transcatheter aortic valve replacement)        5. Nodular calcific aortic valve stenosis        6. Severe aortic stenosis        7. Pure hypercholesterolemia        8. Essential hypertension        9. Snoring  Home Sleep Study      10. 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 Pre-op evaluation  Basic metabolic panel    CBC Auto Differential        PLAN FOR TREATMENT OF ABOVE DIAGNOSES:     Pulse field CTI/PVI  MURRAY prior to ablation unless the patient presents in sinus rhythm  Take OAC the night prior to the ablation. Only hold dose of OAC the morning of the procedure (if applicable)  Hold amiodarone 14 days prior to ablation and metoprolol 5 days prior  CBC/BMP 1-2 weeks prior to ablation (if not performed 1 month prior to ablation)  NPO at midnight  Hold antihypertensive medications morning of procedure  Weight loss medications 1 week prior  Plan to discharge home day of ablation     Straight cardioversion as soon as possible given significant symptoms with AF   Repeat transthoracic echo with persistent AF to ensure EF remains  normal  Home sleep study order   We will reach out to Pulmonary regarding referral placed in September of last year  We discussed the importance of weight loss for the management of atrial fibrillation and her overall health   Continue Eliquis 5 mg b.i.d..  The patient was instructed to take the morning of her cardioversion.  She will hold this medication the morning of her ablation.  Continue amiodarone 200 mg daily for now.  See instructions above for ablation.  Continue Toprol 25 mg daily.  See instructions above for ablation.  Continue Lipitor 40 mg daily      F/u 6-8 weeks after ablation      Wander Johnson MD  Cardiac Electrophysiology    This note was partially generated using voice recognition and generative artificial intelligence software. While every effort has been made to ensure its accuracy, transcription errors may exist. Please reach out to me with any questions or if clarification is needed.

## 2025-02-17 NOTE — H&P (VIEW-ONLY)
SUBJECTIVE:    Patient ID:  Selena Moulton is a 84 y.o. female who presents for follow of atrial fibrillation    Medical history:  Persistent atrial fibrillation  Severe AS s/p TAVR 10/2019  ILR in situ (implant 2/2024)  Nonobstructive CAD  Aortic atherosclerosis  Severe obesity  CKD IIIB  HTN  HLD     Cardiologist: Dr. Reinoso     Diagnosed with AF 2022 > underwent DCCV 3/2022 then again 6/2022. Was started on amiodarone which was d/c'd after syncopal event.   Did well until recently with increased AF burden  Underwent MURRAY/DCCV 7/2024 > symptoms of SOB/CP resolved with NSR.     TTE EF 55-60%. DARIUS 31.5     Relevant labs: cr 1.6, TSH 2.6  Relevant EP Rx: Toprol 25 QD, Eliquis 5 mg BID    9/2024:  SOB and fatigue with AF  ILR shows persistent AF since 9/3. Well controlled V rates.     12/14/2024  S/p MURRAY/DCCV 10/11/24. Continued on amiodarone load.  She is currently on 200 mg daily.  She has not had any AF recurrence since cardioversion.  Chest pain being evaluated with cardiac PET stress which is scheduled for the end of the month.   Tolerating oral anticoagulation.  Does report having another fall after bending over.  Referral sent to sleep medicine at last visit however the patient has not been contacted.    02/17/2025  Recurrent AFib on 01/19/25.  The patient has had significant fatigue and shortness of breath since recurrent atrial fibrillation.  Has not been contacted by Pulmonary  TFT and LFTs okay on amiodarone  Cardiac PET stress 01/13/2025: No ischemia or infarction.  Calcifications in the LAD territory and mild diffuse aortic calcifications in the ascending and descending aorta.  Reports 100% compliance with Eliquis twice per day  Does report issues with balance.  No recent falls.    STOP-BANG score =5 or more (high risk)  Snore loudly? yes  Often fatigue during daytime? yes  Observed apnea? no  HTN? yes  BMI (>35 kg/m2)? yes  Age >50 years? yes  Neck circumference >40 cm? yes  Male? no    I  personally reviewed the ECG/telemetry strip today. My interpretation is atrial fibrillation with controlled ventricular rate 73 beats per minute.   milliseconds.    Past Medical History:   Diagnosis Date    Anticoagulant long-term use     Aortic stenosis     Arthritis     Cataracts, bilateral     CKD (chronic kidney disease), stage III     Class 2 severe obesity due to excess calories with serious comorbidity and body mass index (BMI) of 39.0 to 39.9 in adult 09/13/2018    Digestive disorder     GERD (gastroesophageal reflux disease)     Gout     Hypercholesteremia     Hypertension     Implantable loop recorder present     PAF (paroxysmal atrial fibrillation)     Personal history of COVID-19 05/2022    S/P TAVR (transcatheter aortic valve replacement)        Past Surgical History:   Procedure Laterality Date    AORTIC VALVE REPLACEMENT N/A 10/8/2019    Procedure: Replacement-valve-aortic;  Surgeon: Fidel Cardenas MD;  Location: [facility] CATH LAB;  Service: Cardiology;  Laterality: N/A;    BACK SURGERY      bone graft neck    CARDIAC VALVE SURGERY      CARPAL TUNNEL RELEASE Left 10/3/2018    Procedure: RELEASE, CARPAL TUNNEL  LEFT;  Surgeon: Meche Cárdenas MD;  Location: [facility];  Service: Neurosurgery;  Laterality: Left;    CARPAL TUNNEL RELEASE Right 1/17/2019    Procedure: RELEASE, CARPAL TUNNEL RIGHT;  Surgeon: Meche Cárdenas MD;  Location: [facility];  Service: Neurosurgery;  Laterality: Right;    CATHETERIZATION OF BOTH LEFT AND RIGHT HEART N/A 7/30/2019    Procedure: CATHETERIZATION, HEART, BOTH LEFT AND RIGHT;  Surgeon: Terrie Alberto MD;  Location: [facility] CATH;  Service: Cardiology;  Laterality: N/A;    CERVICAL FUSION  1984    x2     CORONARY ANGIOGRAPHY N/A 7/30/2019    Procedure: ANGIOGRAM, CORONARY ARTERY;  Surgeon: Terrie Alberto MD;  Location: [facility] CATH;  Service: Cardiology;  Laterality: N/A;    ECHOCARDIOGRAM,TRANSESOPHAGEAL N/A 10/11/2024    Procedure: Transesophageal echo (MURRAY)  intra-procedure log documentation;  Surgeon: Moody Johnson MD;  Location: [facility] ENDO;  Service: Cardiology;  Laterality: N/A;    HERNIA REPAIR      umbilical    HYSTERECTOMY      TONSILLECTOMY      TRANSESOPHAGEAL ECHOCARDIOGRAM WITH POSSIBLE CARDIOVERSION (MURRAY W/ POSS CARDIOVERSION) N/A 7/19/2024    Procedure: TRANSESOPHAGEAL ECHOCARDIOGRAM WITH POSSIBLE CARDIOVERSION (MURRAY W/ POSS CARDIOVERSION);  Surgeon: Armando Reinoso MD;  Location: [facility];  Service: Cardiology;  Laterality: N/A;    TREATMENT OF CARDIAC ARRHYTHMIA N/A 10/11/2024    Procedure: Cardioversion or Defibrillation;  Surgeon: Moody Johnson MD;  Location: [facility] ENDO;  Service: Cardiology;  Laterality: N/A;       Family History   Problem Relation Name Age of Onset    Cancer Mother      Stroke Mother      Hypertension Mother      Ovarian cancer Mother      No Known Problems Father      Glaucoma Neg Hx      Macular degeneration Neg Hx         Social History     Socioeconomic History    Marital status:    Tobacco Use    Smoking status: Never    Smokeless tobacco: Never   Substance and Sexual Activity    Alcohol use: No    Drug use: No       Review of patient's allergies indicates:   Allergen Reactions    Opioids - morphine analogues      nause and fever   Other reaction(s): Other (See Comments)  nause and fever     Tetanus vaccines and toxoid Rash and Swelling    Adhesive Rash    Flu virus vacc tvs 2015-16 (18 yr up) cell derived      Other reaction(s): Other (See Comments)    Influenza virus vaccines Nausea And Vomiting    Iodides     Iodine Hives    Iodine and iodide containing products     Morpholine analogues Nausea And Vomiting    Penicillins Other (See Comments)     High fever as a child, also was allergy tested 1980    Tetanus and diphther. tox (pf)     Latex, natural rubber Rash       Review of Systems   All other systems reviewed and are negative.         OBJECTIVE:     Vitals:    02/17/25 1412   BP: 139/85   Pulse: 81         BP Readings from Last 5 Encounters:   01/27/25 130/60   01/13/25 (!) 128/55   12/16/24 (!) 155/70   11/25/24 139/72   10/11/24 134/71        Physical Exam  Vitals and nursing note reviewed.   Constitutional:       General: She is not in acute distress.     Appearance: She is well-developed. She is obese. She is not diaphoretic.   HENT:      Head: Normocephalic and atraumatic.   Eyes:      General: No scleral icterus.        Right eye: No discharge.         Left eye: No discharge.   Cardiovascular:      Rate and Rhythm: Normal rate. Rhythm irregular. No extrasystoles are present.     Pulses: Normal pulses and intact distal pulses.           Radial pulses are 2+ on the right side and 2+ on the left side.      Heart sounds: Normal heart sounds, S1 normal and S2 normal. Heart sounds not distant. No opening snap. No murmur heard.     No friction rub. No gallop. No S3 or S4 sounds.   Pulmonary:      Effort: Pulmonary effort is normal. No respiratory distress.      Breath sounds: No wheezing or rales.   Abdominal:      General: Bowel sounds are normal. There is no distension.      Palpations: Abdomen is soft.      Tenderness: There is no abdominal tenderness.   Musculoskeletal:         General: No deformity. Normal range of motion.      Cervical back: Normal range of motion and neck supple.   Skin:     General: Skin is warm and dry.      Findings: No erythema or rash.   Neurological:      Mental Status: She is alert.             Current Outpatient Medications   Medication Instructions    acetaminophen (TYLENOL) 500 mg, Every 8 hours PRN    allopurinoL (ZYLOPRIM) 300 MG tablet TAKE 1 TABLET(300 MG) BY MOUTH DAILY    amiodarone (PACERONE) 200 mg, Oral, 2 times daily    atorvastatin (LIPITOR) 40 MG tablet TAKE 1 TABLET(40 MG) BY MOUTH EVERY EVENING    ELIQUIS 5 mg, Oral, 2 times daily    fish oil-omega-3 fatty acids 300-1,000 mg capsule 1 capsule, Daily    furosemide (LASIX) 20 MG tablet TAKE 1 TABLET(20 MG) BY MOUTH  TWICE DAILY    metoprolol succinate (TOPROL-XL) 25 mg, Oral, Nightly    multivitamin capsule 1 capsule, Daily    nitroGLYCERIN (NITROSTAT) 0.4 MG SL tablet Every 5 min PRN    pantoprazole (PROTONIX) 20 MG tablet TAKE 1 TABLET(20 MG) BY MOUTH EVERY DAY    vit A/vit C/vit E/zinc/copper (PRESERVISION AREDS ORAL) 1 tablet, 2 times daily    vitamin D (VITAMIN D3) 1,000 Units, Daily       Lipid Panel:   Lab Results   Component Value Date    CHOL 161 05/13/2024    HDL 64 05/13/2024    LDLCALC 79.0 05/13/2024    TRIG 90 05/13/2024    CHOLHDL 39.8 05/13/2024       The ASCVD Risk score (Naya DK, et al., 2019) failed to calculate for the following reasons:    The 2019 ASCVD risk score is only valid for ages 40 to 79    Most Recent EKG Results  Results for orders placed or performed in visit on 11/25/24   IN OFFICE EKG 12-LEAD (to [place])    Collection Time: 11/25/24  9:19 AM   Result Value Ref Range    QRS Duration 92 ms    OHS QTC Calculation 467 ms    Narrative    Test Reason : I10,    Vent. Rate :  58 BPM     Atrial Rate :  58 BPM     P-R Int : 192 ms          QRS Dur :  92 ms      QT Int : 476 ms       P-R-T Axes :  74  46  70 degrees    QTcB Int : 467 ms    Sinus bradycardia  Low voltage QRS  Borderline Abnormal ECG  No previous ECGs available  Confirmed by Terrie Alberto ([id number]) on 11/27/2024 9:48:24 AM    Referred By: AAAREFERRAL SELF           Confirmed By: Terrie Alberto       Most Recent Echocardiogram Results  Results for orders placed during the hospital encounter of 10/11/24    Transesophageal echo (MURRAY) with possible cardioversion    Interpretation Summary    Left Ventricle: There is normal systolic function.    Right Ventricle: Normal right ventricular cavity size. Systolic function is normal.    Left Atrium: Left atrium is dilated. The left atrial appendage appears normal. The left atrial appendage has a windsock morphology. Appendage velocity is reduced at less than 40 cm/sec. There is no thrombus in the left atrial  appendage.    Aortic Valve: There is a transcatheter valve replacement in the aortic position.    Mitral Valve: There is mild regurgitation.    Successful cardioversion (200J) to normal sinus rhythm      Most Recent Nuclear Stress Test Results  Results for orders placed during the hospital encounter of 08/09/22    Nuclear Stress - Cardiology Interpreted    Interpretation Summary    Normal myocardial perfusion scan. There is no evidence of myocardial ischemia or infarction.    The gated perfusion images showed an ejection fraction of 83% post stress.    LV cavity size is normal at rest and normal at stress.    The EKG portion of this study is abnormal but not diagnostic.      Most Recent Cardiac PET Stress Test Results  Results for orders placed during the hospital encounter of 01/13/25    Cardiac PET Scan Stress    Interpretation Summary    The myocardial perfusion images show no evidence of ischemia or scar.    The whole heart absolute myocardial perfusion values were normal at rest, low normal during stress and CFR is mildly reduced.    CT attenuation images demonstrate mild diffuse coronary calcifications in the LAD territory and mild diffuse aortic calcifications in the ascending aorta and descending aorta.    The gated perfusion images showed an ejection fraction of 67% at rest and 70% during stress. A normal ejection fraction is greater than 47%.    There is normal wall motion at rest and normal wall motion during stress.    The study's ECG is negative for ischemia.    There is no prior study available for comparison.      Most Recent Cardiovascular Angiogram results  Results for orders placed during the hospital encounter of 10/08/19    Cardiac catheterization    Conclusion  · Patient referred by Dr Alberto for severe AS, high surgical risk  · Successful aortic valve replacement with 23 mm Cierra S3 valve performed via transfemoral access.  · The post TAVR 2D echo showed no perivalvular leak.  · The AV per  "2d-echo mean gradient is 5 mmHg the peak velocity is 1.5 m/s.  · Estimated blood loss: <50 mL    I certify that I was present for catheter insertion, catheter manipulation, angiography, and angiographic interpretation of this patient.    Procedure Log documented by Documenter: RT Carolina and verified by Fidel Esquivel MD.    Date: 10/8/2019  Time: 12:01 PM      Other Most Recent Cardiology Results  Results for orders placed during the hospital encounter of 10/11/24    Cardiac monitoring strips        All pertinent data including labs, imaging, EKGs, and studies listed above were reviewed.  All of the patients ECG tracings since most recent visit were personally interpreted by this provider    ASSESSMENT:   Selena Moulton is a 84 y.o. female who presents for follow of symptomatic persistent atrial fibrillation.  She has failed amiodarone therapy.  EF normal 9/24.  Has had worsening shortness of breath and fatigue since AF recurrence in January.  Cardiac PET stress negative recently.    I discussed the pathophysiology and clinical importance of atrial fibrillation.We discussed the 3 pillars for AF management including stroke risk, optimization, and symptom management (rate vs. rhythm control). We discussed that their is no "cure" for atrial fibrillation. We discussed that AF management is a multifaceted approach, and that management of comorbidities and lifestyle optimization is paramount to longterm success of maintaining sinus rhythm. I explained the clinical assessment for needing stroke prophylaxis with oral anticoagulation. The patient's [id number] score is 4 which is associated with a 4.8% annual risk of stroke. Indefinite anticoagulation is indicated. I reiterated the need to present to the ED for any head trauma for CT scanning.     I also discussed treatment strategies of rate versus rhythm control, and the goal to reduce symptomatic arrhythmic episodes by pharmacologic and/or procedural " methods. We specifically discussed a trial of anti-arrhythmic drug therapy versus catheter ablation, as well as the pros/cons of each strategy.  I spent about a half hour discussing the nature of  pulse field PVI/CTI  including transseptal puncture. We discussed risks and benefits at length. Our discussion included, but was not limited to, vascular injury [hematoma, pseudoaneurysm, AV fistula, retroperitoneal bleeding], bleeding, cardiac perforation and tamponade which may require pericardiocentesis and/or cardiac surgery, valvular injury, embolism (PE, CVA), AE fistula, pulmonary vein stenosis, phrenic nerve injury leading temporary or permanent diaphragm paralysis, dislodgement of CIED leads (if applicable), other organic injury including the possible need for surgery or pacemaker implantation, infection, stroke, MI, or death. The patient is understanding of these risks. All questions answered.    The risks, benefits and alternatives of cardioversion (DCCV) were explained to the patient, patient's family and/or surrogate decision maker.   The risks include (but are not limited to) trauma to mouth, tachy-/bradyarrhythmias, skin burns/pain, CVA, MI, and death.  Informed consent was obtained. The patient is agreeable to proceed with the procedure and all questions and concerns addressed.     After our discussion, the patient would like to undergo an ablation.  Since she is significantly symptomatic, we will plan for cardioversion in the meantime.  Patient reports 100% compliance with Eliquis b.i.d..  No history of stroke.      1. Persistent atrial fibrillation  IN OFFICE EKG 12-LEAD (to [place])    Echo    Case Request Endoscopy: Cardioversion or Defibrillation    Vital signs    Cleanse with Chlorhexidine (CHG)    Diet NPO    Chlorohexidine Gluconate Bath    Full code    Place in Outpatient    Place sequential compression device    EKG 12-lead    Cardioversion    Start one 18 gauge saline lock    Call EP Lab ([id number])  when patient is ready    Void on call to EP Lab    Height and weight    Vital signs    Chlorhexidine (CHG) 2% Wipes    Skin prep    Diet NPO Except for: Other (Comment) (Including all medications unless otherwise specified)    Type & Screen    EKG 12-lead    Case Request-Cath Lab: Ablation, Atrial Fibrillation, Study possible ablation, CTI, Transesophageal echo (MURRAY) intra-procedure log documentation    Vital signs    Chlorhexidine (CHG) 2% Wipes    Skin prep    Type & Screen    Case Request-Cath Lab: Ablation, Atrial Fibrillation, Study possible ablation, CTI, Transesophageal echo (MURRAY) intra-procedure log documentation      2. Aortic atherosclerosis        3. Coronary artery disease, unspecified vessel or lesion type, unspecified whether angina present, unspecified whether native or transplanted heart  Echo      4. S/P TAVR (transcatheter aortic valve replacement)        5. Nodular calcific aortic valve stenosis        6. Severe aortic stenosis        7. Pure hypercholesterolemia        8. Essential hypertension        9. Snoring  Home Sleep Study      10. 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 Pre-op evaluation  Basic metabolic panel    CBC Auto Differential        PLAN FOR TREATMENT OF ABOVE DIAGNOSES:     Pulse field CTI/PVI  MURRAY prior to ablation unless the patient presents in sinus rhythm  Take OAC the night prior to the ablation. Only hold dose of OAC the morning of the procedure (if applicable)  Hold amiodarone 14 days prior to ablation and metoprolol 5 days prior  CBC/BMP 1-2 weeks prior to ablation (if not performed 1 month prior to ablation)  NPO at midnight  Hold antihypertensive medications morning of procedure  Weight loss medications 1 week prior  Plan to discharge home day of ablation     Straight cardioversion as soon as possible given significant symptoms with AF   Repeat transthoracic echo with persistent AF to ensure EF remains  normal  Home sleep study order   We will reach out to Pulmonary regarding referral placed in September of last year  We discussed the importance of weight loss for the management of atrial fibrillation and her overall health   Continue Eliquis 5 mg b.i.d..  The patient was instructed to take the morning of her cardioversion.  She will hold this medication the morning of her ablation.  Continue amiodarone 200 mg daily for now.  See instructions above for ablation.  Continue Toprol 25 mg daily.  See instructions above for ablation.  Continue Lipitor 40 mg daily      F/u 6-8 weeks after ablation      Wander Johnson MD  Cardiac Electrophysiology    This note was partially generated using voice recognition and generative artificial intelligence software. While every effort has been made to ensure its accuracy, transcription errors may exist. Please reach out to me with any questions or if clarification is needed.

## 2025-02-21 ENCOUNTER — ANESTHESIA EVENT (OUTPATIENT)
Dept: ENDOSCOPY | Facility: HOSPITAL | Age: 85
End: 2025-02-21
Payer: MEDICARE

## 2025-02-22 ENCOUNTER — CLINICAL SUPPORT (OUTPATIENT)
Dept: CARDIOLOGY | Facility: HOSPITAL | Age: 85
End: 2025-02-22
Payer: MEDICARE

## 2025-02-22 ENCOUNTER — HOSPITAL ENCOUNTER (OUTPATIENT)
Dept: CARDIOLOGY | Facility: HOSPITAL | Age: 85
Discharge: HOME | End: 2025-02-22
Attending: INTERNAL MEDICINE
Payer: MEDICARE

## 2025-02-22 DIAGNOSIS — I49.8 OTHER SPECIFIED CARDIAC ARRHYTHMIAS: ICD-10-CM

## 2025-02-24 ENCOUNTER — HOSPITAL ENCOUNTER (OUTPATIENT)
Facility: HOSPITAL | Age: 85
Discharge: HOME | End: 2025-02-24
Attending: INTERNAL MEDICINE | Admitting: INTERNAL MEDICINE
Payer: MEDICARE

## 2025-02-24 ENCOUNTER — ANESTHESIA (OUTPATIENT)
Dept: ENDOSCOPY | Facility: HOSPITAL | Age: 85
End: 2025-02-24
Payer: MEDICARE

## 2025-02-24 ENCOUNTER — HOSPITAL ENCOUNTER (OUTPATIENT)
Dept: CARDIOLOGY | Facility: HOSPITAL | Age: 85
Discharge: HOME | End: 2025-02-24
Attending: INTERNAL MEDICINE
Payer: MEDICARE

## 2025-02-24 VITALS
RESPIRATION RATE: 18 BRPM | OXYGEN SATURATION: 98 % | HEIGHT: 67 IN | SYSTOLIC BLOOD PRESSURE: 140 MMHG | BODY MASS INDEX: 41.59 KG/M2 | TEMPERATURE: 98 F | DIASTOLIC BLOOD PRESSURE: 78 MMHG | WEIGHT: 265 LBS | HEART RATE: 66 BPM

## 2025-02-24 DIAGNOSIS — I10 ESSENTIAL HYPERTENSION: ICD-10-CM

## 2025-02-24 DIAGNOSIS — I48.19 PERSISTENT ATRIAL FIBRILLATION: ICD-10-CM

## 2025-02-24 LAB
OHS QRS DURATION: 102 MS
OHS QRS DURATION: 114 MS
OHS QTC CALCULATION: 488 MS
OHS QTC CALCULATION: 499 MS

## 2025-02-24 PROCEDURE — 63600175 PHARM REV CODE 636 W HCPCS: Mod: PO | Performed by: NURSE ANESTHETIST, CERTIFIED REGISTERED

## 2025-02-24 PROCEDURE — 92960 CARDIOVERSION ELECTRIC EXT: CPT | Mod: PO

## 2025-02-24 PROCEDURE — 63600175 PHARM REV CODE 636 W HCPCS: Mod: PO | Performed by: ANESTHESIOLOGY

## 2025-02-24 PROCEDURE — 37000008 HC ANESTHESIA 1ST 15 MINUTES: Mod: PO | Performed by: INTERNAL MEDICINE

## 2025-02-24 PROCEDURE — 92960 CARDIOVERSION ELECTRIC EXT: CPT | Mod: PO | Performed by: INTERNAL MEDICINE

## 2025-02-24 PROCEDURE — 37000009 HC ANESTHESIA EA ADD 15 MINS: Mod: PO | Performed by: INTERNAL MEDICINE

## 2025-02-24 PROCEDURE — 92960 CARDIOVERSION ELECTRIC EXT: CPT | Mod: ,,, | Performed by: INTERNAL MEDICINE

## 2025-02-24 PROCEDURE — 93005 ELECTROCARDIOGRAM TRACING: CPT | Mod: PO

## 2025-02-24 PROCEDURE — D9220A PRA ANESTHESIA: Mod: CRNA,,, | Performed by: NURSE ANESTHETIST, CERTIFIED REGISTERED

## 2025-02-24 PROCEDURE — D9220A PRA ANESTHESIA: Mod: ANES,,, | Performed by: ANESTHESIOLOGY

## 2025-02-24 PROCEDURE — 93010 ELECTROCARDIOGRAM REPORT: CPT | Mod: XE,ICN,, | Performed by: INTERNAL MEDICINE

## 2025-02-24 RX ORDER — DIPHENHYDRAMINE HYDROCHLORIDE 50 MG/ML
25 INJECTION INTRAMUSCULAR; INTRAVENOUS EVERY 6 HOURS PRN
Status: DISCONTINUED | OUTPATIENT
Start: 2025-02-24 | End: 2025-02-24 | Stop reason: HOSPADM

## 2025-02-24 RX ORDER — FENTANYL CITRATE 50 UG/ML
25 INJECTION, SOLUTION INTRAMUSCULAR; INTRAVENOUS EVERY 5 MIN PRN
Status: DISCONTINUED | OUTPATIENT
Start: 2025-02-24 | End: 2025-02-24 | Stop reason: HOSPADM

## 2025-02-24 RX ORDER — SODIUM CHLORIDE 0.9 % (FLUSH) 0.9 %
10 SYRINGE (ML) INJECTION ONCE
Status: DISCONTINUED | OUTPATIENT
Start: 2025-02-24 | End: 2025-02-24 | Stop reason: HOSPADM

## 2025-02-24 RX ORDER — PROPOFOL 10 MG/ML
VIAL (ML) INTRAVENOUS
Status: DISCONTINUED | OUTPATIENT
Start: 2025-02-24 | End: 2025-02-24

## 2025-02-24 RX ORDER — SODIUM CHLORIDE, SODIUM LACTATE, POTASSIUM CHLORIDE, CALCIUM CHLORIDE 600; 310; 30; 20 MG/100ML; MG/100ML; MG/100ML; MG/100ML
INJECTION, SOLUTION INTRAVENOUS CONTINUOUS
Status: DISCONTINUED | OUTPATIENT
Start: 2025-02-24 | End: 2025-02-24 | Stop reason: HOSPADM

## 2025-02-24 RX ORDER — OXYCODONE HYDROCHLORIDE 5 MG/1
5 TABLET ORAL
Status: DISCONTINUED | OUTPATIENT
Start: 2025-02-24 | End: 2025-02-24 | Stop reason: HOSPADM

## 2025-02-24 RX ORDER — PROCHLORPERAZINE EDISYLATE 5 MG/ML
5 INJECTION INTRAMUSCULAR; INTRAVENOUS EVERY 4 HOURS PRN
Status: DISCONTINUED | OUTPATIENT
Start: 2025-02-24 | End: 2025-02-24 | Stop reason: HOSPADM

## 2025-02-24 RX ORDER — HYDROMORPHONE HYDROCHLORIDE 2 MG/ML
0.2 INJECTION, SOLUTION INTRAMUSCULAR; INTRAVENOUS; SUBCUTANEOUS EVERY 5 MIN PRN
Status: DISCONTINUED | OUTPATIENT
Start: 2025-02-24 | End: 2025-02-24 | Stop reason: HOSPADM

## 2025-02-24 RX ORDER — SODIUM CHLORIDE 9 MG/ML
INJECTION, SOLUTION INTRAVENOUS CONTINUOUS
Status: DISCONTINUED | OUTPATIENT
Start: 2025-02-24 | End: 2025-02-24 | Stop reason: HOSPADM

## 2025-02-24 RX ORDER — GLUCAGON 1 MG
1 KIT INJECTION
Status: DISCONTINUED | OUTPATIENT
Start: 2025-02-24 | End: 2025-02-24 | Stop reason: HOSPADM

## 2025-02-24 RX ORDER — LIDOCAINE HYDROCHLORIDE 10 MG/ML
1 INJECTION, SOLUTION EPIDURAL; INFILTRATION; INTRACAUDAL; PERINEURAL ONCE
Status: DISCONTINUED | OUTPATIENT
Start: 2025-02-24 | End: 2025-02-24 | Stop reason: HOSPADM

## 2025-02-24 RX ORDER — LIDOCAINE HYDROCHLORIDE 20 MG/ML
INJECTION INTRAVENOUS
Status: DISCONTINUED | OUTPATIENT
Start: 2025-02-24 | End: 2025-02-24

## 2025-02-24 RX ORDER — MUPIROCIN 20 MG/G
OINTMENT TOPICAL
Status: DISCONTINUED | OUTPATIENT
Start: 2025-02-24 | End: 2025-02-24 | Stop reason: HOSPADM

## 2025-02-24 RX ADMIN — LIDOCAINE HYDROCHLORIDE 100 MG: 20 INJECTION INTRAVENOUS at 12:02

## 2025-02-24 RX ADMIN — SODIUM CHLORIDE, SODIUM LACTATE, POTASSIUM CHLORIDE, AND CALCIUM CHLORIDE: .6; .31; .03; .02 INJECTION, SOLUTION INTRAVENOUS at 11:02

## 2025-02-24 RX ADMIN — PROPOFOL 100 MG: 10 INJECTION, EMULSION INTRAVENOUS at 12:02

## 2025-02-24 NOTE — TRANSFER OF CARE
"Anesthesia Transfer of Care Note    Patient: Selena Moulton    Procedure(s) Performed: Procedure(s) (LRB):  Cardioversion or Defibrillation (N/A)    Patient location: PACU    Anesthesia Type: MAC    Transport from OR: Transported from OR on room air with adequate spontaneous ventilation    Post pain: adequate analgesia    Post assessment: no apparent anesthetic complications and tolerated procedure well    Post vital signs: stable    Level of consciousness: awake and alert    Nausea/Vomiting: no nausea/vomiting    Complications: none    Transfer of care protocol was followed      Last vitals: Visit Vitals  /67 (BP Location: Left arm, Patient Position: Lying)   Pulse 64   Temp 36.4 °C (97.5 °F) (Skin)   Resp 18   Ht 5' 7" (1.702 m)   Wt 120.2 kg (265 lb)   SpO2 98%   Breastfeeding No   BMI 41.50 kg/m²     "

## 2025-02-24 NOTE — DISCHARGE INSTRUCTIONS
MURRAY / Cardioversion    DO:  Resume all medications today unless otherwise instructed.    DO NOT:  Drive for 24 hours.  Operate heavy machinery.  Sign legal papers.  Make major decisions.  Drink alcohol or smoke for 24 hours.    WHAT TO EXPECT:  Sore throat. This can be soothed with ice chips or lozenges.    Your physician will contact you with results and further instruct you on how you should follow-up.    Contact [phone] for questions.

## 2025-02-24 NOTE — ANESTHESIA PREPROCEDURE EVALUATION
02/24/2025  Selena Moulton is a 84 y.o., female.      Pre-op Assessment    I have reviewed the Patient Summary Reports.     I have reviewed the Nursing Notes. I have reviewed the NPO Status.   I have reviewed the Medications.     Review of Systems  Anesthesia Hx:  No problems with previous Anesthesia                Social:  Non-Smoker       Cardiovascular:     Hypertension, well controlled   CAD  asymptomatic  Dysrhythmias atrial fibrillation      hyperlipidemia    S/p AVR                             Pulmonary:      Shortness of breath                  Renal/:  Chronic Renal Disease (stage 4), CKD                Hepatic/GI:     GERD, well controlled                Musculoskeletal:  Arthritis               Neurological:  Neurology Normal                                      Endocrine:  Endocrine Normal          Morbid Obesity / BMI > 40      Physical Exam  General: Well nourished, Cooperative, Alert and Oriented    Airway:  Mallampati: II   Mouth Opening: Normal  TM Distance: Normal  Neck ROM: Normal ROM    Anesthesia Plan  Type of Anesthesia, risks & benefits discussed:    Anesthesia Type: Gen ETT, Gen Supraglottic Airway, Gen Natural Airway, MAC  Intra-op Monitoring Plan: Standard ASA Monitors  Post Op Pain Control Plan: multimodal analgesia  Induction:  IV  Airway Plan: Direct, Video and Fiberoptic, Post-Induction  Informed Consent: Informed consent signed with the Patient and all parties understand the risks and agree with anesthesia plan.  All questions answered.   ASA Score: 4    Ready For Surgery From Anesthesia Perspective.   .

## 2025-02-24 NOTE — DISCHARGE SUMMARY
[facility]  Cardiology  Discharge Summary      Patient Name: Selena Moulton  MRN: [id number]  Admission Date: 2/24/2025  Hospital Length of Stay: 0 days  Discharge Date and Time:  02/24/2025 1:03 PM  Attending Physician: Moody Johnson MD  Discharging Provider: Moody Johnson MD  Primary Care Physician: Aby Perales MD    HPI:  Patient presented for cardioversion.  ECG consistent with AFib.    Procedure(s) (LRB):  Cardioversion or Defibrillation (N/A)     Indwelling Lines/Drains at time of discharge:  Lines/Drains/Airways       None                   Hospital Course:  Successful straight cardioversion to sinus rhythm.  No medication changes.    Consults:     Significant Diagnostic Studies: Labs: All labs within the past 24 hours have been reviewed    Pending Diagnostic Studies:       None            There are no hospital problems to display for this patient.      Discharged Condition: stable    Follow Up:    Patient Instructions:   No discharge procedures on file.  Medications:  Reconciled Home Medications:      Medication List        ASK your doctor about these medications      acetaminophen 500 MG tablet  Commonly known as: TYLENOL  Take 500 mg by mouth every 8 (eight) hours as needed.     allopurinoL 300 MG tablet  Commonly known as: ZYLOPRIM  TAKE 1 TABLET(300 MG) BY MOUTH DAILY     amiodarone 200 MG Tab  Commonly known as: PACERONE  Take 1 tablet (200 mg total) by mouth 2 (two) times daily.     atorvastatin 40 MG tablet  Commonly known as: LIPITOR  TAKE 1 TABLET(40 MG) BY MOUTH EVERY EVENING     ELIQUIS 5 mg Tab  Generic drug: apixaban  TAKE 1 TABLET(5 MG) BY MOUTH TWICE DAILY     fish oil-omega-3 fatty acids 300-1,000 mg capsule  Take 1 capsule by mouth once daily. Hold for 7 days before surgery     furosemide 20 MG tablet  Commonly known as: LASIX  TAKE 1 TABLET(20 MG) BY MOUTH TWICE DAILY     metoprolol succinate 25 MG 24 hr tablet  Commonly known as: TOPROL-XL  Take 1 tablet (25 mg  total) by mouth every evening.     multivitamin capsule  Take 1 capsule by mouth once daily. Hold AM of surgery     nitroGLYCERIN 0.4 MG SL tablet  Commonly known as: NITROSTAT  Place under the tongue every 5 (five) minutes as needed.     pantoprazole 20 MG tablet  Commonly known as: PROTONIX  TAKE 1 TABLET(20 MG) BY MOUTH EVERY DAY     PRESERVISION AREDS ORAL  Take 1 tablet by mouth 2 (two) times a day.     vitamin D 1000 units Tab  Commonly known as: VITAMIN D3  Take 1,000 Units by mouth once daily.              Time spent on the discharge of patient: >30 minutes    Moody Johnson MD  Cardiology  [facility]

## 2025-02-25 ENCOUNTER — CLINICAL SUPPORT (OUTPATIENT)
Dept: CARDIOLOGY | Facility: HOSPITAL | Age: 85
End: 2025-02-25

## 2025-02-25 ENCOUNTER — HOSPITAL ENCOUNTER (OUTPATIENT)
Dept: CARDIOLOGY | Facility: HOSPITAL | Age: 85
Discharge: HOME | End: 2025-02-25
Attending: INTERNAL MEDICINE
Payer: MEDICARE

## 2025-02-25 DIAGNOSIS — I49.8 OTHER SPECIFIED CARDIAC ARRHYTHMIAS: ICD-10-CM

## 2025-02-25 NOTE — ANESTHESIA POSTPROCEDURE EVALUATION
Anesthesia Post Evaluation    Patient: Selena Moulton    Procedure(s) Performed: Procedure(s) (LRB):  Cardioversion or Defibrillation (N/A)    Final Anesthesia Type: general      Patient location during evaluation: PACU  Patient participation: Yes- Able to Participate  Level of consciousness: awake and alert and oriented  Post-procedure vital signs: reviewed and stable  Pain management: adequate  Airway patency: patent    PONV status at discharge: No PONV  Anesthetic complications: no      Cardiovascular status: blood pressure returned to baseline and stable  Respiratory status: unassisted and spontaneous ventilation  Hydration status: euvolemic  Follow-up not needed.              Vitals Value Taken Time   /78 02/24/25 13:00   Temp 36.4 °C (97.5 °F) 02/24/25 12:40   Pulse 66 02/24/25 13:00   Resp 18 02/24/25 13:00   SpO2 98 % 02/24/25 13:00         Event Time   Out of Recovery 13:13:02         Pain/Amado Score: Amado Score: 10 (2/24/2025  1:10 PM)

## 2025-02-26 LAB
OHS CV AF BURDEN PERCENT: 1.1
OHS CV AF BURDEN PERCENT: 100
OHS CV AF BURDEN PERCENT: 43.5
OHS CV AF BURDEN PERCENT: 80.1
OHS CV AF BURDEN PERCENT: 89.7
OHS CV AF BURDEN PERCENT: 90.2
OHS CV AF BURDEN PERCENT: 98.2
OHS CV AF BURDEN PERCENT: 99.9
OHS CV AF BURDEN PERCENT: 99.9
OHS CV AF BURDEN PERCENT: < 1
OHS CV DC REMOTE DEVICE TYPE: NORMAL
OHS CV ICD SHOCK: NO

## 2025-03-03 ENCOUNTER — HOSPITAL ENCOUNTER (OUTPATIENT)
Dept: CARDIOLOGY | Facility: HOSPITAL | Age: 85
Discharge: HOME | End: 2025-03-03
Attending: INTERNAL MEDICINE
Payer: MEDICARE

## 2025-03-03 VITALS — WEIGHT: 265 LBS | HEART RATE: 64 BPM | HEIGHT: 67 IN | BODY MASS INDEX: 41.59 KG/M2

## 2025-03-03 DIAGNOSIS — I48.19 PERSISTENT ATRIAL FIBRILLATION: ICD-10-CM

## 2025-03-03 DIAGNOSIS — I25.10 CORONARY ARTERY DISEASE, UNSPECIFIED VESSEL OR LESION TYPE, UNSPECIFIED WHETHER ANGINA PRESENT, UNSPECIFIED WHETHER NATIVE OR TRANSPLANTED HEART: ICD-10-CM

## 2025-03-03 PROCEDURE — 93306 TTE W/DOPPLER COMPLETE: CPT | Mod: 26,,, | Performed by: INTERNAL MEDICINE

## 2025-03-03 PROCEDURE — 93306 TTE W/DOPPLER COMPLETE: CPT | Mod: PO

## 2025-03-04 LAB
ASCENDING AORTA: 2.99 CM
AV INDEX (PROSTH): 0.38
AV MEAN GRADIENT: 16 MMHG
AV PEAK GRADIENT: 25 MMHG
AV REGURGITATION PRESSURE HALF TIME: 380 MS
AV VALVE AREA BY VELOCITY RATIO: 1.4 CM²
AV VALVE AREA: 1.2 CM²
AV VELOCITY RATIO: 0.44
BSA FOR ECHO PROCEDURE: 2.38 M2
CV ECHO LV RWT: 0.48 CM
DOP CALC AO PEAK VEL: 2.5 M/S
DOP CALC AO VTI: 68.4 CM
DOP CALC LVOT AREA: 3.1 CM2
DOP CALC LVOT DIAMETER: 2 CM
DOP CALC LVOT PEAK VEL: 1.1 M/S
DOP CALC LVOT STROKE VOLUME: 81 CM3
DOP CALCLVOT PEAK VEL VTI: 25.8 CM
E WAVE DECELERATION TIME: 140 MSEC
E/A RATIO: 1.49
E/E' RATIO: 16 M/S
ECHO LV POSTERIOR WALL: 1.1 CM (ref 0.6–1.1)
FRACTIONAL SHORTENING: 37 % (ref 28–44)
INTERVENTRICULAR SEPTUM: 0.9 CM (ref 0.6–1.1)
IVRT: 117 MSEC
LEFT ATRIUM AREA SYSTOLIC (APICAL 2 CHAMBER): 23.67 CM2
LEFT ATRIUM AREA SYSTOLIC (APICAL 4 CHAMBER): 24.8 CM2
LEFT ATRIUM SIZE: 4 CM
LEFT ATRIUM VOLUME INDEX MOD: 34 ML/M2
LEFT ATRIUM VOLUME MOD: 78 ML
LEFT INTERNAL DIMENSION IN SYSTOLE: 2.9 CM (ref 2.1–4)
LEFT VENTRICLE DIASTOLIC VOLUME INDEX: 42.11 ML/M2
LEFT VENTRICLE DIASTOLIC VOLUME: 96 ML
LEFT VENTRICLE END SYSTOLIC VOLUME APICAL 2 CHAMBER: 74.66 ML
LEFT VENTRICLE END SYSTOLIC VOLUME APICAL 4 CHAMBER: 80.26 ML
LEFT VENTRICLE MASS INDEX: 69.7 G/M2
LEFT VENTRICLE SYSTOLIC VOLUME INDEX: 14.5 ML/M2
LEFT VENTRICLE SYSTOLIC VOLUME: 33 ML
LEFT VENTRICULAR INTERNAL DIMENSION IN DIASTOLE: 4.6 CM (ref 3.5–6)
LEFT VENTRICULAR MASS: 158.8 G
LV LATERAL E/E' RATIO: 13.8 M/S
LV SEPTAL E/E' RATIO: 18.3 M/S
LVED V (TEICH): 96.42 ML
LVES V (TEICH): 33.16 ML
LVOT MG: 2.59 MMHG
LVOT MV: 0.76 CM/S
MV PEAK A VEL: 0.74 M/S
MV PEAK E VEL: 1.1 M/S
MV STENOSIS PRESSURE HALF TIME: 40.55 MS
MV VALVE AREA P 1/2 METHOD: 5.43 CM2
OHS CV RV/LV RATIO: 0.8 CM
PISA AR MAX VEL: 3.8 M/S
PISA MRMAX VEL: 5.51 M/S
PISA TR MAX VEL: 2.9 M/S
PULM VEIN S/D RATIO: 0.87
PV PEAK D VEL: 0.53 M/S
PV PEAK S VEL: 0.46 M/S
RA PRESSURE ESTIMATED: 3 MMHG
RA VOL SYS: 33.38 ML
RIGHT ATRIAL AREA: 15.8 CM2
RIGHT ATRIUM VOLUME AREA LENGTH APICAL 4 CHAMBER: 32.4 ML
RIGHT VENTRICLE DIASTOLIC BASEL DIMENSION: 3.7 CM
RIGHT VENTRICLE DIASTOLIC LENGTH: 3.6 CM
RIGHT VENTRICLE DIASTOLIC MID DIMENSION: 2.9 CM
RIGHT VENTRICULAR END-DIASTOLIC DIMENSION: 3.72 CM
RIGHT VENTRICULAR LENGTH IN DIASTOLE (APICAL 4-CHAMBER VIEW): 3.64 CM
RV MID DIAMA: 2.91 CM
RV TB RVSP: 6 MMHG
RV TISSUE DOPPLER FREE WALL SYSTOLIC VELOCITY 1 (APICAL 4 CHAMBER VIEW): 14.12 CM/S
SINUS: 3 CM
STJ: 3.36 CM
TDI LATERAL: 0.08 M/S
TDI SEPTAL: 0.06 M/S
TDI: 0.07 M/S
TRICUSPID ANNULAR PLANE SYSTOLIC EXCURSION: 2.29 CM
TV REST PULMONARY ARTERY PRESSURE: 37 MMHG
Z-SCORE OF LEFT VENTRICULAR DIMENSION IN END DIASTOLE: -6.25
Z-SCORE OF LEFT VENTRICULAR DIMENSION IN END SYSTOLE: -4.6

## 2025-03-17 ENCOUNTER — RESULTS FOLLOW-UP (OUTPATIENT)
Dept: CARDIOLOGY | Facility: CLINIC | Age: 85
End: 2025-03-17

## 2025-03-19 ENCOUNTER — HOSPITAL ENCOUNTER (OUTPATIENT)
Dept: CARDIOLOGY | Facility: HOSPITAL | Age: 85
Discharge: HOME | End: 2025-03-19
Attending: INTERNAL MEDICINE
Payer: MEDICARE

## 2025-03-19 ENCOUNTER — CLINICAL SUPPORT (OUTPATIENT)
Dept: CARDIOLOGY | Facility: HOSPITAL | Age: 85
End: 2025-03-19

## 2025-03-19 DIAGNOSIS — I49.8 OTHER SPECIFIED CARDIAC ARRHYTHMIAS: ICD-10-CM

## 2025-03-19 PROCEDURE — 93298 REM INTERROG DEV EVAL SCRMS: CPT | Mod: PO | Performed by: INTERNAL MEDICINE

## 2025-03-19 PROCEDURE — 93298 REM INTERROG DEV EVAL SCRMS: CPT | Mod: 26,,, | Performed by: INTERNAL MEDICINE

## 2025-03-24 DIAGNOSIS — Z00.00 ENCOUNTER FOR MEDICARE ANNUAL WELLNESS EXAM: ICD-10-CM

## 2025-03-31 ENCOUNTER — LAB VISIT (OUTPATIENT)
Dept: LAB | Facility: HOSPITAL | Age: 85
End: 2025-03-31
Attending: INTERNAL MEDICINE
Payer: MEDICARE

## 2025-03-31 DIAGNOSIS — Z01.818 PRE-OP EVALUATION: ICD-10-CM

## 2025-03-31 LAB
ABSOLUTE EOSINOPHIL (OHS): 0.13 K/UL
ABSOLUTE MONOCYTE (OHS): 0.66 K/UL (ref 0.3–1)
ABSOLUTE NEUTROPHIL COUNT (OHS): 4.69 K/UL (ref 1.8–7.7)
ANION GAP (OHS): 6 MMOL/L (ref 8–16)
BASOPHILS # BLD AUTO: 0.04 K/UL
BASOPHILS NFR BLD AUTO: 0.5 %
BUN SERPL-MCNC: 26 MG/DL (ref 8–23)
CALCIUM SERPL-MCNC: 10 MG/DL (ref 8.7–10.5)
CHLORIDE SERPL-SCNC: 107 MMOL/L (ref 95–110)
CO2 SERPL-SCNC: 30 MMOL/L (ref 23–29)
CREAT SERPL-MCNC: 1.3 MG/DL (ref 0.5–1.4)
ERYTHROCYTE [DISTWIDTH] IN BLOOD BY AUTOMATED COUNT: 15.8 % (ref 11.5–14.5)
GFR SERPLBLD CREATININE-BSD FMLA CKD-EPI: 40 ML/MIN/1.73/M2
GLUCOSE SERPL-MCNC: 99 MG/DL (ref 70–110)
HCT VFR BLD AUTO: 44.7 % (ref 37–48.5)
HGB BLD-MCNC: 13.9 GM/DL (ref 12–16)
IMM GRANULOCYTES # BLD AUTO: 0.03 K/UL (ref 0–0.04)
IMM GRANULOCYTES NFR BLD AUTO: 0.4 % (ref 0–0.5)
LYMPHOCYTES # BLD AUTO: 2.13 K/UL (ref 1–4.8)
MCH RBC QN AUTO: 28.4 PG (ref 27–31)
MCHC RBC AUTO-ENTMCNC: 31.1 G/DL (ref 32–36)
MCV RBC AUTO: 91 FL (ref 82–98)
NUCLEATED RBC (/100WBC) (OHS): 0 /100 WBC
PLATELET # BLD AUTO: 164 K/UL (ref 150–450)
PMV BLD AUTO: 10.8 FL (ref 9.2–12.9)
POTASSIUM SERPL-SCNC: 4.7 MMOL/L (ref 3.5–5.1)
RBC # BLD AUTO: 4.89 M/UL (ref 4–5.4)
RELATIVE EOSINOPHIL (OHS): 1.7 %
RELATIVE LYMPHOCYTE (OHS): 27.7 % (ref 18–48)
RELATIVE MONOCYTE (OHS): 8.6 % (ref 4–15)
RELATIVE NEUTROPHIL (OHS): 61.1 % (ref 38–73)
SODIUM SERPL-SCNC: 143 MMOL/L (ref 136–145)
WBC # BLD AUTO: 7.68 K/UL (ref 3.9–12.7)

## 2025-03-31 PROCEDURE — 85025 COMPLETE CBC W/AUTO DIFF WBC: CPT

## 2025-03-31 PROCEDURE — 80048 BASIC METABOLIC PNL TOTAL CA: CPT

## 2025-03-31 PROCEDURE — 36415 COLL VENOUS BLD VENIPUNCTURE: CPT | Mod: PO

## 2025-04-03 DIAGNOSIS — I48.91 ATRIAL FIBRILLATION, UNSPECIFIED TYPE: ICD-10-CM

## 2025-04-03 RX ORDER — APIXABAN 5 MG/1
5 TABLET, FILM COATED ORAL 2 TIMES DAILY
Qty: 180 TABLET | Refills: 3 | Status: SHIPPED | OUTPATIENT
Start: 2025-04-03

## 2025-04-19 ENCOUNTER — HOSPITAL ENCOUNTER (OUTPATIENT)
Dept: CARDIOLOGY | Facility: HOSPITAL | Age: 85
Discharge: HOME | End: 2025-04-19
Attending: INTERNAL MEDICINE
Payer: MEDICARE

## 2025-04-19 ENCOUNTER — CLINICAL SUPPORT (OUTPATIENT)
Dept: CARDIOLOGY | Facility: HOSPITAL | Age: 85
End: 2025-04-19

## 2025-04-19 DIAGNOSIS — I49.8 OTHER SPECIFIED CARDIAC ARRHYTHMIAS: ICD-10-CM

## 2025-04-19 PROCEDURE — 93298 REM INTERROG DEV EVAL SCRMS: CPT | Mod: 26,,, | Performed by: INTERNAL MEDICINE

## 2025-04-19 PROCEDURE — 93298 REM INTERROG DEV EVAL SCRMS: CPT | Mod: PO | Performed by: INTERNAL MEDICINE

## 2025-04-23 DIAGNOSIS — K21.9 GASTROESOPHAGEAL REFLUX DISEASE WITHOUT ESOPHAGITIS: ICD-10-CM

## 2025-04-23 RX ORDER — PANTOPRAZOLE SODIUM 20 MG/1
20 TABLET, DELAYED RELEASE ORAL
Qty: 90 TABLET | Refills: 0 | Status: SHIPPED | OUTPATIENT
Start: 2025-04-23

## 2025-04-23 NOTE — TELEPHONE ENCOUNTER
Care Due:                  Date            Visit Type   Department     Provider  --------------------------------------------------------------------------------                                EP -                              PRIMARY      [facility] FAMILY  Last Visit: 05-      CARE ([facility])   MEDICINE       Aby Perales  Next Visit: None Scheduled  None         None Found                                                            Last  Test          Frequency    Reason                     Performed    Due Date  --------------------------------------------------------------------------------    Lipid Panel.  12 months..  atorvastatin.............  05- 05-    Uric Acid...  12 months..  allopurinoL..............  08-   08-    [employer] Embedded Care Due Messages. Reference number: [id number].   4/23/2025 8:09:40 AM CDT

## 2025-04-23 NOTE — TELEPHONE ENCOUNTER
Provider Staff:  Action required for this patient    Requires labs      Please see care gap opportunities below in Care Due Message.    Thanks!  [facility]     Appointments      Date Provider   Last Visit   5/20/2024 Aby Perales MD   Next Visit   Visit date not found Aby Perales MD     Refill Decision Note   Selena Moulton  is requesting a refill authorization.  Brief Assessment and Rationale for Refill:  Approve     Medication Therapy Plan:         Comments:     Note composed:11:44 AM 04/23/2025

## 2025-05-05 ENCOUNTER — TELEPHONE (OUTPATIENT)
Dept: ADMINISTRATIVE | Facility: CLINIC | Age: 85
End: 2025-05-05
Payer: MEDICARE

## 2025-05-05 NOTE — TELEPHONE ENCOUNTER
Called pt; informed pt's daughter I was calling to make sure pt's 5/12/25 home visit for pt's eawv appt still worked for pt; pt's daughter stated as of now the appt still works; informed pt's daughter the provider would call 24-48 hours before the scheduled appt date to confirm appt time; pt's daughter verbalized understanding

## 2025-05-06 RX ORDER — AMLODIPINE BESYLATE 2.5 MG/1
2.5 TABLET ORAL
Qty: 90 TABLET | Refills: 3 | OUTPATIENT
Start: 2025-05-06

## 2025-05-06 NOTE — TELEPHONE ENCOUNTER
No care due was identified.  [facility] Embedded Care Due Messages. Reference number: [id number].   5/06/2025 8:09:47 AM CDT

## 2025-05-06 NOTE — TELEPHONE ENCOUNTER
Refill Decision Note   Selena Moulton  is requesting a refill authorization.  Brief Assessment and Rationale for Refill:  Quick Discontinue     Medication Therapy Plan:  Med d/c by Maureen Perez LPN on 9/9/202; [facility]      Comments:     Note composed:9:04 AM 05/06/2025             No

## 2025-05-12 ENCOUNTER — OFFICE VISIT (OUTPATIENT)
Dept: HOME HEALTH SERVICES | Facility: CLINIC | Age: 85
End: 2025-05-12
Payer: MEDICARE

## 2025-05-12 VITALS
HEART RATE: 61 BPM | BODY MASS INDEX: 41.59 KG/M2 | DIASTOLIC BLOOD PRESSURE: 55 MMHG | SYSTOLIC BLOOD PRESSURE: 125 MMHG | WEIGHT: 265 LBS | HEIGHT: 67 IN | OXYGEN SATURATION: 97 %

## 2025-05-12 DIAGNOSIS — I48.19 PERSISTENT ATRIAL FIBRILLATION: ICD-10-CM

## 2025-05-12 DIAGNOSIS — I70.0 AORTIC ATHEROSCLEROSIS: ICD-10-CM

## 2025-05-12 DIAGNOSIS — I25.10 CORONARY ARTERY DISEASE, UNSPECIFIED VESSEL OR LESION TYPE, UNSPECIFIED WHETHER ANGINA PRESENT, UNSPECIFIED WHETHER NATIVE OR TRANSPLANTED HEART: ICD-10-CM

## 2025-05-12 DIAGNOSIS — N18.32 STAGE 3B CHRONIC KIDNEY DISEASE: ICD-10-CM

## 2025-05-12 DIAGNOSIS — Z95.3 S/P TAVR (TRANSCATHETER AORTIC VALVE REPLACEMENT): ICD-10-CM

## 2025-05-12 DIAGNOSIS — E66.01 CLASS 3 SEVERE OBESITY DUE TO EXCESS CALORIES WITH SERIOUS COMORBIDITY AND BODY MASS INDEX (BMI) OF 40.0 TO 44.9 IN ADULT: ICD-10-CM

## 2025-05-12 DIAGNOSIS — R73.03 PRE-DIABETES: ICD-10-CM

## 2025-05-12 DIAGNOSIS — I10 ESSENTIAL HYPERTENSION: ICD-10-CM

## 2025-05-12 DIAGNOSIS — D69.2 PURPURA, NONTHROMBOCYTOPENIC: ICD-10-CM

## 2025-05-12 DIAGNOSIS — M1A.0720 IDIOPATHIC CHRONIC GOUT OF LEFT FOOT WITHOUT TOPHUS: ICD-10-CM

## 2025-05-12 DIAGNOSIS — Z79.02 LONG TERM (CURRENT) USE OF ANTITHROMBOTICS/ANTIPLATELETS: ICD-10-CM

## 2025-05-12 DIAGNOSIS — Z00.00 ENCOUNTER FOR MEDICARE ANNUAL WELLNESS EXAM: Primary | ICD-10-CM

## 2025-05-12 DIAGNOSIS — E66.813 CLASS 3 SEVERE OBESITY DUE TO EXCESS CALORIES WITH SERIOUS COMORBIDITY AND BODY MASS INDEX (BMI) OF 40.0 TO 44.9 IN ADULT: ICD-10-CM

## 2025-05-12 DIAGNOSIS — I77.1 TORTUOUS AORTA: ICD-10-CM

## 2025-05-12 DIAGNOSIS — N25.81 SECONDARY HYPERPARATHYROIDISM OF RENAL ORIGIN: ICD-10-CM

## 2025-05-12 PROCEDURE — 1158F ADVNC CARE PLAN TLK DOCD: CPT | Mod: CPTII,S$GLB,, | Performed by: NURSE PRACTITIONER

## 2025-05-12 PROCEDURE — 1160F RVW MEDS BY RX/DR IN RCRD: CPT | Mod: CPTII,S$GLB,, | Performed by: NURSE PRACTITIONER

## 2025-05-12 PROCEDURE — G0439 PPPS, SUBSEQ VISIT: HCPCS | Mod: S$GLB,,, | Performed by: NURSE PRACTITIONER

## 2025-05-12 PROCEDURE — 3288F FALL RISK ASSESSMENT DOCD: CPT | Mod: CPTII,S$GLB,, | Performed by: NURSE PRACTITIONER

## 2025-05-12 PROCEDURE — 1159F MED LIST DOCD IN RCRD: CPT | Mod: CPTII,S$GLB,, | Performed by: NURSE PRACTITIONER

## 2025-05-12 PROCEDURE — 1100F PTFALLS ASSESS-DOCD GE2>/YR: CPT | Mod: CPTII,S$GLB,, | Performed by: NURSE PRACTITIONER

## 2025-05-12 RX ORDER — AMLODIPINE BESYLATE 2.5 MG/1
2.5 TABLET ORAL DAILY
COMMUNITY

## 2025-05-15 DIAGNOSIS — M1A.0720 IDIOPATHIC CHRONIC GOUT OF LEFT FOOT WITHOUT TOPHUS: ICD-10-CM

## 2025-05-15 RX ORDER — ALLOPURINOL 300 MG/1
TABLET ORAL
Qty: 90 TABLET | Refills: 0 | Status: SHIPPED | OUTPATIENT
Start: 2025-05-15

## 2025-05-15 NOTE — TELEPHONE ENCOUNTER
Refill Routing Note   Medication(s) are not appropriate for processing by [facility] for the following reason(s):        Required labs outdated    [facility] action(s):  Defer   Requires appointment : Yes               Appointments  past 12m or future 3m with PCP    Date Provider   Last Visit   5/20/2024 Aby Perales MD   Next Visit   Visit date not found Aby Perales MD   ED visits in past 90 days: 0        Note composed:9:15 AM 05/15/2025

## 2025-05-15 NOTE — TELEPHONE ENCOUNTER
Care Due:                  Date            Visit Type   Department     Provider  --------------------------------------------------------------------------------                                EP -                              PRIMARY      [facility] FAMILY  Last Visit: 05-      CARE ([facility])   MEDICINE       Aby Perales  Next Visit: None Scheduled  None         None Found                                                            Last  Test          Frequency    Reason                     Performed    Due Date  --------------------------------------------------------------------------------    Office Visit  15 months..  allopurinoL,               05- 08-                             atorvastatin,                             pantoprazole.............    [employer] Embedded Care Due Messages. Reference number: [id number].   5/15/2025 3:44:16 AM CDT

## 2025-05-17 PROBLEM — N18.32 STAGE 3B CHRONIC KIDNEY DISEASE: Status: ACTIVE | Noted: 2022-08-10

## 2025-05-17 PROBLEM — E66.813 CLASS 3 SEVERE OBESITY DUE TO EXCESS CALORIES WITH SERIOUS COMORBIDITY AND BODY MASS INDEX (BMI) OF 40.0 TO 44.9 IN ADULT: Status: ACTIVE | Noted: 2023-02-15

## 2025-05-17 PROBLEM — I35.0 SEVERE AORTIC STENOSIS: Status: RESOLVED | Noted: 2019-01-14 | Resolved: 2025-05-17

## 2025-05-17 NOTE — PATIENT INSTRUCTIONS
Counseling and Referral of Other Preventative  (Italic type indicates deductible and co-insurance are waived)    Patient Name: Selena Moulton  Today's Date: 5/17/2025    Health Maintenance       Date Due Completion Date    Shingles Vaccine (1 of 2) Never done ---    RSV Vaccine (Age 60+ and Pregnant patients) (1 - 1-dose 75+ series) Never done ---    COVID-19 Vaccine (4 - 2024-25 season) 09/01/2024 8/10/2022    Hemoglobin A1c (Prediabetes) 05/13/2025 5/13/2024    Lipid Panel 05/13/2025 5/13/2024    Influenza Vaccine (Season Ended) 09/01/2025 12/30/2020 (Declined)    Override on 12/30/2020: Declined    Override on 12/6/2019: Declined        No orders of the defined types were placed in this encounter.    The following information is provided to all patients.  This information is to help you find resources for any of the problems found today that may be affecting your health:                  Living healthy guide: www.[facility].[place].gov      Understanding Diabetes: www.diabetes.org      Eating healthy: www.cdc.gov/healthyweight      CDC home safety checklist: www.cdc.gov/steadi/patient.html      Agency on Aging: www.goea.[place].gov      Alcoholics anonymous (AA): www.aa.org      Physical Activity: www.nba.nih.gov/fs6xfxb      Tobacco use: www.quitwithusla.org

## 2025-05-17 NOTE — PROGRESS NOTES
"Selena Moulton presented for an initial Medicare AWV today. The following components were reviewed and updated:    Medical history  Family History  Social history  Allergies and Current Medications  Health Risk Assessment  Health Maintenance  Care Team    **See Completed Assessments for Annual Wellness visit with in the encounter summary    The following assessments were completed:  Depression Screening  Cognitive function Screening  Timed Get Up Test  Whisper Test      Opioid documentation:      Patient does not have a current opioid prescription.          Vitals:    05/12/25 1113   BP: (!) 125/55   BP Location: Left arm   Patient Position: Sitting   Pulse: 61   SpO2: 97%   Weight: 120.2 kg (265 lb)   Height: 5' 7" (1.702 m)     Body mass index is 41.5 kg/m².       Physical Exam  Constitutional:       Appearance: Normal appearance.   HENT:      Head: Normocephalic and atraumatic.      Nose: Nose normal.      Mouth/Throat:      Mouth: Mucous membranes are moist.   Eyes:      Extraocular Movements: Extraocular movements intact.      Pupils: Pupils are equal, round, and reactive to light.   Cardiovascular:      Rate and Rhythm: Normal rate and regular rhythm.   Pulmonary:      Effort: Pulmonary effort is normal.      Breath sounds: Normal breath sounds.   Abdominal:      General: Bowel sounds are normal.      Palpations: Abdomen is soft.   Musculoskeletal:      Cervical back: Normal range of motion and neck supple.   Skin:     General: Skin is warm and dry.   Neurological:      General: No focal deficit present.      Mental Status: She is alert and oriented to person, place, and time.   Psychiatric:         Mood and Affect: Mood normal.         Behavior: Behavior normal.           Diagnoses and health risks identified today and associated recommendations/orders:  1. Encounter for Medicare annual wellness exam    - Referral to Enhanced Annual Wellness Visit (eAWV) W+1    2. Aortic atherosclerosis  On lipitor - followed " with Cardiology      3. Coronary artery disease, unspecified vessel or lesion type, unspecified whether angina present, unspecified whether native or transplanted heart  Followed with cardiology    4. Essential hypertension  BP stable       5. S/P TAVR (transcatheter aortic valve replacement)  Stable - per Cardiology      6. Persistent atrial fibrillation  On eliquis and pacerone per cardiology      7. Tortuous aorta  On eliquis and lipitor  stable    8. Pre-diabetes  Hba1c 5.7-6%  Change diet to help lower Blood sugars    9. Secondary hyperparathyroidism of renal origin  Follow PTH per renal    10. Idiopathic chronic gout of left foot without tophus  On allopurinol to decrease uric acid  Uric acid 4.6        11. Long term (current) use of antithrombotics/antiplatelets  Eliquis related to a fib       12. Stage 3b chronic kidney disease  Monitoring GFR per renal  13. Purpura, nonthrombocytopenic  Related to eliquis - stable    14. Class 3 severe obesity due to excess calories with serious comorbidity and body mass index (BMI) of 40.0 to 44.9 in adult   Diet and exercise      Provided Selena with a 5-10 year written screening schedule and personal prevention plan. Recommendations were developed using the USPSTF age appropriate recommendations. Education, counseling, and referrals were provided as needed.  After Visit Summary printed and given to patient which includes a list of additional screenings\tests needed.    Fu in 1 yr for juan josé Hoff, ADRIANA, APRN

## 2025-05-19 ENCOUNTER — LAB VISIT (OUTPATIENT)
Dept: LAB | Facility: HOSPITAL | Age: 85
End: 2025-05-19
Attending: INTERNAL MEDICINE
Payer: MEDICARE

## 2025-05-19 ENCOUNTER — OFFICE VISIT (OUTPATIENT)
Dept: CARDIOLOGY | Facility: CLINIC | Age: 85
End: 2025-05-19
Payer: MEDICARE

## 2025-05-19 VITALS
SYSTOLIC BLOOD PRESSURE: 109 MMHG | WEIGHT: 267.63 LBS | HEART RATE: 61 BPM | BODY MASS INDEX: 42 KG/M2 | HEIGHT: 67 IN | DIASTOLIC BLOOD PRESSURE: 60 MMHG

## 2025-05-19 DIAGNOSIS — I25.10 CORONARY ARTERY DISEASE, UNSPECIFIED VESSEL OR LESION TYPE, UNSPECIFIED WHETHER ANGINA PRESENT, UNSPECIFIED WHETHER NATIVE OR TRANSPLANTED HEART: ICD-10-CM

## 2025-05-19 DIAGNOSIS — Z95.3 S/P TAVR (TRANSCATHETER AORTIC VALVE REPLACEMENT): ICD-10-CM

## 2025-05-19 DIAGNOSIS — E78.00 PURE HYPERCHOLESTEROLEMIA: ICD-10-CM

## 2025-05-19 DIAGNOSIS — I48.19 PERSISTENT ATRIAL FIBRILLATION: ICD-10-CM

## 2025-05-19 DIAGNOSIS — I10 ESSENTIAL HYPERTENSION: ICD-10-CM

## 2025-05-19 DIAGNOSIS — E66.813 CLASS 3 SEVERE OBESITY DUE TO EXCESS CALORIES WITH SERIOUS COMORBIDITY AND BODY MASS INDEX (BMI) OF 40.0 TO 44.9 IN ADULT: ICD-10-CM

## 2025-05-19 DIAGNOSIS — E66.01 CLASS 3 SEVERE OBESITY DUE TO EXCESS CALORIES WITH SERIOUS COMORBIDITY AND BODY MASS INDEX (BMI) OF 40.0 TO 44.9 IN ADULT: ICD-10-CM

## 2025-05-19 DIAGNOSIS — I70.0 AORTIC ATHEROSCLEROSIS: ICD-10-CM

## 2025-05-19 DIAGNOSIS — I70.0 AORTIC ATHEROSCLEROSIS: Primary | ICD-10-CM

## 2025-05-19 LAB
ANION GAP (OHS): 13 MMOL/L (ref 8–16)
BUN SERPL-MCNC: 31 MG/DL (ref 8–23)
CALCIUM SERPL-MCNC: 9.9 MG/DL (ref 8.7–10.5)
CHLORIDE SERPL-SCNC: 105 MMOL/L (ref 95–110)
CO2 SERPL-SCNC: 25 MMOL/L (ref 23–29)
CREAT SERPL-MCNC: 1.4 MG/DL (ref 0.5–1.4)
ERYTHROCYTE [DISTWIDTH] IN BLOOD BY AUTOMATED COUNT: 16.1 % (ref 11.5–14.5)
GFR SERPLBLD CREATININE-BSD FMLA CKD-EPI: 37 ML/MIN/1.73/M2
GLUCOSE SERPL-MCNC: 98 MG/DL (ref 70–110)
HCT VFR BLD AUTO: 43.1 % (ref 37–48.5)
HGB BLD-MCNC: 13.1 GM/DL (ref 12–16)
MCH RBC QN AUTO: 28.2 PG (ref 27–31)
MCHC RBC AUTO-ENTMCNC: 30.4 G/DL (ref 32–36)
MCV RBC AUTO: 93 FL (ref 82–98)
PLATELET # BLD AUTO: 155 K/UL (ref 150–450)
PMV BLD AUTO: 10.5 FL (ref 9.2–12.9)
POTASSIUM SERPL-SCNC: 4.4 MMOL/L (ref 3.5–5.1)
RBC # BLD AUTO: 4.64 M/UL (ref 4–5.4)
SODIUM SERPL-SCNC: 143 MMOL/L (ref 136–145)
T4 FREE SERPL-MCNC: 1.14 NG/DL (ref 0.71–1.51)
TSH SERPL-ACNC: 1.89 UIU/ML (ref 0.4–4)
WBC # BLD AUTO: 7.27 K/UL (ref 3.9–12.7)

## 2025-05-19 PROCEDURE — 85027 COMPLETE CBC AUTOMATED: CPT

## 2025-05-19 PROCEDURE — 80048 BASIC METABOLIC PNL TOTAL CA: CPT

## 2025-05-19 PROCEDURE — 1126F AMNT PAIN NOTED NONE PRSNT: CPT | Mod: CPTII,S$GLB,, | Performed by: INTERNAL MEDICINE

## 2025-05-19 PROCEDURE — 84443 ASSAY THYROID STIM HORMONE: CPT

## 2025-05-19 PROCEDURE — 93005 ELECTROCARDIOGRAM TRACING: CPT | Mod: PO

## 2025-05-19 PROCEDURE — 36415 COLL VENOUS BLD VENIPUNCTURE: CPT | Mod: PO

## 2025-05-19 PROCEDURE — 1101F PT FALLS ASSESS-DOCD LE1/YR: CPT | Mod: CPTII,S$GLB,, | Performed by: INTERNAL MEDICINE

## 2025-05-19 PROCEDURE — 84439 ASSAY OF FREE THYROXINE: CPT

## 2025-05-19 PROCEDURE — 93010 ELECTROCARDIOGRAM REPORT: CPT | Mod: S$GLB,,, | Performed by: INTERNAL MEDICINE

## 2025-05-19 PROCEDURE — 1159F MED LIST DOCD IN RCRD: CPT | Mod: CPTII,S$GLB,, | Performed by: INTERNAL MEDICINE

## 2025-05-19 PROCEDURE — 99999 PR PBB SHADOW E&M-EST. PATIENT-LVL III: CPT | Mod: PBBFAC,,, | Performed by: INTERNAL MEDICINE

## 2025-05-19 PROCEDURE — 3288F FALL RISK ASSESSMENT DOCD: CPT | Mod: CPTII,S$GLB,, | Performed by: INTERNAL MEDICINE

## 2025-05-19 PROCEDURE — 99214 OFFICE O/P EST MOD 30 MIN: CPT | Mod: S$GLB,,, | Performed by: INTERNAL MEDICINE

## 2025-05-19 NOTE — PROGRESS NOTES
Subjective:    Patient ID:  Selena Moulton is a 84 y.o. female patient here for evaluation Follow-up (3 month)      History of Present Illness:  Follow-up visit.  TAVR 2019.  Pre TAVR workup with nonobstructive CAD, EF normal.  Cardiac PET to screening for coronary artery disease normal.    Persistent atrial fibrillation, status post ablation 04/2025.  Since the ablation procedure exertional fatigue.  No syncope presyncope.  No  Perceived arrhythmia.                   Review of patient's allergies indicates:   Allergen Reactions    Opioids - morphine analogues      nause and fever   Other reaction(s): Other (See Comments)  nause and fever     Tetanus vaccines and toxoid Rash and Swelling    Adhesive Rash    Flu virus vacc tvs 2015-16 (18 yr up) cell derived      Other reaction(s): Other (See Comments)    Influenza virus vaccines Nausea And Vomiting    Iodides     Iodine Hives    Iodine and iodide containing products     Morpholine analogues Nausea And Vomiting    Penicillins Other (See Comments)     High fever as a child, also was allergy tested 1980    Tetanus and diphther. tox (pf)     Latex, natural rubber Rash       Past Medical History:   Diagnosis Date    Anticoagulant long-term use     Aortic stenosis     Arthritis     Cataracts, bilateral     CKD (chronic kidney disease), stage III     Class 2 severe obesity due to excess calories with serious comorbidity and body mass index (BMI) of 39.0 to 39.9 in adult 09/13/2018    Digestive disorder     GERD (gastroesophageal reflux disease)     Gout     Hypercholesteremia     Hypertension     Implantable loop recorder present     Multiple facial fractures     alfredo 15, 2023    PAF (paroxysmal atrial fibrillation)     Personal history of COVID-19 05/2022    S/P TAVR (transcatheter aortic valve replacement)      Past Surgical History:   Procedure Laterality Date    ABLATION, ATRIAL FIBRILLATION, CRYO  04/08/2025    Procedure: Ablation, Atrial Fibrillation PF;   Surgeon: Moody Johnson MD;  Location: [facility] CATH;  Service: Cardiology;;    AORTIC VALVE REPLACEMENT N/A 10/08/2019    Procedure: Replacement-valve-aortic;  Surgeon: Fidel Cardenas MD;  Location: [facility] CATH LAB;  Service: Cardiology;  Laterality: N/A;    BACK SURGERY      bone graft neck    CARDIAC ELECTROPHYSIOLOGY STUDY  04/08/2025    Procedure: Ablation CTI;  Surgeon: Moody Johnson MD;  Location: [facility] CATH;  Service: Cardiology;;    CARDIAC VALVE SURGERY      CARPAL TUNNEL RELEASE Left 10/03/2018    Procedure: RELEASE, CARPAL TUNNEL  LEFT;  Surgeon: Meche Cárdenas MD;  Location: Psychiatric;  Service: Neurosurgery;  Laterality: Left;    CARPAL TUNNEL RELEASE Right 01/17/2019    Procedure: RELEASE, CARPAL TUNNEL RIGHT;  Surgeon: Meche Cárdenas MD;  Location: Psychiatric;  Service: Neurosurgery;  Laterality: Right;    CATARACT EXTRACTION Right     CATHETERIZATION OF BOTH LEFT AND RIGHT HEART N/A 07/30/2019    Procedure: CATHETERIZATION, HEART, BOTH LEFT AND RIGHT;  Surgeon: Terrie Alberto MD;  Location: [facility] CATH;  Service: Cardiology;  Laterality: N/A;    CERVICAL FUSION  1984    x2     CORONARY ANGIOGRAPHY N/A 07/30/2019    Procedure: ANGIOGRAM, CORONARY ARTERY;  Surgeon: Terrie Alberto MD;  Location: [facility];  Service: Cardiology;  Laterality: N/A;    ECHOCARDIOGRAM,TRANSESOPHAGEAL N/A 10/11/2024    Procedure: Transesophageal echo (MURRAY) intra-procedure log documentation;  Surgeon: Moody Johnson MD;  Location: [facility];  Service: Cardiology;  Laterality: N/A;    ECHOCARDIOGRAM,TRANSESOPHAGEAL  04/08/2025    Procedure: (MURRAY) intra-procedure;  Surgeon: Moody Johnson MD;  Location: [facility];  Service: Cardiology;;    HERNIA REPAIR      umbilical    HYSTERECTOMY      TONSILLECTOMY      TRANSESOPHAGEAL ECHOCARDIOGRAM WITH POSSIBLE CARDIOVERSION (MURRAY W/ POSS CARDIOVERSION) N/A 07/19/2024    Procedure: TRANSESOPHAGEAL ECHOCARDIOGRAM WITH POSSIBLE CARDIOVERSION (MURRAY W/ POSS  CARDIOVERSION);  Surgeon: Armando Reinoso MD;  Location: [facility];  Service: Cardiology;  Laterality: N/A;    TREATMENT OF CARDIAC ARRHYTHMIA N/A 10/11/2024    Procedure: Cardioversion or Defibrillation;  Surgeon: Moody Johnson MD;  Location: [facility];  Service: Cardiology;  Laterality: N/A;    TREATMENT OF CARDIAC ARRHYTHMIA N/A 02/24/2025    Procedure: Cardioversion or Defibrillation;  Surgeon: Moody Johnson MD;  Location: [facility] ENDO;  Service: Cardiology;  Laterality: N/A;     Social History[1]     Review of Systems:    As noted in HPI in addition      REVIEW OF SYSTEMS  Review of Systems   Constitutional: Positive for malaise/fatigue. Negative for decreased appetite, diaphoresis, night sweats, weight gain and weight loss.   HENT:  Negative for nosebleeds and odynophagia.    Eyes:  Negative for double vision and photophobia.   Cardiovascular:  Negative for chest pain, claudication, cyanosis, dyspnea on exertion, irregular heartbeat, leg swelling, near-syncope, orthopnea, palpitations, paroxysmal nocturnal dyspnea and syncope.   Respiratory:  Negative for cough, hemoptysis, shortness of breath and wheezing.    Hematologic/Lymphatic: Negative for adenopathy.   Skin:  Negative for flushing, skin cancer and suspicious lesions.   Musculoskeletal:  Negative for gout, myalgias and neck pain.   Gastrointestinal:  Negative for abdominal pain, heartburn, hematemesis and hematochezia.   Genitourinary:  Negative for bladder incontinence, hesitancy and nocturia.   Neurological:  Negative for focal weakness, headaches, light-headedness and paresthesias.   Psychiatric/Behavioral:  Negative for memory loss and substance abuse.               Objective:        Vitals:    05/19/25 1416   BP: 109/60   Pulse: 61       Lab Results   Component Value Date    WBC 7.68 03/31/2025    HGB 13.9 03/31/2025    HCT 44.7 03/31/2025     03/31/2025    CHOL 161 05/13/2024    TRIG 90 05/13/2024    HDL 64 05/13/2024    ALT 16  02/03/2025    AST 18 02/03/2025     03/31/2025    K 4.7 03/31/2025     03/31/2025    CREATININE 1.3 03/31/2025    BUN 26 (H) 03/31/2025    CO2 30 (H) 03/31/2025    TSH 2.571 02/03/2025    INR 0.9 10/08/2019    HGBA1C 5.7 (H) 05/13/2024        ECHOCARDIOGRAM RESULTS  Results for orders placed during the hospital encounter of 03/03/25    Echo    Interpretation Summary    Left Ventricle: The left ventricle is normal in size. Normal wall thickness. There is normal systolic function with a visually estimated ejection fraction of 60 - 65%. There is normal diastolic function.    Right Ventricle: The right ventricle is normal in size. Wall thickness is normal. Systolic function is normal.    Left Atrium: moderately dilated    Aortic Valve: There is a transcatheter valve replacement in the aortic position. It is reported to be a 23 mm Rodgers valve.    Mitral Valve: Moderately calcified posterior leaflet. There is mild regurgitation.    Pulmonary Artery: There is mild pulmonary hypertension. The estimated pulmonary artery systolic pressure is 37 mmHg.    IVC/SVC: Normal venous pressure at 3 mmHg.    Results for orders placed during the hospital encounter of 04/08/25    Intra-Procedure Documentation    Narrative  MURRAY performed in the Invasive Lab  - See Procedure Log link below for nursing documentation  - See MURRAY order on Card Proc Tab for physician findings          CURRENT/PREVIOUS VISIT EKG  Results for orders placed or performed during the hospital encounter of 04/08/25   EKG 12-lead    Collection Time: 04/08/25  3:06 PM   Result Value Ref Range    QRS Duration 110 ms    [facility] QTC Calculation 538 ms    Narrative    Test Reason : I48.19,Z98.890,Z86.79,    Vent. Rate :  77 BPM     Atrial Rate :  77 BPM     P-R Int : 176 ms          QRS Dur : 110 ms      QT Int : 476 ms       P-R-T Axes :  83  46  68 degrees    QTcB Int : 538 ms    Normal sinus rhythm  Prolonged QT  Abnormal ECG  When compared with ECG of  08-Apr-2025 11:31,  No significant change was found  Confirmed by Kingston Vazquez ([id number]) on 4/15/2025 9:32:18 AM    Referred By: Moody Johnson           Confirmed By: Kingston Vazquez     No valid procedures specified.   Results for orders placed during the hospital encounter of 08/09/22    Nuclear Stress - Cardiology Interpreted    Interpretation Summary    Normal myocardial perfusion scan. There is no evidence of myocardial ischemia or infarction.    The gated perfusion images showed an ejection fraction of 83% post stress.    LV cavity size is normal at rest and normal at stress.    The EKG portion of this study is abnormal but not diagnostic.    No valid procedures specified.    PHYSICAL EXAM  GENERAL: well built, well nourished, well-developed in no apparent distress alert and oriented.   HEENT: Normocephalic. Pupils normal and conjunctivae normal.  Mucous membranes normal, no cyanosis or icterus, trachea central,no pallor or icterus is noted..   NECK: No JVD. No bruit..   THYROID: Thyroid not enlarged. No nodules present..   CARDIAC:  Normal S1-S2.  No murmur rub click or gallop.  PMI nondisplaced.  LUNGS: Clear to auscultation. No wheezing or rhonchi..   ABDOMEN: Soft no masses or organomegaly.  No abdomen pulsations or bruits.  Normal bowel sounds. No pulsations and no masses felt, No guarding or rebound.   URINARY: No waller catheter   EXTREMITIES: No cyanosis, clubbing or edema noted at this time., no calf tenderness bilaterally.   PERIPHERAL VASCULAR SYSTEM: Good palpable distal pulses.  2+ femoral, popliteal and pedal pulses.  No bruits    CENTRAL NERVOUS SYSTEM: No focal motor or sensory deficits noted.   SKIN: Skin without lesions, moist, well perfused.   MUSCLE STRENGTH & TONE: No noteable weakness, atrophy or abnormal movement    I HAVE REVIEWED :    The vital signs, nurses notes, and all the pertinent radiology and labs.         Current Outpatient Medications   Medication Instructions     acetaminophen (TYLENOL) 500 mg, Every 8 hours PRN    allopurinoL (ZYLOPRIM) 300 MG tablet TAKE 1 TABLET(300 MG) BY MOUTH DAILY    amiodarone (PACERONE) 200 mg, Oral, 2 times daily    amLODIPine (NORVASC) 2.5 mg, Daily    atorvastatin (LIPITOR) 40 MG tablet TAKE 1 TABLET(40 MG) BY MOUTH EVERY EVENING    ELIQUIS 5 mg, Oral, 2 times daily    fish oil-omega-3 fatty acids 300-1,000 mg capsule 1 capsule, Daily    furosemide (LASIX) 20 MG tablet TAKE 1 TABLET(20 MG) BY MOUTH TWICE DAILY    metoprolol succinate (TOPROL-XL) 25 mg, Oral, Nightly    multivitamin capsule 1 capsule, Daily    nitroGLYCERIN (NITROSTAT) 0.4 MG SL tablet Every 5 min PRN    pantoprazole (PROTONIX) 20 mg, Oral    vit A/vit C/vit E/zinc/copper (PRESERVISION AREDS ORAL) 1 tablet, 2 times daily    vitamin D (VITAMIN D3) 1,000 Units, Daily          Assessment:   Valvular heart disease.  TAVR 2019.  Pre TAVR left heart catheterization with nonobstructive CAD.  Recent nuclear PET perfusion imaging negative for ischemia.    Pulmonary vein ablation 04/08/2025.  Postprocedure fatigue, decreased stamina.        Plan:   Continue Eliquis 5 b.i.d., amiodarone 200 mg qd., atorvastatin 40, metoprolol XL 25 daily.    EKG today.  Normal sinus rhythm.    Labs.  Call results.  If stable see me again in 4 months    Follow up with EP as scheduled        No follow-ups on file.            [1]   Social History  Tobacco Use    Smoking status: Never    Smokeless tobacco: Never   Substance Use Topics    Alcohol use: No    Drug use: No

## 2025-05-20 ENCOUNTER — HOSPITAL ENCOUNTER (OUTPATIENT)
Dept: CARDIOLOGY | Facility: HOSPITAL | Age: 85
Discharge: HOME | End: 2025-05-20
Attending: INTERNAL MEDICINE
Payer: MEDICARE

## 2025-05-20 ENCOUNTER — CLINICAL SUPPORT (OUTPATIENT)
Dept: CARDIOLOGY | Facility: HOSPITAL | Age: 85
End: 2025-05-20
Payer: MEDICARE

## 2025-05-20 DIAGNOSIS — I49.8 OTHER SPECIFIED CARDIAC ARRHYTHMIAS: ICD-10-CM

## 2025-05-20 DIAGNOSIS — E78.49 OTHER HYPERLIPIDEMIA: ICD-10-CM

## 2025-05-20 PROCEDURE — 93298 REM INTERROG DEV EVAL SCRMS: CPT | Mod: 26,,, | Performed by: INTERNAL MEDICINE

## 2025-05-20 PROCEDURE — 93298 REM INTERROG DEV EVAL SCRMS: CPT | Mod: PO | Performed by: INTERNAL MEDICINE

## 2025-05-20 RX ORDER — ATORVASTATIN CALCIUM 40 MG/1
40 TABLET, FILM COATED ORAL NIGHTLY
Qty: 90 TABLET | Refills: 0 | Status: SHIPPED | OUTPATIENT
Start: 2025-05-20

## 2025-05-20 NOTE — TELEPHONE ENCOUNTER
No care due was identified.  [facility] Embedded Care Due Messages. Reference number: [id number].   5/20/2025 3:45:47 AM CDT

## 2025-05-20 NOTE — TELEPHONE ENCOUNTER
Refill Routing Note   Medication(s) are not appropriate for processing by [facility] for the following reason(s):        Required labs outdated    [facility] action(s):  Defer               Appointments  past 12m or future 3m with PCP    Date Provider   Last Visit   5/20/2024 Aby Perales MD   Next Visit   Visit date not found Aby Perales MD   ED visits in past 90 days: 0        Note composed:9:07 AM 05/20/2025

## 2025-05-21 LAB
OHS QRS DURATION: 96 MS
OHS QTC CALCULATION: 457 MS

## 2025-05-26 ENCOUNTER — OFFICE VISIT (OUTPATIENT)
Dept: CARDIOLOGY | Facility: CLINIC | Age: 85
End: 2025-05-26
Payer: MEDICARE

## 2025-05-26 VITALS
WEIGHT: 270.06 LBS | HEIGHT: 67 IN | SYSTOLIC BLOOD PRESSURE: 104 MMHG | DIASTOLIC BLOOD PRESSURE: 38 MMHG | HEART RATE: 63 BPM | BODY MASS INDEX: 42.39 KG/M2

## 2025-05-26 DIAGNOSIS — I10 ESSENTIAL HYPERTENSION: ICD-10-CM

## 2025-05-26 DIAGNOSIS — E66.813 CLASS 3 SEVERE OBESITY DUE TO EXCESS CALORIES WITH SERIOUS COMORBIDITY AND BODY MASS INDEX (BMI) OF 40.0 TO 44.9 IN ADULT: ICD-10-CM

## 2025-05-26 DIAGNOSIS — I70.0 AORTIC ATHEROSCLEROSIS: ICD-10-CM

## 2025-05-26 DIAGNOSIS — Z95.3 S/P TAVR (TRANSCATHETER AORTIC VALVE REPLACEMENT): ICD-10-CM

## 2025-05-26 DIAGNOSIS — E66.01 CLASS 3 SEVERE OBESITY DUE TO EXCESS CALORIES WITH SERIOUS COMORBIDITY AND BODY MASS INDEX (BMI) OF 40.0 TO 44.9 IN ADULT: ICD-10-CM

## 2025-05-26 DIAGNOSIS — I48.19 PERSISTENT ATRIAL FIBRILLATION: Primary | ICD-10-CM

## 2025-05-26 DIAGNOSIS — N18.32 STAGE 3B CHRONIC KIDNEY DISEASE: ICD-10-CM

## 2025-05-26 DIAGNOSIS — E78.49 OTHER HYPERLIPIDEMIA: ICD-10-CM

## 2025-05-26 DIAGNOSIS — I25.10 CORONARY ARTERY DISEASE INVOLVING NATIVE CORONARY ARTERY OF NATIVE HEART WITHOUT ANGINA PECTORIS: ICD-10-CM

## 2025-05-26 PROCEDURE — 1159F MED LIST DOCD IN RCRD: CPT | Mod: CPTII,S$GLB,, | Performed by: INTERNAL MEDICINE

## 2025-05-26 PROCEDURE — 3288F FALL RISK ASSESSMENT DOCD: CPT | Mod: CPTII,S$GLB,, | Performed by: INTERNAL MEDICINE

## 2025-05-26 PROCEDURE — 1126F AMNT PAIN NOTED NONE PRSNT: CPT | Mod: CPTII,S$GLB,, | Performed by: INTERNAL MEDICINE

## 2025-05-26 PROCEDURE — 99999 PR PBB SHADOW E&M-EST. PATIENT-LVL IV: CPT | Mod: PBBFAC,,, | Performed by: INTERNAL MEDICINE

## 2025-05-26 PROCEDURE — 93005 ELECTROCARDIOGRAM TRACING: CPT | Mod: PO

## 2025-05-26 PROCEDURE — 99215 OFFICE O/P EST HI 40 MIN: CPT | Mod: S$GLB,,, | Performed by: INTERNAL MEDICINE

## 2025-05-26 PROCEDURE — 1160F RVW MEDS BY RX/DR IN RCRD: CPT | Mod: CPTII,S$GLB,, | Performed by: INTERNAL MEDICINE

## 2025-05-26 PROCEDURE — 93010 ELECTROCARDIOGRAM REPORT: CPT | Mod: S$GLB,,, | Performed by: INTERNAL MEDICINE

## 2025-05-26 PROCEDURE — 1100F PTFALLS ASSESS-DOCD GE2>/YR: CPT | Mod: CPTII,S$GLB,, | Performed by: INTERNAL MEDICINE

## 2025-05-26 NOTE — PROGRESS NOTES
SUBJECTIVE:    Patient ID:  Selena Moulton is a 84 y.o. female who presents for follow of atrial fibrillation.    Medical history:  Persistent atrial fibrillation s/p pulse field PVI with posterior wall isolation 4/8/2025  Severe AS s/p TAVR 10/2019  ILR in situ (implant 2/2024)  Nonobstructive CAD  Aortic atherosclerosis  Severe obesity  CKD IIIB  HTN  HLD     Cardiologist: Dr. Reinoso     Diagnosed with AF 2022 > underwent DCCV 3/2022 then again 6/2022. Was started on amiodarone which was d/c'd after syncopal event.   Did well until recently with increased AF burden  Underwent MURRAY/DCCV 7/2024 > symptoms of SOB/CP resolved with NSR.     TTE EF 55-60%. DARIUS 31.5     9/2024:  SOB and fatigue with AF  ILR shows persistent AF since 9/3. Well controlled V rates.     12/14/2024  S/p MURRAY/DCCV 10/11/24. Continued on amiodarone load.   Does report having another fall after bending over.  Referral sent to sleep medicine at last visit however the patient has not been contacted.    02/17/2025  Recurrent AFib on 01/19/25 with fatigue and shortness of breath.  Cardiac PET stress 01/13/2025: No ischemia or infarction.   Does report issues with balance.  No recent falls.  S/p straight cardioversion 02/24/2025 05/26/2025  S/p pulse field PVI with posterior wall isolation 04/08/2025 > amiodarone continued.  Seen by Cardiology recently with exertional fatigue since ablation.  ECG showed sinus bradycardia.  TSH, CBC, and BNP unremarkable.    No AF since ablation.     I personally reviewed the ECG/telemetry strip today. My interpretation is NSR 63 bpm.  ms. QTc 466 ms.    Past Medical History:   Diagnosis Date    Anticoagulant long-term use     Aortic stenosis     Arthritis     Cataracts, bilateral     CKD (chronic kidney disease), stage III     Class 2 severe obesity due to excess calories with serious comorbidity and body mass index (BMI) of 39.0 to 39.9 in adult 09/13/2018    Digestive disorder     GERD  (gastroesophageal reflux disease)     Gout     Hypercholesteremia     Hypertension     Implantable loop recorder present     Multiple facial fractures     alfredo 15, 2023    PAF (paroxysmal atrial fibrillation)     Personal history of COVID-19 05/2022    S/P TAVR (transcatheter aortic valve replacement)        Past Surgical History:   Procedure Laterality Date    ABLATION, ATRIAL FIBRILLATION, CRYO  04/08/2025    Procedure: Ablation, Atrial Fibrillation PF;  Surgeon: Moody Johnson MD;  Location: [facility] CATH;  Service: Cardiology;;    AORTIC VALVE REPLACEMENT N/A 10/08/2019    Procedure: Replacement-valve-aortic;  Surgeon: Fidel Cardenas MD;  Location: [facility] CATH LAB;  Service: Cardiology;  Laterality: N/A;    BACK SURGERY      bone graft neck    CARDIAC ELECTROPHYSIOLOGY STUDY  04/08/2025    Procedure: Ablation CTI;  Surgeon: Moody Johnson MD;  Location: [facility] CATH;  Service: Cardiology;;    CARDIAC VALVE SURGERY      CARPAL TUNNEL RELEASE Left 10/03/2018    Procedure: RELEASE, CARPAL TUNNEL  LEFT;  Surgeon: Meche Cárdenas MD;  Location: [facility];  Service: Neurosurgery;  Laterality: Left;    CARPAL TUNNEL RELEASE Right 01/17/2019    Procedure: RELEASE, CARPAL TUNNEL RIGHT;  Surgeon: Meche Cárdenas MD;  Location: [facility];  Service: Neurosurgery;  Laterality: Right;    CATARACT EXTRACTION Right     CATHETERIZATION OF BOTH LEFT AND RIGHT HEART N/A 07/30/2019    Procedure: CATHETERIZATION, HEART, BOTH LEFT AND RIGHT;  Surgeon: Terrie Alberto MD;  Location: [facility] CATH;  Service: Cardiology;  Laterality: N/A;    CERVICAL FUSION  1984    x2     CORONARY ANGIOGRAPHY N/A 07/30/2019    Procedure: ANGIOGRAM, CORONARY ARTERY;  Surgeon: Terrie Alberto MD;  Location: [facility] CATH;  Service: Cardiology;  Laterality: N/A;    ECHOCARDIOGRAM,TRANSESOPHAGEAL N/A 10/11/2024    Procedure: Transesophageal echo (MURRAY) intra-procedure log documentation;  Surgeon: Moody Johnson MD;  Location: [facility] ENDO;  Service:  Cardiology;  Laterality: N/A;    ECHOCARDIOGRAM,TRANSESOPHAGEAL  04/08/2025    Procedure: (MURRAY) intra-procedure;  Surgeon: Moody Johnson MD;  Location: [facility];  Service: Cardiology;;    HERNIA REPAIR      umbilical    HYSTERECTOMY      TONSILLECTOMY      TRANSESOPHAGEAL ECHOCARDIOGRAM WITH POSSIBLE CARDIOVERSION (MURRAY W/ POSS CARDIOVERSION) N/A 07/19/2024    Procedure: TRANSESOPHAGEAL ECHOCARDIOGRAM WITH POSSIBLE CARDIOVERSION (MURRAY W/ POSS CARDIOVERSION);  Surgeon: Armando Reinoso MD;  Location: [facility];  Service: Cardiology;  Laterality: N/A;    TREATMENT OF CARDIAC ARRHYTHMIA N/A 10/11/2024    Procedure: Cardioversion or Defibrillation;  Surgeon: Moody Johnson MD;  Location: [facility] ENDO;  Service: Cardiology;  Laterality: N/A;    TREATMENT OF CARDIAC ARRHYTHMIA N/A 02/24/2025    Procedure: Cardioversion or Defibrillation;  Surgeon: Moody Johnson MD;  Location: [facility] ENDO;  Service: Cardiology;  Laterality: N/A;       Family History   Problem Relation Name Age of Onset    Cancer Mother      Stroke Mother      Hypertension Mother      Ovarian cancer Mother      No Known Problems Father      Glaucoma Neg Hx      Macular degeneration Neg Hx         Social History     Socioeconomic History    Marital status:    Tobacco Use    Smoking status: Never    Smokeless tobacco: Never   Substance and Sexual Activity    Alcohol use: No    Drug use: No     Social Drivers of Health     Financial Resource Strain: Low Risk  (5/12/2025)    Overall Financial Resource Strain (CARDIA)     Difficulty of Paying Living Expenses: Not hard at all   Food Insecurity: No Food Insecurity (5/12/2025)    Hunger Vital Sign     Worried About Running Out of Food in the Last Year: Never true     Ran Out of Food in the Last Year: Never true   Transportation Needs: No Transportation Needs (5/12/2025)    PRAPARE - Transportation     Lack of Transportation (Medical): No     Lack of Transportation (Non-Medical): No   Physical  Activity: Insufficiently Active (5/12/2025)    Exercise Vital Sign     Days of Exercise per Week: 2 days     Minutes of Exercise per Session: 10 min   Stress: No Stress Concern Present (5/12/2025)    [employer] - Occupational Stress Questionnaire     Feeling of Stress : Not at all   Housing Stability: Low Risk  (5/12/2025)    Housing Stability Vital Sign     Unable to Pay for Housing in the Last Year: No     Homeless in the Last Year: No       Review of patient's allergies indicates:   Allergen Reactions    Opioids - morphine analogues      nause and fever   Other reaction(s): Other (See Comments)  nause and fever     Tetanus vaccines and toxoid Rash and Swelling    Adhesive Rash    Flu virus vacc tvs 2015-16 (18 yr up) cell derived      Other reaction(s): Other (See Comments)    Influenza virus vaccines Nausea And Vomiting    Iodides     Iodine Hives    Iodine and iodide containing products     Morpholine analogues Nausea And Vomiting    Penicillins Other (See Comments)     High fever as a child, also was allergy tested 1980    Tetanus and diphther. tox (pf)     Latex, natural rubber Rash       Review of Systems   All other systems reviewed and are negative.         OBJECTIVE:     Vitals:    05/26/25 1329   BP: (!) 104/38   Pulse: 63         BP Readings from Last 5 Encounters:   05/19/25 109/60   05/12/25 (!) 125/55   04/08/25 134/79   02/24/25 (!) 140/78   02/17/25 139/85        Physical Exam  Vitals reviewed.   Constitutional:       General: She is not in acute distress.     Appearance: She is obese. She is not ill-appearing.   HENT:      Head: Normocephalic and atraumatic.   Eyes:      Extraocular Movements: Extraocular movements intact.      Conjunctiva/sclera: Conjunctivae normal.   Cardiovascular:      Rate and Rhythm: Normal rate and regular rhythm.   Pulmonary:      Effort: Pulmonary effort is normal. No respiratory distress.   Musculoskeletal:         General: No swelling or  deformity.   Skin:     Findings: No erythema or rash.   Neurological:      Mental Status: She is alert.           Current Outpatient Medications   Medication Instructions    acetaminophen (TYLENOL) 500 mg, Every 8 hours PRN    allopurinoL (ZYLOPRIM) 300 MG tablet TAKE 1 TABLET(300 MG) BY MOUTH DAILY    amiodarone (PACERONE) 200 mg, Oral, 2 times daily    amLODIPine (NORVASC) 2.5 mg, Daily    atorvastatin (LIPITOR) 40 mg, Oral, Nightly    ELIQUIS 5 mg, Oral, 2 times daily    fish oil-omega-3 fatty acids 300-1,000 mg capsule 1 capsule, Daily    furosemide (LASIX) 20 MG tablet TAKE 1 TABLET(20 MG) BY MOUTH TWICE DAILY    metoprolol succinate (TOPROL-XL) 25 mg, Oral, Nightly    multivitamin capsule 1 capsule, Daily    nitroGLYCERIN (NITROSTAT) 0.4 MG SL tablet Every 5 min PRN    pantoprazole (PROTONIX) 20 mg, Oral    vit A/vit C/vit E/zinc/copper (PRESERVISION AREDS ORAL) 1 tablet, 2 times daily    vitamin D (VITAMIN D3) 1,000 Units, Daily       Lipid Panel:   Lab Results   Component Value Date    CHOL 161 05/13/2024    HDL 64 05/13/2024    LDLCALC 79.0 05/13/2024    TRIG 90 05/13/2024    CHOLHDL 39.8 05/13/2024       The ASCVD Risk score (Naya DK, et al., 2019) failed to calculate for the following reasons:    The 2019 ASCVD risk score is only valid for ages 40 to 79    Most Recent EKG Results  Results for orders placed or performed in visit on 05/19/25   IN OFFICE EKG 12-LEAD (to [place])    Collection Time: 05/19/25  2:46 PM   Result Value Ref Range    QRS Duration 96 ms    [facility] QTC Calculation 457 ms    Narrative    Test Reason : I48.19,    Vent. Rate :  57 BPM     Atrial Rate :  57 BPM     P-R Int : 192 ms          QRS Dur :  96 ms      QT Int : 470 ms       P-R-T Axes :  50  33  64 degrees    QTcB Int : 457 ms    Sinus bradycardia with Premature atrial complexes in a pattern of bigeminy  Otherwise normal ECG  When compared with ECG of 08-Apr-2025 15:06,  Premature atrial complexes are now Present  Confirmed by  Moody Johnson ([id number]) on 5/21/2025 11:23:21 PM    Referred By:            Confirmed By: Moody Johnson       Most Recent Echocardiogram Results  Results for orders placed during the hospital encounter of 03/03/25    Echo    Interpretation Summary    Left Ventricle: The left ventricle is normal in size. Normal wall thickness. There is normal systolic function with a visually estimated ejection fraction of 60 - 65%. There is normal diastolic function.    Right Ventricle: The right ventricle is normal in size. Wall thickness is normal. Systolic function is normal.    Left Atrium: moderately dilated    Aortic Valve: There is a transcatheter valve replacement in the aortic position. It is reported to be a 23 mm Rodgers valve.    Mitral Valve: Moderately calcified posterior leaflet. There is mild regurgitation.    Pulmonary Artery: There is mild pulmonary hypertension. The estimated pulmonary artery systolic pressure is 37 mmHg.    IVC/SVC: Normal venous pressure at 3 mmHg.      Most Recent Nuclear Stress Test Results  Results for orders placed during the hospital encounter of 08/09/22    Nuclear Stress - Cardiology Interpreted    Interpretation Summary    Normal myocardial perfusion scan. There is no evidence of myocardial ischemia or infarction.    The gated perfusion images showed an ejection fraction of 83% post stress.    LV cavity size is normal at rest and normal at stress.    The EKG portion of this study is abnormal but not diagnostic.      Most Recent Cardiac PET Stress Test Results  Results for orders placed during the hospital encounter of 01/13/25    Cardiac PET Scan Stress    Interpretation Summary    The myocardial perfusion images show no evidence of ischemia or scar.    The whole heart absolute myocardial perfusion values were normal at rest, low normal during stress and CFR is mildly reduced.    CT attenuation images demonstrate mild diffuse coronary calcifications in the LAD territory and mild diffuse  aortic calcifications in the ascending aorta and descending aorta.    The gated perfusion images showed an ejection fraction of 67% at rest and 70% during stress. A normal ejection fraction is greater than 47%.    There is normal wall motion at rest and normal wall motion during stress.    The study's ECG is negative for ischemia.    There is no prior study available for comparison.      Most Recent Cardiovascular Angiogram results  Results for orders placed during the hospital encounter of 04/08/25    Intra-Procedure Documentation    Narrative  MURRAY performed in the Invasive Lab  - See Procedure Log link below for nursing documentation  - See MURRAY order on Card Proc Tab for physician findings      Other Most Recent Cardiology Results  Results for orders placed during the hospital encounter of 04/08/25    Cardiac monitoring strips        All pertinent data including labs, imaging, EKGs, and studies listed above were reviewed.  All of the patients ECG tracings since most recent visit were personally interpreted by this provider    ASSESSMENT:   Selena Moulton is a 84 y.o. female who presents for follow of AF s/p pulse field PVI posterior wall isolation. No known recurrence. Major complaint is fatigue.  We will discontinue amiodarone today. TSH, CBC, BMP unremarkable.     1. Persistent atrial fibrillation        2. Coronary artery disease involving native coronary artery of native heart without angina pectoris        3. S/P TAVR (transcatheter aortic valve replacement)        4. Other hyperlipidemia        5. Essential hypertension        6. Aortic atherosclerosis        7. Class 3 severe obesity due to excess calories with serious comorbidity and body mass index (BMI) of 40.0 to 44.9 in adult        8. Stage 3b chronic kidney disease          PLAN FOR TREATMENT OF ABOVE DIAGNOSES:     Persistent atrial fibrillation: currently in sinus  Strategy: rhythm control  Symptomatic: yes  Echocardiogram: LVEF 60-65%. DARIUS  34 mL/m2.  CVA prophylaxis: Eliquis 5 mg BID (CHADS-VASc 5)  Beta blockers/calcium channel blockers:  Toprol 25 q.d.  Antiarrhythmics:  Amiodarone 200 q.d.  Previous ablation?: Yes, pulse field PVI with posterior wall isolation 04/08/2025    Discontinue amiodarone and assess for fatigue impreovmem.   Continue Toprol-XL 25 mg q.d.    Discussed treatment strategies including rate vs. rhythm control, specifically AAD therapy and/or ablation strategies (i.e., PVI)  3 pillars of AF management (SOS) addressed (Stroke, Optimize risk factors, and Symptom management)  Risk factors and behavior changes assessed/addressed, including CHF, exercise, HTN including sodium restriction, DM, tobacco, maintaining a health BMI, EtOH, and ISMAEL, were applicable  Reviewed most recent TSH, electrolytes, and echocardiogram and ordered if absent or update needed  Recommend >/= 210 min/wk of moderate-to-vigorous exercise which has been shown to reduce AF symptoms, decrease arrhythmia burden, enhance maintenance of sinus rhythm, and improve overall quality of life (2023 ACC/AHA/ACCP/HRS guidelines)    HTN:   Avoid NSAID use  Recommend a healthy diet (DASH, low sodium diet) and exercise (at least 150 min/wk)  Discussed importance of maintaining a health BMI (18.5-24.9 kg/m2)    S/p TAVR: stable  Holding aspirin in the setting of anticoagulation use  Routine echo surveillance or if symptoms develop  BP control, healthy BMI, exercise    Nonobstructive CAD: stable. No angina.  Holding antiplatelets in the setting of anticoagulation use for AF  Continue statin & AVN blocking agent(s)  BP control, exercise, healthy BMI, no tobacco use    Obesity Class III (severe obesity) [BMI >/=40]: weight is increasing   Calorie-controlled, nutrient-dense diet (e.g., Mediterranean or DASH-based) tailored to patients preferences  Recommend daily physical activity, aiming for >=150 minutes/week of moderate-intensity exercise; increase to >=300 min/week for weight  loss. Set realistic weight loss goal of 5-10% body weight over 6 months  Management of associated comorbidities: HTN, HLD, DM2, ISMAEL    Aortic atherosclerosis: no acute issues.   Antiplatelets, statin, BP control, avoid tobacco    CKD stage IIIB. Stable renal function.  Avoid NSAIDs/nephrotoxic agents  Low sodium diet (<2000 mg/day)  BP goal <130/80 (preferably with ACE inh or ARB if proteinuria present)  Monitor renal function, electrolytes, and hemoglobin      F/u 3 months      Wander Johnson MD  Cardiac Electrophysiology    This note was partially generated using voice recognition and generative artificial intelligence software. While every effort has been made to ensure its accuracy, transcription errors may exist. Please reach out to me with any questions or if clarification is needed.

## 2025-06-02 LAB
OHS QRS DURATION: 108 MS
OHS QTC CALCULATION: 466 MS

## 2025-06-05 DIAGNOSIS — I10 ESSENTIAL HYPERTENSION: ICD-10-CM

## 2025-06-05 DIAGNOSIS — I48.91 ATRIAL FIBRILLATION, UNSPECIFIED TYPE: ICD-10-CM

## 2025-06-05 RX ORDER — FUROSEMIDE 20 MG/1
20 TABLET ORAL 2 TIMES DAILY
Qty: 180 TABLET | Refills: 3 | Status: SHIPPED | OUTPATIENT
Start: 2025-06-05

## 2025-06-08 ENCOUNTER — CLINICAL SUPPORT (OUTPATIENT)
Dept: CARDIOLOGY | Facility: HOSPITAL | Age: 85
End: 2025-06-08
Payer: MEDICARE

## 2025-06-08 ENCOUNTER — HOSPITAL ENCOUNTER (OUTPATIENT)
Dept: CARDIOLOGY | Facility: HOSPITAL | Age: 85
Discharge: HOME | End: 2025-06-08
Attending: INTERNAL MEDICINE
Payer: MEDICARE

## 2025-06-08 DIAGNOSIS — I49.8 OTHER SPECIFIED CARDIAC ARRHYTHMIAS: ICD-10-CM

## 2025-06-08 LAB
OHS CV AF BURDEN PERCENT: 40.9
OHS CV AF BURDEN PERCENT: < 1
OHS CV DC REMOTE DEVICE TYPE: NORMAL
OHS CV ICD SHOCK: NO
OHS CV ICD SHOCK: NO

## 2025-06-11 ENCOUNTER — TELEPHONE (OUTPATIENT)
Dept: CARDIOLOGY | Facility: HOSPITAL | Age: 85
End: 2025-06-11
Payer: MEDICARE

## 2025-06-13 NOTE — TELEPHONE ENCOUNTER
Patient has not returned calls  DCCV 2/24/25  AF ablation 4/8/25 (recurrent AF in 3mth window)  LOV 5/26?d/c Amiodarone due to fatigue  F/U 8/18    Recurrent AF noted, attempted to reach patient to follow-up on fatigue since d/c amiodarone and any s/s of AF    Recurrent AF with multiple episodes noted 6/6, 6/7, 6/8, 6/10      Longest single episode 6/6 13hr 26mins MVR 67bpm          Presenting NSR 6/13/25      Will continue to monitor

## 2025-06-13 NOTE — TELEPHONE ENCOUNTER
3rd attempt to reach patient, left VM requesting return call back  Undocumented call attempt 6/12/25

## 2025-06-20 ENCOUNTER — HOSPITAL ENCOUNTER (OUTPATIENT)
Dept: CARDIOLOGY | Facility: HOSPITAL | Age: 85
Discharge: HOME | End: 2025-06-20
Attending: INTERNAL MEDICINE
Payer: MEDICARE

## 2025-06-20 ENCOUNTER — CLINICAL SUPPORT (OUTPATIENT)
Dept: CARDIOLOGY | Facility: HOSPITAL | Age: 85
End: 2025-06-20
Payer: MEDICARE

## 2025-06-20 DIAGNOSIS — I49.8 OTHER SPECIFIED CARDIAC ARRHYTHMIAS: ICD-10-CM

## 2025-06-20 PROCEDURE — 93298 REM INTERROG DEV EVAL SCRMS: CPT | Mod: PO | Performed by: INTERNAL MEDICINE

## 2025-06-20 PROCEDURE — 93298 REM INTERROG DEV EVAL SCRMS: CPT | Mod: 26,,, | Performed by: INTERNAL MEDICINE

## 2025-06-27 ENCOUNTER — TELEPHONE (OUTPATIENT)
Dept: CARDIOLOGY | Facility: HOSPITAL | Age: 85
End: 2025-06-27
Payer: MEDICARE

## 2025-06-27 NOTE — TELEPHONE ENCOUNTER
Patient is status post PVI on 4/8/25    Monitoring provider:Dr. Reinoso    Anticoagulant:  Eliquis    Current AT/AF burden: 99.8% 6/26-6/27    AF has occurred within the 3 month blanking period. Longest per device 77 hours 28 mins. On 6/23. Per cardiac compass possibly persistent since 6/23      Presenting on 6/27                          Rate controlled:  Yes        V rates not in AF:                Meds:  Toprol XL  25 mg  Norvasc 2.5 mg daily  Lasix 20 mg bid    Has not checked BP- most recent @ clinic visit 104/38    Symptoms: Fatigue. She reports fatigue for approximately 1 month. Denies improvement after discontinuing Amiodarone.      LOV 5/26/25    Next f/u 8/18/25    Message sent to Dr. Johnson for review due to recent PVI

## 2025-07-06 LAB
OHS CV AF BURDEN PERCENT: 7.5
OHS CV AF BURDEN PERCENT: 99.5
OHS CV DC REMOTE DEVICE TYPE: NORMAL
OHS CV DC REMOTE DEVICE TYPE: NORMAL
OHS CV ICD SHOCK: NO
OHS CV ICD SHOCK: NO

## 2025-07-15 DIAGNOSIS — I10 ESSENTIAL HYPERTENSION: Primary | ICD-10-CM

## 2025-07-15 DIAGNOSIS — I48.19 PERSISTENT ATRIAL FIBRILLATION: ICD-10-CM

## 2025-07-15 NOTE — PROGRESS NOTES
Spoke with pt and scheduled MURRAY/DCCV with Dr. Johnson.   Reviewed instructions via phone with daughter- pt daughter verbalized understanding.   Appt day and arrival time confirmed.

## 2025-07-17 ENCOUNTER — PATIENT MESSAGE (OUTPATIENT)
Dept: CARDIOLOGY | Facility: CLINIC | Age: 85
End: 2025-07-17
Payer: MEDICARE

## 2025-07-17 RX ORDER — MUPIROCIN 20 MG/G
OINTMENT TOPICAL
OUTPATIENT
Start: 2025-07-17

## 2025-07-17 RX ORDER — SODIUM CHLORIDE 9 MG/ML
INJECTION, SOLUTION INTRAVENOUS CONTINUOUS
OUTPATIENT
Start: 2025-07-17

## 2025-07-21 ENCOUNTER — HOSPITAL ENCOUNTER (OUTPATIENT)
Dept: CARDIOLOGY | Facility: HOSPITAL | Age: 85
Discharge: HOME | End: 2025-07-21
Attending: INTERNAL MEDICINE
Payer: MEDICARE

## 2025-07-21 ENCOUNTER — CLINICAL SUPPORT (OUTPATIENT)
Dept: CARDIOLOGY | Facility: HOSPITAL | Age: 85
End: 2025-07-21
Payer: MEDICARE

## 2025-07-21 DIAGNOSIS — I49.8 OTHER SPECIFIED CARDIAC ARRHYTHMIAS: ICD-10-CM

## 2025-07-21 PROCEDURE — 93298 REM INTERROG DEV EVAL SCRMS: CPT | Mod: 26,,, | Performed by: INTERNAL MEDICINE

## 2025-07-21 PROCEDURE — 93298 REM INTERROG DEV EVAL SCRMS: CPT | Mod: PO | Performed by: INTERNAL MEDICINE

## 2025-07-25 LAB
OHS CV AF BURDEN PERCENT: 91.2
OHS CV DC REMOTE DEVICE TYPE: NORMAL
OHS CV ICD SHOCK: NO

## 2025-08-09 ENCOUNTER — CLINICAL SUPPORT (OUTPATIENT)
Dept: CARDIOLOGY | Facility: HOSPITAL | Age: 85
End: 2025-08-09
Payer: MEDICARE

## 2025-08-09 ENCOUNTER — HOSPITAL ENCOUNTER (OUTPATIENT)
Dept: CARDIOLOGY | Facility: HOSPITAL | Age: 85
Discharge: HOME | End: 2025-08-09
Attending: INTERNAL MEDICINE
Payer: MEDICARE

## 2025-08-09 DIAGNOSIS — I49.8 OTHER SPECIFIED CARDIAC ARRHYTHMIAS: ICD-10-CM

## 2025-08-09 DIAGNOSIS — M1A.0720 IDIOPATHIC CHRONIC GOUT OF LEFT FOOT WITHOUT TOPHUS: ICD-10-CM

## 2025-08-10 RX ORDER — ALLOPURINOL 300 MG/1
300 TABLET ORAL DAILY
Qty: 90 TABLET | Refills: 0 | Status: SHIPPED | OUTPATIENT
Start: 2025-08-10

## 2025-08-15 LAB
OHS CV AF BURDEN PERCENT: 91.8
OHS CV DC REMOTE DEVICE TYPE: NORMAL
OHS CV ICD SHOCK: NO

## 2025-08-16 DIAGNOSIS — I48.19 PERSISTENT ATRIAL FIBRILLATION: ICD-10-CM

## 2025-08-16 DIAGNOSIS — E78.49 OTHER HYPERLIPIDEMIA: ICD-10-CM

## 2025-08-16 DIAGNOSIS — I10 ESSENTIAL HYPERTENSION: ICD-10-CM

## 2025-08-17 RX ORDER — ATORVASTATIN CALCIUM 40 MG/1
40 TABLET, FILM COATED ORAL NIGHTLY
Qty: 90 TABLET | Refills: 0 | Status: SHIPPED | OUTPATIENT
Start: 2025-08-17

## 2025-08-18 ENCOUNTER — TELEPHONE (OUTPATIENT)
Dept: CARDIOLOGY | Facility: CLINIC | Age: 85
End: 2025-08-18
Payer: MEDICARE

## 2025-08-18 ENCOUNTER — HOSPITAL ENCOUNTER (OUTPATIENT)
Facility: HOSPITAL | Age: 85
Discharge: HOME | End: 2025-08-18
Attending: INTERNAL MEDICINE | Admitting: INTERNAL MEDICINE
Payer: MEDICARE

## 2025-08-18 ENCOUNTER — ANESTHESIA (OUTPATIENT)
Dept: ENDOSCOPY | Facility: HOSPITAL | Age: 85
End: 2025-08-18
Payer: MEDICARE

## 2025-08-18 ENCOUNTER — ANESTHESIA EVENT (OUTPATIENT)
Dept: ENDOSCOPY | Facility: HOSPITAL | Age: 85
End: 2025-08-18
Payer: MEDICARE

## 2025-08-18 VITALS
BODY MASS INDEX: 42.38 KG/M2 | RESPIRATION RATE: 18 BRPM | WEIGHT: 270 LBS | OXYGEN SATURATION: 96 % | TEMPERATURE: 97 F | HEIGHT: 67 IN | DIASTOLIC BLOOD PRESSURE: 66 MMHG | SYSTOLIC BLOOD PRESSURE: 144 MMHG | HEART RATE: 63 BPM

## 2025-08-18 DIAGNOSIS — I48.19 PERSISTENT ATRIAL FIBRILLATION: ICD-10-CM

## 2025-08-18 DIAGNOSIS — I10 ESSENTIAL HYPERTENSION: ICD-10-CM

## 2025-08-18 DIAGNOSIS — I48.0 PAF (PAROXYSMAL ATRIAL FIBRILLATION): ICD-10-CM

## 2025-08-18 DIAGNOSIS — I10 HTN (HYPERTENSION): ICD-10-CM

## 2025-08-18 DIAGNOSIS — I48.91 ATRIAL FIBRILLATION STATUS POST CARDIOVERSION: ICD-10-CM

## 2025-08-18 LAB
OHS QRS DURATION: 108 MS
OHS QRS DURATION: 108 MS
OHS QTC CALCULATION: 477 MS
OHS QTC CALCULATION: 484 MS

## 2025-08-18 PROCEDURE — 63600175 PHARM REV CODE 636 W HCPCS: Mod: PO | Performed by: INTERNAL MEDICINE

## 2025-08-18 PROCEDURE — 93005 ELECTROCARDIOGRAM TRACING: CPT | Mod: PO | Performed by: INTERNAL MEDICINE

## 2025-08-18 PROCEDURE — 93005 ELECTROCARDIOGRAM TRACING: CPT | Mod: PO

## 2025-08-18 PROCEDURE — 37000009 HC ANESTHESIA EA ADD 15 MINS: Mod: PO | Performed by: INTERNAL MEDICINE

## 2025-08-18 PROCEDURE — 63600175 PHARM REV CODE 636 W HCPCS: Mod: PO | Performed by: NURSE ANESTHETIST, CERTIFIED REGISTERED

## 2025-08-18 PROCEDURE — 37000008 HC ANESTHESIA 1ST 15 MINUTES: Mod: PO | Performed by: INTERNAL MEDICINE

## 2025-08-18 RX ORDER — LIDOCAINE HYDROCHLORIDE 20 MG/ML
INJECTION INTRAVENOUS
Status: DISCONTINUED | OUTPATIENT
Start: 2025-08-18 | End: 2025-08-18

## 2025-08-18 RX ORDER — SODIUM CHLORIDE 9 MG/ML
INJECTION, SOLUTION INTRAVENOUS CONTINUOUS
Status: DISCONTINUED | OUTPATIENT
Start: 2025-08-18 | End: 2025-08-18

## 2025-08-18 RX ORDER — PROPOFOL 10 MG/ML
VIAL (ML) INTRAVENOUS
Status: DISCONTINUED | OUTPATIENT
Start: 2025-08-18 | End: 2025-08-18

## 2025-08-18 RX ORDER — METOPROLOL SUCCINATE 25 MG/1
25 TABLET, EXTENDED RELEASE ORAL NIGHTLY
Qty: 30 TABLET | Refills: 11 | Status: SHIPPED | OUTPATIENT
Start: 2025-08-18

## 2025-08-18 RX ORDER — MUPIROCIN 20 MG/G
OINTMENT TOPICAL
Status: DISCONTINUED | OUTPATIENT
Start: 2025-08-18 | End: 2025-08-18 | Stop reason: HOSPADM

## 2025-08-18 RX ORDER — SODIUM CHLORIDE, SODIUM LACTATE, POTASSIUM CHLORIDE, CALCIUM CHLORIDE 600; 310; 30; 20 MG/100ML; MG/100ML; MG/100ML; MG/100ML
INJECTION, SOLUTION INTRAVENOUS CONTINUOUS
Status: DISCONTINUED | OUTPATIENT
Start: 2025-08-18 | End: 2025-08-18 | Stop reason: HOSPADM

## 2025-08-18 RX ADMIN — PROPOFOL 25 MG: 10 INJECTION, EMULSION INTRAVENOUS at 07:08

## 2025-08-18 RX ADMIN — PROPOFOL 75 MG: 10 INJECTION, EMULSION INTRAVENOUS at 07:08

## 2025-08-18 RX ADMIN — SODIUM CHLORIDE, POTASSIUM CHLORIDE, SODIUM LACTATE AND CALCIUM CHLORIDE: 600; 310; 30; 20 INJECTION, SOLUTION INTRAVENOUS at 06:08

## 2025-08-18 RX ADMIN — LIDOCAINE HYDROCHLORIDE 100 MG: 20 INJECTION INTRAVENOUS at 07:08

## 2025-08-21 ENCOUNTER — HOSPITAL ENCOUNTER (OUTPATIENT)
Dept: CARDIOLOGY | Facility: HOSPITAL | Age: 85
Discharge: HOME | End: 2025-08-21
Attending: INTERNAL MEDICINE
Payer: MEDICARE

## 2025-08-21 ENCOUNTER — CLINICAL SUPPORT (OUTPATIENT)
Dept: CARDIOLOGY | Facility: HOSPITAL | Age: 85
End: 2025-08-21
Payer: MEDICARE

## 2025-08-21 DIAGNOSIS — I49.8 OTHER SPECIFIED CARDIAC ARRHYTHMIAS: ICD-10-CM

## 2025-08-21 PROCEDURE — 93298 REM INTERROG DEV EVAL SCRMS: CPT | Mod: PO | Performed by: INTERNAL MEDICINE

## 2025-08-21 PROCEDURE — 93298 REM INTERROG DEV EVAL SCRMS: CPT | Mod: 26,,, | Performed by: INTERNAL MEDICINE

## 2025-08-25 ENCOUNTER — OFFICE VISIT (OUTPATIENT)
Dept: FAMILY MEDICINE | Facility: CLINIC | Age: 85
End: 2025-08-25
Payer: MEDICARE

## 2025-08-25 ENCOUNTER — HOSPITAL ENCOUNTER (OUTPATIENT)
Dept: RADIOLOGY | Facility: HOSPITAL | Age: 85
Discharge: HOME | End: 2025-08-25
Attending: FAMILY MEDICINE
Payer: MEDICARE

## 2025-08-25 VITALS
HEART RATE: 68 BPM | OXYGEN SATURATION: 96 % | DIASTOLIC BLOOD PRESSURE: 68 MMHG | WEIGHT: 270.31 LBS | HEIGHT: 67 IN | SYSTOLIC BLOOD PRESSURE: 128 MMHG | BODY MASS INDEX: 42.43 KG/M2

## 2025-08-25 DIAGNOSIS — E66.813 CLASS 3 SEVERE OBESITY DUE TO EXCESS CALORIES WITH SERIOUS COMORBIDITY AND BODY MASS INDEX (BMI) OF 40.0 TO 44.9 IN ADULT: ICD-10-CM

## 2025-08-25 DIAGNOSIS — N18.32 STAGE 3B CHRONIC KIDNEY DISEASE: ICD-10-CM

## 2025-08-25 DIAGNOSIS — I10 ESSENTIAL HYPERTENSION: ICD-10-CM

## 2025-08-25 DIAGNOSIS — Z00.01 ANNUAL VISIT FOR GENERAL ADULT MEDICAL EXAMINATION WITH ABNORMAL FINDINGS: Primary | ICD-10-CM

## 2025-08-25 DIAGNOSIS — R53.83 OTHER FATIGUE: ICD-10-CM

## 2025-08-25 DIAGNOSIS — M1A.0720 IDIOPATHIC CHRONIC GOUT OF LEFT FOOT WITHOUT TOPHUS: ICD-10-CM

## 2025-08-25 DIAGNOSIS — E78.49 OTHER HYPERLIPIDEMIA: ICD-10-CM

## 2025-08-25 DIAGNOSIS — R73.03 PREDIABETES: ICD-10-CM

## 2025-08-25 DIAGNOSIS — R06.02 SHORTNESS OF BREATH: ICD-10-CM

## 2025-08-25 DIAGNOSIS — R25.2 LEG CRAMPS: ICD-10-CM

## 2025-08-25 PROCEDURE — 71046 X-RAY EXAM CHEST 2 VIEWS: CPT | Mod: 26,,, | Performed by: RADIOLOGY

## 2025-08-25 PROCEDURE — 99999 PR PBB SHADOW E&M-EST. PATIENT-LVL IV: CPT | Mod: PBBFAC,,, | Performed by: FAMILY MEDICINE

## 2025-08-25 PROCEDURE — 71046 X-RAY EXAM CHEST 2 VIEWS: CPT | Mod: TC,PO

## 2025-08-31 LAB
OHS CV AF BURDEN PERCENT: 77.3
OHS CV DC REMOTE DEVICE TYPE: NORMAL
OHS CV ICD SHOCK: NO

## (undated) DEVICE — NDL HYPODERMIC BLUNT 18G 1.5IN

## (undated) DEVICE — DEVICE PERCLOSE SUT CLSR 6FR

## (undated) DEVICE — NDL SAFETY 25G X 1.5 ECLIPSE

## (undated) DEVICE — CATH DXTERITY PG145 110CM 6FR

## (undated) DEVICE — SEE MEDLINE ITEM 152487

## (undated) DEVICE — GUIDEWIRE STF .035X260CM STR

## (undated) DEVICE — CATH ALII 4FR INFINITY

## (undated) DEVICE — SHEATH PINNACLE 8FR

## (undated) DEVICE — SEE MEDLINE ITEM 156894

## (undated) DEVICE — COVER BAND BAG 40 X 40

## (undated) DEVICE — GUIDEWIRE SUPRA CORE 035 190CM

## (undated) DEVICE — Device

## (undated) DEVICE — TUBING MINIBORE EXTENSION

## (undated) DEVICE — CATH MPA2 INFINITI 4FR 100CM

## (undated) DEVICE — GLOVE SURG ULTRA TOUCH 7.5

## (undated) DEVICE — LINE 60IN PRESSURE MON.

## (undated) DEVICE — SHEATH INTRODUCER 6FR 11CM

## (undated) DEVICE — LINE INJECTION 30IN 25/BX

## (undated) DEVICE — PROTECTION STATION PLUS

## (undated) DEVICE — GUIDEWIRE X SPORT .014IN 190CM

## (undated) DEVICE — DRAPE OPTIMA MAJOR PEDIATRIC

## (undated) DEVICE — STOPCOCK 3-WAY

## (undated) DEVICE — SYR 10CC LUER LOCK

## (undated) DEVICE — TUBING HPCIL ROT M/F ADPT 10IN

## (undated) DEVICE — GUIDEWIRE EMERALD 150CM PTFE

## (undated) DEVICE — OMNIPAQUE 350 200ML

## (undated) DEVICE — KIT MICROINTRO 4F .018X40X7CM

## (undated) DEVICE — SYR MED RAD 150ML

## (undated) DEVICE — CATH TEMP PACER 5.0FR

## (undated) DEVICE — BLLN LOMA VISTA TRUE 20MM

## (undated) DEVICE — CATH DXTERITY AL20 100CM 6FR

## (undated) DEVICE — APPLICATOR CHLORAPREP CLR 10.5

## (undated) DEVICE — WIRE AMPLATZ X-STIFF 035X300

## (undated) DEVICE — CABLE PACER

## (undated) DEVICE — KIT CUSTOM MANIFOLD

## (undated) DEVICE — SYS LABEL CORRECT MED

## (undated) DEVICE — NDL SPINAL SPINOCAN 22GX3.5

## (undated) DEVICE — SYR DISP LL 5CC